# Patient Record
Sex: FEMALE | Race: WHITE | NOT HISPANIC OR LATINO | Employment: OTHER | URBAN - NONMETROPOLITAN AREA
[De-identification: names, ages, dates, MRNs, and addresses within clinical notes are randomized per-mention and may not be internally consistent; named-entity substitution may affect disease eponyms.]

---

## 2017-01-09 ENCOUNTER — TRANSCRIBE ORDERS (OUTPATIENT)
Dept: ADMINISTRATIVE | Facility: HOSPITAL | Age: 82
End: 2017-01-09

## 2017-01-09 DIAGNOSIS — Z12.31 ENCOUNTER FOR SCREENING MAMMOGRAM FOR MALIGNANT NEOPLASM OF BREAST: Primary | ICD-10-CM

## 2017-01-20 ENCOUNTER — OFFICE VISIT (OUTPATIENT)
Dept: CARDIOLOGY | Facility: CLINIC | Age: 82
End: 2017-01-20

## 2017-01-20 VITALS
HEIGHT: 64 IN | DIASTOLIC BLOOD PRESSURE: 72 MMHG | WEIGHT: 170 LBS | BODY MASS INDEX: 29.02 KG/M2 | HEART RATE: 63 BPM | SYSTOLIC BLOOD PRESSURE: 190 MMHG

## 2017-01-20 DIAGNOSIS — I48.0 PAROXYSMAL ATRIAL FIBRILLATION (HCC): Primary | ICD-10-CM

## 2017-01-20 DIAGNOSIS — I10 ESSENTIAL HYPERTENSION: ICD-10-CM

## 2017-01-20 DIAGNOSIS — E78.2 MIXED HYPERLIPIDEMIA: ICD-10-CM

## 2017-01-20 PROCEDURE — 99214 OFFICE O/P EST MOD 30 MIN: CPT | Performed by: INTERNAL MEDICINE

## 2017-01-20 PROCEDURE — 93000 ELECTROCARDIOGRAM COMPLETE: CPT | Performed by: INTERNAL MEDICINE

## 2017-01-20 RX ORDER — ASPIRIN 81 MG/1
81 TABLET ORAL NIGHTLY
COMMUNITY

## 2017-01-20 NOTE — PROGRESS NOTES
Referring Provider: Jung Collins MD    Reason for Follow-up Visit: paroxysmal afib    Subjective .   Chief Complaint:   Chief Complaint   Patient presents with   • Fatigue     3 MO DC FU    • Slow Heart Rate     august - got sent to hospital from [facility]    • Edema     ankle swelling       History of present illness:  Renay Lazo is a 87 y.o. yo female with history of paroxysmal afib, s/p pacemaker. Says she has fallen twice recently resulting in a broken left hip and a broken neck        History  Past Medical History   Diagnosis Date   • Adrenal adenoma    • Anemia    • Atrial fibrillation    • Benign neoplasm of cerebral meninges    • Brain tumor    • CHF (congestive heart failure)    • Dementia    • Disease of thyroid gland    • Hip fracture requiring operative repair      7/2016   • Hyperlipidemia    • Hypertension    • Obstructive sleep apnea    • Osteoarthritis    • Peripheral vascular disease    • Pneumonia    • Seizures    • Sick sinus syndrome      s/p pacemaker per Dr. Puri   • TIA (transient ischemic attack)    ,   Past Surgical History   Procedure Laterality Date   • Brain surgery     • Cholecystectomy     • Hemorrhoidectomy     • Hysterectomy     • Joint replacement     • Cataract extraction     • Tonsillectomy     • Appendectomy     • Total hip arthroplasty     • Pacemaker implantation     • Carotid endarterectomy  02/2011   • Insert / replace / remove pacemaker     ,   Family History   Problem Relation Age of Onset   • Arthritis Mother    • Cancer Mother    • Heart disease Mother    • Arthritis Father    • Arthritis Brother    • Cancer Brother    • Heart disease Brother    • Arthritis Sister    • Heart disease Sister    ,   Social History   Substance Use Topics   • Smoking status: Never Smoker   • Smokeless tobacco: None   • Alcohol use No   ,     Medications  Current Outpatient Prescriptions   Medication Sig Dispense Refill   • amLODIPine (NORVASC) 10 MG tablet Take 10 mg by mouth daily.      • aspirin 81 MG EC tablet Take 81 mg by mouth Daily.     • bumetanide (BUMEX) 1 MG tablet Take 1 mg by mouth daily.     • carvedilol (COREG) 3.125 MG tablet Take 6.25 mg by mouth 2 (Two) Times a Day With Meals.     • DILANTIN 100 MG ER capsule TAKE 4 CAPSULES AT BEDTIME (MUST BE SEEN IN OFFICE) 360 capsule 1   • Ergocalciferol (VITAMIN D2 PO) Take 50,000 Units by mouth 2 (Two) Times a Week.     • ferrous sulfate 325 (65 FE) MG tablet Take 325 mg by mouth daily.     • folic acid (FOLVITE) 1 MG tablet Take 1 mg by mouth daily.     • hydrALAZINE (APRESOLINE) 50 MG tablet Take 50 mg by mouth 4 (four) times a day.     • lisinopril (PRINIVIL,ZESTRIL) 20 MG tablet Take 20 mg by mouth daily.     • potassium chloride (K-DUR,KLOR-CON) 20 MEQ CR tablet Take 20 mEq by mouth 2 (Two) Times a Day.     • pravastatin (PRAVACHOL) 40 MG tablet Take 40 mg by mouth daily.     • acetaminophen (TYLENOL) 325 MG tablet Take 650 mg by mouth.  Tablets every 4 hours     • aluminum hydroxide-magnesium carbonate (GAVISCON)  MG/15ML suspension oral suspension Take 15 mL by mouth 3 (three) times a day as needed.     • citalopram (CeleXA) 10 MG tablet Take 10 mg by mouth Daily.     • fluticasone (FLONASE) 50 MCG/ACT nasal spray 1 spray into each nostril daily. Administer 2 sprays in each nostril for each dose.     • ipratropium (ATROVENT HFA) 17 MCG/ACT inhaler Inhale 1 puff 4 (four) times a day.     • NON FORMULARY Take 50,000 Units by mouth. Drisdol 1 capsule Monday and Thursday.     • ondansetron (ZOFRAN) 4 MG tablet Take 4 mg by mouth 2 (two) times a day as needed for nausea or vomiting.     • polyethylene glycol (MIRALAX) packet Take 17 g by mouth daily.     • Psyllium (METAMUCIL) wafer wafer Take  by mouth daily.     • sennosides-docusate sodium (SENOKOT-S) 8.6-50 MG tablet Take 2 tablets by mouth 2 (Two) Times a Day.     • vitamin E 400 UNIT capsule Take 400 Units by mouth. 2 capsules in morning, 2 capsules at noon, and one capsule  "at night       No current facility-administered medications for this visit.        Allergies:  Chocolate; Ampicillin; Benadryl [diphenhydramine]; Biaxin [clarithromycin]; Capoten [captopril]; Ciprofloxacin; Ciprofloxacin hcl; Codeine; Contrast dye; Darvon [propoxyphene]; Diphenhydramine hcl; Dyazide [triamterene-hctz]; Enablex [darifenacin hydrobromide er]; Gadolinium derivatives; Homatropine; Hydrochlorothiazide w-triamterene; Hydrocodone; Inderal la [propranolol hcl]; Iodine; Levaquin [levofloxacin]; Lortab [hydrocodone-acetaminophen]; Lotrel [amlodipine besy-benazepril hcl]; Macrodantin [nitrofurantoin macrocrystal]; Motrin [ibuprofen]; Macrodantin  [nitrofurantoin]; Other; Percocet [oxycodone-acetaminophen]; Phosphate; Propranolol; Qvar [beclomethasone]; Sulfa antibiotics; Terramycin [oxytetracycline]; and Vesicare [solifenacin]    Review of Systems  Review of Systems   HENT: Negative for nosebleeds.    Cardiovascular: Positive for leg swelling. Negative for chest pain, claudication, dyspnea on exertion, irregular heartbeat, near-syncope, orthopnea, palpitations, paroxysmal nocturnal dyspnea and syncope.   Respiratory: Negative for cough, hemoptysis and shortness of breath.    Musculoskeletal: Positive for joint pain.   Gastrointestinal: Negative for dysphagia, hematemesis and melena.   Genitourinary: Negative for hematuria.       Objective     Physical Exam:  Visit Vitals   • BP (!) 190/72 (BP Location: Left arm, Patient Position: Sitting)   • Pulse 63   • Ht 64\" (162.6 cm)   • Wt 170 lb (77.1 kg)   • BMI 29.18 kg/m2     Physical Exam   Constitutional: She is oriented to person, place, and time. She appears well-developed and well-nourished. No distress.   HENT:   Head: Normocephalic and atraumatic.   Eyes: No scleral icterus.   Neck: Normal range of motion.   Cardiovascular: Normal rate and regular rhythm.    Murmur heard.   Midsystolic murmur is present with a grade of 1/6  at the upper right sternal " border  Pulmonary/Chest: No respiratory distress. She has no wheezes. She has no rales. She exhibits no tenderness.   Abdominal: Soft. Bowel sounds are normal. There is no tenderness. There is no rebound and no guarding.   Musculoskeletal: She exhibits edema.   Neurological: She is alert and oriented to person, place, and time. No cranial nerve deficit.   Skin: Skin is warm. She is diaphoretic. No erythema.   Psychiatric: She has a normal mood and affect.       Results Review:    ECG 12 Lead  Date/Time: 1/20/2017 8:37 AM  Performed by: ANNEMARIE RUTLEDGE  Authorized by: ANNEMARIE RUTLEDGE   Comparison: compared with previous ECG   Similar to previous ECG  Rhythm: sinus rhythm and paced  Rate: normal  ST Segments: ST segments normal  T Waves: T waves normal  QRS axis: normal  Clinical impression: normal ECG  Comments: Atrial paced rhythm            Lab Requisition on 10/15/2016   Component Date Value Ref Range Status   • C. Difficile Toxins by PCR 10/15/2016 Negative  Negative Final   • Stool Culture 10/15/2016 Heavy growth (4+) Normal Fecal Marci   Final    No Salmonella,Shigella,or Vibrio Isolated  No Campylobacter Isolated  No Escherichia Coli 0157 Isolated  No Yersinia Isolated     • SHIGA TOXIN 1 10/15/2016 Negative   Final   • SHIGA TOXIN 2 10/15/2016 Negative   Final       Assessment/Plan   Renay was seen today for fatigue, slow heart rate and edema.    Diagnoses and all orders for this visit:    Paroxysmal atrial fibrillation, maintaining SR    Mixed hyperlipidemia, Patient's statin therapy is followed by PCP.      Essential hypertension, running high. She says she didn't take her morning meds. Needs to have meds adjusted but these have all been changed by Dr. Collins. The meds we have listed are incorrect. She needs to get back in to see him soon

## 2017-01-20 NOTE — MR AVS SNAPSHOT
Renay Lazo   1/20/2017 8:00 AM   Office Visit    Dept Phone:  [phone]   Encounter #:  [id number]    Provider:  Mik Puri MD   Department:  [facility]                Your Full Care Plan              Today's Medication Changes          These changes are accurate as of: 1/20/17  8:53 AM.  If you have any questions, ask your nurse or doctor.               Stop taking medication(s)listed here:     atenolol 25 MG tablet   Commonly known as:  TENORMIN   Stopped by:  Mik Puri MD           CloNIDine 0.1 MG tablet   Commonly known as:  CATAPRES   Stopped by:  Mik Puri MD           diltiaZEM  MG 24 hr capsule   Commonly known as:  CARDIZEM CD   Stopped by:  Mik Puri MD           enoxaparin 40 MG/0.4ML solution syringe   Commonly known as:  LOVENOX   Stopped by:  Mik Puri MD                      Your Updated Medication List          This list is accurate as of: 1/20/17  8:53 AM.  Always use your most recent med list.                acetaminophen 325 MG tablet   Commonly known as:  TYLENOL       aluminum hydroxide-magnesium carbonate  MG/15ML suspension oral suspension   Commonly known as:  GAVISCON       amLODIPine 10 MG tablet   Commonly known as:  NORVASC       aspirin 81 MG EC tablet       bumetanide 1 MG tablet   Commonly known as:  BUMEX       carvedilol 3.125 MG tablet   Commonly known as:  COREG       citalopram 10 MG tablet   Commonly known as:  CeleXA       DILANTIN 100 MG ER capsule   Generic drug:  phenytoin   TAKE 4 CAPSULES AT BEDTIME (MUST BE SEEN IN OFFICE)       ferrous sulfate 325 (65 FE) MG tablet       fluticasone 50 MCG/ACT nasal spray   Commonly known as:  FLONASE       folic acid 1 MG tablet   Commonly known as:  FOLVITE       hydrALAZINE 50 MG tablet   Commonly known as:  APRESOLINE       ipratropium 17 MCG/ACT inhaler   Commonly known as:  ATROVENT HFA       lisinopril 20 MG tablet   Commonly known as:   PRINIVIL,ZESTRIL       METAMUCIL wafer wafer       NON FORMULARY       ondansetron 4 MG tablet   Commonly known as:  ZOFRAN       polyethylene glycol packet   Commonly known as:  MIRALAX       potassium chloride 20 MEQ CR tablet   Commonly known as:  K-DUR,KLOR-CON       pravastatin 40 MG tablet   Commonly known as:  PRAVACHOL       sennosides-docusate sodium 8.6-50 MG tablet   Commonly known as:  SENOKOT-S   Take 2 tablets by mouth 2 (Two) Times a Day.       VITAMIN D2 PO       vitamin E 400 UNIT capsule               We Performed the Following     ECG 12 Lead       You Were Diagnosed With        Codes Comments    Paroxysmal atrial fibrillation    -  Primary ICD-10-CM: I48.0  ICD-9-CM: 427.31     Mixed hyperlipidemia     ICD-10-CM: E78.2  ICD-9-CM: 272.2     Essential hypertension     ICD-10-CM: I10  ICD-9-CM: 401.9       Instructions     None    Patient Instructions History      Upcoming Appointments     Visit Type Date Time Department    FOLLOW UP 2017  8:00 AM [facility]    MAMMO PAD SCREEN EWA BILATERAL 3/20/2017  2:30 PM  PAD MAMMO BIC    FOLLOW UP 1/15/2018 10:15 AM [facility]      MyChart Signup     [facility] allows you to send messages to your doctor, view your test results, renew your prescriptions, schedule appointments, and more. To sign up, go to [employer] and click on the Sign Up Now link in the New User? box. Enter your [employer] Activation Code exactly as it appears below along with the last four digits of your Social Security Number and your Date of Birth () to complete the sign-up process. If you do not sign up before the expiration date, you must request a new code.    R[employer] [facility] Activation Code: [email or web address]  Expires: 2017  4:40 AM    If you have questions, you can email [email or web address] or call [phone] to talk to our [employer] [facility] staff. Remember, [employer] is NOT to be used for urgent needs. For medical emergencies, dial  911.               Other Info from Your Visit           Your Appointments     Mar 20, 2017  2:30 PM CDT   Mammo pad  screen bilat with PAD BIC MAMM 2   [facility] MAMMO [facility] ([place])    [address] [place] [id number] [phone]           Arrive 30 minutes prior to exam time. Do not apply deodorant or talcum/body powders on the day of the exam prior to the appointment. Must bring previous Mammography images/reports. Can take all medications as directed. No breastfeeding for 3 months prior to exam date.            Thong 15, 2018 10:15 AM CST   Follow Up with Mik Puri MD   [facility] (--)    [address] [place] [id number] [phone]           Arrive 15 minutes prior to appointment.              Allergies     Chocolate Allergy Hives    Ampicillin      Benadryl [Diphenhydramine]      Biaxin [Clarithromycin]      Capoten [Captopril]      Patient takes Lisinopril at home    Ciprofloxacin      Ciprofloxacin Hcl      Codeine      Contrast Dye      Darvon [Propoxyphene]      Diphenhydramine Hcl      Dyazide [Triamterene-hctz]      Enablex [Darifenacin Hydrobromide Er]      Gadolinium Derivatives      Homatropine      Hydrochlorothiazide W-triamterene      Hydrocodone      Inderal La [Propranolol Hcl]      Patient takes Atenolol at Home    Iodine      Levaquin [Levofloxacin]      Lortab [Hydrocodone-acetaminophen]      Lotrel [Amlodipine Besy-benazepril Hcl]      Patient takes Amlodipine at home.    Macrodantin [Nitrofurantoin Macrocrystal]      Motrin [Ibuprofen]      Macrodantin  [Nitrofurantoin]      Other      Can now have only unenhanced CAT scan    Percocet [Oxycodone-acetaminophen]      Phosphate      Propranolol      Qvar [Beclomethasone]      Sulfa Antibiotics      Terramycin [Oxytetracycline]      Vesicare [Solifenacin]        Reason for Visit     Fatigue 3 MO DC FU     Slow Heart Rate august - got sent to hospital from [facility]      "Edema ankle swelling      Vital Signs     Blood Pressure Pulse Height Weight Body Mass Index Smoking Status    190/72 (BP Location: Left arm, Patient Position: Sitting) 63 64\" (162.6 cm) 170 lb (77.1 kg) 29.18 kg/m2 Never Smoker      Problems and Diagnoses Noted     Atrial fibrillation (irregular heartbeat)    Mixed hyperlipidemia        High blood pressure          Results     ECG 12 Lead               "

## 2017-01-20 NOTE — LETTER
January 20, 2017     Jung Collins MD  [address] [place] [address]    Patient: Renay Laoz   YOB: 1929   Date of Visit: 1/20/2017       Dear Dr. Karina MD:    Renay Lazo was in my office today. Below is a copy of my note.    Her BP is running very high and her meds need to be adjusted. If you have questions, please do not hesitate to call me. I look forward to following Renay along with you.         Sincerely,        Mik Puri MD        CC: No Recipients    No notes on file

## 2017-03-20 ENCOUNTER — LAB (OUTPATIENT)
Dept: LAB | Facility: HOSPITAL | Age: 82
End: 2017-03-20

## 2017-03-20 ENCOUNTER — HOSPITAL ENCOUNTER (OUTPATIENT)
Dept: MAMMOGRAPHY | Facility: HOSPITAL | Age: 82
Discharge: HOME | End: 2017-03-20
Admitting: INTERNAL MEDICINE

## 2017-03-20 DIAGNOSIS — Z12.31 ENCOUNTER FOR SCREENING MAMMOGRAM FOR MALIGNANT NEOPLASM OF BREAST: ICD-10-CM

## 2017-03-20 DIAGNOSIS — Z79.899 MEDICATION MANAGEMENT: ICD-10-CM

## 2017-03-20 LAB
ALBUMIN SERPL-MCNC: 3.9 G/DL (ref 3.5–5)
ALBUMIN/GLOB SERPL: 1.3 G/DL (ref 1.1–2.5)
ALP SERPL-CCNC: 122 U/L (ref 24–120)
ALT SERPL W P-5'-P-CCNC: 19 U/L (ref 0–54)
ANION GAP SERPL CALCULATED.3IONS-SCNC: 10 MMOL/L (ref 4–13)
AST SERPL-CCNC: 32 U/L (ref 7–45)
BILIRUB SERPL-MCNC: 0.3 MG/DL (ref 0.1–1)
BUN BLD-MCNC: 22 MG/DL (ref 5–21)
BUN/CREAT SERPL: 24.4 (ref 7–25)
CALCIUM SPEC-SCNC: 8.6 MG/DL (ref 8.4–10.4)
CHLORIDE SERPL-SCNC: 106 MMOL/L (ref 98–110)
CO2 SERPL-SCNC: 25 MMOL/L (ref 24–31)
CREAT BLD-MCNC: 0.9 MG/DL (ref 0.5–1.4)
DEPRECATED RDW RBC AUTO: 48.2 FL (ref 40–54)
ERYTHROCYTE [DISTWIDTH] IN BLOOD BY AUTOMATED COUNT: 14.2 % (ref 12–15)
GFR SERPL CREATININE-BSD FRML MDRD: 59 ML/MIN/1.73
GLOBULIN UR ELPH-MCNC: 3 GM/DL
GLUCOSE BLD-MCNC: 95 MG/DL (ref 70–100)
HCT VFR BLD AUTO: 34.1 % (ref 37–47)
HGB BLD-MCNC: 11.4 G/DL (ref 12–16)
MCH RBC QN AUTO: 30.9 PG (ref 28–32)
MCHC RBC AUTO-ENTMCNC: 33.4 G/DL (ref 33–36)
MCV RBC AUTO: 92.4 FL (ref 82–98)
PHENYTOIN SERPL-MCNC: 12.5 MCG/ML (ref 10–20)
PLATELET # BLD AUTO: 251 10*3/MM3 (ref 130–400)
PMV BLD AUTO: 9.5 FL (ref 6–12)
POTASSIUM BLD-SCNC: 4.2 MMOL/L (ref 3.5–5.3)
PROT SERPL-MCNC: 6.9 G/DL (ref 6.3–8.7)
RBC # BLD AUTO: 3.69 10*6/MM3 (ref 4.2–5.4)
SODIUM BLD-SCNC: 141 MMOL/L (ref 135–145)
WBC NRBC COR # BLD: 4.84 10*3/MM3 (ref 4.8–10.8)

## 2017-03-20 PROCEDURE — G0202 SCR MAMMO BI INCL CAD: HCPCS

## 2017-03-20 PROCEDURE — 80053 COMPREHEN METABOLIC PANEL: CPT | Performed by: CLINICAL NURSE SPECIALIST

## 2017-03-20 PROCEDURE — 80185 ASSAY OF PHENYTOIN TOTAL: CPT | Performed by: CLINICAL NURSE SPECIALIST

## 2017-03-20 PROCEDURE — 85027 COMPLETE CBC AUTOMATED: CPT | Performed by: CLINICAL NURSE SPECIALIST

## 2017-03-20 PROCEDURE — 77063 BREAST TOMOSYNTHESIS BI: CPT

## 2017-05-30 RX ORDER — PHENYTOIN SODIUM 100 MG/1
400 CAPSULE, EXTENDED RELEASE ORAL NIGHTLY
Qty: 120 CAPSULE | Refills: 0 | Status: ON HOLD | OUTPATIENT
Start: 2017-05-30 | End: 2017-06-21

## 2017-06-19 ENCOUNTER — APPOINTMENT (OUTPATIENT)
Dept: GENERAL RADIOLOGY | Facility: HOSPITAL | Age: 82
End: 2017-06-19

## 2017-06-19 ENCOUNTER — HOSPITAL ENCOUNTER (INPATIENT)
Facility: HOSPITAL | Age: 82
LOS: 9 days | Discharge: NURSING, REHAB OR LONG-TERM CARE | End: 2017-06-28
Attending: EMERGENCY MEDICINE | Admitting: FAMILY MEDICINE

## 2017-06-19 DIAGNOSIS — S72.141A CLOSED DISPLACED INTERTROCHANTERIC FRACTURE OF RIGHT FEMUR, INITIAL ENCOUNTER (HCC): Primary | ICD-10-CM

## 2017-06-19 DIAGNOSIS — Z78.9 DECREASED ACTIVITIES OF DAILY LIVING (ADL): ICD-10-CM

## 2017-06-19 DIAGNOSIS — Z74.09 IMPAIRED MOBILITY: ICD-10-CM

## 2017-06-19 DIAGNOSIS — R13.12 OROPHARYNGEAL DYSPHAGIA: ICD-10-CM

## 2017-06-19 DIAGNOSIS — S72.011A CLOSED SUBCAPITAL FRACTURE OF NECK OF RIGHT FEMUR, INITIAL ENCOUNTER (HCC): ICD-10-CM

## 2017-06-19 PROBLEM — I10 HYPERTENSION: Status: ACTIVE | Noted: 2017-06-19

## 2017-06-19 PROBLEM — I51.89 DIASTOLIC DYSFUNCTION: Status: ACTIVE | Noted: 2017-06-19

## 2017-06-19 PROBLEM — G47.33 OBSTRUCTIVE SLEEP APNEA: Status: ACTIVE | Noted: 2017-06-19

## 2017-06-19 PROBLEM — J81.1 PULMONARY EDEMA: Status: ACTIVE | Noted: 2017-06-19

## 2017-06-19 PROBLEM — I27.20 PULMONARY HTN (HCC): Status: ACTIVE | Noted: 2017-06-19

## 2017-06-19 PROBLEM — F03.90 DEMENTIA (HCC): Status: ACTIVE | Noted: 2017-06-19

## 2017-06-19 LAB
ABO GROUP BLD: NORMAL
ALBUMIN SERPL-MCNC: 4.1 G/DL (ref 3.5–5)
ALBUMIN/GLOB SERPL: 1.2 G/DL (ref 1.1–2.5)
ALP SERPL-CCNC: 189 U/L (ref 24–120)
ALT SERPL W P-5'-P-CCNC: 34 U/L (ref 0–54)
ANION GAP SERPL CALCULATED.3IONS-SCNC: 14 MMOL/L (ref 4–13)
ANTI-E: NORMAL
ANTI-LITTLE C: NORMAL
APTT PPP: 29.4 SECONDS (ref 24.1–34.8)
AST SERPL-CCNC: 49 U/L (ref 7–45)
BACTERIA UR QL AUTO: ABNORMAL /HPF
BASOPHILS # BLD AUTO: 0.01 10*3/MM3 (ref 0–0.2)
BASOPHILS NFR BLD AUTO: 0.1 % (ref 0–2)
BILIRUB SERPL-MCNC: 0.6 MG/DL (ref 0.1–1)
BILIRUB UR QL STRIP: NEGATIVE
BLD GP AB SCN SERPL QL: POSITIVE
BUN BLD-MCNC: 21 MG/DL (ref 5–21)
BUN/CREAT SERPL: 27.6 (ref 7–25)
CALCIUM SPEC-SCNC: 8.7 MG/DL (ref 8.4–10.4)
CHLORIDE SERPL-SCNC: 107 MMOL/L (ref 98–110)
CK SERPL-CCNC: 49 U/L (ref 0–203)
CLARITY UR: ABNORMAL
CO2 SERPL-SCNC: 23 MMOL/L (ref 24–31)
COLOR UR: YELLOW
CREAT BLD-MCNC: 0.76 MG/DL (ref 0.5–1.4)
D-LACTATE SERPL-SCNC: 1 MMOL/L (ref 0.5–2)
DEPRECATED RDW RBC AUTO: 47.5 FL (ref 40–54)
E AG RBC QL: NEGATIVE
EOSINOPHIL # BLD AUTO: 0.05 10*3/MM3 (ref 0–0.7)
EOSINOPHIL NFR BLD AUTO: 0.4 % (ref 0–4)
ERYTHROCYTE [DISTWIDTH] IN BLOOD BY AUTOMATED COUNT: 13.6 % (ref 12–15)
GFR SERPL CREATININE-BSD FRML MDRD: 72 ML/MIN/1.73
GLOBULIN UR ELPH-MCNC: 3.5 GM/DL
GLUCOSE BLD-MCNC: 182 MG/DL (ref 70–100)
GLUCOSE UR STRIP-MCNC: NEGATIVE MG/DL
HCT VFR BLD AUTO: 38.6 % (ref 37–47)
HGB BLD-MCNC: 12.9 G/DL (ref 12–16)
HGB UR QL STRIP.AUTO: ABNORMAL
HOLD SPECIMEN: NORMAL
HOLD SPECIMEN: NORMAL
HYALINE CASTS UR QL AUTO: ABNORMAL /LPF
IMM GRANULOCYTES # BLD: 0.03 10*3/MM3 (ref 0–0.03)
IMM GRANULOCYTES NFR BLD: 0.2 % (ref 0–5)
INR PPP: 1.05 (ref 0.91–1.09)
KETONES UR QL STRIP: NEGATIVE
LEUKOCYTE ESTERASE UR QL STRIP.AUTO: ABNORMAL
LITTLE C: NEGATIVE
LYMPHOCYTES # BLD AUTO: 0.86 10*3/MM3 (ref 0.72–4.86)
LYMPHOCYTES NFR BLD AUTO: 7.1 % (ref 15–45)
MCH RBC QN AUTO: 32 PG (ref 28–32)
MCHC RBC AUTO-ENTMCNC: 33.4 G/DL (ref 33–36)
MCV RBC AUTO: 95.8 FL (ref 82–98)
MONOCYTES # BLD AUTO: 1.01 10*3/MM3 (ref 0.19–1.3)
MONOCYTES NFR BLD AUTO: 8.3 % (ref 4–12)
NEUTROPHILS # BLD AUTO: 10.2 10*3/MM3 (ref 1.87–8.4)
NEUTROPHILS NFR BLD AUTO: 83.9 % (ref 39–78)
NITRITE UR QL STRIP: NEGATIVE
PH UR STRIP.AUTO: 7 [PH] (ref 5–8)
PHENYTOIN SERPL-MCNC: 16.9 MCG/ML (ref 10–20)
PLATELET # BLD AUTO: 286 10*3/MM3 (ref 130–400)
PMV BLD AUTO: 9.8 FL (ref 6–12)
POTASSIUM BLD-SCNC: 3.7 MMOL/L (ref 3.5–5.3)
PROT SERPL-MCNC: 7.6 G/DL (ref 6.3–8.7)
PROT UR QL STRIP: ABNORMAL
PROTHROMBIN TIME: 14 SECONDS (ref 11.9–14.6)
RBC # BLD AUTO: 4.03 10*6/MM3 (ref 4.2–5.4)
RBC # UR: ABNORMAL /HPF
REF LAB TEST METHOD: ABNORMAL
RH BLD: POSITIVE
SODIUM BLD-SCNC: 144 MMOL/L (ref 135–145)
SP GR UR STRIP: 1.02 (ref 1–1.03)
SQUAMOUS #/AREA URNS HPF: ABNORMAL /HPF
TROPONIN I SERPL-MCNC: 0.23 NG/ML (ref 0–0.03)
UROBILINOGEN UR QL STRIP: ABNORMAL
WBC NRBC COR # BLD: 12.16 10*3/MM3 (ref 4.8–10.8)
WBC UR QL AUTO: ABNORMAL /HPF
WHOLE BLOOD HOLD SPECIMEN: NORMAL
WHOLE BLOOD HOLD SPECIMEN: NORMAL

## 2017-06-19 PROCEDURE — 87086 URINE CULTURE/COLONY COUNT: CPT | Performed by: INTERNAL MEDICINE

## 2017-06-19 PROCEDURE — 86902 BLOOD TYPE ANTIGEN DONOR EA: CPT

## 2017-06-19 PROCEDURE — 82550 ASSAY OF CK (CPK): CPT | Performed by: INTERNAL MEDICINE

## 2017-06-19 PROCEDURE — 86900 BLOOD TYPING SEROLOGIC ABO: CPT | Performed by: EMERGENCY MEDICINE

## 2017-06-19 PROCEDURE — 81001 URINALYSIS AUTO W/SCOPE: CPT | Performed by: INTERNAL MEDICINE

## 2017-06-19 PROCEDURE — 80185 ASSAY OF PHENYTOIN TOTAL: CPT | Performed by: INTERNAL MEDICINE

## 2017-06-19 PROCEDURE — 83605 ASSAY OF LACTIC ACID: CPT | Performed by: EMERGENCY MEDICINE

## 2017-06-19 PROCEDURE — 86870 RBC ANTIBODY IDENTIFICATION: CPT | Performed by: EMERGENCY MEDICINE

## 2017-06-19 PROCEDURE — 25010000002 HYDRALAZINE PER 20 MG: Performed by: INTERNAL MEDICINE

## 2017-06-19 PROCEDURE — 94799 UNLISTED PULMONARY SVC/PX: CPT

## 2017-06-19 PROCEDURE — 86920 COMPATIBILITY TEST SPIN: CPT

## 2017-06-19 PROCEDURE — 87186 SC STD MICRODIL/AGAR DIL: CPT | Performed by: INTERNAL MEDICINE

## 2017-06-19 PROCEDURE — 86850 RBC ANTIBODY SCREEN: CPT | Performed by: EMERGENCY MEDICINE

## 2017-06-19 PROCEDURE — 87077 CULTURE AEROBIC IDENTIFY: CPT | Performed by: INTERNAL MEDICINE

## 2017-06-19 PROCEDURE — 86922 COMPATIBILITY TEST ANTIGLOB: CPT

## 2017-06-19 PROCEDURE — 85610 PROTHROMBIN TIME: CPT | Performed by: EMERGENCY MEDICINE

## 2017-06-19 PROCEDURE — 84484 ASSAY OF TROPONIN QUANT: CPT | Performed by: INTERNAL MEDICINE

## 2017-06-19 PROCEDURE — 25010000002 HYDROMORPHONE PER 4 MG: Performed by: INTERNAL MEDICINE

## 2017-06-19 PROCEDURE — 85730 THROMBOPLASTIN TIME PARTIAL: CPT | Performed by: EMERGENCY MEDICINE

## 2017-06-19 PROCEDURE — 85025 COMPLETE CBC W/AUTO DIFF WBC: CPT | Performed by: EMERGENCY MEDICINE

## 2017-06-19 PROCEDURE — 25010000002 FUROSEMIDE PER 20 MG: Performed by: FAMILY MEDICINE

## 2017-06-19 PROCEDURE — 71010 HC CHEST PA OR AP: CPT

## 2017-06-19 PROCEDURE — 93010 ELECTROCARDIOGRAM REPORT: CPT | Performed by: INTERNAL MEDICINE

## 2017-06-19 PROCEDURE — 86905 BLOOD TYPING RBC ANTIGENS: CPT | Performed by: EMERGENCY MEDICINE

## 2017-06-19 PROCEDURE — 93005 ELECTROCARDIOGRAM TRACING: CPT | Performed by: EMERGENCY MEDICINE

## 2017-06-19 PROCEDURE — 25010000002 FENTANYL CITRATE (PF) 100 MCG/2ML SOLUTION: Performed by: EMERGENCY MEDICINE

## 2017-06-19 PROCEDURE — 86901 BLOOD TYPING SEROLOGIC RH(D): CPT | Performed by: EMERGENCY MEDICINE

## 2017-06-19 PROCEDURE — 73502 X-RAY EXAM HIP UNI 2-3 VIEWS: CPT

## 2017-06-19 PROCEDURE — 99285 EMERGENCY DEPT VISIT HI MDM: CPT

## 2017-06-19 PROCEDURE — 80053 COMPREHEN METABOLIC PANEL: CPT | Performed by: EMERGENCY MEDICINE

## 2017-06-19 RX ORDER — POTASSIUM CHLORIDE 750 MG/1
20 CAPSULE, EXTENDED RELEASE ORAL 2 TIMES DAILY
Status: DISCONTINUED | OUTPATIENT
Start: 2017-06-19 | End: 2017-06-22

## 2017-06-19 RX ORDER — ONDANSETRON 2 MG/ML
4 INJECTION INTRAMUSCULAR; INTRAVENOUS EVERY 6 HOURS PRN
Status: DISCONTINUED | OUTPATIENT
Start: 2017-06-19 | End: 2017-06-25 | Stop reason: SDUPTHER

## 2017-06-19 RX ORDER — CARVEDILOL 6.25 MG/1
6.25 TABLET ORAL 2 TIMES DAILY WITH MEALS
Status: DISCONTINUED | OUTPATIENT
Start: 2017-06-19 | End: 2017-06-21

## 2017-06-19 RX ORDER — FENTANYL CITRATE 50 UG/ML
50 INJECTION, SOLUTION INTRAMUSCULAR; INTRAVENOUS ONCE
Status: COMPLETED | OUTPATIENT
Start: 2017-06-19 | End: 2017-06-19

## 2017-06-19 RX ORDER — HYDRALAZINE HYDROCHLORIDE 20 MG/ML
20 INJECTION INTRAMUSCULAR; INTRAVENOUS EVERY 4 HOURS PRN
Status: DISCONTINUED | OUTPATIENT
Start: 2017-06-19 | End: 2017-06-28 | Stop reason: HOSPADM

## 2017-06-19 RX ORDER — HYDRALAZINE HYDROCHLORIDE 50 MG/1
50 TABLET, FILM COATED ORAL 4 TIMES DAILY
Status: DISCONTINUED | OUTPATIENT
Start: 2017-06-19 | End: 2017-06-21

## 2017-06-19 RX ORDER — FUROSEMIDE 10 MG/ML
40 INJECTION INTRAMUSCULAR; INTRAVENOUS ONCE
Status: COMPLETED | OUTPATIENT
Start: 2017-06-19 | End: 2017-06-19

## 2017-06-19 RX ORDER — HYDRALAZINE HYDROCHLORIDE 20 MG/ML
10 INJECTION INTRAMUSCULAR; INTRAVENOUS EVERY 4 HOURS PRN
Status: DISCONTINUED | OUTPATIENT
Start: 2017-06-19 | End: 2017-06-19

## 2017-06-19 RX ORDER — PHENYTOIN SODIUM 100 MG/1
400 CAPSULE, EXTENDED RELEASE ORAL NIGHTLY
Status: DISCONTINUED | OUTPATIENT
Start: 2017-06-19 | End: 2017-06-28 | Stop reason: HOSPADM

## 2017-06-19 RX ORDER — PANTOPRAZOLE SODIUM 40 MG/10ML
40 INJECTION, POWDER, LYOPHILIZED, FOR SOLUTION INTRAVENOUS
Status: DISCONTINUED | OUTPATIENT
Start: 2017-06-19 | End: 2017-06-25 | Stop reason: CLARIF

## 2017-06-19 RX ORDER — POLYETHYLENE GLYCOL 3350 17 G/17G
17 POWDER, FOR SOLUTION ORAL DAILY
Status: DISCONTINUED | OUTPATIENT
Start: 2017-06-19 | End: 2017-06-24

## 2017-06-19 RX ORDER — LISINOPRIL 20 MG/1
20 TABLET ORAL DAILY
Status: DISCONTINUED | OUTPATIENT
Start: 2017-06-19 | End: 2017-06-24

## 2017-06-19 RX ORDER — LABETALOL HYDROCHLORIDE 5 MG/ML
20 INJECTION, SOLUTION INTRAVENOUS
Status: DISCONTINUED | OUTPATIENT
Start: 2017-06-19 | End: 2017-06-28 | Stop reason: HOSPADM

## 2017-06-19 RX ORDER — AMLODIPINE BESYLATE 10 MG/1
10 TABLET ORAL DAILY
Status: DISCONTINUED | OUTPATIENT
Start: 2017-06-19 | End: 2017-06-28 | Stop reason: HOSPADM

## 2017-06-19 RX ORDER — SENNA AND DOCUSATE SODIUM 50; 8.6 MG/1; MG/1
2 TABLET, FILM COATED ORAL 2 TIMES DAILY
Status: DISCONTINUED | OUTPATIENT
Start: 2017-06-19 | End: 2017-06-26

## 2017-06-19 RX ADMIN — HYDRALAZINE HYDROCHLORIDE 50 MG: 50 TABLET ORAL at 17:26

## 2017-06-19 RX ADMIN — HYDROMORPHONE HYDROCHLORIDE 1 MG: 1 INJECTION, SOLUTION INTRAMUSCULAR; INTRAVENOUS; SUBCUTANEOUS at 06:18

## 2017-06-19 RX ADMIN — SODIUM CHLORIDE 10 MG/HR: 9 INJECTION, SOLUTION INTRAVENOUS at 16:52

## 2017-06-19 RX ADMIN — CARVEDILOL 6.25 MG: 6.25 TABLET, FILM COATED ORAL at 17:26

## 2017-06-19 RX ADMIN — FUROSEMIDE 40 MG: 10 INJECTION, SOLUTION INTRAMUSCULAR; INTRAVENOUS at 08:35

## 2017-06-19 RX ADMIN — HYDRALAZINE HYDROCHLORIDE 50 MG: 50 TABLET ORAL at 20:30

## 2017-06-19 RX ADMIN — DOCUSATE SODIUM -SENNOSIDES 2 TABLET: 50; 8.6 TABLET, COATED ORAL at 17:26

## 2017-06-19 RX ADMIN — POTASSIUM CHLORIDE 20 MEQ: 750 CAPSULE, EXTENDED RELEASE ORAL at 17:26

## 2017-06-19 RX ADMIN — POLYETHYLENE GLYCOL 3350 17 G: 17 POWDER, FOR SOLUTION ORAL at 17:26

## 2017-06-19 RX ADMIN — HYDROMORPHONE HYDROCHLORIDE 0.5 MG: 1 INJECTION, SOLUTION INTRAMUSCULAR; INTRAVENOUS; SUBCUTANEOUS at 10:13

## 2017-06-19 RX ADMIN — LABETALOL HYDROCHLORIDE 20 MG: 5 INJECTION, SOLUTION INTRAVENOUS at 08:35

## 2017-06-19 RX ADMIN — FUROSEMIDE 40 MG: 10 INJECTION, SOLUTION INTRAMUSCULAR; INTRAVENOUS at 20:30

## 2017-06-19 RX ADMIN — LISINOPRIL 20 MG: 20 TABLET ORAL at 17:26

## 2017-06-19 RX ADMIN — HYDROMORPHONE HYDROCHLORIDE 1 MG: 1 INJECTION, SOLUTION INTRAMUSCULAR; INTRAVENOUS; SUBCUTANEOUS at 23:30

## 2017-06-19 RX ADMIN — SODIUM CHLORIDE 5 MG/HR: 9 INJECTION, SOLUTION INTRAVENOUS at 10:36

## 2017-06-19 RX ADMIN — AMLODIPINE BESYLATE 10 MG: 10 TABLET ORAL at 17:26

## 2017-06-19 RX ADMIN — HYDRALAZINE HYDROCHLORIDE 10 MG: 20 INJECTION INTRAMUSCULAR; INTRAVENOUS at 06:18

## 2017-06-19 RX ADMIN — HYDROMORPHONE HYDROCHLORIDE 0.5 MG: 1 INJECTION, SOLUTION INTRAMUSCULAR; INTRAVENOUS; SUBCUTANEOUS at 16:52

## 2017-06-19 RX ADMIN — FENTANYL CITRATE 50 MCG: 50 INJECTION, SOLUTION INTRAMUSCULAR; INTRAVENOUS at 03:26

## 2017-06-19 RX ADMIN — FENTANYL CITRATE 50 MCG: 50 INJECTION INTRAMUSCULAR; INTRAVENOUS at 05:25

## 2017-06-19 RX ADMIN — PANTOPRAZOLE SODIUM 40 MG: 40 INJECTION, POWDER, FOR SOLUTION INTRAVENOUS at 06:18

## 2017-06-19 RX ADMIN — PHENYTOIN SODIUM 400 MG: 100 CAPSULE, EXTENDED RELEASE ORAL at 20:30

## 2017-06-19 NOTE — ED NOTES
Bed change completed; pericare completed; +2 pedal pulses present after bed change and pericare; pt being transferred to ICU9     Kathy John RN  06/19/17 [id number]

## 2017-06-19 NOTE — CONSULTS
Inpatient Consult to Orthopedic Surgery  Consult performed by: FIFI MARTIN  Consult ordered by: АЛЕКСАНДР ZAVALA  Assessment/Recommendations: Orthopaedic Inpatient Consultation    NAME:  Renay Lazo   : 1929  MRN: [id number]    2017  1:26 AM    Requesting Physician: Александр Zavala MD    CHIEF COMPLAINT:  right hip pain      HISTORY OF PRESENT ILLNESS:   The patient is a 88 y.o. Female status post fall at home this morning transported to ED with hip pain, unable to ambulate.  She is a poor historian.  Pain is located in the right hip, rated a 3/5, dull and constant, worse with movement, better with rest and medication.  There are no associated symptoms.  She has a prior left hip fracture in the past.    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 Puri  No date: TIA (transient ischemic attack)    Past Surgical History:    Past Surgical History:  No date: APPENDECTOMY  No date: BRAIN SURGERY  2011: CAROTID ENDARTERECTOMY  No date: CATARACT EXTRACTION  No date: CHOLECYSTECTOMY  No date: HEMORRHOIDECTOMY  No date: INSERT / REPLACE / REMOVE PACEMAKER  No date: JOINT REPLACEMENT  No date: PACEMAKER IMPLANTATION  No date: TONSILLECTOMY  No date: TOTAL HIP ARTHROPLASTY    Current Medications:   Prior to Admission medications :  Medication acetaminophen (TYLENOL) 325 MG tablet, Sig Take 650 mg by mouth.  Tablets every 4 hours, Start Date , End Date ,  Taking? , Authorizing Provider Historical MD Raheel    Medication aluminum hydroxide-magnesium carbonate (GAVISCON)  MG/15ML suspension oral suspension, Sig Take 15 mL by mouth 3 (three) times a day as needed., Start Date , End Date , Taking? , Authorizing Provider Brinda Pickering MD    Medication amLODIPine (NORVASC) 10 MG tablet, Sig Take 10 mg by mouth daily., Start Date , End Date , Taking? , Authorizing Provider Brinda Pickering MD    Medication aspirin 81 MG EC tablet, Sig Take 81 mg by mouth Daily., Start Date , End Date , Taking? , Authorizing Provider Historical MD Raheel    Medication bumetanide (BUMEX) 1 MG tablet, Sig Take 1 mg by mouth daily., Start Date , End Date , Taking? , Authorizing Provider Historical MD Raheel    Medication carvedilol (COREG) 3.125 MG tablet, Sig Take 6.25 mg by mouth 2 (Two) Times a Day With Meals., Start Date , End Date , Taking? , Authorizing Provider Historical MD Raheel    Medication citalopram (CeleXA) 10 MG tablet, Sig Take 10 mg by mouth Daily., Start Date , End Date , Taking? , Authorizing Provider Brinda Pickering MD    Medication DILANTIN 100 MG ER capsule, Sig Take 4 capsules by mouth Every Night. NO REFILLS UNTIL SEEN IN THE OFFICE, Start Date 5/30/17, End Date , Taking? , Authorizing Provider BETO De La Garza    Medication Ergocalciferol (VITAMIN D2 PO), Sig Take 50,000 Units by mouth 2 (Two) Times a Week., Start Date , End Date , Taking? , Authorizing Provider Brinda Pickering MD    Medication ferrous sulfate 325 (65 FE) MG tablet, Sig Take 325 mg by mouth daily., Start Date , End Date , Taking? , Authorizing Provider Brinda Pickering MD    Medication fluticasone (FLONASE) 50 MCG/ACT nasal spray, Sig 1 spray into each nostril daily. Administer 2 sprays in each nostril for each dose., Start Date , End Date , Taking? , Authorizing Provider Brinda Pickering MD    Medication folic acid (FOLVITE) 1 MG tablet, Sig Take 1  mg by mouth daily., Start Date , End Date , Taking? , Authorizing Provider Historical MD Raheel    Medication hydrALAZINE (APRESOLINE) 50 MG tablet, Sig Take 50 mg by mouth 4 (four) times a day., Start Date , End Date , Taking? , Authorizing Provider Historical MD Raheel    Medication ipratropium (ATROVENT HFA) 17 MCG/ACT inhaler, Sig Inhale 1 puff 4 (four) times a day., Start Date , End Date , Taking? , Authorizing Provider Historical MD Raheel    Medication lisinopril (PRINIVIL,ZESTRIL) 20 MG tablet, Sig Take 20 mg by mouth daily., Start Date , End Date , Taking? , Authorizing Provider Historical MD Raheel    Medication NON FORMULARY, Sig Take 50,000 Units by mouth. Drisdol 1 capsule Monday and Thursday., Start Date , End Date , Taking? , Authorizing Provider Historical MD Raheel    Medication ondansetron (ZOFRAN) 4 MG tablet, Sig Take 4 mg by mouth 2 (two) times a day as needed for nausea or vomiting., Start Date , End Date , Taking? , Authorizing Provider Historical MD Raheel    Medication polyethylene glycol (MIRALAX) packet, Sig Take 17 g by mouth daily., Start Date , End Date , Taking? , Authorizing Provider Brinda Pickering MD    Medication potassium chloride (K-DUR,KLOR-CON) 20 MEQ CR tablet, Sig Take 20 mEq by mouth 2 (Two) Times a Day., Start Date , End Date , Taking? , Authorizing Provider Brinda Pickering MD    Medication pravastatin (PRAVACHOL) 40 MG tablet, Sig Take 40 mg by mouth daily., Start Date , End Date , Taking? , Authorizing Provider Brinda Pickering MD    Medication Psyllium (METAMUCIL) wafer wafer, Sig Take  by mouth daily., Start Date , End Date , Taking? , Authorizing Provider Brinda Pickering MD    Medication sennosides-docusate sodium (SENOKOT-S) 8.6-50 MG tablet, Sig Take 2 tablets by mouth 2 (Two) Times a Day., Start Date 9/29/16, End Date , Taking? , Authorizing Provider BETO Lee    Medication vitamin E 400 UNIT capsule, Sig Take 400  Units by mouth. 2 capsules in morning, 2 capsules at noon, and one capsule at night, Start Date , End Date , Taking? , Authorizing Provider Historical Provider, MD        Allergies:  Chocolate; Ampicillin; Benadryl (diphenhydramine); Biaxin (clarithromycin); Capoten (captopril); Ciprofloxacin; Ciprofloxacin hcl; Codeine; Contrast dye; Darvon (propoxyphene); Diphenhydramine hcl; Dyazide (triamterene-hctz); Enablex (darifenacin hydrobromide er); Gadolinium derivatives; Homatropine; Hydrochlorothiazide w-triamterene; Hydrocodone; Inderal la (propranolol hcl); Iodine; Levaquin (levofloxacin); Lortab (hydrocodone-acetaminophen); Lotrel (amlodipine besy-benazepril hcl); Macrodantin (nitrofurantoin macrocrystal); Motrin (ibuprofen); Macrodantin  (nitrofurantoin); Other; Percocet (oxycodone-acetaminophen); Phosphate; Propranolol; Qvar (beclomethasone); Sulfa antibiotics; Terramycin (oxytetracycline); and Vesicare (solifenacin)    Social History:   Social History    Marital status:              Spouse name:                       Years of education:                 Number of children:               Occupational History  Occupation          Employer            Comment               Retired                                     Social History Main Topics    Smoking status: Never Smoker                                                                   Smokeless status: Not on file                       Alcohol use: No              Drug use: No              Sexual activity: Yes                  Other Topics            Concern    None on file    Social History Narrative    None on file        Family History:   Review of patient's family history indicates:    Arthritis                      Mother                    Cancer                         Mother                    Heart disease                  Mother                    Arthritis                      Father                    Arthritis                      Brother                    Cancer                         Brother                   Heart disease                  Brother                   Arthritis                      Sister                    Heart disease                  Sister                      REVIEW OF SYSTEMS:  14 point review of systems has been reviewed from the patient's emergency room visit, reviewed with the patient on today's date with no new changes.    PHYSICAL EXAM:      Physical Examination:  Vitals: -----------------------------------------------------            06/19/17 06/19/17 06/19/17 06/19/17               0625      0635      0650      0720     -----------------------------------------------------   BP:     (!) 201/75  160/60    168/62    170/56     BP Location:                                           Patient Position:                                           Pulse:      72        66        68        70       Resp:       17        16        16        16       Temp:                                              TempSrc:                                           SpO2:      91%       95%       95%       95%       Weight:                                           -----------------------------------------------------  General:  Appears stated age, no distress.  Orientation:  Alert and oriented to time, place, and person.  Mood and Affect:  Cooperative and pleasant.  Gait:  Resting comfortably in bed.  Cardiovascular:  Symmetric 1-2 plus pulses in upper and lower extremities.  Lymph:  No cervical or inguinal lymphadenopathy noted.  Sensation:  Grossly intact to light touch.  DTR:  Normal, no pathologic reflexes.  Coordination/balance:  Normal    Musculoskeletal:  Right upper extremity exam:  There is no tenderness to palpation about the shoulder, elbow, wrist or hand.  Unrestricted full function motion is present.  Stability is normal with provocative tests, 5/5 strength, and  skin is normal.      Left upper extremity exam:  There is no tenderness to palpation about the shoulder, elbow, wrist or hand.  Unrestricted full function motion is present.  Stability is normal with provocative tests, 5/5 strength, and skin is normal.     Right lower extremity exam:  Tenderness right hip, externally rotated/shortened, refuses motion/stability/strength, skin intact    Left lower extremity exam:  There is no tenderness to palpation about the hip, knee, ankle or foot.  Unrestricted full function motion is present.  Stability is normal with provocative tests, 5/5 strength, and skin is normal.      DATA:    CBC with Differential:  Lab Results       Component                Value               Date                       WBC                      12.16 (H)           06/19/2017                 RBC                      4.03 (L)            06/19/2017                 HGB                      12.9                06/19/2017                 HCT                      38.6                06/19/2017                 PLT                      286                 06/19/2017                 MCV                      95.8                06/19/2017                 MCH                      32.0                06/19/2017                 MCHC                     33.4                06/19/2017                 RDW                      13.6                06/19/2017                 MONOSABS                 1.01                06/19/2017                 LYMPHSABS                0.86                06/19/2017                 EOSABS                   0.05                06/19/2017                 BASOSABS                 0.01                06/19/2017            CMP:  Lab Results       Component                Value               Date                       NA                       144                 06/19/2017                 K                        3.7                 06/19/2017                 CL                       107                  06/19/2017                 CO2                      23.0 (L)            06/19/2017                 BUN                      21                  06/19/2017                 CREATININE               0.76                06/19/2017                 GLUCOSE                  182 (H)             06/19/2017                 CALCIUM                  8.7                 06/19/2017                 BILITOT                  0.6                 06/19/2017                 ALKPHOS                  189 (H)             06/19/2017                 AST                      49 (H)              06/19/2017                 ALT                      34                  06/19/2017            BMP:  Lab Results       Component                Value               Date                       NA                       144                 06/19/2017                 K                        3.7                 06/19/2017                 CL                       107                 06/19/2017                 CO2                      23.0 (L)            06/19/2017                 BUN                      21                  06/19/2017                 CREATININE               0.76                06/19/2017                 CALCIUM                  8.7                 06/19/2017                 GLUCOSE                  182 (H)             06/19/2017              ----------------------------------------------------------------------------------------------------------------------  I have reviewed the radiology images above and agree with the findings dictated below    Radiology: Imaging Results (last 24 hours)     Procedure Component Value Units Date/Time    XR Hip With or Without Pelvis 2 - 3 View Right ([id number]) Collected:    06/19/17 0719     Updated:  06/19/17 0719    Narrative:       HISTORY: Fall     Three views.     AP and lateral projection radiographs of the right hip were obtained as  well as pelvis radiograph.     Comparison was made to the  09/20/2016 pelvis exam.     There is deformity and foreshortening of the right femoral neck  compatible with femoral neck fracture, basocervical type.     Changes from left femoral neck pinning observed.     No pelvic bone fracture notified.     There is diffuse osteoporosis.     There are vascular calcifications.       Impression:       1. Right femoral neck fracture.  This report was finalized on  by Dr. Mick Kimbrough MD.    XR Chest 1 View ([id number]) Collected:  06/19/17 0720     Updated:  06/19/17 0720    Narrative:       EXAMINATION: XR CHEST 1 VW-. 6/19/2017 7:04 AM CDT     CHEST, ONE VIEW:     HISTORY: Fall. Hip fracture.     COMPARISON: 10/04/2016     A single frontal chest radiograph was obtained.     FINDINGS:  There are diffuse bilateral perihilar interstitial infiltrates with  pulmonary vascular congestion with Kerley B lines. There is  cardiomegaly. These findings are compatible with interstitial pulmonary  edema and congestive heart failure.     Permanent cardiac pacemaker noted.     Calcified granuloma identified in the left lung base.                                  Impression:       1. 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     This report was finalized on  by Dr. Mick Kimbrough MD.        ----------------------------------------------------------------------------------------------------------------------  Assessment: Acute traumatic displaced subcapital fracture of the neck of the right femur, initial encounter for closed fracture    Plan:  1) to OR for right hip hemiarthroplasty today if ok with medicine service - we discussed the risks, benefits, and alternatives and the patient wishes to proceed  2) Admit postop for pain control, PT/OT  3) DVT prophylaxis x 3 weeks postop    Electronically signed by Jeremiah Zaidi MD on 6/19/2017 at 7:46 AM

## 2017-06-19 NOTE — H&P
[facility] Medicine Services  HISTORY AND PHYSICAL    Date of Admission: 6/19/2017  Primary Care Physician: Jung Collins MD    Subjective     Chief Complaint: Hip pain-patient seen at 3:49 AM on 6/19/17  History is obtained from the patient, ER physician, old records.  There is no family at bedside    History of Present Illness  The patient is an 88-year-old white female with dementia and hypertension.  She was last seen here in October 2016 with UTI.  She has a history of C-spine fracture.    The patient is an extraordinarily poor historian.  She is vague, and hard of hearing, and perseverates with answering her questions, or answering them inappropriately altogether.    The best story I can get is this: She was at home and fell while trying to grab hold of a chair and a book shelf around 6:30 in the evening.  She laid there for a couple of hours, as her  and other concerned people eventually got her up.  Presumably, she landed on her backside.  She says she did not hit her head.  Right leg was externally rotated when she arrived to the ER, and she is complaining of 10 out of 10 pain despite IV fentanyl.    It is difficult if not impossible to ascertain what her other symptoms are.  I asked her if she had chest pain and she said yes but then she indicated she did not she said that she feels a little short of breath.  Thus, she is a vague, inconsistent historian who is extremely hard of hearing.  There is no family at bedside currently    Blood pressure has been very high at greater than 200 mmHg, and Cardene drip is forthcoming.  Chest x-ray shows pulmonary edema which is new compared to old CXR.    Review of Systems   Otherwise complete ROS reviewed and negative except as mentioned in the HPI.      Past Medical History:   Past Medical History:   Diagnosis Date   • Adrenal adenoma    • Anemia    • Atrial fibrillation    • Benign neoplasm of cerebral meninges    •  Brain tumor    • CHF (congestive heart failure)    • Dementia    • Disease of thyroid gland    • Hip fracture requiring operative repair     7/2016   • Hyperlipidemia    • Hypertension    • Obstructive sleep apnea    • Osteoarthritis    • Peripheral vascular disease    • Pneumonia    • Seizures    • Sick sinus syndrome     s/p pacemaker per Dr. Puri   • TIA (transient ischemic attack)    Klebsiella UTI, history of dehydration, history of C2-C3 fracture    Past Surgical History:  Past Surgical History:   Procedure Laterality Date   • APPENDECTOMY     • BRAIN SURGERY     • CAROTID ENDARTERECTOMY  02/2011   • CATARACT EXTRACTION     • CHOLECYSTECTOMY     • HEMORRHOIDECTOMY     • INSERT / REPLACE / REMOVE PACEMAKER     • JOINT REPLACEMENT     • PACEMAKER IMPLANTATION     • TONSILLECTOMY     • TOTAL HIP ARTHROPLASTY         Social History:  reports that she has never smoked. She does not have any smokeless tobacco history on file. She reports that she does not drink alcohol or use illicit drugs.    Family History: family history includes Arthritis in her brother, father, mother, and sister; Cancer in her brother and mother; Heart disease in her brother, mother, and sister.   Unable to obtain from the patient personally-when I asked about her family history, she proceeded to tell me about her nephew who is a nurse.    Allergies:  Allergies   Allergen Reactions   • Chocolate Hives   • Ampicillin    • Benadryl [Diphenhydramine]    • Biaxin [Clarithromycin]    • Capoten [Captopril]      Patient takes Lisinopril at home   • Ciprofloxacin    • Ciprofloxacin Hcl    • Codeine    • Contrast Dye    • Darvon [Propoxyphene]    • Diphenhydramine Hcl    • Dyazide [Triamterene-Hctz]    • Enablex [Darifenacin Hydrobromide Er]    • Gadolinium Derivatives    • Homatropine    • Hydrochlorothiazide W-Triamterene    • Hydrocodone    • Inderal La [Propranolol Hcl]      Patient takes Atenolol at Home   • Iodine    • Levaquin [Levofloxacin]     • Lortab [Hydrocodone-Acetaminophen]    • Lotrel [Amlodipine Besy-Benazepril Hcl]      Patient takes Amlodipine at home.   • Macrodantin [Nitrofurantoin Macrocrystal]    • Motrin [Ibuprofen]    • Macrodantin  [Nitrofurantoin]    • Other      Can now have only unenhanced CAT scan   • Percocet [Oxycodone-Acetaminophen]    • Phosphate    • Propranolol    • Qvar [Beclomethasone]    • Sulfa Antibiotics    • Terramycin [Oxytetracycline]    • Vesicare [Solifenacin]        Medications:  Prior to Admission medications    Medication Sig Start Date End Date Taking? Authorizing Provider   acetaminophen (TYLENOL) 325 MG tablet Take 650 mg by mouth.  Tablets every 4 hours    Historical Provider, MD   aluminum hydroxide-magnesium carbonate (GAVISCON)  MG/15ML suspension oral suspension Take 15 mL by mouth 3 (three) times a day as needed.    Historical Provider, MD   amLODIPine (NORVASC) 10 MG tablet Take 10 mg by mouth daily.    Historical Provider, MD   aspirin 81 MG EC tablet Take 81 mg by mouth Daily.    Historical Provider, MD   bumetanide (BUMEX) 1 MG tablet Take 1 mg by mouth daily.    Historical Provider, MD   carvedilol (COREG) 3.125 MG tablet Take 6.25 mg by mouth 2 (Two) Times a Day With Meals.    Historical Provider, MD   citalopram (CeleXA) 10 MG tablet Take 10 mg by mouth Daily.    Historical Provider, MD   DILANTIN 100 MG ER capsule Take 4 capsules by mouth Every Night. NO REFILLS UNTIL SEEN IN THE OFFICE 5/30/17   BETO De La Garza   Ergocalciferol (VITAMIN D2 PO) Take 50,000 Units by mouth 2 (Two) Times a Week.    Historical Provider, MD   ferrous sulfate 325 (65 FE) MG tablet Take 325 mg by mouth daily.    Historical Provider, MD   fluticasone (FLONASE) 50 MCG/ACT nasal spray 1 spray into each nostril daily. Administer 2 sprays in each nostril for each dose.    Historical Provider, MD   folic acid (FOLVITE) 1 MG tablet Take 1 mg by mouth daily.    Historical Provider, MD   hydrALAZINE (APRESOLINE) 50  MG tablet Take 50 mg by mouth 4 (four) times a day.    Historical Provider, MD   ipratropium (ATROVENT HFA) 17 MCG/ACT inhaler Inhale 1 puff 4 (four) times a day.    Historical Provider, MD   lisinopril (PRINIVIL,ZESTRIL) 20 MG tablet Take 20 mg by mouth daily.    Historical Provider, MD   NON FORMULARY Take 50,000 Units by mouth. Drisdol 1 capsule Monday and Thursday.    Historical Provider, MD   ondansetron (ZOFRAN) 4 MG tablet Take 4 mg by mouth 2 (two) times a day as needed for nausea or vomiting.    Historical Provider, MD   polyethylene glycol (MIRALAX) packet Take 17 g by mouth daily.    Historical Provider, MD   potassium chloride (K-DUR,KLOR-CON) 20 MEQ CR tablet Take 20 mEq by mouth 2 (Two) Times a Day.    Historical Provider, MD   pravastatin (PRAVACHOL) 40 MG tablet Take 40 mg by mouth daily.    Historical Provider, MD   Psyllium (METAMUCIL) wafer wafer Take  by mouth daily.    Historical Provider, MD   sennosides-docusate sodium (SENOKOT-S) 8.6-50 MG tablet Take 2 tablets by mouth 2 (Two) Times a Day. 9/29/16   BETO Lee   vitamin E 400 UNIT capsule Take 400 Units by mouth. 2 capsules in morning, 2 capsules at noon, and one capsule at night    Historical Provider, MD       Objective     Vital Signs: BP (!) 194/56  Pulse 68  Temp 98.2 °F (36.8 °C) (Oral)   Resp 10  SpO2 95%  Physical Exam  Gen.: Patient awake, alert, no distress  HEENT: No scleral icterus, decreased hearing, moist oral mucosa  Neck: No JVD or retractions-patient is laying flat, which limits JVD assessment  Heart: S1-S2, no murmur, gallop, or rub  Lungs: Clear to auscultation bilaterally, normal respiratory effort  Abdomen: Soft, nontender and nondistended, positive bowel sounds, no mass  Extremities: No edema  Derm: No rashes or jaundice  Musculoskeletal: Good hand  strength bilaterally.  Right leg is externally rotated  Psychiatric: Patient is oriented to self, place, month, year.  She thinks it still June 18, on  Sunday    Results Reviewed:  Lab Results (last 24 hours)     Procedure Component Value Units Date/Time    Light Blue Top [[id number]] Collected:  06/19/17 0140    Specimen:  Blood Updated:  06/19/17 0301     Extra Tube hold for add-on      Auto resulted       Green Top (Gel) [[id number]] Collected:  06/19/17 0140    Specimen:  Blood Updated:  06/19/17 0301     Extra Tube Hold for add-ons.      Auto resulted.       Lavender Top [[id number]] Collected:  06/19/17 0140    Specimen:  Blood Updated:  06/19/17 0301     Extra Tube hold for add-on      Auto resulted       Red Top [[id number]] Collected:  06/19/17 0140    Specimen:  Blood Updated:  06/19/17 0301     Extra Tube Hold for add-ons.      Auto resulted.       [place] Draw [[id number]] Collected:  06/19/17 0140    Specimen:  Blood Updated:  06/19/17 0301    Narrative:       The following orders were created for panel order [place] Draw.  Procedure                               Abnormality         Status                     ---------                               -----------         ------                     Light Blue Top[[id number]]                                    Final result               Green Top (Gel)[[id number]]                                  Final result               Lavender Top[[id number]]                                     Final result               Red Top[[id number]]                                          Final result               Green Top (No Gel)[[id number]]                                                            Please view results for these tests on the individual orders.    CBC & Differential [[id number]] Collected:  06/19/17 0140    Specimen:  Blood Updated:  06/19/17 0323    Narrative:       The following orders were created for panel order CBC & Differential.  Procedure                               Abnormality         Status                     ---------                               -----------         ------                     CBC Auto  Differential[[id number]]        Abnormal            Final result                 Please view results for these tests on the individual orders.    CBC Auto Differential [[id number]]  (Abnormal) Collected:  06/19/17 0140    Specimen:  Blood Updated:  06/19/17 0323     WBC 12.16 (H) 10*3/mm3      RBC 4.03 (L) 10*6/mm3      Hemoglobin 12.9 g/dL      Hematocrit 38.6 %      MCV 95.8 fL      MCH 32.0 pg      MCHC 33.4 g/dL      RDW 13.6 %      RDW-SD 47.5 fl      MPV 9.8 fL      Platelets 286 10*3/mm3      Neutrophil % 83.9 (H) %      Lymphocyte % 7.1 (L) %      Monocyte % 8.3 %      Eosinophil % 0.4 %      Basophil % 0.1 %      Immature Grans % 0.2 %      Neutrophils, Absolute 10.20 (H) 10*3/mm3      Lymphocytes, Absolute 0.86 10*3/mm3      Monocytes, Absolute 1.01 10*3/mm3      Eosinophils, Absolute 0.05 10*3/mm3      Basophils, Absolute 0.01 10*3/mm3      Immature Grans, Absolute 0.03 10*3/mm3     aPTT [[id number]]  (Normal) Collected:  06/19/17 0140    Specimen:  Blood Updated:  06/19/17 0326     PTT 29.4 seconds     Protime-INR [[id number]]  (Normal) Collected:  06/19/17 0140    Specimen:  Blood Updated:  06/19/17 0326     Protime 14.0 Seconds      INR 1.05    Comprehensive Metabolic Panel [[id number]]  (Abnormal) Collected:  06/19/17 0140    Specimen:  Blood Updated:  06/19/17 0331     Glucose 182 (H) mg/dL      BUN 21 mg/dL      Creatinine 0.76 mg/dL      Sodium 144 mmol/L      Potassium 3.7 mmol/L      Chloride 107 mmol/L      CO2 23.0 (L) mmol/L      Calcium 8.7 mg/dL      Total Protein 7.6 g/dL      Albumin 4.10 g/dL      ALT (SGPT) 34 U/L      AST (SGOT) 49 (H) U/L      Alkaline Phosphatase 189 (H) U/L      Total Bilirubin 0.6 mg/dL      eGFR Non African Amer 72 mL/min/1.73      Globulin 3.5 gm/dL      A/G Ratio 1.2 g/dL      BUN/Creatinine Ratio 27.6 (H)     Anion Gap 14.0 (H) mmol/L     Narrative:       The MDRD GFR formula is only valid for adults with stable renal function between ages 18 and 70.        Imaging  Results (last 24 hours)     Procedure Component Value Units Date/Time    XR Chest 1 View [[id number]] Updated:  06/19/17 0155    XR Hip With or Without Pelvis 2 - 3 View Right [[id number]] Updated:  06/19/17 0156      Chest x-ray-per my informal read, pulmonary edema, not seen on previous chest x-ray  Hip x-ray-per my informal read, right hip fracture  EKG-sinus rhythm at 66 bpm, with pacemaker spikes, no acute changes       Assessment / Plan     Assessment & Plan     1.right hip fracture, status post fall-the details are not clear to us.  She states she did not hit her head, but she is an unreliable historian.  We will thus check CT of her head and cervical spine.  Keep her NPO, with orthopedic surgery consult.  IV pain medicines as needed.    Will also expand workup to include troponin, CK (because of her fall and prolonged stay on the floor), and urinalysis (given her history of UTI)    2.hypertension-uncontrolled.  Blood pressure sufficiently high to start her on a Cardene drip at this time.  However, she has a reported allergy to Norvasc-try hydralazine for now, and monitor closely in the unit     3.history of pacemaker-monitor on telemetry    4.seizure disorder-check Dilantin level, clarify home medicines.  Start home medications as soon as possible, seizure precautions.    5.questionable pulmonary edema-the patient indicates she feels more short of breath but clinically does not appear to be this way as she is not struggling to breathe and is satting appropriately.  She does not look fluid overloaded on exam.  We will only use judicious fluids if/when needed.  A CK is forthcoming-if she has rhabdomyolysis, she will need fluid hydration.    Kera Zavala MD   06/19/17   3:56 AM

## 2017-06-19 NOTE — ED NOTES
DR CARL AWARE OF PATIENTS BLOOD PRESSURE, NO NEW ORDERS NOTED AT THIS TIME.      Madina Bernal RN  06/19/17 0438

## 2017-06-19 NOTE — PROGRESS NOTES
[facility] Medicine Services  INPATIENT PROGRESS NOTE    Length of Stay: 0  Date of Admission: 6/19/2017  Primary Care Physician: Jung Collins MD    Subjective     Chief Complaint:   Right hip pain    HPI     The patient's blood pressure has been difficult to control and remains elevated.  Chest x-ray suggests pulmonary edema and physical exam certainly suggests the same.  The patient's antihypertensive regimen has been intensified and if her blood pressure comes under better control and she diuresis adequately she should be clear for surgery this evening.  Previous echocardiograms were reviewed.  The patient has a history of diastolic dysfunction and pulmonary hypertension and her hypertensive and tachycardic response to her hip fracture is responsible for her elevated blood pressure and resultant pulmonary edema.      Review of Systems     Unable to obtain secondary to dementia  All pertinent negatives and positives are as above. All other systems have been reviewed and are negative unless otherwise stated.     Objective    Temp:  [97.1 °F (36.2 °C)-98.2 °F (36.8 °C)] 97.1 °F (36.2 °C)  Heart Rate:  [60-99] 71  Resp:  [10-17] 16  BP: (160-236)/(52-92) 190/69     Specimen:  Blood Updated:  06/19/17 0323     WBC 12.16 (H) 10*3/mm3      RBC 4.03 (L) 10*6/mm3      Hemoglobin 12.9 g/dL      Hematocrit 38.6 %      MCV 95.8 fL      MCH 32.0 pg      MCHC 33.4 g/dL      RDW 13.6 %      RDW-SD 47.5 fl      MPV 9.8 fL      Platelets 286 10*3/mm3      Neutrophil % 83.9 (H) %      Lymphocyte % 7.1 (L) %      Monocyte % 8.3 %      Eosinophil % 0.4 %      Basophil % 0.1 %      Immature Grans % 0.2 %      Neutrophils, Absolute 10.20 (H) 10*3/mm3      Lymphocytes, Absolute 0.86 10*3/mm3      Monocytes, Absolute 1.01 10*3/mm3      Eosinophils, Absolute 0.05 10*3/mm3      Basophils, Absolute 0.01 10*3/mm3      Immature Grans, Absolute 0.03 10*3/mm3     aPTT [[id number]]  (Normal) Collected:   06/19/17 0140    Specimen:  Blood Updated:  06/19/17 0326     PTT 29.4 seconds     Protime-INR [[id number]]  (Normal) Collected:  06/19/17 0140    Specimen:  Blood Updated:  06/19/17 0326     Protime 14.0 Seconds      INR 1.05    Comprehensive Metabolic Panel [[id number]]  (Abnormal) Collected:  06/19/17 0140    Specimen:  Blood Updated:  06/19/17 0331     Glucose 182 (H) mg/dL      BUN 21 mg/dL      Creatinine 0.76 mg/dL      Sodium 144 mmol/L      Potassium 3.7 mmol/L      Chloride 107 mmol/L      CO2 23.0 (L) mmol/L      Calcium 8.7 mg/dL      Total Protein 7.6 g/dL      Albumin 4.10 g/dL      ALT (SGPT) 34 U/L      AST (SGOT) 49 (H) U/L      Alkaline Phosphatase 189 (H) U/L      Total Bilirubin 0.6 mg/dL      eGFR Non African Amer 72 mL/min/1.73      Globulin 3.5 gm/dL      A/G Ratio 1.2 g/dL      BUN/Creatinine Ratio 27.6 (H)     Anion Gap 14.0 (H) mmol/L     Narrative:       The MDRD GFR formula is only valid for adults with stable renal function between ages 18 and 70.    Lactic Acid, Plasma [[id number]]  (Normal) Collected:  06/19/17 0355    Specimen:  Blood Updated:  06/19/17 0416     Lactate 1.0 mmol/L     Urine Culture [[id number]] Collected:  06/19/17 0625    Specimen:  Urine from Urine, Catheter Updated:  06/19/17 0628    CK [[id number]]  (Normal) Collected:  06/19/17 0602    Specimen:  Blood Updated:  06/19/17 0632     Creatine Kinase 49 U/L     Phenytoin Level, Total [[id number]]  (Normal) Collected:  06/19/17 0602    Specimen:  Blood Updated:  06/19/17 0635     Phenytoin Level 16.9 mcg/mL     Troponin [[id number]]  (Abnormal) Collected:  06/19/17 0602    Specimen:  Blood Updated:  06/19/17 0644     Troponin I 0.233 (H) ng/mL     Urinalysis With / Culture If Indicated [[id number]]  (Abnormal) Collected:  06/19/17 0625    Specimen:  Urine from Urine, Catheter Updated:  06/19/17 7865     Color, UA Yellow     Appearance, UA Cloudy (A)     pH, UA 7.0     Specific Gravity, UA 1.017     Glucose, UA Negative      Ketones, UA Negative     Bilirubin, UA Negative     Blood, UA Small (1+) (A)     Protein, UA >=300 mg/dL (3+) (A)     Leuk Esterase, UA Small (1+) (A)     Nitrite, UA Negative     Urobilinogen, UA 1.0 E.U./dL    Urinalysis, Microscopic Only [[id number]]  (Abnormal) Collected:  06/19/17 0625    Specimen:  Urine from Urine, Catheter Updated:  06/19/17 0736     RBC, UA 0-2 (A) /HPF      WBC, UA 13-20 (A) /HPF      Bacteria, UA 4+ (A) /HPF      Squamous Epithelial Cells, UA 3-6 (A) /HPF      Hyaline Casts, UA None Seen /LPF      Methodology Manual Light Microscopy          Imaging Results (last 24 hours)     Procedure Component Value Units Date/Time    XR Hip With or Without Pelvis 2 - 3 View Right [[id number]] Collected:  06/19/17 0719     Updated:  06/19/17 0814    Narrative:       HISTORY: Fall     Three views.     AP and lateral projection radiographs of the right hip were obtained as  well as pelvis radiograph.     Comparison was made to the 09/20/2016 pelvis exam.     There is deformity and foreshortening of the right femoral neck  compatible with femoral neck fracture, basocervical type.     Changes from left femoral neck pinning observed.     No pelvic bone fracture notified.     There is diffuse osteoporosis.     There are vascular calcifications.       Impression:       1. Right femoral neck fracture.  This report was finalized on 06/19/2017 08:11 by Dr. Mick Kimbrough MD.    XR Chest 1 View [[id number]] Collected:  06/19/17 0720     Updated:  06/19/17 0814    Narrative:       EXAMINATION: XR CHEST 1 VW-. 6/19/2017 7:04 AM CDT     CHEST, ONE VIEW:     HISTORY: Fall. Hip fracture.     COMPARISON: 10/04/2016     A single frontal chest radiograph was obtained.     FINDINGS:  There are diffuse bilateral perihilar interstitial infiltrates with  pulmonary vascular congestion with Kerley B lines. There is  cardiomegaly. These findings are compatible with interstitial pulmonary  edema and congestive heart failure.      Permanent cardiac pacemaker noted.     Calcified granuloma identified in the left lung base.                                  Impression:       1. 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     This report was finalized on 06/19/2017 08:11 by Dr. Mick Kimbrough MD.             Intake/Output Summary (Last 24 hours) at 06/19/17 0854  Last data filed at 06/19/17 0800   Gross per 24 hour   Intake                0 ml   Output              225 ml   Net             -225 ml       Physical Exam   Constitutional: She appears well-developed. She is cooperative. She has a sickly appearance. Nasal cannula in place.   HENT:   Head: Normocephalic and atraumatic.   Right Ear: Decreased hearing is noted.   Left Ear: Decreased hearing is noted.   Mouth/Throat: Oropharynx is clear and moist.   Eyes: Conjunctivae are normal. No scleral icterus.   Neck: Neck supple. No JVD present. No tracheal deviation present. No thyromegaly present.   Cardiovascular: Normal rate, regular rhythm and normal heart sounds.    Pulmonary/Chest: She has rales (in dependent areas bilaterally).   Abdominal: Soft. Bowel sounds are normal. She exhibits no mass. There is no tenderness.   Musculoskeletal: Normal range of motion. She exhibits edema (1/4 BLE).        Right hip: She exhibits tenderness and bony tenderness.   Right hip externally rotated and shortened   Neurological: She is alert.   Skin: Skin is warm and dry. No rash noted.   Psychiatric: Her affect is blunt. Her speech is delayed. She is slowed. Cognition and memory are impaired.         Results Review:  I have reviewed the labs, radiology results, and diagnostic studies since my last progress note and made treatment changes reflective of the results.   I have reviewed the current medications.    Assessment/Plan     Hospital Problem List     * (Principal)Closed subcapital fracture of neck of right femur    Pulmonary edema    Diastolic  dysfunction    Pulmonary HTN    Paroxysmal atrial fibrillation    Obstructive sleep apnea    Dementia    Hypertension          PLAN:  Lasix 40 mg IV ×1 dose  Improve control of blood pressure  Labetalol 20 mg IV every 2 hours when necessary  Hydralazine 20 mg IV every 2 hours when necessary  Blood pressures do not come under control with above regimen, the patient will require Cardizem drip.  Hold surgery until blood pressure improved and patient is diuresed.  Hopefully she can be cleared for surgery tomorrow if her pressure is controlled and pulmonary edema is resolved.    Kwaku Frey,    06/19/17   8:54 AM     Approximately 60 minutes of critical care time were spent managing the patient exclusive of billable procedures.

## 2017-06-19 NOTE — PLAN OF CARE
Problem: Patient Care Overview (Adult)  Goal: Plan of Care Review  Outcome: Ongoing (interventions implemented as appropriate)    06/19/17 0640   Coping/Psychosocial Response Interventions   Plan Of Care Reviewed With patient   Patient Care Overview   Progress no change   Outcome Evaluation   Outcome Summary/Follow up Plan Patient blood pressure increased to 200s after being moved to the unit. PRN BP and pain medication given. Blood pressure lowered and patient reported pain relief. Taveras placed for strict bedrest and possible surgery.       Goal: Adult Individualization and Mutuality  Outcome: Ongoing (interventions implemented as appropriate)  Goal: Discharge Needs Assessment  Outcome: Ongoing (interventions implemented as appropriate)

## 2017-06-19 NOTE — PLAN OF CARE
Problem: Skin Integrity Impairment, Risk/Actual (Adult)  Goal: Identify Related Risk Factors and Signs and Symptoms  Outcome: Ongoing (interventions implemented as appropriate)  Goal: Skin Integrity/Wound Healing  Outcome: Ongoing (interventions implemented as appropriate)    Problem: Pain, Acute (Adult)  Goal: Identify Related Risk Factors and Signs and Symptoms  Outcome: Ongoing (interventions implemented as appropriate)  Goal: Acceptable Pain Control/Comfort Level  Outcome: Ongoing (interventions implemented as appropriate)    Problem: Fall Risk (Adult)  Goal: Identify Related Risk Factors and Signs and Symptoms  Outcome: Ongoing (interventions implemented as appropriate)  Goal: Absence of Falls  Outcome: Ongoing (interventions implemented as appropriate)

## 2017-06-19 NOTE — ED PROVIDER NOTES
Subjective   HPI Comments: Pt fell around 1830 evening.  She was unable to get up off the floor and figured she would just lay there until she could.  When patient was unable to get up on her own and her  called EMS.  Patient was noted to have a foreign body of the right leg, with shortening and external rotation.  She was given sentinel in route.      History provided by:  Patient and EMS personnel      Review of Systems   Constitutional: Negative for activity change, fatigue and fever.   HENT: Negative for congestion, ear pain, facial swelling, postnasal drip, rhinorrhea, sinus pressure, sore throat and trouble swallowing.    Eyes: Negative for photophobia and redness.   Respiratory: Negative for chest tightness, shortness of breath and wheezing.    Cardiovascular: Negative for chest pain and palpitations.   Gastrointestinal: Negative for abdominal distention, abdominal pain, diarrhea, nausea and vomiting.   Genitourinary: Negative for difficulty urinating, dysuria and flank pain.   Musculoskeletal: Positive for gait problem. Negative for back pain and neck pain.        Right leg and hip pain   Skin: Negative for color change and wound.   Neurological: Negative for dizziness, speech difficulty, weakness, light-headedness and headaches.   Psychiatric/Behavioral: Negative for agitation, confusion, self-injury and suicidal ideas.       Past Medical History:   Diagnosis Date   • Adrenal adenoma    • Anemia    • Atrial fibrillation    • Benign neoplasm of cerebral meninges    • Brain tumor    • CHF (congestive heart failure)    • Dementia    • Disease of thyroid gland    • Hip fracture requiring operative repair     7/2016   • Hyperlipidemia    • Hypertension    • Obstructive sleep apnea    • Osteoarthritis    • Peripheral vascular disease    • Pneumonia    • Seizures    • Sick sinus syndrome     s/p pacemaker per Dr. Puri   • TIA (transient ischemic attack)        Allergies   Allergen Reactions   • Chocolate  Hives   • Ampicillin    • Benadryl [Diphenhydramine]    • Biaxin [Clarithromycin]    • Capoten [Captopril]      Patient takes Lisinopril at home   • Ciprofloxacin    • Ciprofloxacin Hcl    • Codeine    • Contrast Dye    • Darvon [Propoxyphene]    • Diphenhydramine Hcl    • Dyazide [Triamterene-Hctz]    • Enablex [Darifenacin Hydrobromide Er]    • Gadolinium Derivatives    • Homatropine    • Hydrochlorothiazide W-Triamterene    • Hydrocodone    • Inderal La [Propranolol Hcl]      Patient takes Atenolol at Home   • Iodine    • Levaquin [Levofloxacin]    • Lortab [Hydrocodone-Acetaminophen]    • Lotrel [Amlodipine Besy-Benazepril Hcl]      Patient takes Amlodipine at home.   • Macrodantin [Nitrofurantoin Macrocrystal]    • Motrin [Ibuprofen]    • Macrodantin  [Nitrofurantoin]    • Other      Can now have only unenhanced CAT scan   • Percocet [Oxycodone-Acetaminophen]    • Phosphate    • Propranolol    • Qvar [Beclomethasone]    • Sulfa Antibiotics    • Terramycin [Oxytetracycline]    • Vesicare [Solifenacin]        Past Surgical History:   Procedure Laterality Date   • APPENDECTOMY     • BRAIN SURGERY     • CAROTID ENDARTERECTOMY  02/2011   • CATARACT EXTRACTION     • CHOLECYSTECTOMY     • HEMORRHOIDECTOMY     • INSERT / REPLACE / REMOVE PACEMAKER     • JOINT REPLACEMENT     • PACEMAKER IMPLANTATION     • TONSILLECTOMY     • TOTAL HIP ARTHROPLASTY         Family History   Problem Relation Age of Onset   • Arthritis Mother    • Cancer Mother    • Heart disease Mother    • Arthritis Father    • Arthritis Brother    • Cancer Brother    • Heart disease Brother    • Arthritis Sister    • Heart disease Sister        Social History     Social History   • Marital status:      Spouse name: N/A   • Number of children: N/A   • Years of education: N/A     Occupational History   • Retired      Social History Main Topics   • Smoking status: Never Smoker   • Smokeless tobacco: None   • Alcohol use No   • Drug use: No   •  Sexual activity: Yes     Other Topics Concern   • None     Social History Narrative   • None           Objective   Physical Exam   Constitutional: She is oriented to person, place, and time. She appears well-developed and well-nourished. No distress.   HENT:   Head: Normocephalic and atraumatic.   Mouth/Throat: No oropharyngeal exudate.   Eyes: EOM are normal. Pupils are equal, round, and reactive to light.   Neck: Normal range of motion. Neck supple. No JVD present.   Cardiovascular: Normal rate, regular rhythm, normal heart sounds and intact distal pulses.  Exam reveals no gallop and no friction rub.    No murmur heard.  Pulmonary/Chest: Effort normal and breath sounds normal. No respiratory distress. She has no wheezes. She has no rales.   Abdominal: Soft. Bowel sounds are normal. She exhibits no distension and no mass. There is no tenderness. There is no rebound and no guarding.   Musculoskeletal: She exhibits tenderness and deformity.        Right hip: She exhibits tenderness, bony tenderness and deformity. She exhibits normal range of motion and no swelling.        Right ankle: She exhibits normal pulse.   Neurological: She is alert and oriented to person, place, and time. No cranial nerve deficit.   Skin: Skin is warm and dry. No rash noted.   Psychiatric: She has a normal mood and affect. Her behavior is normal. Judgment and thought content normal.   Nursing note and vitals reviewed.      Procedures         ED Course  ED Course   Value Comment By Time   ECG 12 Lead Pacer rhythm with a rate of 66, no ST elevations or depressions, normal axis Jeremiah Villa MD 06/19 0304                  MDM  Number of Diagnoses or Management Options  Closed displaced intertrochanteric fracture of right femur, initial encounter: new and requires workup  Diagnosis management comments: Spoak with Dr. Zaidi 0258 and he would like hospitalist to admit.    0310:  Dr. Zavala will accept for further eval.         Amount and/or  Complexity of Data Reviewed  Clinical lab tests: ordered and reviewed  Tests in the radiology section of CPT®: ordered and reviewed  Tests in the medicine section of CPT®: ordered and reviewed  Discuss the patient with other providers: yes  Independent visualization of images, tracings, or specimens: yes    Risk of Complications, Morbidity, and/or Mortality  Presenting problems: high  Diagnostic procedures: moderate  Management options: high    Patient Progress  Patient progress: stable      Final diagnoses:   Closed displaced intertrochanteric fracture of right femur, initial encounter            Jeremiah Villa MD  06/19/17 3315

## 2017-06-19 NOTE — PLAN OF CARE
Problem: Skin Integrity Impairment, Risk/Actual (Adult)  Goal: Identify Related Risk Factors and Signs and Symptoms  Outcome: Ongoing (interventions implemented as appropriate)    Problem: Pain, Acute (Adult)  Goal: Identify Related Risk Factors and Signs and Symptoms  Outcome: Ongoing (interventions implemented as appropriate)  Goal: Acceptable Pain Control/Comfort Level  Outcome: Ongoing (interventions implemented as appropriate)    Problem: Fall Risk (Adult)  Goal: Identify Related Risk Factors and Signs and Symptoms  Outcome: Ongoing (interventions implemented as appropriate)  Goal: Absence of Falls  Outcome: Ongoing (interventions implemented as appropriate)    Problem: Patient Care Overview (Adult)  Goal: Plan of Care Review  Outcome: Ongoing (interventions implemented as appropriate)    06/19/17 1527   Coping/Psychosocial Response Interventions   Plan Of Care Reviewed With patient   Patient Care Overview   Progress no change   Outcome Evaluation   Outcome Summary/Follow up Plan patient controlled with dilaudid. Diuresis with lasix. surgery planned for tomorrow.

## 2017-06-19 NOTE — PROGRESS NOTES
Discharge Planning Assessment  [facility]     Patient Name: Renay Lazo  MRN: [id number]  Today's Date: 6/19/2017    Admit Date: 6/19/2017          Discharge Needs Assessment       06/19/17 6731    Living Environment    Lives With spouse    Living Arrangements house    Transportation Available ambulance;car;family or friend will provide    Living Environment    Provides Primary Care For no one, unable/limited ability to care for self    Primary Care Provided By spouse/significant other    Quality Of Family Relationships supportive    Discharge Needs Assessment    Concerns To Be Addressed adjustment to diagnosis/illness concerns;care coordination/care conferences;discharge planning concerns    Readmission Within The Last 30 Days no previous admission in last 30 days    Anticipated Changes Related to Illness inability to care for self    Equipment Currently Used at Home hospital bed;walker, rolling    Discharge Facility/Level Of Care Needs nursing facility, skilled;home with home health    Current Discharge Risk chronically ill;dependent with mobility/activities of daily living    Discharge Planning Comments Spoke with patient's spouse at bedside this morning.  Patient was residing at home upon admission.  Patient had been to [facility] in the past.  Spouse states he promised patient she would never go back to a nursing facility.  Spouse was brainstorming how he could take patient home and states he will hire caregivers if needed.  Patient will be having surgery.  Explained to spouse patient may possibly be nonweighbearing on her injured leg for a specificied period of time.  Spouse was tearful at one point but did state he had a good support system that would assist him with caring for patient at home.  Spouse requested follow up in a day or two to assess plan at that time.  Spouse stated he planned to start calling extended family.            Discharge Plan     None        Discharge Placement     No information  found                Demographic Summary     None            Functional Status     None            Psychosocial     None            Abuse/Neglect     None            Legal     None            Substance Abuse     None            Patient Forms     None          QUETA AvitiaW

## 2017-06-20 ENCOUNTER — ANESTHESIA (OUTPATIENT)
Dept: PERIOP | Facility: HOSPITAL | Age: 82
End: 2017-06-20

## 2017-06-20 ENCOUNTER — APPOINTMENT (OUTPATIENT)
Dept: CARDIOLOGY | Facility: HOSPITAL | Age: 82
End: 2017-06-20
Attending: FAMILY MEDICINE

## 2017-06-20 ENCOUNTER — APPOINTMENT (OUTPATIENT)
Dept: GENERAL RADIOLOGY | Facility: HOSPITAL | Age: 82
End: 2017-06-20

## 2017-06-20 ENCOUNTER — ANESTHESIA EVENT (OUTPATIENT)
Dept: PERIOP | Facility: HOSPITAL | Age: 82
End: 2017-06-20

## 2017-06-20 LAB
ALBUMIN SERPL-MCNC: 3.8 G/DL (ref 3.5–5)
ALBUMIN/GLOB SERPL: 1.1 G/DL (ref 1.1–2.5)
ALP SERPL-CCNC: 110 U/L (ref 24–120)
ALT SERPL W P-5'-P-CCNC: 28 U/L (ref 0–54)
ANION GAP SERPL CALCULATED.3IONS-SCNC: 12 MMOL/L (ref 4–13)
AST SERPL-CCNC: 33 U/L (ref 7–45)
BILIRUB SERPL-MCNC: 0.8 MG/DL (ref 0.1–1)
BUN BLD-MCNC: 17 MG/DL (ref 5–21)
BUN/CREAT SERPL: 20.2 (ref 7–25)
CALCIUM SPEC-SCNC: 8.7 MG/DL (ref 8.4–10.4)
CHLORIDE SERPL-SCNC: 100 MMOL/L (ref 98–110)
CO2 SERPL-SCNC: 28 MMOL/L (ref 24–31)
CREAT BLD-MCNC: 0.84 MG/DL (ref 0.5–1.4)
DEPRECATED RDW RBC AUTO: 47.4 FL (ref 40–54)
ERYTHROCYTE [DISTWIDTH] IN BLOOD BY AUTOMATED COUNT: 13.7 % (ref 12–15)
GFR SERPL CREATININE-BSD FRML MDRD: 64 ML/MIN/1.73
GLOBULIN UR ELPH-MCNC: 3.4 GM/DL
GLUCOSE BLD-MCNC: 131 MG/DL (ref 70–100)
HCT VFR BLD AUTO: 36.8 % (ref 37–47)
HGB BLD-MCNC: 12.3 G/DL (ref 12–16)
MCH RBC QN AUTO: 31.7 PG (ref 28–32)
MCHC RBC AUTO-ENTMCNC: 33.4 G/DL (ref 33–36)
MCV RBC AUTO: 94.8 FL (ref 82–98)
PLATELET # BLD AUTO: 227 10*3/MM3 (ref 130–400)
PMV BLD AUTO: 9.7 FL (ref 6–12)
POTASSIUM BLD-SCNC: 3.3 MMOL/L (ref 3.5–5.3)
PROT SERPL-MCNC: 7.2 G/DL (ref 6.3–8.7)
RBC # BLD AUTO: 3.88 10*6/MM3 (ref 4.2–5.4)
SODIUM BLD-SCNC: 140 MMOL/L (ref 135–145)
WBC NRBC COR # BLD: 9.4 10*3/MM3 (ref 4.8–10.8)

## 2017-06-20 PROCEDURE — 25010000002 CEFTRIAXONE: Performed by: FAMILY MEDICINE

## 2017-06-20 PROCEDURE — C1776 JOINT DEVICE (IMPLANTABLE): HCPCS | Performed by: ORTHOPAEDIC SURGERY

## 2017-06-20 PROCEDURE — C1713 ANCHOR/SCREW BN/BN,TIS/BN: HCPCS | Performed by: ORTHOPAEDIC SURGERY

## 2017-06-20 PROCEDURE — C8929 TTE W OR WO FOL WCON,DOPPLER: HCPCS

## 2017-06-20 PROCEDURE — 25010000002 NEOSTIGMINE PER 0.5 MG: Performed by: NURSE ANESTHETIST, CERTIFIED REGISTERED

## 2017-06-20 PROCEDURE — C1776 JOINT DEVICE (IMPLANTABLE): HCPCS

## 2017-06-20 PROCEDURE — 85027 COMPLETE CBC AUTOMATED: CPT | Performed by: FAMILY MEDICINE

## 2017-06-20 PROCEDURE — 25010000002 SUCCINYLCHOLINE PER 20 MG: Performed by: NURSE ANESTHETIST, CERTIFIED REGISTERED

## 2017-06-20 PROCEDURE — 0SRR0JZ REPLACEMENT OF RIGHT HIP JOINT, FEMORAL SURFACE WITH SYNTHETIC SUBSTITUTE, OPEN APPROACH: ICD-10-PCS | Performed by: ORTHOPAEDIC SURGERY

## 2017-06-20 PROCEDURE — 25010000002 PERFLUTREN (DEFINITY) 8.476 MG IN SODIUM CHLORIDE 10 ML INJECTION: Performed by: FAMILY MEDICINE

## 2017-06-20 PROCEDURE — 80053 COMPREHEN METABOLIC PANEL: CPT | Performed by: FAMILY MEDICINE

## 2017-06-20 PROCEDURE — 93306 TTE W/DOPPLER COMPLETE: CPT | Performed by: INTERNAL MEDICINE

## 2017-06-20 PROCEDURE — 25010000002 HYDROMORPHONE PER 4 MG: Performed by: INTERNAL MEDICINE

## 2017-06-20 PROCEDURE — 71010 HC CHEST PA OR AP: CPT

## 2017-06-20 PROCEDURE — L1830 KO IMMOB CANVAS LONG PRE OTS: HCPCS | Performed by: ORTHOPAEDIC SURGERY

## 2017-06-20 PROCEDURE — 25010000002 FUROSEMIDE PER 20 MG: Performed by: FAMILY MEDICINE

## 2017-06-20 DEVICE — MODULAR CATHCART FRACTURE HEAD HIP BALL 46MM OD +5MM: Type: IMPLANTABLE DEVICE | Site: HIP | Status: FUNCTIONAL

## 2017-06-20 DEVICE — CORAIL HIP SYSTEM CEMENTLESS FEMORAL STEM 12/14 AMT 135 DEGREES KA SIZE 15 HA COATED STANDARD COLLAR
Type: IMPLANTABLE DEVICE | Site: HIP | Status: FUNCTIONAL
Brand: CORAIL

## 2017-06-20 DEVICE — MODULAR CATHCART TAPERED SPACER 12/14 TAPER +0MM: Type: IMPLANTABLE DEVICE | Site: HIP | Status: FUNCTIONAL

## 2017-06-20 RX ORDER — METOCLOPRAMIDE HYDROCHLORIDE 5 MG/ML
5 INJECTION INTRAMUSCULAR; INTRAVENOUS
Status: DISCONTINUED | OUTPATIENT
Start: 2017-06-20 | End: 2017-06-20 | Stop reason: HOSPADM

## 2017-06-20 RX ORDER — ONDANSETRON 2 MG/ML
4 INJECTION INTRAMUSCULAR; INTRAVENOUS AS NEEDED
Status: DISCONTINUED | OUTPATIENT
Start: 2017-06-20 | End: 2017-06-20 | Stop reason: HOSPADM

## 2017-06-20 RX ORDER — SODIUM CHLORIDE 0.9 % (FLUSH) 0.9 %
1-10 SYRINGE (ML) INJECTION AS NEEDED
Status: DISCONTINUED | OUTPATIENT
Start: 2017-06-20 | End: 2017-06-28 | Stop reason: HOSPADM

## 2017-06-20 RX ORDER — CLINDAMYCIN PHOSPHATE 900 MG/50ML
900 INJECTION INTRAVENOUS ONCE
Status: COMPLETED | OUTPATIENT
Start: 2017-06-20 | End: 2017-06-20

## 2017-06-20 RX ORDER — MEPERIDINE HYDROCHLORIDE 25 MG/ML
12.5 INJECTION INTRAMUSCULAR; INTRAVENOUS; SUBCUTANEOUS
Status: DISCONTINUED | OUTPATIENT
Start: 2017-06-20 | End: 2017-06-20 | Stop reason: HOSPADM

## 2017-06-20 RX ORDER — CLINDAMYCIN PHOSPHATE 900 MG/50ML
900 INJECTION INTRAVENOUS EVERY 8 HOURS
Status: COMPLETED | OUTPATIENT
Start: 2017-06-21 | End: 2017-06-22

## 2017-06-20 RX ORDER — NALOXONE HCL 0.4 MG/ML
0.04 VIAL (ML) INJECTION AS NEEDED
Status: DISCONTINUED | OUTPATIENT
Start: 2017-06-20 | End: 2017-06-20 | Stop reason: HOSPADM

## 2017-06-20 RX ORDER — DOCUSATE SODIUM 100 MG/1
100 CAPSULE, LIQUID FILLED ORAL 2 TIMES DAILY PRN
Status: DISCONTINUED | OUTPATIENT
Start: 2017-06-20 | End: 2017-06-28 | Stop reason: HOSPADM

## 2017-06-20 RX ORDER — ESMOLOL HYDROCHLORIDE 10 MG/ML
INJECTION INTRAVENOUS AS NEEDED
Status: DISCONTINUED | OUTPATIENT
Start: 2017-06-20 | End: 2017-06-20 | Stop reason: SURG

## 2017-06-20 RX ORDER — CLINDAMYCIN PHOSPHATE 600 MG/50ML
INJECTION INTRAVENOUS
Status: DISCONTINUED
Start: 2017-06-20 | End: 2017-06-20 | Stop reason: WASHOUT

## 2017-06-20 RX ORDER — ONDANSETRON 2 MG/ML
4 INJECTION INTRAMUSCULAR; INTRAVENOUS EVERY 6 HOURS PRN
Status: DISCONTINUED | OUTPATIENT
Start: 2017-06-20 | End: 2017-06-28 | Stop reason: HOSPADM

## 2017-06-20 RX ORDER — SUCCINYLCHOLINE CHLORIDE 20 MG/ML
INJECTION INTRAMUSCULAR; INTRAVENOUS AS NEEDED
Status: DISCONTINUED | OUTPATIENT
Start: 2017-06-20 | End: 2017-06-20 | Stop reason: SURG

## 2017-06-20 RX ORDER — ROCURONIUM BROMIDE 10 MG/ML
INJECTION, SOLUTION INTRAVENOUS AS NEEDED
Status: DISCONTINUED | OUTPATIENT
Start: 2017-06-20 | End: 2017-06-20 | Stop reason: SURG

## 2017-06-20 RX ORDER — SODIUM CHLORIDE, SODIUM LACTATE, POTASSIUM CHLORIDE, CALCIUM CHLORIDE 600; 310; 30; 20 MG/100ML; MG/100ML; MG/100ML; MG/100ML
9 INJECTION, SOLUTION INTRAVENOUS CONTINUOUS PRN
Status: DISCONTINUED | OUTPATIENT
Start: 2017-06-20 | End: 2017-06-20 | Stop reason: HOSPADM

## 2017-06-20 RX ORDER — FUROSEMIDE 10 MG/ML
40 INJECTION INTRAMUSCULAR; INTRAVENOUS ONCE
Status: COMPLETED | OUTPATIENT
Start: 2017-06-20 | End: 2017-06-20

## 2017-06-20 RX ORDER — ONDANSETRON 4 MG/1
4 TABLET, ORALLY DISINTEGRATING ORAL EVERY 6 HOURS PRN
Status: DISCONTINUED | OUTPATIENT
Start: 2017-06-20 | End: 2017-06-28 | Stop reason: HOSPADM

## 2017-06-20 RX ORDER — GLYCOPYRROLATE 0.2 MG/ML
INJECTION INTRAMUSCULAR; INTRAVENOUS AS NEEDED
Status: DISCONTINUED | OUTPATIENT
Start: 2017-06-20 | End: 2017-06-20 | Stop reason: SURG

## 2017-06-20 RX ORDER — SODIUM CHLORIDE, SODIUM LACTATE, POTASSIUM CHLORIDE, CALCIUM CHLORIDE 600; 310; 30; 20 MG/100ML; MG/100ML; MG/100ML; MG/100ML
20 INJECTION, SOLUTION INTRAVENOUS CONTINUOUS
Status: DISCONTINUED | OUTPATIENT
Start: 2017-06-20 | End: 2017-06-20

## 2017-06-20 RX ORDER — LABETALOL HYDROCHLORIDE 5 MG/ML
5 INJECTION, SOLUTION INTRAVENOUS
Status: DISCONTINUED | OUTPATIENT
Start: 2017-06-20 | End: 2017-06-20 | Stop reason: HOSPADM

## 2017-06-20 RX ORDER — LIDOCAINE HYDROCHLORIDE 20 MG/ML
INJECTION, SOLUTION INFILTRATION; PERINEURAL AS NEEDED
Status: DISCONTINUED | OUTPATIENT
Start: 2017-06-20 | End: 2017-06-20 | Stop reason: SURG

## 2017-06-20 RX ORDER — ONDANSETRON 4 MG/1
4 TABLET, FILM COATED ORAL EVERY 6 HOURS PRN
Status: DISCONTINUED | OUTPATIENT
Start: 2017-06-20 | End: 2017-06-28 | Stop reason: HOSPADM

## 2017-06-20 RX ORDER — SODIUM CHLORIDE, SODIUM LACTATE, POTASSIUM CHLORIDE, CALCIUM CHLORIDE 600; 310; 30; 20 MG/100ML; MG/100ML; MG/100ML; MG/100ML
20 INJECTION, SOLUTION INTRAVENOUS CONTINUOUS
Status: DISCONTINUED | OUTPATIENT
Start: 2017-06-20 | End: 2017-06-22

## 2017-06-20 RX ORDER — IPRATROPIUM BROMIDE AND ALBUTEROL SULFATE 2.5; .5 MG/3ML; MG/3ML
3 SOLUTION RESPIRATORY (INHALATION) ONCE AS NEEDED
Status: DISCONTINUED | OUTPATIENT
Start: 2017-06-20 | End: 2017-06-20 | Stop reason: HOSPADM

## 2017-06-20 RX ORDER — MAGNESIUM HYDROXIDE 1200 MG/15ML
LIQUID ORAL AS NEEDED
Status: DISCONTINUED | OUTPATIENT
Start: 2017-06-20 | End: 2017-06-20 | Stop reason: HOSPADM

## 2017-06-20 RX ORDER — SUFENTANIL CITRATE 50 UG/ML
INJECTION EPIDURAL; INTRAVENOUS AS NEEDED
Status: DISCONTINUED | OUTPATIENT
Start: 2017-06-20 | End: 2017-06-20 | Stop reason: SURG

## 2017-06-20 RX ORDER — SODIUM CHLORIDE 0.9 % (FLUSH) 0.9 %
1-10 SYRINGE (ML) INJECTION AS NEEDED
Status: DISCONTINUED | OUTPATIENT
Start: 2017-06-20 | End: 2017-06-20 | Stop reason: HOSPADM

## 2017-06-20 RX ORDER — HYDRALAZINE HYDROCHLORIDE 20 MG/ML
5 INJECTION INTRAMUSCULAR; INTRAVENOUS
Status: DISCONTINUED | OUTPATIENT
Start: 2017-06-20 | End: 2017-06-20 | Stop reason: HOSPADM

## 2017-06-20 RX ORDER — FLUMAZENIL 0.1 MG/ML
0.2 INJECTION INTRAVENOUS AS NEEDED
Status: DISCONTINUED | OUTPATIENT
Start: 2017-06-20 | End: 2017-06-20 | Stop reason: HOSPADM

## 2017-06-20 RX ADMIN — CLONIDINE 1 PATCH: 0.2 PATCH TRANSDERMAL at 09:25

## 2017-06-20 RX ADMIN — Medication 4 MG: at 18:46

## 2017-06-20 RX ADMIN — PANTOPRAZOLE SODIUM 40 MG: 40 INJECTION, POWDER, FOR SOLUTION INTRAVENOUS at 06:07

## 2017-06-20 RX ADMIN — SUFENTANIL CITRATE 5 MCG: 50 INJECTION, SOLUTION EPIDURAL; INTRAVENOUS at 18:05

## 2017-06-20 RX ADMIN — HYDROMORPHONE HYDROCHLORIDE 1 MG: 1 INJECTION, SOLUTION INTRAMUSCULAR; INTRAVENOUS; SUBCUTANEOUS at 14:26

## 2017-06-20 RX ADMIN — SODIUM CHLORIDE, POTASSIUM CHLORIDE, SODIUM LACTATE AND CALCIUM CHLORIDE 20 ML/HR: 600; 310; 30; 20 INJECTION, SOLUTION INTRAVENOUS at 17:15

## 2017-06-20 RX ADMIN — LIDOCAINE HYDROCHLORIDE 100 MG: 20 INJECTION, SOLUTION INFILTRATION; PERINEURAL at 17:58

## 2017-06-20 RX ADMIN — LABETALOL HYDROCHLORIDE 20 MG: 5 INJECTION, SOLUTION INTRAVENOUS at 09:25

## 2017-06-20 RX ADMIN — GLYCOPYRROLATE 0.6 MG: 0.2 INJECTION, SOLUTION INTRAMUSCULAR; INTRAVENOUS at 18:46

## 2017-06-20 RX ADMIN — SUFENTANIL CITRATE 5 MCG: 50 INJECTION, SOLUTION EPIDURAL; INTRAVENOUS at 18:14

## 2017-06-20 RX ADMIN — SUFENTANIL CITRATE 5 MCG: 50 INJECTION, SOLUTION EPIDURAL; INTRAVENOUS at 18:10

## 2017-06-20 RX ADMIN — EPHEDRINE SULFATE 15 MG: 50 INJECTION INTRAMUSCULAR; INTRAVENOUS; SUBCUTANEOUS at 18:42

## 2017-06-20 RX ADMIN — SUCCINYLCHOLINE CHLORIDE 100 MG: 20 INJECTION, SOLUTION INTRAMUSCULAR; INTRAVENOUS at 17:58

## 2017-06-20 RX ADMIN — SODIUM CHLORIDE, POTASSIUM CHLORIDE, SODIUM LACTATE AND CALCIUM CHLORIDE: 600; 310; 30; 20 INJECTION, SOLUTION INTRAVENOUS at 17:50

## 2017-06-20 RX ADMIN — HYDROMORPHONE HYDROCHLORIDE 1 MG: 1 INJECTION, SOLUTION INTRAMUSCULAR; INTRAVENOUS; SUBCUTANEOUS at 17:07

## 2017-06-20 RX ADMIN — CEFTRIAXONE 1 G: 1 INJECTION, POWDER, FOR SOLUTION INTRAMUSCULAR; INTRAVENOUS at 09:59

## 2017-06-20 RX ADMIN — FUROSEMIDE 40 MG: 10 INJECTION, SOLUTION INTRAMUSCULAR; INTRAVENOUS at 10:07

## 2017-06-20 RX ADMIN — SODIUM CHLORIDE 5 ML: 9 INJECTION INTRAMUSCULAR; INTRAVENOUS; SUBCUTANEOUS at 15:04

## 2017-06-20 RX ADMIN — LABETALOL HYDROCHLORIDE 20 MG: 5 INJECTION, SOLUTION INTRAVENOUS at 20:18

## 2017-06-20 RX ADMIN — SODIUM CHLORIDE, POTASSIUM CHLORIDE, SODIUM LACTATE AND CALCIUM CHLORIDE 20 ML/HR: 600; 310; 30; 20 INJECTION, SOLUTION INTRAVENOUS at 20:20

## 2017-06-20 RX ADMIN — SUFENTANIL CITRATE 5 MCG: 50 INJECTION, SOLUTION EPIDURAL; INTRAVENOUS at 17:58

## 2017-06-20 RX ADMIN — SODIUM CHLORIDE 5 MG/HR: 9 INJECTION, SOLUTION INTRAVENOUS at 06:39

## 2017-06-20 RX ADMIN — SODIUM CHLORIDE 5 MG/HR: 9 INJECTION, SOLUTION INTRAVENOUS at 16:20

## 2017-06-20 RX ADMIN — ROCURONIUM BROMIDE 5 MG: 10 INJECTION INTRAVENOUS at 17:58

## 2017-06-20 RX ADMIN — EPHEDRINE SULFATE 10 MG: 50 INJECTION INTRAMUSCULAR; INTRAVENOUS; SUBCUTANEOUS at 18:34

## 2017-06-20 RX ADMIN — HYDROMORPHONE HYDROCHLORIDE 1 MG: 1 INJECTION, SOLUTION INTRAMUSCULAR; INTRAVENOUS; SUBCUTANEOUS at 16:43

## 2017-06-20 RX ADMIN — ROCURONIUM BROMIDE 20 MG: 10 INJECTION INTRAVENOUS at 18:05

## 2017-06-20 RX ADMIN — SODIUM CHLORIDE 5 MG/HR: 9 INJECTION, SOLUTION INTRAVENOUS at 19:17

## 2017-06-20 RX ADMIN — CLINDAMYCIN PHOSPHATE 900 MG: 18 INJECTION, SOLUTION INTRAVENOUS at 17:53

## 2017-06-20 RX ADMIN — HYDROMORPHONE HYDROCHLORIDE 1 MG: 1 INJECTION, SOLUTION INTRAMUSCULAR; INTRAVENOUS; SUBCUTANEOUS at 06:07

## 2017-06-20 RX ADMIN — ESMOLOL HYDROCHLORIDE 10 MG: 10 INJECTION, SOLUTION INTRAVENOUS at 19:29

## 2017-06-20 NOTE — PLAN OF CARE
Problem: Skin Integrity Impairment, Risk/Actual (Adult)  Goal: Skin Integrity/Wound Healing  Outcome: Ongoing (interventions implemented as appropriate)    Problem: Pain, Acute (Adult)  Goal: Acceptable Pain Control/Comfort Level  Outcome: Ongoing (interventions implemented as appropriate)    Problem: Fall Risk (Adult)  Goal: Absence of Falls  Outcome: Outcome(s) achieved Date Met:  06/20/17    Problem: Patient Care Overview (Adult)  Goal: Plan of Care Review  Outcome: Ongoing (interventions implemented as appropriate)    06/20/17 3702   Coping/Psychosocial Response Interventions   Plan Of Care Reviewed With patient;spouse   Outcome Evaluation   Outcome Summary/Follow up Plan pt currently in OR       Goal: Adult Individualization and Mutuality  Outcome: Ongoing (interventions implemented as appropriate)  Goal: Discharge Needs Assessment  Outcome: Ongoing (interventions implemented as appropriate)

## 2017-06-20 NOTE — PROGRESS NOTES
Surgery cancelled today due to blood pressure issue and pulmonary edema    Plan surgery 6/20/17 for hemiarthroplasty when ok with Dr. Frey    Electronically signed by Jeremiah Zaidi MD on 6/19/2017 at 10:13 PM

## 2017-06-20 NOTE — PROGRESS NOTES
Orthopedic Surgery Progress Note    Renay Lazo  6/20/2017      Subjective:     Systemic or Specific Complaints: R Hip pain, resting comfortably, was agitated earlier today according to .     Objective:     Patient Vitals for the past 24 hrs:   BP Temp Temp src Pulse Resp SpO2 Weight   06/20/17 0820 159/64 - - 101 - 95 % -   06/20/17 0805 139/68 - - 101 - 94 % -   06/20/17 0750 148/63 - - 93 - 94 % -   06/20/17 0735 141/73 - - 105 16 95 % -   06/20/17 0720 149/67 - - 111 - 95 % -   06/20/17 0705 141/72 - - 110 - 94 % -   06/20/17 0700 - 97.8 °F (36.6 °C) Temporal Art - - - -   06/20/17 0605 161/76 - - (!) 121 16 93 % -   06/20/17 0600 - - - - - - 165 lb 11.2 oz (75.2 kg)   06/20/17 0550 150/84 - - 102 16 96 % -   06/20/17 0535 142/67 - - 100 16 96 % -   06/20/17 0520 139/65 - - 108 15 95 % -   06/20/17 0505 160/85 - - 112 16 95 % -   06/20/17 0450 (!) 151/121 - - 100 15 95 % -   06/20/17 0435 161/92 - - 116 16 94 % -   06/20/17 0420 154/90 - - 115 15 95 % -   06/20/17 0405 157/71 97.6 °F (36.4 °C) Temporal Art 113 16 95 % -   06/20/17 0350 167/84 - - 103 14 95 % -   06/20/17 0335 166/63 - - 112 15 95 % -   06/20/17 0320 164/79 - - 100 14 96 % -   06/20/17 0305 163/62 - - 102 15 95 % -   06/20/17 0250 158/69 - - 101 14 96 % -   06/20/17 0235 135/66 - - 104 15 96 % -   06/20/17 0220 151/70 - - 107 14 96 % -   06/20/17 0205 140/73 - - 95 14 95 % -   06/20/17 0150 141/66 - - 97 14 95 % -   06/20/17 0135 145/64 - - 98 15 95 % -   06/20/17 0120 143/73 - - 113 15 95 % -   06/20/17 0105 141/63 - - 98 15 95 % -   06/20/17 0050 156/71 - - 101 15 96 % -   06/20/17 0035 160/64 - - 100 14 95 % -   06/20/17 0020 154/56 - - 69 16 95 % -   06/20/17 0005 157/56 - - 60 15 95 % -   06/20/17 0000 - 97.6 °F (36.4 °C) Temporal Art - - - -   06/19/17 2350 148/61 - - 75 15 94 % -   06/19/17 2335 155/61 - - 62 16 93 % -   06/19/17 2320 148/49 - - 66 15 92 % -   06/19/17 2305 149/47 - - 64 14 95 % -   06/19/17 2250 151/53 - - 62  14 95 % -   06/19/17 2235 140/46 - - 60 15 95 % -   06/19/17 2220 139/49 - - 60 15 95 % -   06/19/17 2205 129/46 - - 71 16 94 % -   06/19/17 2150 135/56 - - 61 15 94 % -   06/19/17 2135 132/47 - - 60 16 94 % -   06/19/17 2120 139/51 - - 64 15 94 % -   06/19/17 2105 147/56 - - 60 15 95 % -   06/19/17 2050 159/58 - - 65 15 93 % -   06/19/17 2035 143/62 - - 61 15 94 % -   06/19/17 2030 144/62 - - - - - -   06/19/17 2020 147/54 - - 61 15 95 % -   06/19/17 2005 143/54 - - 60 15 93 % -   06/19/17 2000 - 98 °F (36.7 °C) Temporal Art - - - -   06/19/17 1950 140/54 - - 63 16 95 % -   06/19/17 1935 135/53 - - 60 15 95 % -   06/19/17 1922 - - - 60 - 94 % -   06/19/17 1920 138/60 - - 67 15 94 % -   06/19/17 1905 118/55 - - 62 15 95 % -   06/19/17 1805 139/55 - - 95 14 95 % -   06/19/17 1750 138/51 - - 60 16 94 % -   06/19/17 1735 148/55 - - 70 16 93 % -   06/19/17 1705 141/62 - - 97 16 95 % -   06/19/17 1650 162/65 - - 90 16 95 % -   06/19/17 1635 162/61 - - 114 16 94 % -   06/19/17 1605 150/51 - - 76 14 94 % -   06/19/17 1550 146/49 - - 63 16 95 % -   06/19/17 1535 146/49 - - 77 14 94 % -   06/19/17 1505 139/48 - - 68 16 94 % -   06/19/17 1450 148/47 - - 71 16 94 % -   06/19/17 1435 152/60 - - 99 16 94 % -   06/19/17 1420 146/48 - - 77 14 94 % -   06/19/17 1405 145/49 - - 76 14 93 % -   06/19/17 1350 147/83 - - 62 16 93 % -   06/19/17 1335 154/56 - - 60 16 94 % -   06/19/17 1320 148/63 - - 72 16 93 % -   06/19/17 1305 146/48 - - 62 16 92 % -   06/19/17 1250 146/55 - - 83 16 90 % -   06/19/17 1235 133/54 - - 60 14 93 % -   06/19/17 1220 138/49 - - 70 16 94 % -   06/19/17 1205 137/49 - - 68 16 93 % -   06/19/17 1150 144/55 - - 65 14 94 % -   06/19/17 1135 146/51 - - 71 16 94 % -   06/19/17 1120 150/58 - - 67 16 95 % -   06/19/17 1105 161/65 - - 69 16 95 % -   06/19/17 1035 166/61 - - 60 16 95 % -   06/19/17 1005 176/60 - - 60 16 95 % -       right lower  General: alert, appears stated age and cooperative   Wound: clean, dry,  intact             Dressing: clean, dry, intact   Extremity: Distal NVI           DVT Exam: No evidence of DVT seen on physical exam.                   Data Review:  Lab Results (last 24 hours)     Procedure Component Value Units Date/Time    Urine Culture [[id number]]  (Abnormal) Collected:  06/19/17 0625    Specimen:  Urine from Urine, Catheter Updated:  06/20/17 0659     Urine Culture >100,000 CFU/mL Gram Negative Bacilli (A)        Imaging Results (last 24 hours)     Procedure Component Value Units Date/Time    XR Chest 1 View [[id number]] Updated:  06/20/17 0955          Assessment:     Acute traumatic displaced subcapital fracture of the neck of the right femur, initial encounter for closed fracture       Plan:      1:  DVT prophylaxis, ICE, elevate  2:  Pain control  3: NWB LLE  4: To OR 6/20/17 for R hemiarthroplasty-Medicine has given the OK. \    Supa Cancino PA-C

## 2017-06-20 NOTE — CONSULTS
#20 ga ultrasound guided peripheral to left brachial vein.  Line flushes and draws well.  Sterile dressing applied.  Pt ermelinda well.

## 2017-06-20 NOTE — ANESTHESIA PROCEDURE NOTES
Airway  Urgency: elective    Airway not difficult    General Information and Staff    Patient location during procedure: OR  CRNA: MONSTER ESPINOZA    Indications and Patient Condition  Indications for airway management: airway protection    Preoxygenated: yes  Mask difficulty assessment: 1 - vent by mask    Final Airway Details  Final airway type: endotracheal airway      Successful airway: ETT  Cuffed: yes   Successful intubation technique: direct laryngoscopy  Facilitating devices/methods: intubating stylet  Endotracheal tube insertion site: oral  Blade: Dennis  Blade size: #2  ETT size: 7.5 mm  Cormack-Lehane Classification: grade IIa - partial view of glottis  Placement verified by: chest auscultation and capnometry   Cuff volume (mL): 7  Measured from: lips  ETT to lips (cm): 22  Number of attempts at approach: 1    Additional Comments  Atraumatic intubation

## 2017-06-20 NOTE — PLAN OF CARE
Problem: Skin Integrity Impairment, Risk/Actual (Adult)  Goal: Identify Related Risk Factors and Signs and Symptoms  Outcome: Ongoing (interventions implemented as appropriate)  Goal: Skin Integrity/Wound Healing  Outcome: Ongoing (interventions implemented as appropriate)    Problem: Pain, Acute (Adult)  Goal: Identify Related Risk Factors and Signs and Symptoms  Outcome: Ongoing (interventions implemented as appropriate)  Goal: Acceptable Pain Control/Comfort Level  Outcome: Ongoing (interventions implemented as appropriate)    Problem: Fall Risk (Adult)  Goal: Identify Related Risk Factors and Signs and Symptoms  Outcome: Ongoing (interventions implemented as appropriate)  Goal: Absence of Falls  Outcome: Ongoing (interventions implemented as appropriate)    Problem: Patient Care Overview (Adult)  Goal: Plan of Care Review  Outcome: Ongoing (interventions implemented as appropriate)    06/19/17 1714 06/20/17 0400 06/20/17 0553   Coping/Psychosocial Response Interventions   Plan Of Care Reviewed With --  patient;spouse --    Patient Care Overview   Progress no change --  --    Outcome Evaluation   Outcome Summary/Follow up Plan --  --  dilaudid given x1 dose, Cardene back up to 5 mcg, prn order parameters too high to wean off cardene and need to be readdressed, 4+ bacteria in urine, start possible antibiotics, femur reduction surgery scheduled for today at 1625, continue to monitor       Goal: Adult Individualization and Mutuality  Outcome: Ongoing (interventions implemented as appropriate)  Goal: Discharge Needs Assessment  Outcome: Ongoing (interventions implemented as appropriate)

## 2017-06-20 NOTE — PROGRESS NOTES
[facility] Medicine Services  INPATIENT PROGRESS NOTE    Length of Stay: 1  Date of Admission: 6/19/2017  Primary Care Physician: Jung Collins MD    Subjective     Chief Complaint:     Right hip pain    HPI     Blood pressure is significantly improved.  She remains on Cardene drip at low dose.  Hopefully with the addition of Catapres patch we can discontinue the Cardene prior to surgery.  Her heart rate is elevated this morning into the 100 teens.  Diuresis has produced significant urine output.  Urine culture positive for gram-negative bacteria.  Echocardiogram ordered for further evaluation of cardiac function.  The patient is smiling and more interactive today.  Her  is present at bedside.       Review of Systems     All pertinent negatives and positives are as above. All other systems have been reviewed and are negative unless otherwise stated.     Objective    Temp:  [97.6 °F (36.4 °C)-98 °F (36.7 °C)] 97.8 °F (36.6 °C)  Heart Rate:  [] 101  Resp:  [14-16] 16  BP: (118-180)/() 159/64    Lab Results (last 24 hours)     Procedure Component Value Units Date/Time    Urine Culture [[id number]]  (Abnormal) Collected:  06/19/17 0625    Specimen:  Urine from Urine, Catheter Updated:  06/20/17 0659     Urine Culture >100,000 CFU/mL Gram Negative Bacilli (A)          Imaging Results (last 24 hours)     ** No results found for the last 24 hours. **             Intake/Output Summary (Last 24 hours) at 06/20/17 0851  Last data filed at 06/20/17 0820   Gross per 24 hour   Intake           549.62 ml   Output             2850 ml   Net         -2300.38 ml       Physical Exam    Constitutional: She appears well-developed. She is cooperative. She has a sickly appearance. Nasal cannula in place.  Discussed with her nurse Netta.  HENT:   Head: Normocephalic and atraumatic.   Right Ear: Decreased hearing is noted.   Left Ear: Decreased hearing is noted.   Mouth/Throat:  Oropharynx is clear and moist.   Eyes: Conjunctivae are normal. No scleral icterus.   Neck: Neck supple. No JVD present. No tracheal deviation present. No thyromegaly present.   Cardiovascular: Irregularly irregular rhythm and normal heart sounds.   Pulmonary/Chest: She has a few rales (in dependent areas bilaterally).   Abdominal: Soft. Bowel sounds are normal. She exhibits no mass. There is no tenderness.   Musculoskeletal: Normal range of motion. She exhibits edema (1/4 BLE).   Right hip: She exhibits tenderness and bony tenderness.   Right hip externally rotated and shortened   Neurological: She is alert.   Skin: Skin is warm and dry. No rash noted.   Psychiatric: Her affect is normal. Her speech is delayed. She is slowed. Cognition and memory are impaired.      Results Review:  I have reviewed the labs, radiology results, and diagnostic studies since my last progress note and made treatment changes reflective of the results.   I have reviewed the current medications.    Assessment/Plan     Hospital Problem List     * (Principal)Closed subcapital fracture of neck of right femur    Pulmonary edema    UTI (urinary tract infection)    Diastolic dysfunction    Pulmonary HTN    Paroxysmal atrial fibrillation    Obstructive sleep apnea    Dementia    Hypertension          PLAN:  Add Clonidine TTS 2 patch to regimen (oral medications on hold preoperatively)  Rocephin 1 gm IV q 24 hrs  Patient cleared for orthopedic surgery today. Their office has been notified.  Repeat chest x-ray today to reassess pulmonary edema.  Labetalol 20 mg IV every 2 hours when necessary for heart rate greater than 110    Kwaku Frey DO   06/20/17   8:51 AM     Approximately 40 minutes of critical care time were spent managing the patient exclusive of billable procedures.

## 2017-06-20 NOTE — ANESTHESIA PREPROCEDURE EVALUATION
Anesthesia Evaluation     Patient summary reviewed   NPO Solid Status: > 8 hours  NPO Liquid Status: > 2 hours     Airway   Mallampati: II  TM distance: >3 FB  Neck ROM: full  no difficulty expected  Dental - normal exam     Pulmonary - normal exam   (+) pneumonia , sleep apnea,   Cardiovascular     Rhythm: irregular    (+) hypertension, dysrhythmias Atrial Fib, CHF, PVD, hyperlipidemia      Neuro/Psych  (+) seizures, TIA, psychiatric history Anxiety, dementia,    GI/Hepatic/Renal/Endo      Musculoskeletal     (+) back pain,   Abdominal  - normal exam   Substance History      OB/GYN          Other   (+) arthritis   history of cancer                                  Anesthesia Plan    ASA 3 - emergent     general     intravenous induction   Anesthetic plan and risks discussed with patient.    Plan discussed with CRNA.

## 2017-06-21 LAB
ANION GAP SERPL CALCULATED.3IONS-SCNC: 11 MMOL/L (ref 4–13)
BACTERIA SPEC AEROBE CULT: ABNORMAL
BH CV ECHO MEAS - AO MAX PG (FULL): 9.9 MMHG
BH CV ECHO MEAS - AO MAX PG: 14.4 MMHG
BH CV ECHO MEAS - AO MEAN PG (FULL): 7 MMHG
BH CV ECHO MEAS - AO MEAN PG: 10 MMHG
BH CV ECHO MEAS - AO ROOT AREA (BSA CORRECTED): 1.6
BH CV ECHO MEAS - AO ROOT AREA: 6.6 CM^2
BH CV ECHO MEAS - AO ROOT DIAM: 2.9 CM
BH CV ECHO MEAS - AO V2 MAX: 190 CM/SEC
BH CV ECHO MEAS - AO V2 MEAN: 148 CM/SEC
BH CV ECHO MEAS - AO V2 VTI: 52.6 CM
BH CV ECHO MEAS - AVA(I,A): 1.4 CM^2
BH CV ECHO MEAS - AVA(I,D): 1.4 CM^2
BH CV ECHO MEAS - AVA(V,A): 1.4 CM^2
BH CV ECHO MEAS - AVA(V,D): 1.4 CM^2
BH CV ECHO MEAS - BSA(HAYCOCK): 1.9 M^2
BH CV ECHO MEAS - BSA: 1.8 M^2
BH CV ECHO MEAS - BZI_BMI: 28.3 KILOGRAMS/M^2
BH CV ECHO MEAS - BZI_METRIC_HEIGHT: 162.6 CM
BH CV ECHO MEAS - BZI_METRIC_WEIGHT: 74.8 KG
BH CV ECHO MEAS - CONTRAST EF 4CH: 62.4 ML/M^2
BH CV ECHO MEAS - EDV(CUBED): 46.7 ML
BH CV ECHO MEAS - EDV(MOD-SP4): 80.1 ML
BH CV ECHO MEAS - EDV(TEICH): 54.4 ML
BH CV ECHO MEAS - EF(CUBED): 80.2 %
BH CV ECHO MEAS - EF(MOD-SP4): 62.4 %
BH CV ECHO MEAS - EF(TEICH): 73.5 %
BH CV ECHO MEAS - ESV(CUBED): 9.3 ML
BH CV ECHO MEAS - ESV(MOD-SP4): 30.1 ML
BH CV ECHO MEAS - ESV(TEICH): 14.4 ML
BH CV ECHO MEAS - FS: 41.7 %
BH CV ECHO MEAS - IVS/LVPW: 1.1
BH CV ECHO MEAS - IVSD: 2.1 CM
BH CV ECHO MEAS - LA DIMENSION: 4.2 CM
BH CV ECHO MEAS - LA/AO: 1.4
BH CV ECHO MEAS - LV DIASTOLIC VOL/BSA (35-75): 44.4 ML/M^2
BH CV ECHO MEAS - LV MASS(C)D: 327 GRAMS
BH CV ECHO MEAS - LV MASS(C)DI: 181.4 GRAMS/M^2
BH CV ECHO MEAS - LV MAX PG: 4.6 MMHG
BH CV ECHO MEAS - LV MEAN PG: 3 MMHG
BH CV ECHO MEAS - LV SYSTOLIC VOL/BSA (12-30): 16.7 ML/M^2
BH CV ECHO MEAS - LV V1 MAX: 107 CM/SEC
BH CV ECHO MEAS - LV V1 MEAN: 73.4 CM/SEC
BH CV ECHO MEAS - LV V1 VTI: 28.1 CM
BH CV ECHO MEAS - LVIDD: 3.6 CM
BH CV ECHO MEAS - LVIDS: 2.1 CM
BH CV ECHO MEAS - LVLD AP4: 6.3 CM
BH CV ECHO MEAS - LVLS AP4: 4.6 CM
BH CV ECHO MEAS - LVOT AREA (M): 2.5 CM^2
BH CV ECHO MEAS - LVOT AREA: 2.5 CM^2
BH CV ECHO MEAS - LVOT DIAM: 1.8 CM
BH CV ECHO MEAS - LVPWD: 1.9 CM
BH CV ECHO MEAS - MV A MAX VEL: 104 CM/SEC
BH CV ECHO MEAS - MV DEC TIME: 0.25 SEC
BH CV ECHO MEAS - MV E MAX VEL: 101 CM/SEC
BH CV ECHO MEAS - MV E/A: 0.97
BH CV ECHO MEAS - RAP SYSTOLE: 10 MMHG
BH CV ECHO MEAS - RVSP: 85 MMHG
BH CV ECHO MEAS - SI(AO): 192.8 ML/M^2
BH CV ECHO MEAS - SI(CUBED): 20.7 ML/M^2
BH CV ECHO MEAS - SI(LVOT): 39.7 ML/M^2
BH CV ECHO MEAS - SI(MOD-SP4): 27.7 ML/M^2
BH CV ECHO MEAS - SI(TEICH): 22.2 ML/M^2
BH CV ECHO MEAS - SV(AO): 347.4 ML
BH CV ECHO MEAS - SV(CUBED): 37.4 ML
BH CV ECHO MEAS - SV(LVOT): 71.5 ML
BH CV ECHO MEAS - SV(MOD-SP4): 50 ML
BH CV ECHO MEAS - SV(TEICH): 40 ML
BH CV ECHO MEAS - TR MAX VEL: 433 CM/SEC
BUN BLD-MCNC: 25 MG/DL (ref 5–21)
BUN/CREAT SERPL: 24.8 (ref 7–25)
CALCIUM SPEC-SCNC: 8.5 MG/DL (ref 8.4–10.4)
CHLORIDE SERPL-SCNC: 99 MMOL/L (ref 98–110)
CO2 SERPL-SCNC: 28 MMOL/L (ref 24–31)
CREAT BLD-MCNC: 1.01 MG/DL (ref 0.5–1.4)
DEPRECATED RDW RBC AUTO: 46 FL (ref 40–54)
E/E' RATIO: 22.6
ERYTHROCYTE [DISTWIDTH] IN BLOOD BY AUTOMATED COUNT: 13.3 % (ref 12–15)
GFR SERPL CREATININE-BSD FRML MDRD: 52 ML/MIN/1.73
GLUCOSE BLD-MCNC: 142 MG/DL (ref 70–100)
HCT VFR BLD AUTO: 31.4 % (ref 37–47)
HGB BLD-MCNC: 10.7 G/DL (ref 12–16)
LEFT ATRIUM VOLUME: 22.9 CM3
LV EF 2D ECHO EST: 65 %
MCH RBC QN AUTO: 32.1 PG (ref 28–32)
MCHC RBC AUTO-ENTMCNC: 34.1 G/DL (ref 33–36)
MCV RBC AUTO: 94.3 FL (ref 82–98)
PLATELET # BLD AUTO: 212 10*3/MM3 (ref 130–400)
PMV BLD AUTO: 9.9 FL (ref 6–12)
POTASSIUM BLD-SCNC: 3.5 MMOL/L (ref 3.5–5.3)
RBC # BLD AUTO: 3.33 10*6/MM3 (ref 4.2–5.4)
SODIUM BLD-SCNC: 138 MMOL/L (ref 135–145)
WBC NRBC COR # BLD: 8.01 10*3/MM3 (ref 4.8–10.8)

## 2017-06-21 PROCEDURE — 25010000002 ONDANSETRON PER 1 MG: Performed by: ORTHOPAEDIC SURGERY

## 2017-06-21 PROCEDURE — 97162 PT EVAL MOD COMPLEX 30 MIN: CPT

## 2017-06-21 PROCEDURE — 25010000002 HYDROMORPHONE PER 4 MG: Performed by: FAMILY MEDICINE

## 2017-06-21 PROCEDURE — 85027 COMPLETE CBC AUTOMATED: CPT | Performed by: FAMILY MEDICINE

## 2017-06-21 PROCEDURE — G8987 SELF CARE CURRENT STATUS: HCPCS

## 2017-06-21 PROCEDURE — 25010000002 CEFTRIAXONE: Performed by: FAMILY MEDICINE

## 2017-06-21 PROCEDURE — G8988 SELF CARE GOAL STATUS: HCPCS

## 2017-06-21 PROCEDURE — 25010000002 ENOXAPARIN PER 10 MG: Performed by: ORTHOPAEDIC SURGERY

## 2017-06-21 PROCEDURE — G8979 MOBILITY GOAL STATUS: HCPCS

## 2017-06-21 PROCEDURE — 97166 OT EVAL MOD COMPLEX 45 MIN: CPT

## 2017-06-21 PROCEDURE — 25010000002 HYDROMORPHONE PER 4 MG: Performed by: INTERNAL MEDICINE

## 2017-06-21 PROCEDURE — 80048 BASIC METABOLIC PNL TOTAL CA: CPT | Performed by: FAMILY MEDICINE

## 2017-06-21 PROCEDURE — 97110 THERAPEUTIC EXERCISES: CPT

## 2017-06-21 PROCEDURE — G8978 MOBILITY CURRENT STATUS: HCPCS

## 2017-06-21 RX ORDER — AMLODIPINE BESYLATE 5 MG/1
5 TABLET ORAL DAILY
COMMUNITY
End: 2017-06-28 | Stop reason: HOSPADM

## 2017-06-21 RX ORDER — LISINOPRIL 5 MG/1
5 TABLET ORAL DAILY
COMMUNITY
End: 2017-06-28 | Stop reason: HOSPADM

## 2017-06-21 RX ORDER — CLONIDINE HYDROCHLORIDE 0.1 MG/1
0.1 TABLET ORAL 2 TIMES DAILY
COMMUNITY
End: 2017-06-28 | Stop reason: HOSPADM

## 2017-06-21 RX ORDER — CARVEDILOL 6.25 MG/1
6.25 TABLET ORAL 2 TIMES DAILY WITH MEALS
COMMUNITY
End: 2017-06-28 | Stop reason: HOSPADM

## 2017-06-21 RX ORDER — CARVEDILOL 6.25 MG/1
12.5 TABLET ORAL 2 TIMES DAILY WITH MEALS
Status: DISCONTINUED | OUTPATIENT
Start: 2017-06-21 | End: 2017-06-24

## 2017-06-21 RX ORDER — PHENYTOIN SODIUM 100 MG/1
300 CAPSULE, EXTENDED RELEASE ORAL
COMMUNITY
End: 2018-07-12

## 2017-06-21 RX ORDER — PHENYTOIN SODIUM 100 MG/1
100 CAPSULE, EXTENDED RELEASE ORAL EVERY MORNING
COMMUNITY

## 2017-06-21 RX ORDER — FUROSEMIDE 20 MG/1
20 TABLET ORAL DAILY
Status: DISCONTINUED | OUTPATIENT
Start: 2017-06-21 | End: 2017-06-22

## 2017-06-21 RX ADMIN — CEFTRIAXONE 1 G: 1 INJECTION, POWDER, FOR SOLUTION INTRAMUSCULAR; INTRAVENOUS at 08:51

## 2017-06-21 RX ADMIN — ENOXAPARIN SODIUM 40 MG: 40 INJECTION SUBCUTANEOUS at 08:50

## 2017-06-21 RX ADMIN — HYDROMORPHONE HYDROCHLORIDE 1 MG: 1 INJECTION, SOLUTION INTRAMUSCULAR; INTRAVENOUS; SUBCUTANEOUS at 05:30

## 2017-06-21 RX ADMIN — POTASSIUM CHLORIDE 20 MEQ: 750 CAPSULE, EXTENDED RELEASE ORAL at 08:51

## 2017-06-21 RX ADMIN — LABETALOL HYDROCHLORIDE 20 MG: 5 INJECTION, SOLUTION INTRAVENOUS at 16:50

## 2017-06-21 RX ADMIN — HYDROMORPHONE HYDROCHLORIDE 1 MG: 1 INJECTION, SOLUTION INTRAMUSCULAR; INTRAVENOUS; SUBCUTANEOUS at 01:09

## 2017-06-21 RX ADMIN — PHENYTOIN SODIUM 400 MG: 100 CAPSULE, EXTENDED RELEASE ORAL at 20:05

## 2017-06-21 RX ADMIN — FUROSEMIDE 20 MG: 20 TABLET ORAL at 10:35

## 2017-06-21 RX ADMIN — MAGNESIUM HYDROXIDE 10 ML: 2400 SUSPENSION ORAL at 20:05

## 2017-06-21 RX ADMIN — HYDROMORPHONE HYDROCHLORIDE 1 MG: 1 INJECTION, SOLUTION INTRAMUSCULAR; INTRAVENOUS; SUBCUTANEOUS at 23:37

## 2017-06-21 RX ADMIN — PHENYTOIN SODIUM 400 MG: 100 CAPSULE, EXTENDED RELEASE ORAL at 00:30

## 2017-06-21 RX ADMIN — DOCUSATE SODIUM -SENNOSIDES 2 TABLET: 50; 8.6 TABLET, COATED ORAL at 18:50

## 2017-06-21 RX ADMIN — CLINDAMYCIN PHOSPHATE 900 MG: 18 INJECTION, SOLUTION INTRAVENOUS at 20:12

## 2017-06-21 RX ADMIN — PANTOPRAZOLE SODIUM 40 MG: 40 INJECTION, POWDER, FOR SOLUTION INTRAVENOUS at 07:36

## 2017-06-21 RX ADMIN — HYDROMORPHONE HYDROCHLORIDE 1 MG: 1 INJECTION, SOLUTION INTRAMUSCULAR; INTRAVENOUS; SUBCUTANEOUS at 19:59

## 2017-06-21 RX ADMIN — CARVEDILOL 6.25 MG: 6.25 TABLET, FILM COATED ORAL at 07:34

## 2017-06-21 RX ADMIN — DOCUSATE SODIUM -SENNOSIDES 2 TABLET: 50; 8.6 TABLET, COATED ORAL at 08:51

## 2017-06-21 RX ADMIN — POLYETHYLENE GLYCOL 3350 17 G: 17 POWDER, FOR SOLUTION ORAL at 08:51

## 2017-06-21 RX ADMIN — HYDROMORPHONE HYDROCHLORIDE 1 MG: 1 INJECTION, SOLUTION INTRAMUSCULAR; INTRAVENOUS; SUBCUTANEOUS at 09:16

## 2017-06-21 RX ADMIN — HYDRALAZINE HYDROCHLORIDE 50 MG: 50 TABLET ORAL at 07:34

## 2017-06-21 RX ADMIN — AMLODIPINE BESYLATE 10 MG: 10 TABLET ORAL at 08:51

## 2017-06-21 RX ADMIN — CARVEDILOL 12.5 MG: 6.25 TABLET, FILM COATED ORAL at 18:50

## 2017-06-21 RX ADMIN — LISINOPRIL 20 MG: 20 TABLET ORAL at 08:51

## 2017-06-21 RX ADMIN — POTASSIUM CHLORIDE 20 MEQ: 750 CAPSULE, EXTENDED RELEASE ORAL at 18:50

## 2017-06-21 RX ADMIN — ONDANSETRON 4 MG: 2 INJECTION INTRAMUSCULAR; INTRAVENOUS at 19:59

## 2017-06-21 NOTE — BRIEF OP NOTE
HIP HEMIARTHROPLASTY  Procedure Note    Renay Lazo  6/19/2017 - 6/20/2017    Pre-op Diagnosis:   Closed subcapital fracture of neck of right femur, initial encounter [S72.011A]    Post-op Diagnosis:     Post-Op Diagnosis Codes:     * Closed subcapital fracture of neck of right femur, initial encounter [S72.011A]    Procedure/CPT® Codes:  DE FEMORAL FX, OPEN TX [08985]    Procedure(s):  HIP HEMIARTHROPLASTY    Surgeon(s):  Jeremiah Zaidi MD    Anesthesia: General    Staff:   Circulator: Joe Hurd RN  Scrub Person: José Antonio Jordan; Mary Ocampo; Mandie Rodas  Assistant: Alyssa Lynn    Estimated Blood Loss: *No blood loss documented*  Urine Voided: * No values recorded between 6/20/2017  5:45 PM and 6/20/2017  7:05 PM *    Specimens:                * No specimens in log *      Drains:   Urethral Catheter 06/19/17 0615 16 (Active)   Daily Indications Required activity restriction from trauma, surgery, (e.g. unstable spine, fracture, hemodynamics) 6/20/2017  5:00 PM   Securement secured to upper leg with tape 6/20/2017  5:00 PM   Catheter care done Yes 6/20/2017  8:00 AM   Tolerance no signs/symptoms of discomfort 6/20/2017  8:00 AM   Urine Output (mL) 800 6/20/2017  3:00 PM           Findings: see op note    Complications: none      Jeremiah Zaidi MD     Date: 6/20/2017  Time: 7:08 PM

## 2017-06-21 NOTE — OP NOTE
Patient Name: Noe  : 1929  MRN: [id number]      DATE of SURGERY: 2017    SURGEON: Jeremiah Zaidi MD    ASSISTANT: NONE    Preoperative Diagnosis:  Traumatic displaced subcapital fracture of the neck of the right femur, initial encounter for closed fracture     Postoperative Diagnosis:  Traumatic displaced subcapital fracture of the neck of the right femur, initial encounter for closed fracture     Procedure Performed: Right Hip Hemiarthroplasty    Implants: DePuy Corail System with the following components:            46 mm monopolar head          +0 neck           14 mm press fit stem    Anesthesia Used: GETA    Operative Indications: 88 y.o. year old female status post fall at home with a displaced femoral neck fracture. She has multiple medical problems and surgery was delayed due to hypertension and pulmonary edema. Surgical indications include fracture displacement, pain control, ability to mobilize the patient, and prevent sequelae of prolonged bedrest (DVT, pneumonia, skin ulceration).  Risk include, but are not limited to, bleeding, infection, pain, damage to neurovascular structures, leg length inequality, hip dislocation, blood clots, intraoperative death. Risks, benefits, and alternatives were discussed and the patient wished to proceed.    Estimated Blood Loss: 150 mL    Drains: None    Specimens: None    Complications: None    Procedure In Detail:  The patient was seen in the preoperative holding room, the informed consent was reviewed and signed, and the correct operative extremity marked with the patient’s agreement.  The patient was transported to the operating room, where a timeout was performed identifying the correct patient and operative site.  Perioperative antibiotics were administered prior to incision.  Once placed under general anesthesia, the patient was positioned in the lateral decubitus position and all bony prominences were padded.  An axillary roll was placed.   The extremity was prepped and draped in the usual sterile fashion.    A posterior approach to the hip was utilized as a slightly curved incision was made centered from the posterior aspect of the greater trochanter.  Soft tissue was dissected in-line with the incision and the tensor fascia and gluteus kelsie muscle were split.  The trochanteric bursa was excised revealing the short external rotators of the hip, which were tagged and incised from the posterolateral aspect of the greater trochanter.  A T-shaped capsulotomy was performed and with progressive internal rotation of the hip the fracture was noted.  While protecting soft tissue structures, a standard femoral neck cut was made with an oscillating saw approximately one finger-breadth proximal to the lesser trochanter.  The native femoral head was removed with a corkscrew device and taken the back table and measured.    Preparation of the femoral canal was then performed, first with a box-cut chisel, followed by a canal finder, lateralizing device, and sequential broaches up to a stable fit.  Trial components were inserted, the hip was reduced showing excellent stability and approximately equal leg lengths.  The trial components were removed, final implants impacted without complication with findings as described.    The incision was thoroughly irrigated followed by closure of the capsule with 0-vicryl, re-approximation of the short external rotators with #2 fiberwire suture, and closure of the wound in layers.  The skin was closed with adhesive glue.  A soft tissue dressing was placed.  The patient was placed supine on a hospital bed, legs lengths again checked showing them to be equal and a knee immobilizer was placed.    The patient was awakened by anesthesia transported to the PACU in stable condition.      POSTOPERATIVE PLAN: Admit inpatient for monitoring, PT/OT, 3 weeks of DVT prophylaxis, and IV antibiotics for 24 hrs.  Weight bear as tolerated with  posterior hip precautions.    Electronically signed by Jeremiah Zaidi MD on 6/20/2017 at 7:08 PM

## 2017-06-21 NOTE — PROGRESS NOTES
Continued Stay Note  [facility]     Patient Name: Renay Lazo  MRN: [id number]  Today's Date: 6/21/2017    Admit Date: 6/19/2017          Discharge Plan       06/21/17 1502    Case Management/Social Work Plan    Plan Home (Per spouse)    Additional Comments SW spoke to patient's  in room re discharge planning and recommended skilled nursing placement. Patient's  adamantly refused. He states that he wants for patient to discharge home and he plans to hire a nurse to assist full time in the home. Patient's  says patient is set up already at home with DME: hospital bed, wheelchair, walker, cane, bedside commode, cpap. SW will continue to follow to assist in discharge planning. SW did provide contact information to patient's .       06/21/17 1433    Case Management/Social Work Plan    Plan Skilled nursing placement    Additional Comments SW went to patient's room to discuss discharge planning needs (SNF). Patient's  told SW he was headed into bathroom and would need SW to return later. SW will go back to patient's room shortly to speak to patient's .               Discharge Codes     None            ADAN Harris

## 2017-06-21 NOTE — PLAN OF CARE
Problem: Skin Integrity Impairment, Risk/Actual (Adult)  Goal: Identify Related Risk Factors and Signs and Symptoms  Outcome: Outcome(s) achieved Date Met:  06/21/17  Goal: Skin Integrity/Wound Healing  Outcome: Ongoing (interventions implemented as appropriate)    Problem: Pain, Acute (Adult)  Goal: Identify Related Risk Factors and Signs and Symptoms  Outcome: Outcome(s) achieved Date Met:  06/21/17  Goal: Acceptable Pain Control/Comfort Level  Outcome: Ongoing (interventions implemented as appropriate)    Problem: Fall Risk (Adult)  Goal: Identify Related Risk Factors and Signs and Symptoms  Outcome: Outcome(s) achieved Date Met:  06/21/17  Goal: Absence of Falls  Outcome: Ongoing (interventions implemented as appropriate)    Problem: Patient Care Overview (Adult)  Goal: Plan of Care Review    06/21/17 0604   Coping/Psychosocial Response Interventions   Plan Of Care Reviewed With patient   Patient Care Overview   Progress no change   Outcome Evaluation   Outcome Summary/Follow up Plan Patient continues to be drowsy after surgery. Complaints of pain - dilaudid given. Cardene remains on.         Problem: Fractured Hip (Adult)  Goal: Signs and Symptoms of Listed Potential Problems Will be Absent or Manageable (Fractured Hip)  Outcome: Ongoing (interventions implemented as appropriate)

## 2017-06-21 NOTE — PROGRESS NOTES
[facility] Medicine Services  INPATIENT PROGRESS NOTE    Length of Stay: 2  Date of Admission: 6/19/2017  Primary Care Physician: Jung Collins MD    Subjective     Chief Complaint:     Right hip pain    HPI     The patient is moaning with discomfort this morning.  Communication remains very difficult because of her hearing loss and dementia.  Dilaudid seems not to be lasting the full 4 hour interval.  The patient has been able to take by mouth's and oral medications.  Final urine culture shows pansensitive Escherichia coli.  The patient's blood pressure is under good control with oral medications at present.  Echocardiogram report is pending.      Review of Systems     All pertinent negatives and positives are as above. All other systems have been reviewed and are negative unless otherwise stated.     Objective    Temp:  [96.9 °F (36.1 °C)-97.8 °F (36.6 °C)] 97.8 °F (36.6 °C)  Heart Rate:  [] 99  Resp:  [9-18] 17  BP: (112-187)/() 151/62    Lab Results (last 24 hours)     Procedure Component Value Units Date/Time    CBC (No Diff) [[id number]]  (Abnormal) Collected:  06/20/17 0947    Specimen:  Blood Updated:  06/20/17 1021     WBC 9.40 10*3/mm3      RBC 3.88 (L) 10*6/mm3      Hemoglobin 12.3 g/dL      Hematocrit 36.8 (L) %      MCV 94.8 fL      MCH 31.7 pg      MCHC 33.4 g/dL      RDW 13.7 %      RDW-SD 47.4 fl      MPV 9.7 fL      Platelets 227 10*3/mm3     Comprehensive Metabolic Panel [[id number]]  (Abnormal) Collected:  06/20/17 0947    Specimen:  Blood Updated:  06/20/17 1034     Glucose 131 (H) mg/dL      BUN 17 mg/dL      Creatinine 0.84 mg/dL      Sodium 140 mmol/L      Potassium 3.3 (L) mmol/L      Chloride 100 mmol/L      CO2 28.0 mmol/L      Calcium 8.7 mg/dL      Total Protein 7.2 g/dL      Albumin 3.80 g/dL      ALT (SGPT) 28 U/L      AST (SGOT) 33 U/L      Alkaline Phosphatase 110 U/L      Total Bilirubin 0.8 mg/dL      eGFR Non African Amer 64  mL/min/1.73      Globulin 3.4 gm/dL      A/G Ratio 1.1 g/dL      BUN/Creatinine Ratio 20.2     Anion Gap 12.0 mmol/L     Narrative:       The MDRD GFR formula is only valid for adults with stable renal function between ages 18 and 70.    CBC (No Diff) [[id number]]  (Abnormal) Collected:  06/21/17 0300    Specimen:  Blood Updated:  06/21/17 0356     WBC 8.01 10*3/mm3      RBC 3.33 (L) 10*6/mm3      Hemoglobin 10.7 (L) g/dL      Hematocrit 31.4 (L) %      MCV 94.3 fL      MCH 32.1 (H) pg      MCHC 34.1 g/dL      RDW 13.3 %      RDW-SD 46.0 fl      MPV 9.9 fL      Platelets 212 10*3/mm3     Urine Culture [[id number]]  (Abnormal)  (Susceptibility) Collected:  06/19/17 0625    Specimen:  Urine from Urine, Catheter Updated:  06/21/17 0748     Urine Culture >100,000 CFU/mL Escherichia coli (A)    Susceptibility      Escherichia coli     BRAVO     Ampicillin 4 ug/ml Susceptible     Ampicillin + Sulbactam <=2 ug/ml Susceptible     Cefazolin <=4 ug/ml Susceptible  [1]      Cefepime <=1 ug/ml Susceptible     Ceftriaxone <=1 ug/ml Susceptible     Ertapenem <=0.5 ug/ml Susceptible     ESBL Confirmation Test NEG  Negative     Gentamicin <=1 ug/ml Susceptible     Levofloxacin <=0.12 ug/ml Susceptible     Meropenem <=0.25 ug/ml Susceptible     Nitrofurantoin <=16 ug/ml Susceptible     Piperacillin + Tazobactam <=4 ug/ml Susceptible     Trimethoprim + Sulfamethoxazole <=20 ug/ml Susceptible            [1]   Cefazolin results may be used to predict the potential effectiveness of oral cephalosporins for treating uncomplicated urinary tract infections.                       Imaging Results (last 24 hours)     Procedure Component Value Units Date/Time    XR Chest 1 View [[id number]] Collected:  06/20/17 1003     Updated:  06/20/17 1007    Narrative:       EXAMINATION:  XR CHEST 1 VW-  6/20/2017 8:41 CST     HISTORY: Reevaluate pulmonary edema.     COMPARISON: 06/19/2017.     FINDINGS:  Bilateral interstitial edema is decreasing when compared  to  the prior study. There is mild cardiomegaly. There is minimal blunting  of the left costophrenic angle. There is a pacemaker on the left. There  is atheromatous disease of the thoracic aorta.       Impression:       1. Decreasing pulmonary edema.  2. Minimal blunting of the left costophrenic angle which may be due to  minimal left pleural effusion.  3. Mild cardiomegaly.        This report was finalized on 06/20/2017 10:04 by Dr. Jamar Choe MD.             Intake/Output Summary (Last 24 hours) at 06/21/17 0851  Last data filed at 06/21/17 0733   Gross per 24 hour   Intake           1030.9 ml   Output             1125 ml   Net            -94.1 ml       Physical Exam  Constitutional: She appears well-developed. She is cooperative. She is moaning with pain. Nasal cannula in place. Discussed with her nurse Netta.  HENT:   Head: Normocephalic and atraumatic.   Right Ear: Decreased hearing is noted.   Left Ear: Decreased hearing is noted.   Mouth/Throat: Oropharynx is clear and moist.   Eyes: Conjunctivae are normal. No scleral icterus.   Neck: Neck supple. No JVD present. No tracheal deviation present. No thyromegaly present.   Cardiovascular: Irregularly irregular rhythm and normal heart sounds.   Pulmonary/Chest: She has no rales.   Abdominal: Soft. Bowel sounds are normal. She exhibits no mass. There is no tenderness.   Musculoskeletal: Normal range of motion. She exhibits edema (1/4 BLE).   Right hip: She exhibits tenderness R hip.   Neurological: She is alert.   Skin: Skin is warm and dry. No rash noted.   Psychiatric: Her affect is normal. Her speech is delayed. She is slowed. Cognition and memory are impaired.        Results Review:  I have reviewed the labs, radiology results, and diagnostic studies since my last progress note and made treatment changes reflective of the results.   I have reviewed the current medications.    Assessment/Plan     Hospital Problem List     * (Principal)Closed subcapital  fracture of neck of right femur    Pulmonary edema    UTI (urinary tract infection)    Diastolic dysfunction    Pulmonary HTN    Paroxysmal atrial fibrillation    Obstructive sleep apnea    Dementia    Hypertension          PLAN:  Transfer to the medical surgical floor  Continue oral antihypertensives  Continue IV Rocephin  Recheck BMP  Lasix 20 mg by mouth daily  Discontinue hydralazine  Increase Coreg to 12.5 mg by mouth twice a day    Kwaku Frey,    06/21/17   8:51 AM     Approximately  minutes of critical care time were spent managing the patient exclusive of billable procedures.

## 2017-06-21 NOTE — THERAPY EVALUATION
Acute Care - Physical Therapy Initial Evaluation  [facility]     Patient Name: Renay Lazo  : 1929  MRN: [id number]  Today's Date: 2017   Onset of Illness/Injury or Date of Surgery Date: 17  Date of Referral to PT: 17  Referring Physician: (S) Dr. Zaidi (Post hip precautions, WBAT)      Admit Date: 2017     Visit Dx:    ICD-10-CM ICD-9-CM   1. Closed displaced intertrochanteric fracture of right femur, initial encounter S72.141A 820.21   2. Closed subcapital fracture of neck of right femur, initial encounter S72.011A 820.09   3. Decreased activities of daily living (ADL) Z78.9 V49.89   4. Impaired mobility Z74.09 799.89     Patient Active Problem List   Diagnosis   • Closed C2 fracture   • C2 cervical fracture   • Transient alteration of awareness   • Paroxysmal atrial fibrillation   • History of meningioma of the brain   • UTI (urinary tract infection)   • Over weight   • Closed displaced fracture of second cervical vertebra with routine healing   • Closed subcapital fracture of neck of right femur   • Obstructive sleep apnea   • Dementia   • Hypertension   • Pulmonary edema   • Diastolic dysfunction   • Pulmonary HTN     Past Medical History:   Diagnosis Date   • Adrenal adenoma    • Anemia    • Atrial fibrillation    • Benign neoplasm of cerebral meninges    • Brain tumor    • CHF (congestive heart failure)    • Dementia    • Disease of thyroid gland    • Hip fracture requiring operative repair     2016   • Hyperlipidemia    • Hypertension    • Obstructive sleep apnea    • Osteoarthritis    • Peripheral vascular disease    • Pneumonia    • Seizures    • Sick sinus syndrome     s/p pacemaker per Dr. Puri   • TIA (transient ischemic attack)      Past Surgical History:   Procedure Laterality Date   • APPENDECTOMY     • BRAIN SURGERY     • CAROTID ENDARTERECTOMY  2011   • CATARACT EXTRACTION     • CHOLECYSTECTOMY     • HEMORRHOIDECTOMY     • HIP HEMIARTHROPLASTY Right 2017     Procedure: HIP HEMIARTHROPLASTY;  Surgeon: Jeremiah Zaidi MD;  Location:  [facility] OR;  Service:    • INSERT / REPLACE / REMOVE PACEMAKER     • JOINT REPLACEMENT     • PACEMAKER IMPLANTATION     • TONSILLECTOMY     • TOTAL HIP ARTHROPLASTY            PT ASSESSMENT (last 72 hours)      PT Evaluation       06/21/17 0810 06/21/17 0756    Rehab Evaluation    Document Type evaluation   See MAR  -EDMUND evaluation   See MAR  -ND    Subjective Information agree to therapy;complains of;pain   moaning throughout with discomfort  -EDMUND agree to therapy;complains of;pain   difficult to obtain pt. just moans out in pain  -ND    General Information    Patient Profile Review yes  -EDMUND yes  -ND    Onset of Illness/Injury or Date of Surgery Date 06/19/17  -EDMUND 06/19/17  -ND    Referring Physician Dr. Zaidi   Post hip precautions, WBAT  -EDMUND Dr. Zaidi  -ND    General Observations Fowlers in bed, moaning throughout eval,  present  -EDMUND Fowlers, disoriented,  present, telemetry, IV site, R knee immobilizer  -ND    Pertinent History Of Current Problem Pt. fell at home d/t being poor historian it is difficult to obtain scenario. Pt. was found with RLE externally rotated. In ICU pt. has been extremely hypertensive difficult to control BP and pulmonary edema. Hx of dementia. Dx: Displaced subcapital fracture of R femoral neck, pulmonary edema, diastolic fx, pulm HTN, AFIB, ANAYA, HTN. Chest XRAY- pulm edema, min blunting of L costophrenric angle possibly d/t Min L PE. XR R hip- R femoral neck fx. s/p 6/20 R hip hemiarthroplasty.  -EDMUND Pt. fell at home d/t being poor historian it is difficult to obtain scenario. Pt. was found with RLE externally rotated. In  ICU pt. has been extremely hypertensive difficult to control BP and pulmonary edema. Hx of dementia. Dx: Displaced subcapital fracture of R femoral neck, pulmonary edema, diastolic fx, pulm HTN, AFIB, ANAYA, HTN. Chest XRAY- pulm edema, min blunting of L costophrenric angle  possibly d/t Min L PE. XR R hip- R femoral neck fx. s/p 6/20 R hemiarthroplasty   -ND    Precautions/Limitations fall precautions;hip precautions- right;oxygen therapy device and L/min   R knee immobilizer  -EDMUND fall precautions;hip precautions- right   WBAT RLE with PHP, R knee immobilizer  -ND    Prior Level of Function independent:;dressing;bathing;all household mobility   furniture walks  -EDMUND independent:;all household mobility;gait;transfer;ADL's   funrniture surfs per pt.   -ND    Equipment Currently Used at Home cane, straight;walker, standard  -EDMUND cane, straight;walker, rolling  -ND    Plans/Goals Discussed With patient;spouse/S.O.;agreed upon  -EDMUND patient and family;agreed upon  -ND    Risks Reviewed patient:;spouse/S.O.:;LOB;nausea/vomiting;dizziness;increased discomfort;change in vital signs;lines disloged  -EDMUND patient and family:;LOB;nausea/vomiting;dizziness;increased discomfort;change in vital signs;increased drainage;lines disloged  -ND    Benefits Reviewed patient:;spouse/S.O.:;improve function;increase independence;increase strength;increase balance;decrease pain;increase knowledge  -EDMUND patient and family:;improve function;increase independence;increase strength;increase balance;decrease pain;decrease risk of DVT;improve skin integrity;increase knowledge  -ND    Barriers to Rehab medically complex;cognitive status;physical barrier  -EDMUND medically complex;previous functional deficit;cognitive status;physical barrier  -ND    Living Environment    Lives With spouse  -EDMUND spouse  -ND    Living Arrangements house  -EDMUND house  -ND    Home Accessibility bed and bath on same level;ramps present at home   walk in shower, has built in seat  -EDMUND bed and bath on same level;ramps present at home   walk in shower with built in seat  -ND    Clinical Impression    Date of Referral to PT 06/20/17  -EDMUND     PT Diagnosis impaired mobility, weakness  -EDMUND     Functional Level At Time Of Evaluation Max x 2 bed mobility, CGA  sitting balance EOB  -EDMUND     Patient/Family Goals Statement Return home  -EDMUND     Criteria for Skilled Therapeutic Interventions Met yes;treatment indicated  -EDMUND     Impairments Found (describe specific impairments) arousal, attention, and cognition;gait, locomotion, and balance  -EDMUND     Rehab Potential good, to achieve stated therapy goals  -EDMUND     Predicted Duration of Therapy Intervention (days/wks) until d/c  -EDMUND     Vital Signs    Pre Systolic BP Rehab 137  -EDMUND 137  -ND    Pre Treatment Diastolic BP 68  -EDMUND 68  -ND    Intra Systolic BP Rehab 111  -EDMUND 111  -ND    Intra Treatment Diastolic BP 60  -EDMUND 60  -ND    Post Systolic BP Rehab 132  -EDMUND 132  -ND    Post Treatment Diastolic BP 51  -EDMUND 51  -ND    Pretreatment Heart Rate (beats/min) 101  -EDMUND 101  -ND    Posttreatment Heart Rate (beats/min) 72  -EDMUND 70  -ND    Pre SpO2 (%) 96  -EDMUND 96  -ND    O2 Delivery Pre Treatment supplemental O2   3L/NC  -EDMUND supplemental O2   3L O2  -ND    Post SpO2 (%) 94  -EDMUND     O2 Delivery Post Treatment supplemental O2   3L/NC  -EDMUND     Pre Patient Position Supine  -EDMUND Supine  -ND    Intra Patient Position Sitting  -EDMUND Sitting  -ND    Post Patient Position Supine  -EDMUND Supine  -ND    Pain Assessment    Pain Assessment Antony-Briggs FACES  -EDMUND Antony-Briggs FACES  -ND    Antony-Briggs FACES Pain Rating 8  -EDMUND 8  -ND    Pain Type Acute pain;Surgical pain  -EDMUND Acute pain;Surgical pain  -ND    Pain Location Hip  -EDMUND Hip  -ND    Pain Orientation Right  -EDMUND Right  -ND    Pain Descriptors Patient unable to describe  -EDMUND     Pain Frequency  Constant/continuous  -ND    Pain Intervention(s) Repositioned;Ambulation/increased activity  -EDMUND Medication (See MAR);Repositioned  -ND    Response to Interventions Moaned throughout eval  -EDMUND Pt. moaned and cried out in pain with movement  -ND    Vision Assessment/Intervention    Visual Impairment unable/difficult to assess   Has reading glasses  -EDMUND WFL with corrective lenses   reading  -ND    Cognitive  Assessment/Intervention    Current Cognitive/Communication Assessment impaired  -EDMUND impaired  -ND    Orientation Status oriented to;person  -EDMUND oriented to;person  -ND    Follows Commands/Answers Questions able to follow single-step instructions;25% of the time;50% of the time;needs cueing;needs increased time;needs repetition  -EDMUND 50% of the time;able to follow single-step instructions;needs cueing;needs increased time;needs repetition;speech unintelligible;unable to answer questions  -ND    Personal Safety decreased awareness, need for assist;decreased awareness, need for safety  -EDMUND decreased awareness, need for assist;decreased awareness, need for safety;decreased insight to deficits;moderate impairment;severe impairment  -ND    Personal Safety Interventions fall prevention program maintained;gait belt;muscle strengthening facilitated;nonskid shoes/slippers when out of bed  -EDMUND fall prevention program maintained;nonskid shoes/slippers when out of bed;supervised activity  -ND    ROM (Range of Motion)    General ROM  upper extremity range of motion deficits identified  -ND    General ROM Detail Deferred R LE, L LE AROM impaired ~ 50%. See OT IE for B UE  -EDMUND Jimy shoulders impaired approx 25% AROM, PROM WFL, Distal BUE WFL  -ND    MMT (Manual Muscle Testing)    General MMT Assessment Detail Deferred R LE, L LE functionally 2-/5 - 2/5. See OT IE B UE  -EDMUND Functionally 3+/5  -ND    Mobility Assessment/Training    Extremity Weight-Bearing Status right lower extremity  -EDMUND right lower extremity  -ND    Right Lower Extremity Weight-Bearing weight-bearing as tolerated  -EDMUND weight-bearing as tolerated  -ND    Bed Mobility, Assessment/Treatment    Bed Mobility, Assistive Device head of bed elevated;draw sheet  -EDMUND bed rails;head of bed elevated;draw sheet  -ND    Bed Mobility, Scoot/Bridge, [place] maximum assist (25% patient effort);2 person assist required   draw sheet  -EDMUND verbal cues required;nonverbal cues  required (demo/gesture);maximum assist (25% patient effort);2 person assist required   draw sheet  -ND    Bed Mob, Supine to Sit, [place] verbal cues required;maximum assist (25% patient effort);2 person assist required  -EDMUND verbal cues required;nonverbal cues required (demo/gesture);maximum assist (25% patient effort);2 person assist required  -ND    Bed Mob, Sit to Supine, [place] verbal cues required;maximum assist (25% patient effort);2 person assist required  -EDMUND verbal cues required;nonverbal cues required (demo/gesture);maximum assist (25% patient effort);2 person assist required  -ND    Bed Mobility, Safety Issues cognitive deficits limit understanding;decreased use of arms for pushing/pulling;decreased use of legs for bridging/pushing  -EDMUND decreased use of arms for pushing/pulling;decreased use of legs for bridging/pushing;cognitive deficits limit understanding  -ND    Bed Mobility, Impairments ROM decreased;strength decreased;impaired balance;pain  -EDMUND impaired balance;strength decreased;postural control impaired;pain  -ND    Transfer Assessment/Treatment    Transfer, Comment Deffered due to cognitive status currently and weakness  -EDMUND deferred not appropriate at this time d/t decreased cognition and severe pain  -ND    Motor Skills/Interventions    Additional Documentation Balance Skills Training (Group)  -EDMUND Balance Skills Training (Group)  -ND    Balance Skills Training    Sitting-Level of Assistance Close supervision;Contact guard  -EDMUND Close supervision;Contact guard  -ND    Sitting-Balance Support Right upper extremity supported;Left upper extremity supported;Feet unsupported  -EDMUND Right upper extremity supported;Left upper extremity supported;Feet unsupported  -ND    Sitting # of Minutes 5  -EDMUND     Orthotics Prosthetics    Additional Documentation Orthosis Location (Group);Orthosis Management/Training (Group)  -EDMUND Orthosis Location (Group);Orthosis Management/Training (Group)  -ND     Orthosis Location    Orthosis Location/Type lower extremity  -EDMUND lower extremity  -ND    Orthosis, Lower Extremity Right:;knee immobilizer  -EDMUND Right:;knee immobilizer  -ND    Orthosis Management/Training    Orthosis Indications immobilize, protect/position healing structures;rest, reduce pain   post hip precations  -EDMUND immobilize, protect/position healing structures;rest, reduce pain  -ND    Orthosis Skills Training restrictions/precautions  -EDMUND restrictions/precautions  -ND    Sensory Assessment/Intervention    Sensory Impairment --   unable to accurately assess  -EDMUND --   unable to obtain  -ND    Positioning and Restraints    Pre-Treatment Position in bed  -EDMUND in bed  -ND    Post Treatment Position bed  -EDMUND bed  -ND    In Bed fowlers;call light within reach;encouraged to call for assist;patient within staff view;with family/caregiver;SCD pump applied   R knee immobilizer intact  -EDMUND fowlers;call light within reach;encouraged to call for assist;patient within staff view;with family/caregiver;side rails up x2;SCD pump applied;with brace  -ND      06/19/17 1350 06/19/17 0900    General Information    Equipment Currently Used at Home hospital bed;walker, rolling  -CE none  -RC    Living Environment    Lives With spouse  -CE facility resident  -RC    Living Arrangements house  -CE extended care facility  -    Home Accessibility  no concerns  -    Stair Railings at Home  inside, present at both sides  -    Type of Financial/Environmental Concern  none  -    Transportation Available ambulance;car;family or friend will provide  -CE family or friend will provide  -      User Key  (r) = Recorded By, (t) = Taken By, (c) = Cosigned By    Initials Name Provider Type    EDMUND Cesar, PT DPT Physical Therapist    RC Rajan Vieyra, RN Registered Nurse    KAYLEE Betancourt BSW     GEE Zamarripa, OTR/L Occupational Therapist          Physical Therapy Education     Title: PT OT SLP Therapies  (Active)     Topic: Physical Therapy (Active)     Point: Mobility training (Active)    Learning Progress Summary    Learner Readiness Method Response Comment Documented by Status   Patient Acceptance E NR Educated pt./spouse on progression of PT POC, benefits of activity, R hip precautions.  06/21/17 0810 Active   Significant Other Acceptance E NR Educated pt./spouse on progression of PT POC, benefits of activity, R hip precautions.  06/21/17 0810 Active               Point: Precautions (Active)    Learning Progress Summary    Learner Readiness Method Response Comment Documented by Status   Patient Acceptance E NR Educated pt./spouse on progression of PT POC, benefits of activity, R hip precautions.  06/21/17 0810 Active   Significant Other Acceptance E NR Educated pt./spouse on progression of PT POC, benefits of activity, R hip precautions.  06/21/17 0810 Active                      User Key     Initials Effective Dates Name Provider Type Discipline    EDMUND 08/02/16 -  Deonte Cesar, PT DPT Physical Therapist PT                PT Recommendation and Plan  Anticipated Equipment Needs At Discharge: two wheeled walker  Anticipated Discharge Disposition: transitional care, skilled nursing facility, inpatient rehabilitation facility  Planned Therapy Interventions: bed mobility training, transfer training, gait training, balance training, home exercise program, orthotic fitting/training, patient/family education, postural re-education, strengthening  PT Frequency: 2 times/day, per priority policy  Plan of Care Review  Plan Of Care Reviewed With: patient, spouse  Outcome Summary/Follow up Plan: PT eval completed. Pt. currently max assist x 2 to get EOB and to return to bed. Patient was able to sit EOB with CGA/SBA  ~ 5 minuets. Pt. currently moaning in pain throughout all of eval and with weakness. PT will continue to educate post hip precautions and will work to progress functional mobility, balance, and  strength. Anticiapte d/c to SNF.           IP PT Goals       06/21/17 0810          Bed Mobility PT LTG    Bed Mobility PT LTG, Date Established 06/21/17  -EDMUND      Bed Mobility PT LTG, Time to Achieve by discharge  -EDMUND      Bed Mobility PT LTG, Activity Type supine to sit/sit to supine  -EDMUND      Bed Mobility PT LTG, [place] Level minimum assist (75% patient effort)  -EDMUND      Bed Mobility PT Goal  LTG, Assist Device bed rails  -EDMUND      Transfer Training PT LTG    Transfer Training PT LTG, Date Established 06/21/17  -EDMUND      Transfer Training PT LTG, Time to Achieve by discharge  -EDMUND      Transfer Training PT LTG, Activity Type bed to chair /chair to bed;sit to stand/stand to sit  -EDMUND      Transfer Training PT LTG, [place] Level minimum assist (75% patient effort)  -EDMUND      Transfer Training PT LTG, Assist Device walker, rolling  -EDMUND      Gait Training PT LTG    Gait Training Goal PT LTG, Date Established 06/21/17  -EDMUND      Gait Training Goal PT LTG, Time to Achieve by discharge  -EDMUND      Gait Training Goal PT LTG, [place] Level minimum assist (75% patient effort)  -EDMUND      Gait Training Goal PT LTG, Assist Device walker, rolling  -EDMUND      Gait Training Goal PT LTG, Distance to Achieve 75  -EDMUND      Strength Goal PT LTG    Strength Goal PT LTG, Date Established 06/21/17  -EDMUND      Strength Goal PT LTG, Time to Achieve by discharge  -EDMUND      Strength Goal PT LTG, Measure to Achieve AROM/AAROM, 20 reps, B UE/LE, min assist  -EDMUND        User Key  (r) = Recorded By, (t) = Taken By, (c) = Cosigned By    Initials Name Provider Type    EDMUND Cesar, PT DPT Physical Therapist                Outcome Measures       06/21/17 0810 06/21/17 0800       How much help from another person do you currently need...    Turning from your back to your side while in flat bed without using bedrails? 2  -EDMUND      Moving from lying on back to sitting on the side of a flat bed without bedrails? 2  -EDMUND      Moving to and from a  bed to a chair (including a wheelchair)? 1  -EDMUND      Standing up from a chair using your arms (e.g., wheelchair, bedside chair)? 2  -EDMUND      Climbing 3-5 steps with a railing? 1  -EDMUND      To walk in hospital room? 1  -EDMUND      AM-PAC 6 Clicks Score 9  -EDMUND      How much help from another is currently needed...    Putting on and taking off regular lower body clothing?  1  -ND     Bathing (including washing, rinsing, and drying)  1  -ND     Toileting (which includes using toilet bed pan or urinal)  2  -ND     Putting on and taking off regular upper body clothing  2  -ND     Taking care of personal grooming (such as brushing teeth)  2  -ND     Eating meals  2  -ND     Score  10  -ND     Functional Assessment    Outcome Measure Options AM-PAC 6 Clicks Basic Mobility (PT)  -EDMUND AM-PAC 6 Clicks Daily Activity (OT)  -ND       User Key  (r) = Recorded By, (t) = Taken By, (c) = Cosigned By    Initials Name Provider Type    EDMUND Cesar, PT DPT Physical Therapist    ND Jess Zamarripa, OTR/L Occupational Therapist           Time Calculation:         PT Charges       06/21/17 0911          Time Calculation    Start Time 0810  -EDMUND      Stop Time 0849  -EDMUND      Time Calculation (min) 39 min  -EDMUND      PT Received On 06/21/17  -EDMUND      PT Goal Re-Cert Due Date 07/01/17  -EDMUND        User Key  (r) = Recorded By, (t) = Taken By, (c) = Cosigned By    Initials Name Provider Type    EDMUND Cesar, PT DPT Physical Therapist          Therapy Charges for Today     Code Description Service Date Service Provider Modifiers Qty    21023282407 HC PT MOBILITY CURRENT 6/21/2017 Deonte Cesar, PT DPT GP, CL 1    [id number] HC PT MOBILITY PROJECTED 6/21/2017 Deonte Cesar, PT DPT GP, CJ 1    [id number]  PT EVAL MOD COMPLEXITY 3 6/21/2017 Deonte Cesar, PT DPT GP, KX 1          PT G-Codes  Outcome Measure Options: AM-PAC 6 Clicks Basic Mobility (PT)  Score: 9  Functional Limitation: Mobility: Walking and moving  around  Mobility: Walking and Moving Around Current Status (): At least 60 percent but less than 80 percent impaired, limited or restricted  Mobility: Walking and Moving Around Goal Status (): At least 20 percent but less than 40 percent impaired, limited or restricted      Deonte Cesar, PT DPT  6/21/2017

## 2017-06-21 NOTE — PROGRESS NOTES
Continued Stay Note   [place]     Patient Name: Renay Lazo  MRN: [id number]  Today's Date: 6/21/2017    Admit Date: 6/19/2017          Discharge Plan       06/21/17 1433    Case Management/Social Work Plan    Plan Skilled nursing placement    Additional Comments SW went to patient's room to discuss discharge planning needs (SNF). Patient's  told SW he was headed into bathroom and would need SW to return later. SW will go back to patient's room shortly to speak to patient's .               Discharge Codes     None            QUETA HarrisW

## 2017-06-21 NOTE — THERAPY EVALUATION
Acute Care - Occupational Therapy Initial Evaluation  [facility]     Patient Name: Renay Lazo  : 1929  MRN: [id number]  Today's Date: 2017  Onset of Illness/Injury or Date of Surgery Date: 17  Date of Referral to OT: 17  Referring Physician: (S) Dr. Zaidi (Post hip precautions, WBAT)    Admit Date: 2017       ICD-10-CM ICD-9-CM   1. Closed displaced intertrochanteric fracture of right femur, initial encounter S72.141A 820.21   2. Closed subcapital fracture of neck of right femur, initial encounter S72.011A 820.09   3. Decreased activities of daily living (ADL) Z78.9 V49.89     Patient Active Problem List   Diagnosis   • Closed C2 fracture   • C2 cervical fracture   • Transient alteration of awareness   • Paroxysmal atrial fibrillation   • History of meningioma of the brain   • UTI (urinary tract infection)   • Over weight   • Closed displaced fracture of second cervical vertebra with routine healing   • Closed subcapital fracture of neck of right femur   • Obstructive sleep apnea   • Dementia   • Hypertension   • Pulmonary edema   • Diastolic dysfunction   • Pulmonary HTN     Past Medical History:   Diagnosis Date   • Adrenal adenoma    • Anemia    • Atrial fibrillation    • Benign neoplasm of cerebral meninges    • Brain tumor    • CHF (congestive heart failure)    • Dementia    • Disease of thyroid gland    • Hip fracture requiring operative repair     2016   • Hyperlipidemia    • Hypertension    • Obstructive sleep apnea    • Osteoarthritis    • Peripheral vascular disease    • Pneumonia    • Seizures    • Sick sinus syndrome     s/p pacemaker per Dr. Puri   • TIA (transient ischemic attack)      Past Surgical History:   Procedure Laterality Date   • APPENDECTOMY     • BRAIN SURGERY     • CAROTID ENDARTERECTOMY  2011   • CATARACT EXTRACTION     • CHOLECYSTECTOMY     • HEMORRHOIDECTOMY     • HIP HEMIARTHROPLASTY Right 2017    Procedure: HIP HEMIARTHROPLASTY;  Surgeon:  Jeremiah Zaidi MD;  Location:  [facility] OR;  Service:    • INSERT / REPLACE / REMOVE PACEMAKER     • JOINT REPLACEMENT     • PACEMAKER IMPLANTATION     • TONSILLECTOMY     • TOTAL HIP ARTHROPLASTY            OT ASSESSMENT FLOWSHEET (last 72 hours)      OT Evaluation       06/21/17 0810 06/21/17 0756 06/19/17 1350 06/19/17 0900       Rehab Evaluation    Document Type evaluation   See MAR  -EDMUND evaluation   See MAR  -ND       Subjective Information (P)  agree to therapy;complains of;pain   moaning throughout with discomfort  -EDMUND agree to therapy;complains of;pain   difficult to obtain pt. just moans out in pain  -ND       General Information    Patient Profile Review yes  -EDMUND yes  -ND       Onset of Illness/Injury or Date of Surgery Date 06/19/17  -EDMUND 06/19/17  -ND       Referring Physician Dr. Zaidi   Post hip precautions, WBAT  -EDMUND Dr. Zaidi  -ND       General Observations (P)  Fowlers in bed, moaning throughout eval,  present  -EDMUND Fowlers, disoriented,  present, telemetry, IV site, R knee immobilizer  -ND       Pertinent History Of Current Problem (P)  Pt. fell at home d/t being poor historian it is difficult to obtain scenario. Pt. was found with RLE externally rotated. In ICU pt. has been extremely hypertensive difficult to control BP and pulmonary edema. Hx of dementia. Dx: Displaced subcapital fracture of R femoral neck, pulmonary edema, diastolic fx, pulm HTN, AFIB, ANAYA, HTN. Chest XRAY- pulm edema, min blunting of L costophrenric angle possibly d/t Min L PE. XR R hip- R femoral neck fx. s/p 6/20 R hip hemiarthroplasty.  -EDMUND Pt. fell at home d/t being poor historian it is difficult to obtain scenario. Pt. was found with RLE externally rotated. In  ICU pt. has been extremely hypertensive difficult to control BP and pulmonary edema. Hx of dementia. Dx: Displaced subcapital fracture of R femoral neck, pulmonary edema, diastolic fx, pulm HTN, AFIB, ANAYA, HTN. Chest XRAY- pulm edema, min blunting  of L costophrenric angle possibly d/t Min L PE. XR R hip- R femoral neck fx. s/p 6/20 R hemiarthroplasty   -ND       Precautions/Limitations (P)  fall precautions;hip precautions- right;oxygen therapy device and L/min   R knee immobilizer  -EDMUND fall precautions;hip precautions- right   WBAT RLE with PHP, R knee immobilizer  -ND       Prior Level of Function independent:;dressing;bathing;all household mobility   furniture walks  -EDMUND independent:;all household mobility;gait;transfer;ADL's   funrniture surfs per pt.   -ND       Equipment Currently Used at Home cane, straight;walker, standard  -EDMUND cane, straight;walker, rolling  -ND hospital bed;walker, rolling  -CE none  -RC     Plans/Goals Discussed With (P)  patient;spouse/S.O.;agreed upon  -EDMUND patient and family;agreed upon  -ND       Risks Reviewed (P)  patient:;spouse/S.O.:;LOB;nausea/vomiting;dizziness;increased discomfort;change in vital signs;lines disloged  -EDMUND patient and family:;LOB;nausea/vomiting;dizziness;increased discomfort;change in vital signs;increased drainage;lines disloged  -ND       Benefits Reviewed (P)  patient:;spouse/S.O.:;improve function;increase independence;increase strength;increase balance;decrease pain;increase knowledge  -EDMUND patient and family:;improve function;increase independence;increase strength;increase balance;decrease pain;decrease risk of DVT;improve skin integrity;increase knowledge  -ND       Barriers to Rehab (P)  medically complex;cognitive status;physical barrier  -EDMUND medically complex;previous functional deficit;cognitive status;physical barrier  -ND       Living Environment    Lives With spouse  -EDMUND spouse  -ND spouse  -CE facility resident  -     Living Arrangements house  -EDMUND house  -ND house  -CE extended care [facility]  -     Home Accessibility bed and bath on same level;ramps present at home   walk in shower, has built in seat  -EDMUND bed and bath on same level;ramps present at home   walk in shower with built in  seat  -ND  no concerns  -RC     Stair Railings at Home    inside, present at both sides  -RC     Type of Financial/Environmental Concern    none  -RC     Transportation Available   ambulance;car;family or friend will provide  -CE family or friend will provide  -RC     Clinical Impression    Date of Referral to OT  06/20/17  -ND       OT Diagnosis  decreased adl  -ND       Prognosis  fair/good  -ND       Impairments Found (describe specific impairments)  aerobic capacity/endurance;arousal, attention, and cognition;ergonomics and body mechanics;gait, locomotion, and balance;joint integrity and mobility;motor function;muscle performance;posture;ROM  -ND       Patient/Family Goals Statement  to go home with   -ND       Criteria for Skilled Therapeutic Interventions Met  yes;treatment indicated  -ND       Rehab Potential  fair, will monitor progress closely  -ND       Therapy Frequency  3-5 times/wk  -ND       Predicted Duration of Therapy Intervention (days/wks)  10 days  -ND       Anticipated Equipment Needs At Discharge  bathing equipment;dressing equipment  -ND       Anticipated Discharge Disposition  skilled nursing facility  -ND       Functional Level Prior    Ambulation    1-->assistive equipment  -RC     Transferring    1-->assistive equipment  -RC     Toileting    1-->assistive equipment  -RC     Bathing    1-->assistive equipment  -RC     Dressing    1-->assistive equipment  -RC     Eating    0-->independent  -RC     Communication    0-->understands/communicates without difficulty  -RC     Swallowing    0-->swallows foods/liquids without difficulty  -RC     Prior Functional Level Comment    none  -RC     Vital Signs    Pre Systolic BP Rehab 137  -EDMUND 137  -ND       Pre Treatment Diastolic BP 68  -EDMUND 68  -ND       Intra Systolic BP Rehab (P)  111  -EDMUND 111  -ND       Intra Treatment Diastolic BP (P)  60  -EDMUND 60  -ND       Post Systolic BP Rehab (P)  132  -EDMUND 132  -ND       Post Treatment Diastolic BP (P)   51  -EDMUND 51  -ND       Pretreatment Heart Rate (beats/min) 101  -EDMUND 101  -ND       Posttreatment Heart Rate (beats/min) (P)  72  -EDMUND 70  -ND       Pre SpO2 (%) 96  -EDMUND 96  -ND       O2 Delivery Pre Treatment (P)  supplemental O2   3L/NC  -EDMUND supplemental O2   3L O2  -ND       Post SpO2 (%) (P)  94  -EDMUND        O2 Delivery Post Treatment (P)  supplemental O2   3L/NC  -EDMUND        Pre Patient Position (P)  Supine  -EDMUND Supine  -ND       Intra Patient Position (P)  Sitting  -EDMUND Sitting  -ND       Post Patient Position (P)  Supine  -EDMUND Supine  -ND       Pain Assessment    Pain Assessment (P)  Antony-Briggs FACES  -EDMUND Antony-Briggs FACES  -ND       Antony-Briggs FACES Pain Rating  8  -ND       Pain Type  Acute pain;Surgical pain  -ND       Pain Location  Hip  -ND       Pain Orientation  Right  -ND       Pain Frequency  Constant/continuous  -ND       Pain Intervention(s)  Medication (See MAR);Repositioned  -ND       Response to Interventions  Pt. moaned and cried out in pain with movement  -ND       Vision Assessment/Intervention    Visual Impairment --   Has reading glasses  -EDMUND WFL with corrective lenses   reading  -ND       Cognitive Assessment/Intervention    Current Cognitive/Communication Assessment  impaired  -ND       Orientation Status  oriented to;person  -ND       Follows Commands/Answers Questions  50% of the time;able to follow single-step instructions;needs cueing;needs increased time;needs repetition;speech unintelligible;unable to answer questions  -ND       Personal Safety  decreased awareness, need for assist;decreased awareness, need for safety;decreased insight to deficits;moderate impairment;severe impairment  -ND       Personal Safety Interventions  fall prevention program maintained;nonskid shoes/slippers when out of bed;supervised activity  -ND       ROM (Range of Motion)    General ROM  upper extremity range of motion deficits identified  -ND       General ROM Detail  Jimy shoulders impaired approx 25% AROM,  PROM WFL, Distal BUE WFL  -ND       MMT (Manual Muscle Testing)    General MMT Assessment Detail  Functionally 3+/5  -ND       Mobility Assessment/Training    Extremity Weight-Bearing Status  right lower extremity  -ND       Right Lower Extremity Weight-Bearing  weight-bearing as tolerated  -ND       Bed Mobility, Assessment/Treatment    Bed Mobility, Assistive Device  bed rails;head of bed elevated;draw sheet  -ND       Bed Mobility, Scoot/Bridge, [place]  verbal cues required;nonverbal cues required (demo/gesture);maximum assist (25% patient effort);2 person assist required   draw sheet  -ND       Bed Mob, Supine to Sit, [place]  verbal cues required;nonverbal cues required (demo/gesture);maximum assist (25% patient effort);2 person assist required  -ND       Bed Mob, Sit to Supine, [place]  verbal cues required;nonverbal cues required (demo/gesture);maximum assist (25% patient effort);2 person assist required  -ND       Bed Mobility, Safety Issues  decreased use of arms for pushing/pulling;decreased use of legs for bridging/pushing;cognitive deficits limit understanding  -ND       Bed Mobility, Impairments  impaired balance;strength decreased;postural control impaired;pain  -ND       Transfer Assessment/Treatment    Transfer, Comment  deferred not appropriate at this time d/t decreased cognition and severe pain  -ND       Lower Body Dressing Assessment/Training    LB Dressing Assess/Train, Clothing Type  doffing:;donning:;socks  -ND       LB Dressing Assess/Train, Position  edge of bed  -ND       LB Dressing Assess/Train, [place]  dependent (less than 25% patient effort)  -ND       LB Dressing Assess/Train, Impairments  decreased flexibility;ROM decreased;strength decreased;impaired balance;pain;postural control impaired  -ND       Motor Skills/Interventions    Additional Documentation  Balance Skills Training (Group)  -ND       Balance Skills Training    Sitting-Level of Assistance  Close  supervision;Contact guard  -ND       Sitting-Balance Support  Right upper extremity supported;Left upper extremity supported;Feet unsupported  -ND       Orthotics Prosthetics    Additional Documentation  Orthosis Location (Group);Orthosis Management/Training (Group)  -ND       Orthosis Location    Orthosis Location/Type  lower extremity  -ND       Orthosis, Lower Extremity  Right:;knee immobilizer  -ND       Orthosis Management/Training    Orthosis Indications  immobilize, protect/position healing structures;rest, reduce pain  -ND       Orthosis Skills Training  restrictions/precautions  -ND       Sensory Assessment/Intervention    Sensory Impairment  --   unable to obtain  -ND       General Therapy Interventions    Planned Therapy Interventions  activity intolerance;adaptive equipment training;ADL retraining;balance training;bed mobility training;energy conservation;home exercise program;orthotic fitting/training;strengthening;transfer training  -ND       Positioning and Restraints    Pre-Treatment Position  in bed  -ND       Post Treatment Position  bed  -ND       In Bed  fowlers;call light within reach;encouraged to call for assist;patient within staff view;with family/caregiver;side rails up x2;SCD pump applied;with brace  -ND         User Key  (r) = Recorded By, (t) = Taken By, (c) = Cosigned By    Initials Name Effective Dates    EDMUND Cesar, PT DPT 08/02/16 -     KADY Vieyra RN 08/02/16 -     KAYLEE Betancourt, BSW 09/15/16 -     GEE Zamarripa, OTR/L 10/21/16 -            Occupational Therapy Education     Title: PT OT SLP Therapies (Done)     Topic: Occupational Therapy (Done)     Point: ADL training (Done)    Description: Instruct learner(s) on proper safety adaptation and remediation techniques during self care or transfers.   Instruct in proper use of assistive devices.    Learning Progress Summary    Learner Readiness Method Response Comment Documented by Status   Patient Acceptance  E VU,NR Pt. educated on role of OT, Safe bed mob, PHP, benefit of activity, progression with poc ND 06/21/17 0851 Done   Significant Other Acceptance E VU,NR Pt. educated on role of OT, Safe bed mob, PHP, benefit of activity, progression with poc ND 06/21/17 0851 Done               Point: Home exercise program (Done)    Description: Instruct learner(s) on appropriate technique for monitoring, assisting and/or progressing therapeutic exercises/activities.    Learning Progress Summary    Learner Readiness Method Response Comment Documented by Status   Patient Acceptance E VU,NR Pt. educated on role of OT, Safe bed mob, PHP, benefit of activity, progression with poc ND 06/21/17 0851 Done   Significant Other Acceptance E VU,NR Pt. educated on role of OT, Safe bed mob, PHP, benefit of activity, progression with poc ND 06/21/17 0851 Done               Point: Body mechanics (Done)    Description: Instruct learner(s) on proper positioning and spine alignment during self-care, functional mobility activities and/or exercises.    Learning Progress Summary    Learner Readiness Method Response Comment Documented by Status   Patient Acceptance E VU,NR Pt. educated on role of OT, Safe bed mob, PHP, benefit of activity, progression with poc ND 06/21/17 0851 Done   Significant Other Acceptance E VU,NR Pt. educated on role of OT, Safe bed mob, PHP, benefit of activity, progression with poc ND 06/21/17 0851 Done                      User Key     Initials Effective Dates Name Provider Type Discipline    ND 10/21/16 -  Jess Zamarripa, OTR/L Occupational Therapist OT                  OT Recommendation and Plan  Anticipated Equipment Needs At Discharge: bathing equipment, dressing equipment  Anticipated Discharge Disposition: skilled nursing facility  Planned Therapy Interventions: activity intolerance, adaptive equipment training, ADL retraining, balance training, bed mobility training, energy conservation, home exercise program,  orthotic fitting/training, strengthening, transfer training  Therapy Frequency: 3-5 times/wk  Plan of Care Review  Plan Of Care Reviewed With: patient  Progress: progress toward functional goals as expected  Outcome Summary/Follow up Plan: OT eval completed. Pt. moaning and crying out in immense pain during eval and decreased cognition limiting understanding of benefits of activity. Pt. Max A with use of draw sheet to complete bed mob. Pt. Max A for LB dressing. Pt. sat EOB with close sup/CGA for approx 8 min. Pt. cont to benefit from skilled OT d/t decreased cognition, decreased activity tolerance, decreased strength, decreased IND in ADLs. Anticipated dc is SNF. 6/21/17 0838          OT Goals       06/21/17 0852          Bed Mobility OT LTG    Bed Mobility OT LTG, Date Established 06/21/17  -ND      Bed Mobility OT LTG, Time to Achieve by discharge  -ND      Bed Mobility OT LTG, Activity Type all bed mobility  -ND      Bed Mobility OT LTG, [place] Level minimum assist (75% patient effort)  -ND      Transfer Training OT LTG    Transfer Training OT LTG, Date Established 06/21/17  -ND      Transfer Training OT LTG, Time to Achieve by discharge  -ND      Transfer Training OT LTG, Activity Type bed to chair /chair to bed;sit to stand/stand to sit;toilet  -ND      Transfer Training OT LTG, [place] Level moderate assist (50% patient effort)  -ND      Transfer Training OT LTG, Assist Device commode, bedside;walker, rolling  -ND      Strength OT LTG    Strength Goal OT LTG, Date Established 06/21/17  -ND      Strength Goal OT LTG, Time to Achieve by discharge  -ND      Strength Goal OT LTG, Measure to Achieve Pt. will complete BUE strengthening exercises to increase BUE strength to 4/5 for ADLs.   -ND      ADL OT LTG    ADL OT LTG, Date Established 06/21/17  -ND      ADL OT LTG, Time to Achieve by discharge  -ND      ADL OT LTG, Activity Type ADL skills  -ND      ADL OT LTG, [place] Level mod assist  -ND       ADL OT LTG, Additional Goal AE PRN  -ND        User Key  (r) = Recorded By, (t) = Taken By, (c) = Cosigned By    Initials Name Provider Type    GEE Zamarripa OTR/L Occupational Therapist                Outcome Measures       06/21/17 0800          How much help from another is currently needed...    Putting on and taking off regular lower body clothing? 1  -ND      Bathing (including washing, rinsing, and drying) 1  -ND      Toileting (which includes using toilet bed pan or urinal) 2  -ND      Putting on and taking off regular upper body clothing 2  -ND      Taking care of personal grooming (such as brushing teeth) 2  -ND      Eating meals 2  -ND      Score 10  -ND      Functional Assessment    Outcome Measure Options AM-PAC 6 Clicks Daily Activity (OT)  -ND        User Key  (r) = Recorded By, (t) = Taken By, (c) = Cosigned By    Initials Name Provider Type    GEE Zamarripa OTR/L Occupational Therapist          Time Calculation:   OT Start Time: 0740  OT Stop Time: 0838  OT Time Calculation (min): 58 min    Therapy Charges for Today     Code Description Service Date Service Provider Modifiers Qty    [id number]  OT SELFCARE CURRENT 6/21/2017 Jess Zamarripa OTR/L GO, CL 1    [id number] HC OT SELFCARE PROJECTED 6/21/2017 Jess Zamarripa OTR/L GO, CK 1    [id number]  OT EVAL MOD COMPLEXITY 4 6/21/2017 Jess Zamarripa OTR/L GO, KX 1          OT G-codes  OT Functional Scales Options: AM-PAC 6 Clicks Daily Activity (OT)  Functional Limitation: Self care  Self Care Current Status (): At least 60 percent but less than 80 percent impaired, limited or restricted  Self Care Goal Status (): At least 40 percent but less than 60 percent impaired, limited or restricted    SHAY Magdaleno/YENNIFER  6/21/2017

## 2017-06-21 NOTE — PLAN OF CARE
Problem: Patient Care Overview (Adult)  Goal: Plan of Care Review  Outcome: Ongoing (interventions implemented as appropriate)    06/21/17 1316   Coping/Psychosocial Response Interventions   Plan Of Care Reviewed With patient   Patient Care Overview   Progress no change   Outcome Evaluation   Outcome Summary/Follow up Plan Pt. very lethargic this afternoon, sleeping during most of tx except she would wake with pain when R leg was moved. Assisted pt. with exercises to bilateral LEs. Pt. was prom-AAROM on Left LE and PROM on R ight LE. Will continue to work with pt. on strengthening and mobility. Pt. will most likely need inpatient therapy prior to returning home.

## 2017-06-21 NOTE — PROGRESS NOTES
Orthopedic Surgery Progress Note    Renay Lazo  6/21/2017      Subjective:     Systemic or Specific Complaints: resting comfortably.  said she slept well through most of the night.    Objective:     Patient Vitals for the past 24 hrs:   BP Temp Temp src Pulse Resp SpO2   06/21/17 0602 133/49 - - 92 17 96 %   06/21/17 0517 128/50 - - 62 - 98 %   06/21/17 0502 131/64 - - 83 15 98 %   06/21/17 0436 - 97.7 °F (36.5 °C) Temporal Art - - -   06/21/17 0433 128/60 - - 81 - 94 %   06/21/17 0402 143/66 - - 88 11 97 %   06/21/17 0347 145/66 - - 88 - 97 %   06/21/17 0332 142/58 - - 80 - 96 %   06/21/17 0302 141/67 - - 99 10 97 %   06/21/17 0247 124/62 - - 94 - 96 %   06/21/17 0231 134/59 - - 90 - 96 %   06/21/17 0217 131/62 - - 87 - 97 %   06/21/17 0201 122/72 - - 86 11 94 %   06/21/17 0147 123/56 - - 87 - 97 %   06/21/17 0132 119/59 - - 96 - 97 %   06/21/17 0117 135/67 - - 102 - 96 %   06/21/17 0102 119/59 - - 102 10 98 %   06/21/17 0047 144/72 - - 90 - 97 %   06/21/17 0032 149/64 - - 90 - 97 %   06/21/17 0017 151/73 - - 80 - 97 %   06/21/17 0002 164/66 - - 80 10 96 %   06/20/17 2302 159/71 - - 77 11 97 %   06/20/17 2220 148/84 - - 85 9 97 %   06/20/17 2205 157/71 - - 65 10 97 %   06/20/17 2150 158/64 - - 71 14 98 %   06/20/17 2135 144/70 - - 65 12 96 %   06/20/17 2120 147/95 - - 65 10 97 %   06/20/17 2035 114/61 - - 74 14 95 %   06/20/17 2020 161/78 - - (!) 150 18 95 %   06/20/17 1955 170/60 97.8 °F (36.6 °C) Temporal Art 100 16 93 %   06/20/17 1950 158/69 - - 108 16 93 %   06/20/17 1945 169/67 - - 88 16 95 %   06/20/17 1940 168/68 - - 94 16 96 %   06/20/17 1935 167/86 - - (!) 164 16 96 %   06/20/17 1930 169/94 - - (!) 167 16 97 %   06/20/17 1925 (!) 187/74 - - 98 16 96 %   06/20/17 1920 165/68 - - 97 16 95 %   06/20/17 1915 - - - 97 16 97 %   06/20/17 1910 161/60 97.6 °F (36.4 °C) Temporal Art 100 16 97 %   06/20/17 1713 - - - 87 16 95 %   06/20/17 1620 (!) 147/101 - - 85 - 92 %   06/20/17 1605 160/65 - - 78 - 95  %   06/20/17 1550 152/70 - - 93 - 94 %   06/20/17 1535 151/65 - - 61 - 95 %   06/20/17 1515 151/68 - - 78 - 96 %   06/20/17 1505 155/60 - - 99 - 94 %   06/20/17 1450 141/66 - - 84 - 91 %   06/20/17 1435 157/54 - - 75 - 92 %   06/20/17 1420 149/63 - - 62 - 96 %   06/20/17 1400 151/72 - - 80 - 97 %   06/20/17 1355 150/60 - - 64 - 96 %   06/20/17 1320 148/64 - - 86 - 96 %   06/20/17 1300 148/70 - - 75 - 98 %   06/20/17 1245 153/73 - - 86 - 97 %   06/20/17 1230 162/73 - - 106 - 97 %   06/20/17 1215 149/69 - - 107 - 96 %   06/20/17 1205 151/79 - - 104 - 96 %   06/20/17 1200 - 96.9 °F (36.1 °C) Temporal Art - - -   06/20/17 1145 161/66 - - 79 - 96 %   06/20/17 1130 147/68 - - 76 - 94 %   06/20/17 1120 155/64 - - 70 - 93 %   06/20/17 1100 152/68 - - 72 - 95 %   06/20/17 1050 160/67 - - 101 - 91 %   06/20/17 1020 143/61 - - 107 - 90 %   06/20/17 1005 131/63 - - 105 - 95 %   06/20/17 0935 112/56 - - 87 - 93 %   06/20/17 0920 137/70 - - 113 - 96 %   06/20/17 0905 (!) 126/103 - - 96 - 95 %   06/20/17 0850 150/69 - - 107 - 93 %   06/20/17 0835 104/78 - - 103 - 94 %   06/20/17 0820 159/64 - - 101 - 95 %   06/20/17 0805 139/68 - - 101 - 94 %   06/20/17 0750 148/63 - - 93 - 94 %   06/20/17 0735 141/73 - - 105 16 95 %   06/20/17 0720 149/67 - - 111 - 95 %       right lower  General: alert, appears stated age and cooperative   Wound: clean, dry, intact             Dressing: clean, dry, intact   Extremity: Distal NVI           DVT Exam: No evidence of DVT seen on physical exam.                   Data Review:  Lab Results (last 24 hours)     Procedure Component Value Units Date/Time    CBC (No Diff) [[id number]]  (Abnormal) Collected:  06/20/17 0947    Specimen:  Blood Updated:  06/20/17 1021     WBC 9.40 10*3/mm3      RBC 3.88 (L) 10*6/mm3      Hemoglobin 12.3 g/dL      Hematocrit 36.8 (L) %      MCV 94.8 fL      MCH 31.7 pg      MCHC 33.4 g/dL      RDW 13.7 %      RDW-SD 47.4 fl      MPV 9.7 fL      Platelets 227 10*3/mm3      Comprehensive Metabolic Panel [[id number]]  (Abnormal) Collected:  06/20/17 0947    Specimen:  Blood Updated:  06/20/17 1034     Glucose 131 (H) mg/dL      BUN 17 mg/dL      Creatinine 0.84 mg/dL      Sodium 140 mmol/L      Potassium 3.3 (L) mmol/L      Chloride 100 mmol/L      CO2 28.0 mmol/L      Calcium 8.7 mg/dL      Total Protein 7.2 g/dL      Albumin 3.80 g/dL      ALT (SGPT) 28 U/L      AST (SGOT) 33 U/L      Alkaline Phosphatase 110 U/L      Total Bilirubin 0.8 mg/dL      eGFR Non African Amer 64 mL/min/1.73      Globulin 3.4 gm/dL      A/G Ratio 1.1 g/dL      BUN/Creatinine Ratio 20.2     Anion Gap 12.0 mmol/L     Narrative:       The MDRD GFR formula is only valid for adults with stable renal function between ages 18 and 70.    CBC (No Diff) [[id number]]  (Abnormal) Collected:  06/21/17 0300    Specimen:  Blood Updated:  06/21/17 0356     WBC 8.01 10*3/mm3      RBC 3.33 (L) 10*6/mm3      Hemoglobin 10.7 (L) g/dL      Hematocrit 31.4 (L) %      MCV 94.3 fL      MCH 32.1 (H) pg      MCHC 34.1 g/dL      RDW 13.3 %      RDW-SD 46.0 fl      MPV 9.9 fL      Platelets 212 10*3/mm3         Imaging Results (last 24 hours)     Procedure Component Value Units Date/Time    XR Chest 1 View [[id number]] Collected:  06/20/17 1003     Updated:  06/20/17 1007    Narrative:       EXAMINATION:  XR CHEST 1 VW-  6/20/2017 8:41 CST     HISTORY: Reevaluate pulmonary edema.     COMPARISON: 06/19/2017.     FINDINGS:  Bilateral interstitial edema is decreasing when compared to  the prior study. There is mild cardiomegaly. There is minimal blunting  of the left costophrenic angle. There is a pacemaker on the left. There  is atheromatous disease of the thoracic aorta.       Impression:       1. Decreasing pulmonary edema.  2. Minimal blunting of the left costophrenic angle which may be due to  minimal left pleural effusion.  3. Mild cardiomegaly.        This report was finalized on 06/20/2017 10:04 by Dr. Jamar Choe MD.           Assessment:     POD# 1 R hip hemiarthroplasty, Acute Post op Blood Loss Anemia (expected)     Plan:      1:  DVT prophylaxis 3 weeks, ICE, elevate  2:  Pain control  3:  Physical therapy/Occupational therapy  4:  IV antibiotics for 24 hours  5:  Anticipate discharge next week if pain well controlled  6:  WBAT RLE with posterior hip precautions       Supa Cancino PA-C

## 2017-06-21 NOTE — THERAPY TREATMENT NOTE
Acute Care - Physical Therapy Treatment Note  [facility]     Patient Name: Renay Lazo  : 1929  MRN: [id number]  Today's Date: 2017  Onset of Illness/Injury or Date of Surgery Date: 17  Date of Referral to PT: 17  Referring Physician: (S) Dr. Zaidi (Post hip precautions, WBAT)    Admit Date: 2017    Visit Dx:    ICD-10-CM ICD-9-CM   1. Closed displaced intertrochanteric fracture of right femur, initial encounter S72.141A 820.21   2. Closed subcapital fracture of neck of right femur, initial encounter S72.011A 820.09   3. Decreased activities of daily living (ADL) Z78.9 V49.89   4. Impaired mobility Z74.09 799.89     Patient Active Problem List   Diagnosis   • Closed C2 fracture   • C2 cervical fracture   • Transient alteration of awareness   • Paroxysmal atrial fibrillation   • History of meningioma of the brain   • UTI (urinary tract infection)   • Over weight   • Closed displaced fracture of second cervical vertebra with routine healing   • Closed subcapital fracture of neck of right femur   • Obstructive sleep apnea   • Dementia   • Hypertension   • Pulmonary edema   • Diastolic dysfunction   • Pulmonary HTN               Adult Rehabilitation Note       17 1316          Rehab Assessment/Intervention    Discipline physical therapy assistant  -      Document Type therapy note (daily note)  -      Subjective Information agree to therapy;complains of;pain  -      Symptoms Noted Comment Pt. very lethargic, sleeping through most of tx. Pt. did awake with pain when R Leg was moved.  -      Precautions/Limitations cardiac precautions;hip precautions- right   Rknee immobilizer  -      Recorded by [] Clara Saldana PTA      Pain Assessment    Pain Assessment Antony-Baker FACES  -      Antony-Baker FACES Pain Rating 8  -      Pain Type Acute pain;Surgical pain  -      Pain Location Hip  -      Pain Orientation Right  -      Pain Frequency Intermittent  -       Pain Intervention(s) Medication (See MAR);Repositioned  -MF      Response to Interventions tolerated, pt. would wake with pain.  -MF      Recorded by [] Clara Saldana PTA      Therapy Exercises    Left Lower Extremity PROM:;AAROM:;20 reps;supine;ankle pumps/circles;heel slides;hip abduction/adduction;SAQ  -MF      Exercise Protocols total hip  -MF      Total Hip Exercises right:;20 reps;completed protocol;with assist   no quad sets due to lethargy. All PROM.  -MF      Recorded by [] Clara Saldana PTA      Positioning and Restraints    Pre-Treatment Position in bed  -MF      Post Treatment Position bed  -MF      In Bed fowlers;call light within reach;exit alarm on;with family/caregiver;side rails up x3;SCD pump applied;R knee immobilizer  -MF      Recorded by [] Clara Saldana PTA        User Key  (r) = Recorded By, (t) = Taken By, (c) = Cosigned By    Initials Name Effective Dates     Clara Saldaan PTA 08/02/16 -                 IP PT Goals       06/21/17 0810          Bed Mobility PT LTG    Bed Mobility PT LTG, Date Established 06/21/17  -EDMUND      Bed Mobility PT LTG, Time to Achieve by discharge  -EDMUND      Bed Mobility PT LTG, Activity Type supine to sit/sit to supine  -EDMUND      Bed Mobility PT LTG, [place] Level minimum assist (75% patient effort)  -EDMUND      Bed Mobility PT Goal  LTG, Assist Device bed rails  -EDMUND      Transfer Training PT LTG    Transfer Training PT LTG, Date Established 06/21/17  -EDMUND      Transfer Training PT LTG, Time to Achieve by discharge  -EDMUND      Transfer Training PT LTG, Activity Type bed to chair /chair to bed;sit to stand/stand to sit  -EDMUND      Transfer Training PT LTG, [place] Level minimum assist (75% patient effort)  -EDMUND      Transfer Training PT LTG, Assist Device walker, rolling  -EDMUND      Gait Training PT LTG    Gait Training Goal PT LTG, Date Established 06/21/17  -EDMUND      Gait Training Goal PT LTG, Time to Achieve by discharge  -EDMUND      Gait  Training Goal PT LTG, [place] Level minimum assist (75% patient effort)  -EDMUND      Gait Training Goal PT LTG, Assist Device walker, rolling  -EDMUND      Gait Training Goal PT LTG, Distance to Achieve 75  -EDMUND      Strength Goal PT LTG    Strength Goal PT LTG, Date Established 06/21/17  -EDMUND      Strength Goal PT LTG, Time to Achieve by discharge  -EDMUND      Strength Goal PT LTG, Measure to Achieve AROM/AAROM, 20 reps, B UE/LE, min assist  -EDMUND        User Key  (r) = Recorded By, (t) = Taken By, (c) = Cosigned By    Initials Name Provider Type    EDMUND Cesar, PT DPT Physical Therapist          Physical Therapy Education     Title: PT OT SLP Therapies (Active)     Topic: Physical Therapy (Active)     Point: Mobility training (Active)    Learning Progress Summary    Learner Readiness Method Response Comment Documented by Status   Patient Acceptance E NR Educated pt./spouse on progression of PT POC, benefits of activity, R hip precautions.  06/21/17 0810 Active   Significant Other Acceptance E NR Educated pt./spouse on progression of PT POC, benefits of activity, R hip precautions.  06/21/17 0810 Active               Point: Precautions (Active)    Learning Progress Summary    Learner Readiness Method Response Comment Documented by Status   Patient Acceptance E NR Educated pt./spouse on progression of PT POC, benefits of activity, R hip precautions.  06/21/17 0810 Active   Significant Other Acceptance E NR Educated pt./spouse on progression of PT POC, benefits of activity, R hip precautions.  06/21/17 0810 Active                      User Key     Initials Effective Dates Name Provider Type [place] SHAFFER 08/02/16 -  Deonte Cesar, PT DPT Physical Therapist PT                    PT Recommendation and Plan  Anticipated Equipment Needs At Discharge: two wheeled walker  Anticipated Discharge Disposition: transitional care, skilled nursing facility, inpatient rehabilitation facility  Planned Therapy  Interventions: bed mobility training, transfer training, gait training, balance training, home exercise program, orthotic fitting/training, patient/family education, postural re-education, strengthening  PT Frequency: 2 times/day, per priority policy  Plan of Care Review  Plan Of Care Reviewed With: patient  Progress: no change  Outcome Summary/Follow up Plan: Pt. very lethargic this afternoon, sleeping during most of tx except she would wake with pain when R leg was moved. Assisted pt. with exercises to bilateral LEs. Pt. was prom-AAROM on Left LE and PROM on R ight LE. Will continue to work with pt. on strengthening and mobility. Pt. will most likely need inpatient therapy prior to returning home.          Outcome Measures       06/21/17 1316 06/21/17 0810 06/21/17 0800    How much help from another person do you currently need...    Turning from your back to your side while in flat bed without using bedrails? 2  -MF 2  -EDMUND     Moving from lying on back to sitting on the side of a flat bed without bedrails? 2  -MF 2  -EDMUND     Moving to and from a bed to a chair (including a wheelchair)? 1  -MF 1  -EDMUND     Standing up from a chair using your arms (e.g., wheelchair, bedside chair)? 2  -MF 2  -EDMUND     Climbing 3-5 steps with a railing? 1  -MF 1  -EDMUND     To walk in hospital room? 1  -MF 1  -EDMUND     AM-PAC 6 Clicks Score 9  -MF 9  -EDMUND     How much help from another is currently needed...    Putting on and taking off regular lower body clothing?   1  -ND    Bathing (including washing, rinsing, and drying)   1  -ND    Toileting (which includes using toilet bed pan or urinal)   2  -ND    Putting on and taking off regular upper body clothing   2  -ND    Taking care of personal grooming (such as brushing teeth)   2  -ND    Eating meals   2  -ND    Score   10  -ND    Functional Assessment    Outcome Measure Options AM-PAC 6 Clicks Basic Mobility (PT)  -MF AM-PAC 6 Clicks Basic Mobility (PT)  -EDMUND AM-PAC 6 Clicks Daily Activity  (OT)  -ND      User Key  (r) = Recorded By, (t) = Taken By, (c) = Cosigned By    Initials Name Provider Type    EDMUND Cesar, PT DPT Physical Therapist    MARC Saldana PTA Physical Therapy Assistant    GEE Zamarripa, OTR/L Occupational Therapist           Time Calculation:         PT Charges       06/21/17 1351 06/21/17 0911       Time Calculation    Start Time 1316  -MF 0810  -EDMUND     Stop Time 1344  -MF 0849  -EDMUND     Time Calculation (min) 28 min  -MF 39 min  -EDMUND     PT Non-Billable Time (min) 0 min  -MF      PT Received On 06/21/17  - 06/21/17  -EDMUND     PT Goal Re-Cert Due Date 07/01/17  - 07/01/17  -EDMUND     Time Calculation- PT    Total Timed Code Minutes- PT 28 minute(s)  -        User Key  (r) = Recorded By, (t) = Taken By, (c) = Cosigned By    Initials Name Provider Type    EDMUND Cesar, PT DPT Physical Therapist    MARC Saldana PTA Physical Therapy Assistant          Therapy Charges for Today     Code Description Service Date Service Provider Modifiers Qty    32666680792 HC PT THER PROC EA 15 MIN 6/21/2017 Clara Saldana PTA GP, KX 2          PT G-Codes  Outcome Measure Options: AM-PAC 6 Clicks Basic Mobility (PT)  Score: 9  Functional Limitation: Mobility: Walking and moving around  Mobility: Walking and Moving Around Current Status (): At least 60 percent but less than 80 percent impaired, limited or restricted  Mobility: Walking and Moving Around Goal Status (): At least 20 percent but less than 40 percent impaired, limited or restricted    Clara Saldana PTA  6/21/2017

## 2017-06-21 NOTE — PLAN OF CARE
Problem: Patient Care Overview (Adult)  Goal: Plan of Care Review  Outcome: Ongoing (interventions implemented as appropriate)    06/20/17 1955   Coping/Psychosocial Response Interventions   Plan Of Care Reviewed With patient   Patient Care Overview   Progress improving   Outcome Evaluation   Outcome Summary/Follow up Plan no s/s pain noted. VSS. Meets criteria for discharge from PACU and transfer to ICU.

## 2017-06-21 NOTE — PLAN OF CARE
Problem: Patient Care Overview (Adult)  Goal: Plan of Care Review  Outcome: Ongoing (interventions implemented as appropriate)    06/21/17 0810   Coping/Psychosocial Response Interventions   Plan Of Care Reviewed With patient;spouse   Outcome Evaluation   Outcome Summary/Follow up Plan PT eval completed. Pt. currently max assist x 2 to get EOB and to return to bed. Patient was able to sit EOB with CGA/SBA ~ 5 minuets. Pt. currently moaning in pain throughout all of eval and with weakness. PT will continue to educate post hip precautions and will work to progress functional mobility, balance, and strength. Anticiapte d/c to SNF.          Problem: Inpatient Physical Therapy  Goal: Bed Mobility Goal LTG- PT  Outcome: Ongoing (interventions implemented as appropriate)    06/21/17 0810   Bed Mobility PT LTG   Bed Mobility PT LTG, Date Established 06/21/17   Bed Mobility PT LTG, Time to Achieve by discharge   Bed Mobility PT LTG, Activity Type supine to sit/sit to supine   Bed Mobility PT LTG, [place] Level minimum assist (75% patient effort)   Bed Mobility PT Goal LTG, Assist Device bed rails       Goal: Transfer Training Goal 1 LTG- PT  Outcome: Ongoing (interventions implemented as appropriate)    06/21/17 0810   Transfer Training PT LTG   Transfer Training PT LTG, Date Established 06/21/17   Transfer Training PT LTG, Time to Achieve by discharge   Transfer Training PT LTG, Activity Type bed to chair /chair to bed;sit to stand/stand to sit   Transfer Training PT LTG, [place] Level minimum assist (75% patient effort)   Transfer Training PT LTG, Assist Device walker, rolling       Goal: Gait Training Goal LTG- PT  Outcome: Ongoing (interventions implemented as appropriate)    06/21/17 0810   Gait Training PT LTG   Gait Training Goal PT LTG, Date Established 06/21/17   Gait Training Goal PT LTG, Time to Achieve by discharge   Gait Training Goal PT LTG, [place] Level minimum assist (75% patient effort)    Gait Training Goal PT LTG, Assist Device walker, rolling   Gait Training Goal PT LTG, Distance to Achieve 75       Goal: Strength Goal LTG- PT  Outcome: Ongoing (interventions implemented as appropriate)    06/21/17 0810   Strength Goal PT LTG   Strength Goal PT LTG, Date Established 06/21/17   Strength Goal PT LTG, Time to Achieve by discharge   Strength Goal PT LTG, Measure to Achieve AROM/AAROM, 20 reps, B UE/LE, min assist

## 2017-06-21 NOTE — PLAN OF CARE
Problem: Patient Care Overview (Adult)  Goal: Plan of Care Review  Outcome: Ongoing (interventions implemented as appropriate)    06/21/17 0852   Coping/Psychosocial Response Interventions   Plan Of Care Reviewed With patient   Patient Care Overview   Progress progress toward functional goals as expected   Outcome Evaluation   Outcome Summary/Follow up Plan OT eval completed. Pt. moaning and crying out in immense pain during eval and decreased cognition limiting understanding of benefits of activity. Pt. Max A with use of draw sheet to complete bed mob. Pt. Max A for LB dressing. Pt. sat EOB with close sup/CGA for approx 8 min. Pt. cont to benefit from skilled OT d/t decreased cognition, decreased activity tolerance, decreased strength, decreased IND in ADLs. Anticipated dc is SNF. 6/21/17 0838         Problem: Inpatient Occupational Therapy  Goal: Bed Mobility Goal LTG- OT  Outcome: Ongoing (interventions implemented as appropriate)    06/21/17 0852   Bed Mobility OT LTG   Bed Mobility OT LTG, Date Established 06/21/17   Bed Mobility OT LTG, Time to Achieve by discharge   Bed Mobility OT LTG, Activity Type all bed mobility   Bed Mobility OT LTG, [place] Level minimum assist (75% patient effort)       Goal: Transfer Training Goal 1 LTG- OT  Outcome: Ongoing (interventions implemented as appropriate)    06/21/17 0852   Transfer Training OT LTG   Transfer Training OT LTG, Date Established 06/21/17   Transfer Training OT LTG, Time to Achieve by discharge   Transfer Training OT LTG, Activity Type bed to chair /chair to bed;sit to stand/stand to sit;toilet   Transfer Training OT LTG, [place] Level moderate assist (50% patient effort)   Transfer Training OT LTG, Assist Device commode, bedside;walker, rolling       Goal: Strength Goal LTG- OT  Outcome: Ongoing (interventions implemented as appropriate)    06/21/17 0852   Strength OT LTG   Strength Goal OT LTG, Date Established 06/21/17   Strength Goal OT LTG,  Time to Achieve by discharge   Strength Goal OT LTG, Measure to Achieve Pt. will complete BUE strengthening exercises to increase BUE strength to 4/5 for ADLs.        Goal: ADL Goal LTG- OT  Outcome: Ongoing (interventions implemented as appropriate)    06/21/17 0852   ADL OT LTG   ADL OT LTG, Date Established 06/21/17   ADL OT LTG, Time to Achieve by discharge   ADL OT LTG, Activity Type ADL skills   ADL OT LTG, [place] Level mod assist   ADL OT LTG, Additional Goal AE PRN

## 2017-06-22 ENCOUNTER — APPOINTMENT (OUTPATIENT)
Dept: CT IMAGING | Facility: HOSPITAL | Age: 82
End: 2017-06-22

## 2017-06-22 LAB
DEPRECATED RDW RBC AUTO: 47.6 FL (ref 40–54)
ERYTHROCYTE [DISTWIDTH] IN BLOOD BY AUTOMATED COUNT: 13.6 % (ref 12–15)
HCT VFR BLD AUTO: 23.9 % (ref 37–47)
HGB BLD-MCNC: 8.1 G/DL (ref 12–16)
MCH RBC QN AUTO: 32 PG (ref 28–32)
MCHC RBC AUTO-ENTMCNC: 33.9 G/DL (ref 33–36)
MCV RBC AUTO: 94.5 FL (ref 82–98)
PLATELET # BLD AUTO: 166 10*3/MM3 (ref 130–400)
PMV BLD AUTO: 10 FL (ref 6–12)
RBC # BLD AUTO: 2.53 10*6/MM3 (ref 4.2–5.4)
WBC NRBC COR # BLD: 7.83 10*3/MM3 (ref 4.8–10.8)

## 2017-06-22 PROCEDURE — 97110 THERAPEUTIC EXERCISES: CPT

## 2017-06-22 PROCEDURE — 92610 EVALUATE SWALLOWING FUNCTION: CPT

## 2017-06-22 PROCEDURE — G8997 SWALLOW GOAL STATUS: HCPCS

## 2017-06-22 PROCEDURE — 97530 THERAPEUTIC ACTIVITIES: CPT

## 2017-06-22 PROCEDURE — 25010000002 CEFTRIAXONE: Performed by: ORTHOPAEDIC SURGERY

## 2017-06-22 PROCEDURE — P9612 CATHETERIZE FOR URINE SPEC: HCPCS

## 2017-06-22 PROCEDURE — G8996 SWALLOW CURRENT STATUS: HCPCS

## 2017-06-22 PROCEDURE — 70450 CT HEAD/BRAIN W/O DYE: CPT

## 2017-06-22 PROCEDURE — 87040 BLOOD CULTURE FOR BACTERIA: CPT | Performed by: FAMILY MEDICINE

## 2017-06-22 PROCEDURE — 97535 SELF CARE MNGMENT TRAINING: CPT

## 2017-06-22 PROCEDURE — 25010000002 ENOXAPARIN PER 10 MG: Performed by: ORTHOPAEDIC SURGERY

## 2017-06-22 PROCEDURE — 25010000002 HYDROMORPHONE PER 4 MG: Performed by: FAMILY MEDICINE

## 2017-06-22 PROCEDURE — 85027 COMPLETE CBC AUTOMATED: CPT | Performed by: FAMILY MEDICINE

## 2017-06-22 RX ORDER — ACETAMINOPHEN 325 MG/1
650 TABLET ORAL EVERY 6 HOURS PRN
Status: DISCONTINUED | OUTPATIENT
Start: 2017-06-22 | End: 2017-06-28 | Stop reason: HOSPADM

## 2017-06-22 RX ORDER — SODIUM CHLORIDE 9 MG/ML
75 INJECTION, SOLUTION INTRAVENOUS CONTINUOUS
Status: DISCONTINUED | OUTPATIENT
Start: 2017-06-22 | End: 2017-06-24

## 2017-06-22 RX ADMIN — PANTOPRAZOLE SODIUM 40 MG: 40 INJECTION, POWDER, FOR SOLUTION INTRAVENOUS at 05:17

## 2017-06-22 RX ADMIN — SODIUM CHLORIDE 100 ML/HR: 9 INJECTION, SOLUTION INTRAVENOUS at 14:54

## 2017-06-22 RX ADMIN — DOCUSATE SODIUM -SENNOSIDES 2 TABLET: 50; 8.6 TABLET, COATED ORAL at 09:24

## 2017-06-22 RX ADMIN — ENOXAPARIN SODIUM 40 MG: 40 INJECTION SUBCUTANEOUS at 09:23

## 2017-06-22 RX ADMIN — POLYETHYLENE GLYCOL 3350 17 G: 17 POWDER, FOR SOLUTION ORAL at 09:25

## 2017-06-22 RX ADMIN — FUROSEMIDE 20 MG: 20 TABLET ORAL at 09:25

## 2017-06-22 RX ADMIN — AMLODIPINE BESYLATE 10 MG: 10 TABLET ORAL at 09:25

## 2017-06-22 RX ADMIN — CEFTRIAXONE 1 G: 1 INJECTION, POWDER, FOR SOLUTION INTRAMUSCULAR; INTRAVENOUS at 09:23

## 2017-06-22 RX ADMIN — ACETAMINOPHEN 650 MG: 325 TABLET, FILM COATED ORAL at 14:53

## 2017-06-22 RX ADMIN — CARVEDILOL 12.5 MG: 6.25 TABLET, FILM COATED ORAL at 09:27

## 2017-06-22 RX ADMIN — LISINOPRIL 20 MG: 20 TABLET ORAL at 09:25

## 2017-06-22 RX ADMIN — CLINDAMYCIN PHOSPHATE 900 MG: 18 INJECTION, SOLUTION INTRAVENOUS at 05:16

## 2017-06-22 RX ADMIN — HYDROMORPHONE HYDROCHLORIDE 1 MG: 1 INJECTION, SOLUTION INTRAMUSCULAR; INTRAVENOUS; SUBCUTANEOUS at 09:26

## 2017-06-22 RX ADMIN — POTASSIUM CHLORIDE 20 MEQ: 750 CAPSULE, EXTENDED RELEASE ORAL at 09:24

## 2017-06-22 NOTE — PROGRESS NOTES
"    [facility]  INPATIENT PROGRESS NOTE    Length of Stay: 3  Date of Admission: 6/19/2017  Primary Care Physician: Jung Collins MD    Subjective   Chief Complaint: f/u right hip fracture    HPI   Patient awakens with gentle verbal stimuli.  She is confused, thinks she is in her home.  She does not follow commands very well but she does try.  She moans with every expiratory breath.  When asked how she was feeling she returned, \"I am all right I guess\".  She appears to be in no acute distress.  Following closely.    Review of Systems   Unable to determine    All pertinent negatives and positives are as above. All other systems have been reviewed and are negative unless otherwise stated.     Objective    Temp:  [97.7 °F (36.5 °C)-101.3 °F (38.5 °C)] 100.7 °F (38.2 °C)  Heart Rate:  [] 73  Resp:  [12-18] 16  BP: (111-175)/(41-82) 119/46    Physical Exam   Constitutional: She appears well-developed and well-nourished. No distress.   HENT:   Head: Normocephalic and atraumatic.   Oral mucosa dry   Eyes: Conjunctivae and EOM are normal. Pupils are equal, round, and reactive to light. No scleral icterus.   Neck: Normal range of motion. Neck supple. No JVD present. No tracheal deviation present.   Cardiovascular: Normal rate, regular rhythm, normal heart sounds and intact distal pulses.  Exam reveals no gallop.    No murmur heard.  Pulmonary/Chest: Effort normal and breath sounds normal. No respiratory distress. She has no wheezes. She has no rales.   Abdominal: Soft. Bowel sounds are normal. She exhibits no distension. There is no tenderness. There is no guarding.   Musculoskeletal: She exhibits no edema.   Neurological:   Awakens and tries to follow commands   Skin: Skin is warm and dry. No rash noted. She is not diaphoretic. No erythema. No pallor.   Dressing right lower extremity   Psychiatric: She has a normal mood and affect. Her behavior is normal.   Vitals " reviewed.      Results Review:  Recent Results (from the past 12 hour(s))   CBC (No Diff)    Collection Time: 06/22/17  4:20 AM   Result Value Ref Range    WBC 7.83 4.80 - 10.80 10*3/mm3    RBC 2.53 (L) 4.20 - 5.40 10*6/mm3    Hemoglobin 8.1 (L) 12.0 - 16.0 g/dL    Hematocrit 23.9 (L) 37.0 - 47.0 %    MCV 94.5 82.0 - 98.0 fL    MCH 32.0 28.0 - 32.0 pg    MCHC 33.9 33.0 - 36.0 g/dL    RDW 13.6 12.0 - 15.0 %    RDW-SD 47.6 40.0 - 54.0 fl    MPV 10.0 6.0 - 12.0 fL    Platelets 166 130 - 400 10*3/mm3       Cultures:  Urine Culture   Date Value Ref Range Status   06/19/2017 >100,000 CFU/mL Escherichia coli (A)  Final       Radiology Data:    Imaging Results (last 24 hours)     ** No results found for the last 24 hours. **            Intake/Output Summary (Last 24 hours) at 06/22/17 0755  Last data filed at 06/22/17 0336   Gross per 24 hour   Intake              240 ml   Output              230 ml   Net               10 ml       Allergies   Allergen Reactions   • Chocolate Hives   • Ampicillin    • Benadryl [Diphenhydramine]    • Biaxin [Clarithromycin]    • Capoten [Captopril]      Patient takes Lisinopril at home   • Ciprofloxacin    • Ciprofloxacin Hcl    • Codeine    • Contrast Dye    • Darvon [Propoxyphene]    • Diphenhydramine Hcl    • Dyazide [Triamterene-Hctz]    • Enablex [Darifenacin Hydrobromide Er]    • Gadolinium Derivatives    • Homatropine    • Hydrochlorothiazide W-Triamterene    • Hydrocodone    • Inderal La [Propranolol Hcl]      Patient takes Atenolol at Home   • Iodine    • Levaquin [Levofloxacin]    • Lortab [Hydrocodone-Acetaminophen]    • Lotrel [Amlodipine Besy-Benazepril Hcl]      Patient takes Amlodipine at home.   • Macrodantin [Nitrofurantoin Macrocrystal]    • Motrin [Ibuprofen]    • Macrodantin  [Nitrofurantoin]    • Other      Can now have only unenhanced CAT scan   • Percocet [Oxycodone-Acetaminophen]    • Phosphate    • Propranolol    • Qvar [Beclomethasone]    • Sulfa Antibiotics    •  Terramycin [Oxytetracycline]    • Vesicare [Solifenacin]        Scheduled meds:     amLODIPine 10 mg Oral Daily   carvedilol 12.5 mg Oral BID With Meals   ceftriaxone 1 g Intravenous Q24H   CloNIDine 1 patch Transdermal Weekly   enoxaparin 40 mg Subcutaneous Daily   furosemide 20 mg Oral Daily   HYDROmorphone 1 mg Intravenous Once   lisinopril 20 mg Oral Daily   pantoprazole 40 mg Intravenous Q AM   phenytoin 400 mg Oral Nightly   polyethylene glycol 17 g Oral Daily   potassium chloride 20 mEq Oral BID   sennosides-docusate sodium 2 tablet Oral BID       PRN meds:  docusate sodium  •  hydrALAZINE  •  HYDROmorphone  •  labetalol  •  magnesium hydroxide  •  ondansetron  •  ondansetron **OR** ondansetron ODT **OR** ondansetron  •  sodium chloride    Assessment/Plan     Principal Problem:    Closed subcapital fracture of neck of right femur  Active Problems:    Paroxysmal atrial fibrillation    UTI (urinary tract infection)    Obstructive sleep apnea    Dementia    Hypertension    Pulmonary edema    Diastolic dysfunction    Pulmonary HTN  Febrile illness  Ecoli UTI    Plan:  1. POD#1 right hip hemiarthroplasty   2. Day 3 Rocephin 1 gm IV q 24 hrs  3. Repeat portable chest xray in am  4. Check anemia substrates, CBC, CMP  5. Obtain blood cultures x 2 for temp equal to or greater than 100.5  6. Start NS at 100ml/hour, monitor for fluid overload  7. Consult Speech Therapy - evaluate for aspiration       Discharge Planning:  In 2-3 days to home with private care nursing as  has declined rehabilitation placement, or home health follow-up.    Ramona Allen, APRN   06/22/17   7:56 AM

## 2017-06-22 NOTE — PROGRESS NOTES
Orthopedic Surgery Progress Note    Renay Lazo  6/22/2017      Subjective:     Systemic or Specific Complaints: resting comfortably. Doing well.   Objective:     Patient Vitals for the past 24 hrs:   BP Temp Temp src Pulse Resp SpO2   06/22/17 0301 119/46 (!) 100.7 °F (38.2 °C) Oral 73 16 98 %   06/21/17 2300 126/41 99.2 °F (37.3 °C) Oral 71 16 94 %   06/21/17 2140 118/54 98 °F (36.7 °C) Oral 69 16 96 %   06/21/17 1928 134/48 (!) 101.3 °F (38.5 °C) Oral 87 18 93 %   06/21/17 1600 142/55 97.7 °F (36.5 °C) Oral 71 18 98 %   06/21/17 1030 135/50 98.5 °F (36.9 °C) Oral 65 14 98 %   06/21/17 0950 126/51 - - 69 12 96 %   06/21/17 0935 130/58 - - 80 - 97 %   06/21/17 0920 141/66 - - 82 - 96 %   06/21/17 0905 175/73 - - 100 - 98 %   06/21/17 0850 119/61 - - 87 - 99 %   06/21/17 0840 132/51 - - 95 - 96 %   06/21/17 0835 111/60 - - 76 - 97 %   06/21/17 0825 154/62 - - 88 - 97 %   06/21/17 0820 137/68 - - 101 - 98 %   06/21/17 0805 160/82 - - 99 16 98 %   06/21/17 0750 165/56 - - 75 - 98 %   06/21/17 0735 151/62 - - 97 - 99 %   06/21/17 0734 151/62 - - 99 - -   06/21/17 0720 141/54 - - 93 - 98 %       right lower  General: alert, appears stated age and cooperative   Wound: clean, dry, intact             Dressing: clean, dry, intact   Extremity: Distal NVI           DVT Exam: No evidence of DVT seen on physical exam.                   Data Review:  Lab Results (last 24 hours)     Procedure Component Value Units Date/Time    Urine Culture [[id number]]  (Abnormal)  (Susceptibility) Collected:  06/19/17 0625    Specimen:  Urine from Urine, Catheter Updated:  06/21/17 0748     Urine Culture >100,000 CFU/mL Escherichia coli (A)    Susceptibility      Escherichia coli     BRAVO     Ampicillin 4 ug/ml Susceptible     Ampicillin + Sulbactam <=2 ug/ml Susceptible     Cefazolin <=4 ug/ml Susceptible  [1]      Cefepime <=1 ug/ml Susceptible     Ceftriaxone <=1 ug/ml Susceptible     Ertapenem <=0.5 ug/ml Susceptible     ESBL Confirmation  Test NEG  Negative     Gentamicin <=1 ug/ml Susceptible     Levofloxacin <=0.12 ug/ml Susceptible     Meropenem <=0.25 ug/ml Susceptible     Nitrofurantoin <=16 ug/ml Susceptible     Piperacillin + Tazobactam <=4 ug/ml Susceptible     Trimethoprim + Sulfamethoxazole <=20 ug/ml Susceptible            [1]   Cefazolin results may be used to predict the potential effectiveness of oral cephalosporins for treating uncomplicated urinary tract infections.                 Basic Metabolic Panel [[id number]]  (Abnormal) Collected:  06/21/17 0908    Specimen:  Blood Updated:  06/21/17 0944     Glucose 142 (H) mg/dL      BUN 25 (H) mg/dL      Creatinine 1.01 mg/dL      Sodium 138 mmol/L      Potassium 3.5 mmol/L      Chloride 99 mmol/L      CO2 28.0 mmol/L      Calcium 8.5 mg/dL      eGFR Non African Amer 52 (L) mL/min/1.73      BUN/Creatinine Ratio 24.8     Anion Gap 11.0 mmol/L     Narrative:       The MDRD GFR formula is only valid for adults with stable renal function between ages 18 and 70.    CBC (No Diff) [[id number]]  (Abnormal) Collected:  06/22/17 0420    Specimen:  Blood Updated:  06/22/17 0603     WBC 7.83 10*3/mm3      RBC 2.53 (L) 10*6/mm3      Hemoglobin 8.1 (L) g/dL      Hematocrit 23.9 (L) %      MCV 94.5 fL      MCH 32.0 pg      MCHC 33.9 g/dL      RDW 13.6 %      RDW-SD 47.6 fl      MPV 10.0 fL      Platelets 166 10*3/mm3         Imaging Results (last 24 hours)     ** No results found for the last 24 hours. **          Assessment:     POD# 2 R hip hemiarthroplasty, Acute Post op Blood Loss Anemia (expected)     Plan:      1:  DVT prophylaxis 3 weeks, ICE, elevate  2:  Pain control  3:  Physical therapy/Occupational therapy  4:  IV antibiotics for 24 hours  5:  Anticipate discharge tomorrow/Saturday if pain well controlled-per Medicine.   6:  WBAT RLE with posterior hip precautions       Supa Cancino PA-C

## 2017-06-22 NOTE — PLAN OF CARE
Problem: Patient Care Overview (Adult)  Goal: Plan of Care Review  Outcome: Ongoing (interventions implemented as appropriate)    06/22/17 1533   Coping/Psychosocial Response Interventions   Plan Of Care Reviewed With caregiver;other (see comments)  (Javon SAENZ)   Outcome Evaluation   Outcome Summary/Follow up Plan CBSE completed. Javon SAENZ reports pt has been inconsistently alert and that she had a wet vocal quality earlier today with sips of thin water. He stated that she has appeared to tolerate meds in applesauce once she initiates a swallow with it. Pt had eyes closed and was moaning in pain upon SLP arrival. Pt could not follow commands or respond. SLP presented empty spoon to pt's oral cavity. She did not close her mouth or attempt to obtain the spoon. SLP provided stimulation to pt's lips with ice. She did purse her lips to clear off water residue from the ice. No PO trials were given due to the pt's current cognitive status. Pt has been seen by SLP in previous admission in October of 2016 and was on a mechanical soft diet with honey thick liquids at that time. Recommend change to mechanical soft solids and honey thick liquids. Feed pt only if alert and discontinue if any s/s of aspiration observed. Meds crushed in applesauce if pt alert.         Problem: Inpatient SLP  Goal: Dysphagia- Patient will safely consume diet as per recommendation with no signs/symptoms of aspiration  Outcome: Ongoing (interventions implemented as appropriate)    06/22/17 1533   Safely Consume Diet   Safely Consume Diet- SLP, Date Established 06/22/17   Safely Consume Diet- SLP, Time to Achieve by discharge   Safely Consume Diet- SLP, Additional Goal Pt will tolerate trials of current diet and upgraded consistencies with no overt s/s of aspiration.   Safely Consume Diet- SLP, Date Goal Reviewed 06/22/17   Safely Consume Diet- SLP, Outcome goal ongoing

## 2017-06-22 NOTE — PLAN OF CARE
Problem: Skin Integrity Impairment, Risk/Actual (Adult)  Goal: Skin Integrity/Wound Healing  Outcome: Ongoing (interventions implemented as appropriate)    Problem: Pain, Acute (Adult)  Goal: Acceptable Pain Control/Comfort Level  Outcome: Ongoing (interventions implemented as appropriate)    Problem: Fall Risk (Adult)  Goal: Absence of Falls  Outcome: Ongoing (interventions implemented as appropriate)    Problem: Patient Care Overview (Adult)  Goal: Plan of Care Review  Outcome: Ongoing (interventions implemented as appropriate)    06/21/17 4369   Coping/Psychosocial Response Interventions   Plan Of Care Reviewed With patient;spouse   Patient Care Overview   Progress no change   Outcome Evaluation   Outcome Summary/Follow up Plan pt lethargic, slept most of the day, pain controlled with pain meds       Goal: Adult Individualization and Mutuality  Outcome: Ongoing (interventions implemented as appropriate)  Goal: Discharge Needs Assessment  Outcome: Ongoing (interventions implemented as appropriate)    Problem: Fractured Hip (Adult)  Goal: Signs and Symptoms of Listed Potential Problems Will be Absent or Manageable (Fractured Hip)  Outcome: Ongoing (interventions implemented as appropriate)    Problem: Pressure Ulcer Risk (Wing Scale) (Adult,Obstetrics,Pediatric)  Goal: Identify Related Risk Factors and Signs and Symptoms  Outcome: Ongoing (interventions implemented as appropriate)  Goal: Skin Integrity  Outcome: Ongoing (interventions implemented as appropriate)

## 2017-06-22 NOTE — THERAPY EVALUATION
Acute Care - Speech Language Pathology   Swallow Initial Evaluation [facility]     Patient Name: Renay Lazo  : 1929  MRN: [id number]  Today's Date: 2017  Onset of Illness/Injury or Date of Surgery Date: 17            Admit Date: 2017    SPEECH-LANGUAGE PATHOLOGY EVALUATION - SWALLOW  Subjective: The patient was seen on this date for a Clinical Swallow evaluation.  Patient was poorly arousable.    The patient's history is significant for acute fall with right hip fracture s/p surgery. History of c-spine fracture, pacemaker, a-fib, brain tumor, brain surgery, CHF, dementia, pneumonia, seizures, TIA, carotid endarterectomy.   Objective: Textures given included empty spoon and ice to pt's lips.  Assessment: Difficulties were noted with all presentations. Javon SAENZ reports pt has been inconsistently alert and that she had a wet vocal quality earlier today with sips of thin water. He stated that she has appeared to tolerate meds in applesauce once she initiates a swallow with it. Pt had eyes closed and was moaning in pain upon SLP arrival. Pt could not follow commands or respond. SLP presented empty spoon to pt's oral cavity. She did not close her mouth or attempt to obtain the spoon. SLP provided stimulation to pt's lips with ice. She did purse her lips to clear off water residue from the ice. No PO trials were given due to the pt's current cognitive status. Pt has been seen by SLP in previous admission in 2016 and was on a mechanical soft diet with honey thick liquids at that time. Recommend change to mechanical soft solids and honey thick liquids. Feed pt only if alert and discontinue if any s/s of aspiration observed. Meds crushed in applesauce if pt alert.  SLP Findings:  Patient presents with moderate to severe oropharyngeal dysphagia.  Recommendations: Diet Textures: honey thick liquid, mechanical soft consistency food.  Medications should be taken crushed with puree.    Recommended Strategies: Feed only if alert and discontinue PO if s/s of aspiration observed, Upright for PO and small bites and sips. Oral care before breakfast, after all meals and PRN.  Other Recommended Evaluations: Re-evaluation at bedside    Dysphagia therapy is recommended.   Liss Breaux, CCC-SLP 6/22/2017 3:40 PM    Visit Dx:     ICD-10-CM ICD-9-CM   1. Closed displaced intertrochanteric fracture of right femur, initial encounter S72.141A 820.21   2. Closed subcapital fracture of neck of right femur, initial encounter S72.011A 820.09   3. Decreased activities of daily living (ADL) Z78.9 V49.89   4. Impaired mobility Z74.09 799.89   5. Oropharyngeal dysphagia R13.12 787.22     Patient Active Problem List   Diagnosis   • Closed C2 fracture   • C2 cervical fracture   • Transient alteration of awareness   • Paroxysmal atrial fibrillation   • History of meningioma of the brain   • UTI (urinary tract infection)   • Over weight   • Closed displaced fracture of second cervical vertebra with routine healing   • Closed subcapital fracture of neck of right femur   • Obstructive sleep apnea   • Dementia   • Hypertension   • Pulmonary edema   • Diastolic dysfunction   • Pulmonary HTN     Past Medical History:   Diagnosis Date   • Adrenal adenoma    • Anemia    • Atrial fibrillation    • Benign neoplasm of cerebral meninges    • Brain tumor    • CHF (congestive heart failure)    • Dementia    • Disease of thyroid gland    • Hip fracture requiring operative repair     7/2016   • Hyperlipidemia    • Hypertension    • Obstructive sleep apnea    • Osteoarthritis    • Peripheral vascular disease    • Pneumonia    • Seizures    • Sick sinus syndrome     s/p pacemaker per Dr. Puri   • TIA (transient ischemic attack)      Past Surgical History:   Procedure Laterality Date   • APPENDECTOMY     • BRAIN SURGERY     • CAROTID ENDARTERECTOMY  02/2011   • CATARACT EXTRACTION     • CHOLECYSTECTOMY     • HEMORRHOIDECTOMY     •  HIP HEMIARTHROPLASTY Right 6/20/2017    Procedure: HIP HEMIARTHROPLASTY;  Surgeon: Jeremiah Zaidi MD;  Location: [facility] OR;  Service:    • INSERT / REPLACE / REMOVE PACEMAKER     • JOINT REPLACEMENT     • PACEMAKER IMPLANTATION     • TONSILLECTOMY     • TOTAL HIP ARTHROPLASTY            SWALLOW EVALUATION (last 72 hours)      Swallow Evaluation       06/22/17 1455                Rehab Evaluation    Document Type evaluation  -MB        Subjective Information unable to respond   pt moaning in pain  -MB        General Information    Patient Profile Review yes  -MB        Onset of Illness/Injury 06/19/17  -MB        Pertinent History Of Current Problem acute fall with right hip fracture s/p surgery. Hx of c-spine fracture, a-fib, pacemaker, brain tumor, brain surgery, CHF, dementia, pneumonia, seizures, TIA, carotid endarterectomy  -MB        Current Diet Limitations regular solid;thin liquids  -MB        Prior Level of Function- Communication functional for ADL's with assistance  -MB        Plans/Goals Discussed With patient  -MB        Barriers to Rehab cognitive status  -MB        Clinical Impression    Patient's Goals For Discharge patient could not state  -MB        SLP Swallowing Diagnosis severe dysphagia  -MB        Rehab Potential/Prognosis, Swallowing fair, will monitor progress closely  -MB        Criteria for Skilled Therapeutic Interventions Met demonstrates skilled criteria for intervention  -MB        Therapy Frequency 3-5 times/wk  -MB        Predicted Duration Therapy Interv (days) until discharge  -MB        Expected Duration Therapy Session (min) 15-30 minutes  -MB        SLP Diet Recommendation soft textures;ground;honey-thick liquids  -MB        Recommended Diagnostics reassess via clinical swallow (non-instrumental exam)  -MB        Recommended Feeding/Eating Techniques maintain upright posture during/after eating for 30 mins;small sips/bites;other (see comments)   feed only if alert, d/c PO  if s/s of aspiration observed  -MB        SLP Rec. for Method of Medication Administration meds crushed in pudding/applesauce  -MB        Monitor For Signs Of Aspiration cough;gurgly voice;throat clearing  -MB        Anticipated Discharge Disposition skilled nursing facility  -MB        General Feeding/Swallowing Observations    Current Feeding Method oral feeding methods  -MB        Clinical Swallow Exam    Mode of Presentation spoon  -MB        Oral Preparation Concerns incomplete mouth closure around utensil  -MB        Summary of Clinical Exam Javon SAENZ reports pt has been inconsistently alert and that she had a wet vocal quality earlier today with sips of thin water. He stated that she has appeared to tolerate meds in applesauce once she initiates a swallow with it. Pt had eyes closed and was moaning in pain upon SLP arrival. Pt could not follow commands or respond. SLP presented empty spoon to pt's oral cavity. She did not close her mouth or attempt to obtain the spoon. SLP provided stimulation to pt's lips with ice. She did purse her lips to clear off water residue from the ice. No PO trials were given due to the pt's current cognitive status. Pt has been seen by SLP in previous admission in October of 2016 and was on a mechanical soft diet with honey thick liquids at that time.  -MB        Swallow Recommendations    Eating Assistance requires caregiver assistance for eating  -MB        Oral Care oral care with toothbrush and dentifrice BID and PRN  -MB        Modified Eating Strategies upright positioning 90 degrees  -MB        Recommended Diet soft textures;ground;honey-thick liquids;other (see comments)   feed pt only if alert  -MB          User Key  (r) = Recorded By, (t) = Taken By, (c) = Cosigned By    Initials Name Effective Dates    RED Breaux [facility]-SLP 08/02/16 -         EDUCATION  The patient has been educated in the following areas:   Dysphagia (Swallowing Impairment).    SLP  Recommendation and Plan  SLP Swallowing Diagnosis: severe dysphagia  SLP Diet Recommendation: soft textures, ground, honey-thick liquids  Recommended Feeding/Eating Techniques: maintain upright posture during/after eating for 30 mins, small sips/bites, other (see comments) (feed only if alert, d/c PO if s/s of aspiration observed)  SLP Rec. for Method of Medication Administration: meds crushed in pudding/applesauce  Monitor For Signs Of Aspiration: cough, gurgly voice, throat clearing  Recommended Diagnostics: reassess via clinical swallow (non-instrumental exam)  Criteria for Skilled Therapeutic Interventions Met: demonstrates skilled criteria for intervention  Anticipated Discharge Disposition: skilled nursing facility  Rehab Potential/Prognosis, Swallowing: fair, will monitor progress closely  Therapy Frequency: 3-5 times/wk             Plan of Care Review  Plan Of Care Reviewed With: caregiver, other (see comments) (Javon SAENZ)  Outcome Summary/Follow up Plan: CBSE completed. Javon SAENZ reports pt has been inconsistently alert and that she had a wet vocal quality earlier today with sips of thin water. He stated that she has appeared to tolerate meds in applesauce once she initiates a swallow with it. Pt had eyes closed and was moaning in pain upon SLP arrival. Pt could not follow commands or respond. SLP presented empty spoon to pt's oral cavity. She did not close her mouth or attempt to obtain the spoon. SLP provided stimulation to pt's lips with ice. She did purse her lips to clear off water residue from the ice. No PO trials were given due to the pt's current cognitive status. Pt has been seen by SLP in previous admission in October of 2016 and was on a mechanical soft diet with honey thick liquids at that time. Recommend change to mechanical soft solids and honey thick liquids. Feed pt only if alert and discontinue if any s/s of aspiration observed. Meds crushed in applesauce if pt alert.          IP SLP  Goals       06/22/17 1533          Safely Consume Diet    Safely Consume Diet- SLP, Date Established 06/22/17  -MB      Safely Consume Diet- SLP, Time to Achieve by discharge  -MB      Safely Consume Diet- SLP, Additional Goal Pt will tolerate trials of current diet and upgraded consistencies with no overt s/s of aspiration.  -MB      Safely Consume Diet- SLP, Date Goal Reviewed 06/22/17  -MB      Safely Consume Diet- SLP, Outcome goal ongoing  -MB        User Key  (r) = Recorded By, (t) = Taken By, (c) = Cosigned By    Initials Name Provider Type    RED Breaux CCC-SLP Speech and Language Pathologist             SLP Outcome Measures (last 72 hours)      SLP Outcome Measures       06/22/17 1500          SLP Outcome Measures    Outcome Measure Used? Adult NOMS  -MB      FCM Scores    FCM Chosen Swallowing  -MB      Swallowing FCM Score 2  -MB        User Key  (r) = Recorded By, (t) = Taken By, (c) = Cosigned By    Initials Name Effective Dates    BASILIO Alex 08/02/16 -            Time Calculation:         Time Calculation- SLP       06/22/17 1539          Time Calculation- SLP    SLP Start Time 1455  -MB      SLP Stop Time 1539  -MB      SLP Time Calculation (min) 44 min  -MB      SLP Received On 06/22/17  -MB      SLP Goal Re-Cert Due Date 07/02/17  -MB        User Key  (r) = Recorded By, (t) = Taken By, (c) = Cosigned By    Initials Name Provider Type    BASILIO Alex Speech and Language Pathologist          Therapy Charges for Today     Code Description Service Date Service Provider Modifiers Qty    11074326619 HC ST SWALLOWING CURRENT STATUS 6/22/2017 BASILIO Arteaga GN, CM 1    35606471423 HC ST SWALLOWING PROJECTED 6/22/2017 BASILIO Arteaga GN, CK 1    65815692378 HC ST EVAL ORAL PHARYNG SWALLOW 3 6/22/2017 BASILIO Arteaga, KX 1          SLP G-Codes  SLP NOMS Used?: Yes  Functional Limitations: Swallowing  Swallow Current Status  (): At least 80 percent but less than 100 percent impaired, limited or restricted  Swallow Goal Status (): At least 40 percent but less than 60 percent impaired, limited or restricted    Liss Breaux CCC-SLP  6/22/2017

## 2017-06-22 NOTE — PLAN OF CARE
Problem: Patient Care Overview (Adult)  Goal: Plan of Care Review  Outcome: Ongoing (interventions implemented as appropriate)    06/22/17 1041   Coping/Psychosocial Response Interventions   Plan Of Care Reviewed With patient   Patient Care Overview   Progress progress toward functional goals is gradual   Outcome Evaluation   Outcome Summary/Follow up Plan Pt very lethargic and demonstrates lack of task initation.Pt would open eyes but was unable to keep them open. Tamayo discussed acute rehab with pt spouse, educating pt spouse on safety concerns in taking pt home in current condition. Continue OT POC

## 2017-06-22 NOTE — PROGRESS NOTES
Continued Stay Note  DYLAN Charles     Patient Name: Renay Lazo  MRN: [id number]  Today's Date: 6/22/2017    Admit Date: 6/19/2017          Discharge Plan       06/22/17 1250    Case Management/Social Work Plan    Plan Home vs SNF    Additional Comments Long discussion with patient's  in room re discharge planning and recommendations. PT and OT worked with patient and advised  that patient will need SNF. RC advised SW that patient's  seemed more receptive to placement. Patient's  reported to SW that he would still like to take patient home with hired assistance. Patient's  states he is willing to discuss plans with his brother and will get back with SW this afternoon. SW will continue to follow.               Discharge Codes     None            QUETA HarrisW

## 2017-06-22 NOTE — PLAN OF CARE
Problem: Skin Integrity Impairment, Risk/Actual (Adult)  Goal: Skin Integrity/Wound Healing  Outcome: Ongoing (interventions implemented as appropriate)    Problem: Pain, Acute (Adult)  Goal: Acceptable Pain Control/Comfort Level  Outcome: Ongoing (interventions implemented as appropriate)    Problem: Fall Risk (Adult)  Goal: Absence of Falls  Outcome: Ongoing (interventions implemented as appropriate)    Problem: Patient Care Overview (Adult)  Goal: Plan of Care Review  Outcome: Ongoing (interventions implemented as appropriate)    06/22/17 0333   Coping/Psychosocial Response Interventions   Plan Of Care Reviewed With patient;spouse   Patient Care Overview   Progress no change   Outcome Evaluation   Outcome Summary/Follow up Plan pt has complained of sharp pain only with movements,  has been at bedside all night, pt turned q2hr, VSS besides temperature has been flucuating, drsg, c/d/i, n/v checks WNL, ppp, will cont to monitor         Problem: Fractured Hip (Adult)  Goal: Signs and Symptoms of Listed Potential Problems Will be Absent or Manageable (Fractured Hip)  Outcome: Ongoing (interventions implemented as appropriate)    Problem: Pressure Ulcer Risk (Wing Scale) (Adult,Obstetrics,Pediatric)  Goal: Identify Related Risk Factors and Signs and Symptoms  Outcome: Outcome(s) achieved Date Met:  06/22/17  Goal: Skin Integrity  Outcome: Ongoing (interventions implemented as appropriate)

## 2017-06-22 NOTE — PLAN OF CARE
Problem: Patient Care Overview (Adult)  Goal: Plan of Care Review  Outcome: Ongoing (interventions implemented as appropriate)    06/22/17 1130   Coping/Psychosocial Response Interventions   Plan Of Care Reviewed With patient   Patient Care Overview   Progress progress toward functional goals is gradual   Outcome Evaluation   Outcome Summary/Follow up Plan Pt very lethargic,constant vc's to attempt to keep pt awake. Pt trans sup-sit mod-max of 2, sat EOB 10 minutes mod -max assist for maintain sitting balance. Contact vc's to attempt to keep pt awake during sitting, trans sit-supine max of 2. Pt will need SNF

## 2017-06-23 ENCOUNTER — APPOINTMENT (OUTPATIENT)
Dept: CT IMAGING | Facility: HOSPITAL | Age: 82
End: 2017-06-23

## 2017-06-23 ENCOUNTER — APPOINTMENT (OUTPATIENT)
Dept: GENERAL RADIOLOGY | Facility: HOSPITAL | Age: 82
End: 2017-06-23

## 2017-06-23 LAB
ABO + RH BLD: NORMAL
ABO + RH BLD: NORMAL
ALBUMIN SERPL-MCNC: 3.3 G/DL (ref 3.5–5)
ALBUMIN/GLOB SERPL: 1 G/DL (ref 1.1–2.5)
ALP SERPL-CCNC: 83 U/L (ref 24–120)
ALT SERPL W P-5'-P-CCNC: 45 U/L (ref 0–54)
ANION GAP SERPL CALCULATED.3IONS-SCNC: 13 MMOL/L (ref 4–13)
ARTERIAL PATENCY WRIST A: ABNORMAL
AST SERPL-CCNC: 63 U/L (ref 7–45)
ATMOSPHERIC PRESS: ABNORMAL MMHG
BASE EXCESS BLDA CALC-SCNC: 3.7 MMOL/L (ref -2–2)
BDY SITE: ABNORMAL
BH BB BLOOD EXPIRATION DATE: NORMAL
BH BB BLOOD EXPIRATION DATE: NORMAL
BH BB BLOOD TYPE BARCODE: 5100
BH BB BLOOD TYPE BARCODE: 5100
BH BB DISPENSE STATUS: NORMAL
BH BB DISPENSE STATUS: NORMAL
BH BB PRODUCT CODE: NORMAL
BH BB PRODUCT CODE: NORMAL
BH BB UNIT NUMBER: NORMAL
BH BB UNIT NUMBER: NORMAL
BILIRUB SERPL-MCNC: 0.7 MG/DL (ref 0.1–1)
BUN BLD-MCNC: 34 MG/DL (ref 5–21)
BUN/CREAT SERPL: 28.8 (ref 7–25)
CALCIUM SPEC-SCNC: 8.5 MG/DL (ref 8.4–10.4)
CHLORIDE SERPL-SCNC: 102 MMOL/L (ref 98–110)
CO2 SERPL-SCNC: 23 MMOL/L (ref 24–31)
CREAT BLD-MCNC: 1.18 MG/DL (ref 0.5–1.4)
CROSSMATCH INTERPRETATION: NORMAL
CROSSMATCH INTERPRETATION: NORMAL
DEPRECATED RDW RBC AUTO: 46.3 FL (ref 40–54)
ERYTHROCYTE [DISTWIDTH] IN BLOOD BY AUTOMATED COUNT: 13.3 % (ref 12–15)
FERRITIN SERPL-MCNC: 202 NG/ML (ref 11.1–264)
FOLATE SERPL-MCNC: >20 NG/ML
GAS FLOW AIRWAY: 2 LPM
GFR SERPL CREATININE-BSD FRML MDRD: 43 ML/MIN/1.73
GLOBULIN UR ELPH-MCNC: 3.3 GM/DL
GLUCOSE BLD-MCNC: 120 MG/DL (ref 70–100)
HCO3 BLDA-SCNC: 26.5 MMOL/L (ref 22–26)
HCT VFR BLD AUTO: 24.3 % (ref 37–47)
HGB BLD-MCNC: 8.1 G/DL (ref 12–16)
IRON 24H UR-MRATE: 23 MCG/DL (ref 42–180)
IRON SATN MFR SERPL: 12 % (ref 20–45)
MCH RBC QN AUTO: 31.5 PG (ref 28–32)
MCHC RBC AUTO-ENTMCNC: 33.3 G/DL (ref 33–36)
MCV RBC AUTO: 94.6 FL (ref 82–98)
MODALITY: ABNORMAL
PCO2 BLDA: 34.7 MM HG (ref 35–45)
PH BLDA: 7.5 PH UNITS (ref 7.35–7.45)
PLATELET # BLD AUTO: 173 10*3/MM3 (ref 130–400)
PMV BLD AUTO: 10.1 FL (ref 6–12)
PO2 BLDA: 91.1 MM HG (ref 80–100)
POTASSIUM BLD-SCNC: 4 MMOL/L (ref 3.5–5.3)
PROT SERPL-MCNC: 6.6 G/DL (ref 6.3–8.7)
RBC # BLD AUTO: 2.57 10*6/MM3 (ref 4.2–5.4)
SAO2 % BLDCOA: 97.6 % (ref 94–100)
SAO2 % BLDCOA: 97.6 % (ref 94–100)
SODIUM BLD-SCNC: 138 MMOL/L (ref 135–145)
TIBC SERPL-MCNC: 200 MCG/DL (ref 225–420)
UNIT  ABO: NORMAL
UNIT  ABO: NORMAL
UNIT  RH: NORMAL
UNIT  RH: NORMAL
VIT B12 BLD-MCNC: 245 PG/ML (ref 239–931)
WBC NRBC COR # BLD: 8.46 10*3/MM3 (ref 4.8–10.8)

## 2017-06-23 PROCEDURE — 25010000002 CEFTRIAXONE: Performed by: ORTHOPAEDIC SURGERY

## 2017-06-23 PROCEDURE — 71250 CT THORAX DX C-: CPT

## 2017-06-23 PROCEDURE — 82728 ASSAY OF FERRITIN: CPT | Performed by: NURSE PRACTITIONER

## 2017-06-23 PROCEDURE — 36600 WITHDRAWAL OF ARTERIAL BLOOD: CPT

## 2017-06-23 PROCEDURE — 83550 IRON BINDING TEST: CPT | Performed by: NURSE PRACTITIONER

## 2017-06-23 PROCEDURE — 71010 HC CHEST PA OR AP: CPT

## 2017-06-23 PROCEDURE — 97535 SELF CARE MNGMENT TRAINING: CPT

## 2017-06-23 PROCEDURE — 82607 VITAMIN B-12: CPT | Performed by: NURSE PRACTITIONER

## 2017-06-23 PROCEDURE — 97530 THERAPEUTIC ACTIVITIES: CPT

## 2017-06-23 PROCEDURE — 92526 ORAL FUNCTION THERAPY: CPT

## 2017-06-23 PROCEDURE — 80053 COMPREHEN METABOLIC PANEL: CPT | Performed by: NURSE PRACTITIONER

## 2017-06-23 PROCEDURE — 25010000002 IRON SUCROSE PER 1 MG: Performed by: NURSE PRACTITIONER

## 2017-06-23 PROCEDURE — 97110 THERAPEUTIC EXERCISES: CPT

## 2017-06-23 PROCEDURE — 85027 COMPLETE CBC AUTOMATED: CPT | Performed by: FAMILY MEDICINE

## 2017-06-23 PROCEDURE — 25010000002 CYANOCOBALAMIN PER 1000 MCG: Performed by: NURSE PRACTITIONER

## 2017-06-23 PROCEDURE — 83540 ASSAY OF IRON: CPT | Performed by: NURSE PRACTITIONER

## 2017-06-23 PROCEDURE — 82746 ASSAY OF FOLIC ACID SERUM: CPT | Performed by: NURSE PRACTITIONER

## 2017-06-23 PROCEDURE — 82803 BLOOD GASES ANY COMBINATION: CPT

## 2017-06-23 PROCEDURE — 25010000002 ENOXAPARIN PER 10 MG: Performed by: ORTHOPAEDIC SURGERY

## 2017-06-23 RX ORDER — CYANOCOBALAMIN 1000 UG/ML
1000 INJECTION, SOLUTION INTRAMUSCULAR; SUBCUTANEOUS
Status: DISCONTINUED | OUTPATIENT
Start: 2017-07-06 | End: 2017-06-28 | Stop reason: HOSPADM

## 2017-06-23 RX ORDER — BISACODYL 10 MG
10 SUPPOSITORY, RECTAL RECTAL DAILY PRN
Status: DISCONTINUED | OUTPATIENT
Start: 2017-06-23 | End: 2017-06-28 | Stop reason: HOSPADM

## 2017-06-23 RX ORDER — CYANOCOBALAMIN 1000 UG/ML
1000 INJECTION, SOLUTION INTRAMUSCULAR; SUBCUTANEOUS DAILY
Status: DISCONTINUED | OUTPATIENT
Start: 2017-06-23 | End: 2017-06-28 | Stop reason: HOSPADM

## 2017-06-23 RX ADMIN — PANTOPRAZOLE SODIUM 40 MG: 40 INJECTION, POWDER, FOR SOLUTION INTRAVENOUS at 05:14

## 2017-06-23 RX ADMIN — LISINOPRIL 20 MG: 20 TABLET ORAL at 09:59

## 2017-06-23 RX ADMIN — CYANOCOBALAMIN 1000 MCG: 1000 INJECTION, SOLUTION INTRAMUSCULAR; SUBCUTANEOUS at 10:09

## 2017-06-23 RX ADMIN — SODIUM CHLORIDE 100 ML/HR: 9 INJECTION, SOLUTION INTRAVENOUS at 05:26

## 2017-06-23 RX ADMIN — PHENYTOIN SODIUM 400 MG: 100 CAPSULE, EXTENDED RELEASE ORAL at 20:41

## 2017-06-23 RX ADMIN — POLYETHYLENE GLYCOL 3350 17 G: 17 POWDER, FOR SOLUTION ORAL at 09:59

## 2017-06-23 RX ADMIN — CARVEDILOL 12.5 MG: 6.25 TABLET, FILM COATED ORAL at 20:41

## 2017-06-23 RX ADMIN — LABETALOL HYDROCHLORIDE 20 MG: 5 INJECTION, SOLUTION INTRAVENOUS at 04:05

## 2017-06-23 RX ADMIN — CARVEDILOL 12.5 MG: 6.25 TABLET, FILM COATED ORAL at 04:33

## 2017-06-23 RX ADMIN — CEFTRIAXONE 1 G: 1 INJECTION, POWDER, FOR SOLUTION INTRAMUSCULAR; INTRAVENOUS at 13:22

## 2017-06-23 RX ADMIN — DOCUSATE SODIUM -SENNOSIDES 2 TABLET: 50; 8.6 TABLET, COATED ORAL at 09:59

## 2017-06-23 RX ADMIN — IRON SUCROSE 300 MG: 20 INJECTION, SOLUTION INTRAVENOUS at 10:08

## 2017-06-23 RX ADMIN — SODIUM CHLORIDE 75 ML/HR: 9 INJECTION, SOLUTION INTRAVENOUS at 19:10

## 2017-06-23 RX ADMIN — ENOXAPARIN SODIUM 40 MG: 40 INJECTION SUBCUTANEOUS at 09:59

## 2017-06-23 RX ADMIN — AMLODIPINE BESYLATE 10 MG: 10 TABLET ORAL at 09:59

## 2017-06-23 NOTE — PLAN OF CARE
Problem: Patient Care Overview (Adult)  Goal: Plan of Care Review  Outcome: Ongoing (interventions implemented as appropriate)    06/23/17 1035   Coping/Psychosocial Response Interventions   Plan Of Care Reviewed With patient;spouse   Patient Care Overview   Progress progress toward functional goals is gradual   Outcome Evaluation   Outcome Summary/Follow up Plan Pt still lethargic, would open eyes briefly. Pt did not participate with ROM, PROM to RLE. Reveiwed THR precautions with . Pt will need SNF at d/c

## 2017-06-23 NOTE — THERAPY TREATMENT NOTE
Acute Care - Speech Language Pathology   Swallow Treatment Note [facility]     Patient Name: Renay Lazo  : 1929  MRN: [id number]  Today's Date: 2017  Onset of Illness/Injury or Date of Surgery Date: 17            Admit Date: 2017   Pt was quite fatigued at time of ST arrival, essentially lethargic; however, with able to arouse for brief periods with maximal, multi-modal cues including sternal rub, verbal cues, and tactile stim with labial ice chip stimulation. With labial and lingual ice chip stimulation, pt required max cues to awaken and to follow commands for lingual protrusion, though pt was able to follow. Pt was able to complete a swallow following stim and cues with each of the two attempted trials. Pt was then presented meds crushed in applesauce, presented to the pt by the RN, Kamla. Pt was noted to have what appeared to be quite immediate swallow initiations, with up to three multiple swallows across three trials. It must be noted that laryngeal elevation decreased when multiple swallows were observed. Educated RN and  on concerns with pt's level alertness placing her at an increased risk for aspiration with PO intake. Minutes following the final PO trial presentation, pt was noted to have two separate coughing instances. RN stated that concerns where noted this AM with  attempting to feed pt when pt appeared too fatigued. Therefore, RN stated she called in nurse aid, Tiff, to assist with PO at that time and the aid removed the tray from the bedside post meal secondary to this concern. RN stated lungs are diminished. Cannot fully r/o aspiration with PO intake. Educated  that ST will follow up at lunch time today to monitor for safety with PO intake and to provide further recommendations as felt warranted. Thanks! Marva Jamil, PASCALE-SLP 2017 10:39 AM      Visit Dx:      ICD-10-CM ICD-9-CM   1. Closed displaced intertrochanteric fracture of right  femur, initial encounter S72.141A 820.21   2. Closed subcapital fracture of neck of right femur, initial encounter S72.011A 820.09   3. Decreased activities of daily living (ADL) Z78.9 V49.89   4. Impaired mobility Z74.09 799.89   5. Oropharyngeal dysphagia R13.12 787.22     Patient Active Problem List   Diagnosis   • Closed C2 fracture   • C2 cervical fracture   • Transient alteration of awareness   • Paroxysmal atrial fibrillation   • History of meningioma of the brain   • UTI (urinary tract infection)   • Over weight   • Closed displaced fracture of second cervical vertebra with routine healing   • Closed subcapital fracture of neck of right femur   • Obstructive sleep apnea   • Dementia   • Hypertension   • Pulmonary edema   • Diastolic dysfunction   • Pulmonary HTN             Adult Rehabilitation Note       06/23/17 1020 06/23/17 1000 06/23/17 0808    Rehab Assessment/Intervention    Discipline physical therapy assistant  - speech language pathologist  -TM occupational therapy assistant  -TS    Document Type therapy note (daily note)  - therapy note (daily note)  -TM therapy note (daily note)  -TS    Subjective Information no complaints   pt still lethargic  - fatigue   Significant fatigue, sternal rub, max verbal, tactile cues.  - no complaints   pt stated yes/no to questions  -TS    Patient Effort, Rehab Treatment  fair  -     Symptoms Noted Comment   lethagic and would not open eyes through tx  -TS    Precautions/Limitations fall precautions;hip precautions- right   Rknee immobilizer in bed  -  fall precautions;swallowing precautions;hip precautions- right   knee immobilizer in bed RLE  -TS    Recorded by [] Yesenia Merritt, MIYA [TM] Marva Jamil, PASCALE-SLP [TS] KIM Naranjo/L    Pain Assessment    Pain Assessment Antony-Briggs FACES  -AH  Antony-Briggs FACES  -TS    Antony-Briggs FACES Pain Rating 2  -AH  0  -TS    Pain Type Surgical pain  -      Pain Location Hip  -AH      Pain  Orientation Right  -AH      Pain Frequency Intermittent   with ROM  -AH      Pain Intervention(s) Repositioned  -AH      Recorded by [AH] Yesenia Merritt, PTA  [TS] KIM Naranjo/L    Cognitive Assessment/Intervention    Personal Safety Interventions   fall prevention program maintained  -TS    Recorded by   [TS] KIM Naranjo/L    Dysphagia/Swallow Intervention    Dysphagia/Swallow Therapeutic Feed  Pt was quite fatigued at time of ST arrival, essentially lethargic; however, with able to arouse for brief periods with maximal, multi-modal cues including sternal rub, verbal cues, and tactile stim with labial ice chip stimulation. With labial and lingual ice chip stimulation, pt required max cues to awaken and to follow commands for lingual protrusion, though pt was able to follow. Pt was able to complete a swallow following stim and cues with each of the two attempted trials. Pt was then presented meds crushed in applesauce, presented to the pt by the RN, Kamla. Pt was noted to have what appeared to be quite immediate swallow initiations, with up to three multiple swallows across three trials. It must be noted that laryngeal elevation decreased when multiple swallows were observed. Educated RN and  on concerns with pt's level alertness placing her at an increased risk for aspiration with PO intake. Minutes following the final PO trial presentation, pt was noted to have two separate coughing instances. RN stated that concerns where noted this AM with  attempting to feed pt when pt appeared too fatigued. Therefore, RN stated she called in nurse aid, Tiff, to assist with PO at that time and the aid removed the tray from the bedside post meal secondary to this concern. RN stated lungs are diminished. Cannot fully r/o aspiration with PO intake. Educated  that ST will follow up at lunch time today to monitor for safety with PO intake and to provide further recommendations as  felt warranted.  -TM     Recorded by  [TM] Marva Jamil, CCC-SLP     ADL Assessment/Intervention    Additional Documentation   Self-Feeding Assessment/Training (Group)  -TS    Recorded by   [TS] LUTHER Naranjo    Self-Feeding Assessment/Training    Self-Feeding Assess/Train, Position   --   HOB elevated to 90  -TS    Self-Feeding Assess/Train, [place]   maximum assist (25% patient effort);dependent (less than 25% patient effort)  -TS    Self-Feeding Assess/Train, Impairments   --   decreased cognition  -TS    Self-Feeding Assess/Train, Comment   Pt responded yes/no x3 occurances during self feeding. Pt kept eyes closed through task with max encouragement to open eyes and hold utensils. Tamayo attempted [place] food to mouth, pt assisted 25% with bringing food to mouth. Pt provided verbal cues and increased time to chew and swallow with feeding task.   -TS    Recorded by   [TS] LUTHER Naranjo    Therapy Exercises    Exercise Protocols total hip  -      Total Hip Exercises right:;10 reps   PROM  -      Recorded by [AH] Yesenia Merritt PTA      Orthosis Location    Orthosis Location/Type lower extremity  -      Orthosis, Lower Extremity Right:;knee immobilizer  -      Recorded by [AH] Yesenia Merritt PTA      Orthosis Management/Training    Orthosis Wear Schedule wear at night or when in bed  -      Recorded by [AH] Yesenia Merritt PTA      Positioning and Restraints    Pre-Treatment Position in bed  -AH  in bed  -    Post Treatment Position bed  -  bed  -TS    In Bed fowlers;call light within reach;encouraged to call for assist;exit alarm on;with family/caregiver;R knee immobilizer  -  fowlers;call light within reach;encouraged to call for assist;with family/caregiver;side rails up x2;with brace;SCD pump applied  -TS    Recorded by [AH] Yesenia Merritt PTA  [TS] LUTHER Naranjo      06/22/17 1455 06/22/17 1022 06/22/17 1000    Rehab Assessment/Intervention     Discipline  physical therapy assistant  - occupational therapy assistant  -TS    Document Type  therapy note (daily note)  - therapy note (daily note)  -TS    Subjective Information  agree to therapy   pt very drowsy  - agree to therapy;complains of;fatigue  -TS    Symptoms Noted Comment  lethargic  - VERY Lethargic  -TS    Precautions/Limitations  fall precautions;hip precautions- right   R knee immobilizer when in bed  - fall precautions;hip precautions- right;oxygen therapy device and L/min   R knee immobilizer  -TS    Recorded by  [] Yesenia Merritt PTA [TS] Julisa Balbuena, ALVAREZ/L    Pain Assessment    Pain Assessment  Antony-Briggs FACES  - No/denies pain  -TS    Antony-Briggs FACES Pain Rating  2  -     Pain Location  Hip  -     Pain Orientation  Right  -     Pain Frequency  Intermittent   with bed mobility  -     Pain Intervention(s)  Medication (See MAR)  -     Response to Interventions  tolerated  -     Recorded by  [] Yesenia Merritt PTA [TS] Julisa Balbuena, ALVAREZ/L    Cognitive Assessment/Intervention    Personal Safety Interventions  fall prevention program maintained  - fall prevention program maintained  -TS    Recorded by  [] Yesenia Merritt PTA [TS] Julisa Balbuena, ALVAREZ/L    Swallow Assessment/Intervention    Additional Documentation Dysphagia/Swallow Intervention (Group)  -MB      Recorded by [MB] Liss Breaux CCC-SLP      Dysphagia/Swallow Intervention    Dysphagia/Swallow Therapeutic Feed Pt will tolerate trials of current diet and upgraded consistencies with no overt s/s of aspiration.  -MB      Recorded by [MB] Liss Breaux CCC-SLP      Bed Mobility, Assessment/Treatment    Bed Mob, Supine to Sit, [place]  moderate assist (50% patient effort);maximum assist (25% patient effort);verbal cues required;2 person assist required  -     Bed Mob, Sit to Supine, [place]  maximum assist (25% patient effort);2 person assist required;verbal  cues required  -     Bed Mobility, Safety Issues  cognitive deficits limit understanding   lethargic  -AH     Recorded by  [] Yesenia Merritt PTA     Grooming Assessment/Training    Grooming Assess/Train, Position   supine  -TS    Grooming Assess/Train, Indepen Level   maximum assist (25% patient effort)  -TS    Grooming Assess/Train, Comment   pt present wash cloth, and then put in pt hand, pt then provided [place] to initiate, pt would open eyes occasionally but would not initiate or complete task.   -TS    Recorded by   [TS] KIM Naranjo/L    Motor Skills/Interventions    Additional Documentation  Balance Skills Training (Group)  -AH     Recorded by  [] Yesenia Merritt PTA     Balance Skills Training    Sitting-Level of Assistance  Moderate assistance;Maximum assistance   constant vc's to attempt to keep pt awake  -     Sitting-Balance Support  Feet supported  -AH     Recorded by  [] Yesenia Merritt PTA     Therapy Exercises    Bilateral Upper Extremity   AAROM:;15 reps;supine;elbow flexion/extension;shoulder extension/flexion   wrist flexion/extension  -TS    Recorded by   [TS] KIM Naranjo/L    Orthotics Prosthetics    Additional Documentation  Orthosis Location (Group);Orthosis Management/Training (Group)  -     Recorded by  [] Yesenia Merritt PTA     Orthosis Location    Orthosis Location/Type  lower extremity  -     Orthosis, Lower Extremity  Right:;knee immobilizer  -     Recorded by  [] Yesenia Merritt PTA     Orthosis Management/Training    Orthosis Indications  --   hip precaution  -     Orthosis Wear Schedule  wear at night or when in bed  -     Recorded by  [] Yesenia Merritt PTA     Positioning and Restraints    Pre-Treatment Position  in bed  - in bed  -    Post Treatment Position  bed  - bed  -TS    In Bed  fowlers;call light within reach;encouraged to call for assist;with family/caregiver;R knee immobilizer  - fowlers;call light within  reach;encouraged to call for assist;with family/caregiver;with PT;side rails up x2  -TS    Recorded by  [] Yesenia Merritt PTA [TS] KIM Naranjo/YENNIFER      06/21/17 1316          Rehab Assessment/Intervention    Discipline physical therapy assistant  -      Document Type therapy note (daily note)  -      Subjective Information agree to therapy;complains of;pain  -      Symptoms Noted Comment Pt. very lethargic, sleeping through most of tx. Pt. did awake with pain when R Leg was moved.  -      Precautions/Limitations cardiac precautions;hip precautions- right   Rknee immobilizer  -      Recorded by [] Clara Saldana PTA      Pain Assessment    Pain Assessment Antony-Baker FACES  -      Antony-Baker FACES Pain Rating 8  -      Pain Type Acute pain;Surgical pain  -      Pain Location Hip  -      Pain Orientation Right  -      Pain Frequency Intermittent  -      Pain Intervention(s) Medication (See MAR);Repositioned  -      Response to Interventions tolerated, pt. would wake with pain.  -MF      Recorded by [] Clara Saldana PTA      Therapy Exercises    Left Lower Extremity PROM:;AAROM:;20 reps;supine;ankle pumps/circles;heel slides;hip abduction/adduction;SAQ  -      Exercise Protocols total hip  -      Total Hip Exercises right:;20 reps;completed protocol;with assist   no quad sets due to lethargy. All PROM.  -MF      Recorded by [] Clara Saldana PTA      Positioning and Restraints    Pre-Treatment Position in bed  -      Post Treatment Position bed  -      In Bed fowlers;call light within reach;exit alarm on;with family/caregiver;side rails up x3;SCD pump applied;R knee immobilizer  -      Recorded by [] Clara Saldana PTA        User Key  (r) = Recorded By, (t) = Taken By, (c) = Cosigned By    Initials Name Effective Dates    RED Breaux, [facility]SLP 08/02/16 -      Yesenia Merritt PTA 08/02/16 -      Marva Jamil CCC-SLP 08/02/16 -      CHERELLE Balbuena, ALVAREZ/L 08/02/16 -     MF Clara COHENJorge Les, PTA 08/02/16 -                   IP SLP Goals       06/23/17 1037 06/22/17 1533       Safely Consume Diet    Safely Consume Diet- SLP, Date Established  06/22/17  -MB     Safely Consume Diet- SLP, Time to Achieve  by discharge  -MB     Safely Consume Diet- SLP, Additional Goal  Pt will tolerate trials of current diet and upgraded consistencies with no overt s/s of aspiration.  -MB     Safely Consume Diet- SLP, Date Goal Reviewed 06/23/17  -TM 06/22/17  -MB     Safely Consume Diet- SLP, Outcome  goal ongoing  -MB       User Key  (r) = Recorded By, (t) = Taken By, (c) = Cosigned By    Initials Name Provider Type    RED Breaux, CCC-SLP Speech and Language Pathologist    KAIR Jamil, CCC-SLP Speech and Language Pathologist          EDUCATION  The patient has been educated in the following areas:   Dysphagia (Swallowing Impairment).    SLP Recommendation and Plan                                           Plan of Care Review  Plan Of Care Reviewed With: spouse, other (see comments) (Kamla SAENZ. )  Progress: progress towards functional goals is fair  Outcome Summary/Follow up Plan: Pt was quite fatigued at time of ST arrival, essentially lethargic; however, with able to arouse for brief periods with maximal, multi-modal cues including sternal rub, verbal cues, and tactile stim with labial ice chip stimulation. With labial and lingual ice chip stimulation, pt required max cues to awaken and to follow commands for lingual protrusion, though pt was able to follow. Pt was able to complete a swallow following stim and cues with each of the two attempted trials. Pt was then presented meds crushed in applesauce, presented to the pt by the RNKamla. Pt was noted to have what appeared to be quite immediate swallow initiations, with up to three multiple swallows across three trials. It must be noted that laryngeal elevation decreased when  multiple swallows were observed. Educated RN and  on concerns with pt's level alertness placing her at an increased risk for aspiration with PO intake. Minutes following the final PO trial presentation, pt was noted to have two separate coughing instances. RN stated that concerns where noted this AM with  attempting to feed pt when pt appeared too fatigued. Therefore, RN stated she called in nurse aid, Tiff, to assist with PO at that time and the aid removed the tray from the bedside post meal secondary to this concern. RN stated lungs are diminished. Cannot fully r/o aspiration with PO intake. Educated  that ST will follow up at lunch time today to monitor for safety with PO intake and to provide further recommendations as felt warranted. ST to follow and tx.       SLP Outcome Measures (last 72 hours)      SLP Outcome Measures       06/22/17 1500          SLP Outcome Measures    Outcome Measure Used? Adult NOMS  -MB      FCM Scores    FCM Chosen Swallowing  -MB      Swallowing FCM Score 2  -MB        User Key  (r) = Recorded By, (t) = Taken By, (c) = Cosigned By    Initials Name Effective Dates    MB Liss Breaux CCC-KAYLEE 08/02/16 -              Time Calculation:         Time Calculation- SLP       06/23/17 1038          Time Calculation- SLP    SLP Start Time 1000  -TM      SLP Stop Time 1024  -TM      SLP Time Calculation (min) 24 min  -TM      SLP Received On 06/23/17  -TM        User Key  (r) = Recorded By, (t) = Taken By, (c) = Cosigned By    Initials Name Provider Type    TM Marva Jamil CCC-SLP Speech and Language Pathologist          Therapy Charges for Today     Code Description Service Date Service Provider Modifiers Qty    [id number] [facility] TREATMENT SWALLOW 2 6/23/2017 Marva Jamil CCC-SLP GN, KX 1          SLP G-Codes  SLP NOMS Used?: Yes  Functional Limitations: Swallowing  Swallow Current Status (): At least 80 percent but less than 100 percent impaired,  limited or restricted  Swallow Goal Status (): At least 40 percent but less than 60 percent impaired, limited or restricted      Marva Jamil CCC-SLP  6/23/2017

## 2017-06-23 NOTE — THERAPY TREATMENT NOTE
Acute Care - Occupational Therapy Treatment Note  [facility]     Patient Name: Renay Lazo  : 1929  MRN: [id number]  Today's Date: 2017  Onset of Illness/Injury or Date of Surgery Date: 17  Date of Referral to OT: 17  Referring Physician: (S) Dr. Zaidi (Post hip precautions, WBAT)      Admit Date: 2017    Visit Dx:     ICD-10-CM ICD-9-CM   1. Closed displaced intertrochanteric fracture of right femur, initial encounter S72.141A 820.21   2. Closed subcapital fracture of neck of right femur, initial encounter S72.011A 820.09   3. Decreased activities of daily living (ADL) Z78.9 V49.89   4. Impaired mobility Z74.09 799.89   5. Oropharyngeal dysphagia R13.12 787.22     Patient Active Problem List   Diagnosis   • Closed C2 fracture   • C2 cervical fracture   • Transient alteration of awareness   • Paroxysmal atrial fibrillation   • History of meningioma of the brain   • UTI (urinary tract infection)   • Over weight   • Closed displaced fracture of second cervical vertebra with routine healing   • Closed subcapital fracture of neck of right femur   • Obstructive sleep apnea   • Dementia   • Hypertension   • Pulmonary edema   • Diastolic dysfunction   • Pulmonary HTN             Adult Rehabilitation Note       17 0808 17 1455 17 1022    Rehab Assessment/Intervention    Discipline occupational therapy assistant  -TS  physical therapy assistant  -    Document Type therapy note (daily note)  -TS  therapy note (daily note)  -    Subjective Information no complaints   pt stated yes/no to questions  -TS  agree to therapy   pt very drowsy  -    Symptoms Noted Comment lethagic and would not open eyes through tx  -TS  lethargic  -AH    Precautions/Limitations fall precautions;swallowing precautions;hip precautions- right   knee immobilizer in bed RLE  -TS  fall precautions;hip precautions- right   R knee immobilizer when in bed  -AH    Recorded by [TS] Julisa Balbuena,  ALVAREZ/L  [] Yesenia Merritt PTA    Pain Assessment    Pain Assessment Antony-Briggs FACES  -  Antony-Briggs FACES  -    Antony-Briggs FACES Pain Rating 0  -TS  2  -AH    Pain Location   Hip  -AH    Pain Orientation   Right  -AH    Pain Frequency   Intermittent   with bed mobility  -    Pain Intervention(s)   Medication (See MAR)  -    Response to Interventions   tolerated  -AH    Recorded by [TS] CITLALY NaranjoA/L  [] Yesenia Merritt PTA    Cognitive Assessment/Intervention    Personal Safety Interventions fall prevention program maintained  -  fall prevention program maintained  -AH    Recorded by [TS] CITLALY NaranjoA/L  [] Yesenia Merritt PTA    Swallow Assessment/Intervention    Additional Documentation  Dysphagia/Swallow Intervention (Group)  -MB     Recorded by  [MB] Liss Breaux CCC-SLP     Dysphagia/Swallow Intervention    Dysphagia/Swallow Therapeutic Feed  Pt will tolerate trials of current diet and upgraded consistencies with no overt s/s of aspiration.  -MB     Recorded by  [MB] Liss Breaux CCC-SLP     Bed Mobility, Assessment/Treatment    Bed Mob, Supine to Sit, [place]   moderate assist (50% patient effort);maximum assist (25% patient effort);verbal cues required;2 person assist required  -    Bed Mob, Sit to Supine, [place]   maximum assist (25% patient effort);2 person assist required;verbal cues required  -    Bed Mobility, Safety Issues   cognitive deficits limit understanding   lethargic  -AH    Recorded by   [] Yesenia Merritt PTA    ADL Assessment/Intervention    Additional Documentation Self-Feeding Assessment/Training (Group)  -TS      Recorded by [TS] CITLALY NaranjoA/L      Self-Feeding Assessment/Training    Self-Feeding Assess/Train, Position --   HOB elevated to 90  -TS      Self-Feeding Assess/Train, [place] maximum assist (25% patient effort);dependent (less than 25% patient effort)  -      Self-Feeding Assess/Train,  Impairments --   decreased cognition  -      Self-Feeding Assess/Train, Comment Pt responded yes/no x3 occurances during self feeding. Pt kept eyes closed through task with max encouragement to open eyes and hold utensils. Tamayo attempted [place] food to mouth, pt assisted 25% with bringing food to mouth. Pt provided verbal cues and increased time to chew and swallow with feeding task.   -TS      Recorded by [] KIM Naranjo/L      Motor Skills/Interventions    Additional Documentation   Balance Skills Training (Group)  -    Recorded by   [] Yesenia Merritt PTA    Balance Skills Training    Sitting-Level of Assistance   Moderate assistance;Maximum assistance   constant vc's to attempt to keep pt awake  -    Sitting-Balance Support   Feet supported  -    Recorded by   [] Yesenia Merritt PTA    Orthotics Prosthetics    Additional Documentation   Orthosis Location (Group);Orthosis Management/Training (Group)  -    Recorded by   [] Yesenia Merritt PTA    Orthosis Location    Orthosis Location/Type   lower extremity  -    Orthosis, Lower Extremity   Right:;knee immobilizer  -    Recorded by   [] Yesenia Merritt PTA    Orthosis Management/Training    Orthosis Indications   --   hip precaution  -    Orthosis Wear Schedule   wear at night or when in bed  -    Recorded by   [] Yesenia Merritt PTA    Positioning and Restraints    Pre-Treatment Position in bed  -  in bed  -    Post Treatment Position bed  -  bed  -    In Bed fowlers;call light within reach;encouraged to call for assist;with family/caregiver;side rails up x2;with brace;SCD pump applied  -  fowlers;call light within reach;encouraged to call for assist;with family/caregiver;R knee immobilizer  -    Recorded by [] KIM Naranjo/YENNIFER  [] Yesenia Merritt PTA      06/22/17 1000 06/21/17 1316       Rehab Assessment/Intervention    Discipline occupational therapy assistant  - physical therapy assistant   -MF     Document Type therapy note (daily note)  -TS therapy note (daily note)  -MF     Subjective Information agree to therapy;complains of;fatigue  -TS agree to therapy;complains of;pain  -MF     Symptoms Noted Comment VERY Lethargic  -TS Pt. very lethargic, sleeping through most of tx. Pt. did awake with pain when R Leg was moved.  -MF     Precautions/Limitations fall precautions;hip precautions- right;oxygen therapy device and L/min   R knee immobilizer  -TS cardiac precautions;hip precautions- right   Rknee immobilizer  -MF     Recorded by [TS] KIM Naranjo/YENNIFER [MF] Clara Saldana, MIYA     Pain Assessment    Pain Assessment No/denies pain  -TS Antony-Baker FACES  -MF     Antony-Baker FACES Pain Rating  8  -MF     Pain Type  Acute pain;Surgical pain  -MF     Pain Location  Hip  -MF     Pain Orientation  Right  -MF     Pain Frequency  Intermittent  -MF     Pain Intervention(s)  Medication (See MAR);Repositioned  -MF     Response to Interventions  tolerated, pt. would wake with pain.  -MF     Recorded by [TS] KIM Naranjo/YENNIFER [MF] Clara Saldana PTA     Cognitive Assessment/Intervention    Personal Safety Interventions fall prevention program maintained  -TS      Recorded by [TS] KIM Naranjo/L      Grooming Assessment/Training    Grooming Assess/Train, Position supine  -TS      Grooming Assess/Train, Indepen Level maximum assist (25% patient effort)  -TS      Grooming Assess/Train, Comment pt present wash cloth, and then put in pt hand, pt then provided [place] to initiate, pt would open eyes occasionally but would not initiate or complete task.   -TS      Recorded by [TS] KIM Naranjo/L      Therapy Exercises    Left Lower Extremity  PROM:;AAROM:;20 reps;supine;ankle pumps/circles;heel slides;hip abduction/adduction;SAQ  -MF     Bilateral Upper Extremity AAROM:;15 reps;supine;elbow flexion/extension;shoulder extension/flexion   wrist flexion/extension  -TS       Exercise Protocols  total hip  -MF     Total Hip Exercises  right:;20 reps;completed protocol;with assist   no quad sets due to lethargy. All PROM.  -MF     Recorded by [TS] KIM Naranjo/YENNIFER [MF] Clara Saldana PTA     Positioning and Restraints    Pre-Treatment Position in bed  -TS in bed  -MF     Post Treatment Position bed  -TS bed  -MF     In Bed fowlers;call light within reach;encouraged to call for assist;with family/caregiver;with PT;side rails up x2  -TS fowlers;call light within reach;exit alarm on;with family/caregiver;side rails up x3;SCD pump applied;R knee immobilizer  -     Recorded by [TS] KIM Naranjo/YENNIFER [MF] Clara Saldana PTA       User Key  (r) = Recorded By, (t) = Taken By, (c) = Cosigned By    Initials Name Effective Dates    MB Liss Breaux, [facility]-SLP 08/02/16 -      Yesenia Merritt, PTA 08/02/16 -      LUTHER Naranjo 08/02/16 -     MARC Saldana, MIYA 08/02/16 -                 OT Goals       06/21/17 0852          Bed Mobility OT LTG    Bed Mobility OT LTG, Date Established 06/21/17  -ND      Bed Mobility OT LTG, Time to Achieve by discharge  -ND      Bed Mobility OT LTG, Activity Type all bed mobility  -ND      Bed Mobility OT LTG, [place] Level minimum assist (75% patient effort)  -ND      Transfer Training OT LTG    Transfer Training OT LTG, Date Established 06/21/17  -ND      Transfer Training OT LTG, Time to Achieve by discharge  -ND      Transfer Training OT LTG, Activity Type bed to chair /chair to bed;sit to stand/stand to sit;toilet  -ND      Transfer Training OT LTG, [place] Level moderate assist (50% patient effort)  -ND      Transfer Training OT LTG, Assist Device commode, bedside;walker, rolling  -ND      Strength OT LTG    Strength Goal OT LTG, Date Established 06/21/17  -ND      Strength Goal OT LTG, Time to Achieve by discharge  -ND      Strength Goal OT LTG, Measure to Achieve Pt. will complete BUE  strengthening exercises to increase BUE strength to 4/5 for ADLs.   -ND      ADL OT LTG    ADL OT LTG, Date Established 06/21/17  -ND      ADL OT LTG, Time to Achieve by discharge  -ND      ADL OT LTG, Activity Type ADL skills  -ND      ADL OT LTG, [place] Level mod assist  -ND      ADL OT LTG, Additional Goal AE PRN  -ND        User Key  (r) = Recorded By, (t) = Taken By, (c) = Cosigned By    Initials Name Provider Type    GEE Zamarripa, OTR/L Occupational Therapist          Occupational Therapy Education     Title: PT OT SLP Therapies (Active)     Topic: Occupational Therapy (Done)     Point: ADL training (Done)    Description: Instruct learner(s) on proper safety adaptation and remediation techniques during self care or transfers.   Instruct in proper use of assistive devices.    Learning Progress Summary    Learner Readiness Method Response Comment Documented by Status   Patient Acceptance E,D VU,NR ADL's TS 06/23/17 0857 Done    Acceptance E,D VU progression of POC, strengthening TS 06/22/17 1043 Done    Acceptance E VU,NR Pt. educated on role of OT, Safe bed mob, PHP, benefit of activity, progression with poc ND 06/21/17 0851 Done   Significant Other Acceptance E VU,NR Pt. educated on role of OT, Safe bed mob, PHP, benefit of activity, progression with poc ND 06/21/17 0851 Done   Caregiver Acceptance E,D VU progression of POC, strengthening TS 06/22/17 1043 Done               Point: Home exercise program (Done)    Description: Instruct learner(s) on appropriate technique for monitoring, assisting and/or progressing therapeutic exercises/activities.    Learning Progress Summary    Learner Readiness Method Response Comment Documented by Status   Patient Acceptance E VU,NR Pt. educated on role of OT, Safe bed mob, PHP, benefit of activity, progression with poc ND 06/21/17 0851 Done   Significant Other Acceptance E VU,NR Pt. educated on role of OT, Safe bed mob, PHP, benefit of activity, progression  with poc ND 06/21/17 0851 Done               Point: Body mechanics (Done)    Description: Instruct learner(s) on proper positioning and spine alignment during self-care, functional mobility activities and/or exercises.    Learning Progress Summary    Learner Readiness Method Response Comment Documented by Status   Patient Acceptance E VU,NR Pt. educated on role of OT, Safe bed mob, PHP, benefit of activity, progression with poc ND 06/21/17 0851 Done   Significant Other Acceptance E VU,NR Pt. educated on role of OT, Safe bed mob, PHP, benefit of activity, progression with poc ND 06/21/17 0851 Done                      User Key     Initials Effective Dates Name Provider Type Discipline    TS 08/02/16 -  KIM Naranjo/L Occupational Therapy Assistant OT    ND 10/21/16 -  SHAY Caldwell/L Occupational Therapist OT                  OT Recommendation and Plan  Anticipated Equipment Needs At Discharge: bathing equipment, dressing equipment  Anticipated Discharge Disposition: skilled nursing facility  Planned Therapy Interventions: activity intolerance, adaptive equipment training, ADL retraining, balance training, bed mobility training, energy conservation, home exercise program, orthotic fitting/training, strengthening, transfer training  Therapy Frequency: 3-5 times/wk  Plan of Care Review  Plan Of Care Reviewed With: patient  Progress: no change  Outcome Summary/Follow up Plan: Pt lethargic and kept eyes closed through self feeding task. Tamayo attempted [place] assist to encourage pt participation with self feeding but pt required max A to bring food to mouth. Pt Max A/dependent with self feeding. Pt spouse plans for pt to discharge to SNF for rehab. Continue OT POC         Outcome Measures       06/23/17 0800 06/22/17 1000 06/21/17 1316    How much help from another person do you currently need...    Turning from your back to your side while in flat bed without using bedrails?  2  -AH 2  -MF    Moving  from lying on back to sitting on the side of a flat bed without bedrails?  1  -AH 2  -MF    Moving to and from a bed to a chair (including a wheelchair)?  1  -AH 1  -MF    Standing up from a chair using your arms (e.g., wheelchair, bedside chair)?  1  -AH 2  -MF    Climbing 3-5 steps with a railing?  1  -AH 1  -MF    To walk in hospital room?  1  -AH 1  -MF    AM-PAC 6 Clicks Score  7  -AH 9  -MF    How much help from another is currently needed...    Putting on and taking off regular lower body clothing? 1  -TS 1  -TS     Bathing (including washing, rinsing, and drying) 1  -TS 1  -TS     Toileting (which includes using toilet bed pan or urinal) 1  -TS 2  -TS     Putting on and taking off regular upper body clothing 1  -TS 2  -TS     Taking care of personal grooming (such as brushing teeth) 1  -TS 2  -TS     Eating meals 1  -TS 2  -TS     Score 6  -TS 10  -TS     Functional Assessment    Outcome Measure Options AM-PAC 6 Clicks Daily Activity (OT)  -TS AM-PAC 6 Clicks Daily Activity (OT)  -TS AM-PAC 6 Clicks Basic Mobility (PT)  -      06/21/17 0810 06/21/17 0800       How much help from another person do you currently need...    Turning from your back to your side while in flat bed without using bedrails? 2  -EDMUND      Moving from lying on back to sitting on the side of a flat bed without bedrails? 2  -EDMUND      Moving to and from a bed to a chair (including a wheelchair)? 1  -EDMUND      Standing up from a chair using your arms (e.g., wheelchair, bedside chair)? 2  -EDMUND      Climbing 3-5 steps with a railing? 1  -EDMUND      To walk in hospital room? 1  -EDMUND      AM-PAC 6 Clicks Score 9  -EDMUND      How much help from another is currently needed...    Putting on and taking off regular lower body clothing?  1  -ND     Bathing (including washing, rinsing, and drying)  1  -ND     Toileting (which includes using toilet bed pan or urinal)  2  -ND     Putting on and taking off regular upper body clothing  2  -ND     Taking care of  personal grooming (such as brushing teeth)  2  -ND     Eating meals  2  -ND     Score  10  -ND     Functional Assessment    Outcome Measure Options AM-PAC 6 Clicks Basic Mobility (PT)  -EDMUND AM-PAC 6 Clicks Daily Activity (OT)  -ND       User Key  (r) = Recorded By, (t) = Taken By, (c) = Cosigned By    Initials Name Provider Type     Yesenia Merritt, PTA Physical Therapy Assistant     Julisa Balbuena, ALVAREZ/L Occupational Therapy Assistant    EDMUND Cesar, PT DPT Physical Therapist    MF Clara Saldana, PTA Physical Therapy Assistant    ND Jess Zamarripa, OTR/L Occupational Therapist           Time Calculation:         Time Calculation- OT       06/23/17 0900          Time Calculation- OT    OT Start Time 0806  -TS      OT Stop Time 0845  -TS      OT Time Calculation (min) 39 min  -TS      Total Timed Code Minutes- OT 39 minute(s)  -TS      OT Received On 06/23/17  -TS        User Key  (r) = Recorded By, (t) = Taken By, (c) = Cosigned By    Initials Name Provider Type     Julisa Balbuena ALVAREZ/L Occupational Therapy Assistant           Therapy Charges for Today     Code Description Service Date Service Provider Modifiers Qty    20511475774 HC OT SELF CARE/MGMT/TRAIN EA 15 MIN 6/22/2017 Julisa Balbuena ALVAREZ/L GO, KX 1    70181481296 HC OT THER PROC EA 15 MIN 6/22/2017 Julisa Balbuena ALVAREZ/L GO, KX 1    47569483233 HC OT SELF CARE/MGMT/TRAIN EA 15 MIN 6/23/2017 Julisa Balbuena ALVAREZ/L GO, KX 3          OT G-codes  OT Functional Scales Options: AM-PAC 6 Clicks Daily Activity (OT)  Functional Limitation: Self care  Self Care Current Status (): At least 60 percent but less than 80 percent impaired, limited or restricted  Self Care Goal Status (): At least 40 percent but less than 60 percent impaired, limited or restricted    KIM Daly/YENNIFER  6/23/2017

## 2017-06-23 NOTE — PROGRESS NOTES
[facility] Medicine Services  INPATIENT PROGRESS NOTE    Length of Stay: 4  Date of Admission: 6/19/2017  Primary Care Physician: Jung Collins MD    Subjective   Chief Complaint: Follow-up febrile illness    HPI   Patient much more alert today.  She follows commands and does respond to questioning.   at bedside states she ate good today.  Patient has absolutely no complaints.  She is in no acute distress.    Review of Systems   All pertinent negatives and positives are as above. All other systems have been reviewed and are negative unless otherwise stated.     Objective    Temp:  [98.5 °F (36.9 °C)-100.6 °F (38.1 °C)] 98.5 °F (36.9 °C)  Heart Rate:  [] 71  Resp:  [16-20] 18  BP: ()/(49-55) 155/50    Physical Exam   Constitutional: She is oriented to person, place, and time. She appears well-developed and well-nourished. No distress.   HENT:   Head: Normocephalic and atraumatic.   Eyes: Conjunctivae and EOM are normal. Pupils are equal, round, and reactive to light. No scleral icterus.   Neck: Normal range of motion. Neck supple. No JVD present. No tracheal deviation present.   Cardiovascular: Normal rate, regular rhythm, normal heart sounds and intact distal pulses.  Exam reveals no gallop.    No murmur heard.  Pulmonary/Chest: Effort normal and breath sounds normal. No respiratory distress. She has no wheezes. She has no rales.   Abdominal: Soft. Bowel sounds are normal. She exhibits no distension. There is no tenderness. There is no guarding.   Musculoskeletal: Normal range of motion. She exhibits no edema.   Neurological: She is alert and oriented to person, place, and time.   No obvious deficits noted.   Skin: Skin is warm and dry. No rash noted. She is not diaphoretic. No erythema. No pallor.   Flushed   Psychiatric: She has a normal mood and affect. Her behavior is normal.   Vitals reviewed.      Results Review:  Recent Results (from the past 12  hour(s))   CBC (No Diff)    Collection Time: 06/23/17  5:07 AM   Result Value Ref Range    WBC 8.46 4.80 - 10.80 10*3/mm3    RBC 2.57 (L) 4.20 - 5.40 10*6/mm3    Hemoglobin 8.1 (L) 12.0 - 16.0 g/dL    Hematocrit 24.3 (L) 37.0 - 47.0 %    MCV 94.6 82.0 - 98.0 fL    MCH 31.5 28.0 - 32.0 pg    MCHC 33.3 33.0 - 36.0 g/dL    RDW 13.3 12.0 - 15.0 %    RDW-SD 46.3 40.0 - 54.0 fl    MPV 10.1 6.0 - 12.0 fL    Platelets 173 130 - 400 10*3/mm3   Iron Profile    Collection Time: 06/23/17  5:07 AM   Result Value Ref Range    Iron 23 (L) 42 - 180 mcg/dL    TIBC 200 (L) 225 - 420 mcg/dL    Iron Saturation 12 (L) 20 - 45 %   Ferritin    Collection Time: 06/23/17  5:07 AM   Result Value Ref Range    Ferritin 202.00 11.10 - 264.00 ng/mL   Folate    Collection Time: 06/23/17  5:07 AM   Result Value Ref Range    Folate >20.00 >2.75 ng/mL   Vitamin B12    Collection Time: 06/23/17  5:07 AM   Result Value Ref Range    Vitamin B-12 245 239 - 931 pg/mL   Comprehensive Metabolic Panel    Collection Time: 06/23/17  5:07 AM   Result Value Ref Range    Glucose 120 (H) 70 - 100 mg/dL    BUN 34 (H) 5 - 21 mg/dL    Creatinine 1.18 0.50 - 1.40 mg/dL    Sodium 138 135 - 145 mmol/L    Potassium 4.0 3.5 - 5.3 mmol/L    Chloride 102 98 - 110 mmol/L    CO2 23.0 (L) 24.0 - 31.0 mmol/L    Calcium 8.5 8.4 - 10.4 mg/dL    Total Protein 6.6 6.3 - 8.7 g/dL    Albumin 3.30 (L) 3.50 - 5.00 g/dL    ALT (SGPT) 45 0 - 54 U/L    AST (SGOT) 63 (H) 7 - 45 U/L    Alkaline Phosphatase 83 24 - 120 U/L    Total Bilirubin 0.7 0.1 - 1.0 mg/dL    eGFR Non African Amer 43 (L) >60 mL/min/1.73    Globulin 3.3 gm/dL    A/G Ratio 1.0 (L) 1.1 - 2.5 g/dL    BUN/Creatinine Ratio 28.8 (H) 7.0 - 25.0    Anion Gap 13.0 4.0 - 13.0 mmol/L       Cultures:  Urine Culture   Date Value Ref Range Status   06/19/2017 >100,000 CFU/mL Escherichia coli (A)  Final       Radiology Data:    Imaging Results (last 24 hours)     Procedure Component Value Units Date/Time    CT Head Without Contrast  [[id number]] Collected:  06/22/17 1626     Updated:  06/22/17 1724    Narrative:       EXAMINATION: CT HEAD WO CONTRAST-  6/22/2017 4:14 PM CDT     CT SCAN OF THE HEAD, WITHOUT CONTRAST:      HISTORY: Altered mental status     COMPARISONS: 10/04/2016      TECHNIQUE:     Radiation dose equals DLP 1395 mGy-cm.  Automated exposure control dose  reduction technique was implemented.     CT evaluation of the head without intravenous contrast. 5 mm transaxial  images were obtained.   2-D sagittal and coronal reconstruction images  were generated.     FINDINGS:      A craniotomy defect is again identified in the left temporofrontal lobe  region, with a large amount of cystic encephalomalacia involving the  temporal lobe and frontal lobe, most likely stable.     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     There is decreased attenuation in the periventricular white matter;  suspect chronic ischemic changes.     There is mild diffuse central and cortical atrophy.     There is no intra or extra-axial hemorrhage.     There is a partially calcified mass identified along the left posterior  parietal region peripherally, and a meningioma is suspected. The mass  measures nearly 2 cm and was noted previously, likely unchanged.             Impression:       1. No CT evidence of an acute intracranial process.    2. Postsurgical and chronic changes.   3. Partially calcified left convexity meningioma suspected.     These findings were described on a digital voice clip recorded on the  PACS system at the time of dictation.                            This report was finalized on 06/22/2017 17:21 by Dr. Mick Kimbrough MD.         1. No CT evidence of an acute intracranial process.    2. Postsurgical and chronic changes.   3. Partially calcified left convexity meningioma suspected.     These findings were described on a digital voice clip recorded on the  PACS system  at the time of dictation.                            This report was finalized on 06/22/2017 17:21 by Dr. Mick Kimbrough MD.    XR Chest 1 View [[id number]] Collected:  06/23/17 1317     Updated:  06/23/17 1317    Narrative:       EXAMINATION: XR CHEST 1 VW-. 6/23/2017 12:54 PM CDT     CHEST, ONE VIEW:     HISTORY: Pulmonary edema.     COMPARISON: 06/20/2017, 06/19/2017 and 10/04/2016.     A single frontal chest radiograph was obtained.     FINDINGS:  The heart is generous with permanent cardiac pacemaker.     There is mild perihilar bronchovascular interstitial prominence  compatible with minimal residual pulmonary edema changes significantly  improved compared to the 6/19/2017 examination improving compared to the  06/20/2017 study.     The bony structures are intact.       Impression:       1. Significant improvement in pulmonary edema with minimal residual  perihilar bronchovascular interstitial prominence.     This report was finalized on  by Dr. Mick Kimbrough MD.          Intake/Output Summary (Last 24 hours) at 06/23/17 0813  Last data filed at 06/23/17 0322   Gross per 24 hour   Intake           760.67 ml   Output                0 ml   Net           760.67 ml       Allergies   Allergen Reactions   • Chocolate Hives   • Ampicillin    • Benadryl [Diphenhydramine]    • Biaxin [Clarithromycin]    • Capoten [Captopril]      Patient takes Lisinopril at home   • Ciprofloxacin    • Ciprofloxacin Hcl    • Codeine    • Contrast Dye    • Darvon [Propoxyphene]    • Diphenhydramine Hcl    • Dyazide [Triamterene-Hctz]    • Enablex [Darifenacin Hydrobromide Er]    • Gadolinium Derivatives    • Homatropine    • Hydrochlorothiazide W-Triamterene    • Hydrocodone    • Inderal La [Propranolol Hcl]      Patient takes Atenolol at Home   • Iodine    • Levaquin [Levofloxacin]    • Lortab [Hydrocodone-Acetaminophen]    • Lotrel [Amlodipine Besy-Benazepril Hcl]      Patient takes Amlodipine at home.   • Macrodantin [Nitrofurantoin  Macrocrystal]    • Motrin [Ibuprofen]    • Macrodantin  [Nitrofurantoin]    • Other      Can now have only unenhanced CAT scan   • Percocet [Oxycodone-Acetaminophen]    • Phosphate    • Propranolol    • Qvar [Beclomethasone]    • Sulfa Antibiotics    • Terramycin [Oxytetracycline]    • Vesicare [Solifenacin]        Scheduled meds:     amLODIPine 10 mg Oral Daily   carvedilol 12.5 mg Oral BID With Meals   ceftriaxone 1 g Intravenous Q24H   CloNIDine 1 patch Transdermal Weekly   cyanocobalamin 1,000 mcg Intramuscular Daily   enoxaparin 40 mg Subcutaneous Daily   HYDROmorphone 1 mg Intravenous Once   iron sucrose (VENOFER) IVPB 300 mg Intravenous Q24H   lisinopril 20 mg Oral Daily   pantoprazole 40 mg Intravenous Q AM   phenytoin 400 mg Oral Nightly   polyethylene glycol 17 g Oral Daily   sennosides-docusate sodium 2 tablet Oral BID       PRN meds:  •  acetaminophen  •  bisacodyl  •  docusate sodium  •  hydrALAZINE  •  HYDROmorphone  •  labetalol  •  magnesium hydroxide  •  ondansetron  •  ondansetron **OR** ondansetron ODT **OR** ondansetron  •  sodium chloride    Assessment/Plan     Principal Problem:    Closed subcapital fracture of neck of right femur  Active Problems:    Paroxysmal atrial fibrillation    UTI (urinary tract infection)    Obstructive sleep apnea    Dementia    Hypertension    Pulmonary edema    Diastolic dysfunction    Pulmonary HTN  Febrile illness  Ecoli UTI     Plan:  1. POD#2 right hip hemiarthroplasty   2. Day 4 Rocephin 1 gm IV q 24 hrs  3. Portable chest xray today  4. Venofer 300mg IV daily x 3  5. Obtain blood cultures x 2 for temp equal to or greater than 100.5  6. Decrease NS to 75ml/hour, monitor for fluid overload  7. Speech Therapy -following, puréed honey thick liquids started today       Discharge Planning: In 2-3 days to home with private care nursing as  has declined rehabilitation placement, or home health follow-up.    Luis:  Patient seen and examined. Cheeks flushed.  Patient breathing solomnent. Right lung congested.  appears from bathroom. Asks if things ok. Tells me that patient had a CXR this am. Results reviewed and appear improved, but still concern. Will get abg and CT chest for further review. Agree with NP plan.     Ramona Allen, BETO   06/23/17   8:13 AM

## 2017-06-23 NOTE — PROGRESS NOTES
Continued Stay Note   Melvin     Patient Name: Renay Lazo  MRN: [id number]  Today's Date: 6/23/2017    Admit Date: 6/19/2017          Discharge Plan       06/23/17 9881    Case Management/Social Work Plan    Plan [facility]    Additional Comments [facility] has offered patient a bed (will need to be off Venofer). Will follow for discharge to [facility].               Discharge Codes     None            ADAN Harris

## 2017-06-23 NOTE — THERAPY TREATMENT NOTE
Acute Care - Physical Therapy Treatment Note  [facility]     Patient Name: Renay Lazo  : 1929  MRN: [id number]  Today's Date: 2017  Onset of Illness/Injury or Date of Surgery Date: 17  Date of Referral to PT: 17  Referring Physician: (S) Dr. Zaidi (Post hip precautions, WBAT)    Admit Date: 2017    Visit Dx:    ICD-10-CM ICD-9-CM   1. Closed displaced intertrochanteric fracture of right femur, initial encounter S72.141A 820.21   2. Closed subcapital fracture of neck of right femur, initial encounter S72.011A 820.09   3. Decreased activities of daily living (ADL) Z78.9 V49.89   4. Impaired mobility Z74.09 799.89   5. Oropharyngeal dysphagia R13.12 787.22     Patient Active Problem List   Diagnosis   • Closed C2 fracture   • C2 cervical fracture   • Transient alteration of awareness   • Paroxysmal atrial fibrillation   • History of meningioma of the brain   • UTI (urinary tract infection)   • Over weight   • Closed displaced fracture of second cervical vertebra with routine healing   • Closed subcapital fracture of neck of right femur   • Obstructive sleep apnea   • Dementia   • Hypertension   • Pulmonary edema   • Diastolic dysfunction   • Pulmonary HTN               Adult Rehabilitation Note       17 1020 17 1000 17 0808    Rehab Assessment/Intervention    Discipline physical therapy assistant  - speech language pathologist  -TM occupational therapy assistant  -TS    Document Type therapy note (daily note)  - therapy note (daily note)  -TM therapy note (daily note)  -TS    Subjective Information no complaints   pt still lethargic  - fatigue   Significant fatigue, sternal rub, max verbal, tactile cues.  - no complaints   pt stated yes/no to questions  -TS    Patient Effort, Rehab Treatment  fair  -TM     Symptoms Noted Comment   lethagic and would not open eyes through tx  -TS    Precautions/Limitations fall precautions;hip precautions- right   Rknee  immobilizer in bed  -AH  fall precautions;swallowing precautions;hip precautions- right   knee immobilizer in bed RLE  -TS    Recorded by [AH] Yesenia Merritt PTA [TM] Marva Jamil, PASCALE-SLP [TS] Julisa Balbuena ALVAREZ/L    Pain Assessment    Pain Assessment Antony-Briggs FACES  -AH  Antony-Briggs FACES  -TS    Antony-Briggs FACES Pain Rating 2  -AH  0  -TS    Pain Type Surgical pain  -AH      Pain Location Hip  -AH      Pain Orientation Right  -AH      Pain Frequency Intermittent   with ROM  -AH      Pain Intervention(s) Repositioned  -AH      Recorded by [AH] Yesenia Merritt PTA  [TS] Julisa Balbuena, ALVAREZ/L    Cognitive Assessment/Intervention    Personal Safety Interventions   fall prevention program maintained  -TS    Recorded by   [TS] Julisa Balbuena ALVAREZ/L    Dysphagia/Swallow Intervention    Dysphagia/Swallow Therapeutic Feed  Pt was quite fatigued at time of ST arrival, essentially lethargic; however, with able to arouse for brief periods with maximal, multi-modal cues including sternal rub, verbal cues, and tactile stim with labial ice chip stimulation. With labial and lingual ice chip stimulation, pt required max cues to awaken and to follow commands for lingual protrusion, though pt was able to follow. Pt was able to complete a swallow following stim and cues with each of the two attempted trials. Pt was then presented meds crushed in applesauce, presented to the pt by the RN, Kamla. Pt was noted to have what appeared to be quite immediate swallow initiations, with up to three multiple swallows across three trials. It must be noted that laryngeal elevation decreased when multiple swallows were observed. Educated RN and  on concerns with pt's level alertness placing her at an increased risk for aspiration with PO intake. Minutes following the final PO trial presentation, pt was noted to have two separate coughing instances. RN stated that concerns where noted this AM with  attempting to  feed pt when pt appeared too fatigued. Therefore, RN stated she called in nurse aid, Tiff, to assist with PO at that time and the aid removed the tray from the bedside post meal secondary to this concern. RN stated lungs are diminished. Cannot fully r/o aspiration with PO intake. Educated  that ST will follow up at lunch time today to monitor for safety with PO intake and to provide further recommendations as felt warranted.  -TM     Recorded by  [TM] Marva Jamil, CCC-SLP     ADL Assessment/Intervention    Additional Documentation   Self-Feeding Assessment/Training (Group)  -TS    Recorded by   [TS] LUTHER Naranjo    Self-Feeding Assessment/Training    Self-Feeding Assess/Train, Position   --   HOB elevated to 90  -TS    Self-Feeding Assess/Train, [place]   maximum assist (25% patient effort);dependent (less than 25% patient effort)  -TS    Self-Feeding Assess/Train, Impairments   --   decreased cognition  -TS    Self-Feeding Assess/Train, Comment   Pt responded yes/no x3 occurances during self feeding. Pt kept eyes closed through task with max encouragement to open eyes and hold utensils. Tamayo attempted [place] food to mouth, pt assisted 25% with bringing food to mouth. Pt provided verbal cues and increased time to chew and swallow with feeding task.   -TS    Recorded by   [TS] LUTHER Naranjo    Therapy Exercises    Exercise Protocols total hip  -      Total Hip Exercises right:;10 reps   PROM  -      Recorded by [] Yesenia Merritt PTA      Orthosis Location    Orthosis Location/Type lower extremity  -      Orthosis, Lower Extremity Right:;knee immobilizer  -      Recorded by [] Yesenia Merritt PTA      Orthosis Management/Training    Orthosis Wear Schedule wear at night or when in bed  -      Recorded by [] Yesenia Merritt PTA      Positioning and Restraints    Pre-Treatment Position in bed  -  in bed  -    Post Treatment Position bed  -  bed  -     In Bed fowlers;call light within reach;encouraged to call for assist;exit alarm on;with family/caregiver;R knee immobilizer  -  fowlers;call light within reach;encouraged to call for assist;with family/caregiver;side rails up x2;with brace;SCD pump applied  -TS    Recorded by [] Yesenia Merritt PTA  [TS] CITLALY NaranjoA/L      06/22/17 1455 06/22/17 1022 06/22/17 1000    Rehab Assessment/Intervention    Discipline  physical therapy assistant  - occupational therapy assistant  -TS    Document Type  therapy note (daily note)  - therapy note (daily note)  -TS    Subjective Information  agree to therapy   pt very drowsy  - agree to therapy;complains of;fatigue  -    Symptoms Noted Comment  lethargic  - VERY Lethargic  -    Precautions/Limitations  fall precautions;hip precautions- right   R knee immobilizer when in bed  - fall precautions;hip precautions- right;oxygen therapy device and L/min   R knee immobilizer  -TS    Recorded by  [] Yesenia Merritt PTA [TS] Julisa Balbuena, ALVAREZ/L    Pain Assessment    Pain Assessment  Antony-Briggs FACES  - No/denies pain  -    Antony-Briggs FACES Pain Rating  2  -     Pain Location  Hip  -     Pain Orientation  Right  -     Pain Frequency  Intermittent   with bed mobility  -     Pain Intervention(s)  Medication (See MAR)  -     Response to Interventions  tolerated  -     Recorded by  [] Yesenia Merritt PTA [TS] Julisa Balbuena, ALVAREZ/L    Cognitive Assessment/Intervention    Personal Safety Interventions  fall prevention program maintained  - fall prevention program maintained  -TS    Recorded by  [] Yesenia Merritt PTA [TS] Julisa Balbuena, ALVAREZ/L    Swallow Assessment/Intervention    Additional Documentation Dysphagia/Swallow Intervention (Group)  -MB      Recorded by [MB] Liss Breaux CCC-SLP      Dysphagia/Swallow Intervention    Dysphagia/Swallow Therapeutic Feed Pt will tolerate trials of current diet and  upgraded consistencies with no overt s/s of aspiration.  -MB      Recorded by [MB] Liss Breaux CCC-SLP      Bed Mobility, Assessment/Treatment    Bed Mob, Supine to Sit, [place]  moderate assist (50% patient effort);maximum assist (25% patient effort);verbal cues required;2 person assist required  -     Bed Mob, Sit to Supine, [place]  maximum assist (25% patient effort);2 person assist required;verbal cues required  -     Bed Mobility, Safety Issues  cognitive deficits limit understanding   lethargic  -AH     Recorded by  [AH] Yesenia Merritt PTA     Grooming Assessment/Training    Grooming Assess/Train, Position   supine  -TS    Grooming Assess/Train, Indepen Level   maximum assist (25% patient effort)  -TS    Grooming Assess/Train, Comment   pt present wash cloth, and then put in pt hand, pt then provided [place] to initiate, pt would open eyes occasionally but would not initiate or complete task.   -TS    Recorded by   [TS] KIM Naranjo/L    Motor Skills/Interventions    Additional Documentation  Balance Skills Training (Group)  -     Recorded by  [] Yesenia Merritt PTA     Balance Skills Training    Sitting-Level of Assistance  Moderate assistance;Maximum assistance   constant vc's to attempt to keep pt awake  -     Sitting-Balance Support  Feet supported  -     Recorded by  [] Yesenia Merritt PTA     Therapy Exercises    Bilateral Upper Extremity   AAROM:;15 reps;supine;elbow flexion/extension;shoulder extension/flexion   wrist flexion/extension  -TS    Recorded by   [TS] CITLALY NaranjoA/L    Orthotics Prosthetics    Additional Documentation  Orthosis Location (Group);Orthosis Management/Training (Group)  -     Recorded by  [] Yesenia Merritt PTA     Orthosis Location    Orthosis Location/Type  lower extremity  -     Orthosis, Lower Extremity  Right:;knee immobilizer  -AH     Recorded by  [] Yesenia Merritt PTA     Orthosis Management/Training     Orthosis Indications  --   hip precaution  -     Orthosis Wear Schedule  wear at night or when in bed  -     Recorded by  [] Yesenia Merritt PTA     Positioning and Restraints    Pre-Treatment Position  in bed  - in bed  -    Post Treatment Position  bed  - bed  -TS    In Bed  fowlers;call light within reach;encouraged to call for assist;with family/caregiver;R knee immobilizer  - fowlers;call light within reach;encouraged to call for assist;with family/caregiver;with PT;side rails up x2  -TS    Recorded by  [] Yesenia Merritt PTA [TS] KIM Naranjo/YENNIFER      06/21/17 1316          Rehab Assessment/Intervention    Discipline physical therapy assistant  -      Document Type therapy note (daily note)  -      Subjective Information agree to therapy;complains of;pain  -      Symptoms Noted Comment Pt. very lethargic, sleeping through most of tx. Pt. did awake with pain when R Leg was moved.  -      Precautions/Limitations cardiac precautions;hip precautions- right   Rknee immobilizer  -      Recorded by [] Clara Saldana PTA      Pain Assessment    Pain Assessment Antony-Baker FACES  -      Antony-Baker FACES Pain Rating 8  -      Pain Type Acute pain;Surgical pain  -      Pain Location Hip  -      Pain Orientation Right  -      Pain Frequency Intermittent  -      Pain Intervention(s) Medication (See MAR);Repositioned  -      Response to Interventions tolerated, pt. would wake with pain.  -MF      Recorded by [] Clara Saldana PTA      Therapy Exercises    Left Lower Extremity PROM:;AAROM:;20 reps;supine;ankle pumps/circles;heel slides;hip abduction/adduction;SAQ  -      Exercise Protocols total hip  -      Total Hip Exercises right:;20 reps;completed protocol;with assist   no quad sets due to lethargy. All PROM.  -MF      Recorded by [] Clara Saldana PTA      Positioning and Restraints    Pre-Treatment Position in bed  -      Post Treatment  Position bed  -MF      In Bed fowlers;call light within reach;exit alarm on;with family/caregiver;side rails up x3;SCD pump applied;R knee immobilizer  -MF      Recorded by [MF] Clara Saldana, MIYA        User Key  (r) = Recorded By, (t) = Taken By, (c) = Cosigned By    Initials Name Effective Dates    MB Liss Breaux, CCC-SLP 08/02/16 -      Yesenia Merritt, PTA 08/02/16 -      Marva Jamil, CCC-SLP 08/02/16 -     TS Julisa Balbuena, ALVAREZ/L 08/02/16 -      Clara Saldana, PTA 08/02/16 -                 IP PT Goals       06/21/17 0810          Bed Mobility PT LTG    Bed Mobility PT LTG, Date Established 06/21/17  -EDMUND      Bed Mobility PT LTG, Time to Achieve by discharge  -EDMUND      Bed Mobility PT LTG, Activity Type supine to sit/sit to supine  -EDMUND      Bed Mobility PT LTG, [place] Level minimum assist (75% patient effort)  -EDMUND      Bed Mobility PT Goal  LTG, Assist Device bed rails  -EDMUND      Transfer Training PT LTG    Transfer Training PT LTG, Date Established 06/21/17  -EDMUND      Transfer Training PT LTG, Time to Achieve by discharge  -EDMUND      Transfer Training PT LTG, Activity Type bed to chair /chair to bed;sit to stand/stand to sit  -EDMUND      Transfer Training PT LTG, [place] Level minimum assist (75% patient effort)  -EDMUND      Transfer Training PT LTG, Assist Device walker, rolling  -EDMUND      Gait Training PT LTG    Gait Training Goal PT LTG, Date Established 06/21/17  -EDMUND      Gait Training Goal PT LTG, Time to Achieve by discharge  -EDMUND      Gait Training Goal PT LTG, [place] Level minimum assist (75% patient effort)  -EDMUND      Gait Training Goal PT LTG, Assist Device walker, rolling  -EDMUND      Gait Training Goal PT LTG, Distance to Achieve 75  -EDMUND      Strength Goal PT LTG    Strength Goal PT LTG, Date Established 06/21/17  -EDMUND      Strength Goal PT LTG, Time to Achieve by discharge  -EDMUND      Strength Goal PT LTG, Measure to Achieve AROM/AAROM, 20 reps, B UE/LE, min assist   -EDMUND        User Key  (r) = Recorded By, (t) = Taken By, (c) = Cosigned By    Initials Name Provider Type    EDMUND Cesar, PT DPT Physical Therapist          Physical Therapy Education     Title: PT OT SLP Therapies (Active)     Topic: Physical Therapy (Active)     Point: Mobility training (Active)    Learning Progress Summary    Learner Readiness Method Response Comment Documented by Status   Patient Acceptance E NL bed mobility  06/22/17 1129 Active    Acceptance E NR Educated pt./spouse on progression of PT POC, benefits of activity, R hip precautions.  06/21/17 0810 Active   Significant Other Acceptance E NR Educated pt./spouse on progression of PT POC, benefits of activity, R hip precautions.  06/21/17 0810 Active               Point: Precautions (Active)    Learning Progress Summary    Learner Readiness Method Response Comment Documented by Status   Patient Acceptance E NR Educated pt./spouse on progression of PT POC, benefits of activity, R hip precautions.  06/21/17 0810 Active   Significant Other Acceptance E,D,H VU,NR THR HEP and precautions  06/23/17 1035 Done    Acceptance E NR Educated pt./spouse on progression of PT POC, benefits of activity, R hip precautions.  06/21/17 0810 Active                      User Key     Initials Effective Dates Name Provider Type Discipline     08/02/16 -  Yesenia Merritt, PTA Physical Therapy Assistant PT     08/02/16 -  Deonte Cesar, PT DPT Physical Therapist PT                    PT Recommendation and Plan  Anticipated Equipment Needs At Discharge: two wheeled walker  Anticipated Discharge Disposition: transitional care, skilled nursing facility, inpatient rehabilitation facility  Planned Therapy Interventions: bed mobility training, transfer training, gait training, balance training, home exercise program, orthotic fitting/training, patient/family education, postural re-education, strengthening  PT Frequency: 2 times/day, per priority  policy  Plan of Care Review  Plan Of Care Reviewed With: patient, spouse  Progress: progress toward functional goals is gradual  Outcome Summary/Follow up Plan: Pt still lethargic, would open eyes briefly. Pt did not participate with ROM, PROM to RLE. Reveiwed THR precautions with . Pt will need SNF at d/c          Outcome Measures       06/23/17 1000 06/23/17 0800 06/22/17 1000    How much help from another person do you currently need...    Turning from your back to your side while in flat bed without using bedrails? 1  -AH  2  -AH    Moving from lying on back to sitting on the side of a flat bed without bedrails? 1  -AH  1  -AH    Moving to and from a bed to a chair (including a wheelchair)? 1  -AH  1  -AH    Standing up from a chair using your arms (e.g., wheelchair, bedside chair)? 1  -AH  1  -AH    Climbing 3-5 steps with a railing? 1  -AH  1  -AH    To walk in hospital room? 1  -AH  1  -AH    AM-PAC 6 Clicks Score 6  -AH  7  -AH    How much help from another is currently needed...    Putting on and taking off regular lower body clothing?  1  -TS 1  -TS    Bathing (including washing, rinsing, and drying)  1  -TS 1  -TS    Toileting (which includes using toilet bed pan or urinal)  1  -TS 2  -TS    Putting on and taking off regular upper body clothing  1  -TS 2  -TS    Taking care of personal grooming (such as brushing teeth)  1  -TS 2  -TS    Eating meals  1  -TS 2  -TS    Score  6  -TS 10  -TS    Functional Assessment    Outcome Measure Options AM-PAC 6 Clicks Basic Mobility (PT)  -AH AM-PAC 6 Clicks Daily Activity (OT)  -TS AM-PAC 6 Clicks Daily Activity (OT)  -TS      06/21/17 1316 06/21/17 0810 06/21/17 0800    How much help from another person do you currently need...    Turning from your back to your side while in flat bed without using bedrails? 2  -MF 2  -EDUMND     Moving from lying on back to sitting on the side of a flat bed without bedrails? 2  -MF 2  -EDMUND     Moving to and from a bed to a chair  (including a wheelchair)? 1  -MF 1  -EDMUND     Standing up from a chair using your arms (e.g., wheelchair, bedside chair)? 2  -MF 2  -EDMUND     Climbing 3-5 steps with a railing? 1  -MF 1  -EDMUND     To walk in hospital room? 1  -MF 1  -EDMUND     AM-PAC 6 Clicks Score 9  -MF 9  -EDMUND     How much help from another is currently needed...    Putting on and taking off regular lower body clothing?   1  -ND    Bathing (including washing, rinsing, and drying)   1  -ND    Toileting (which includes using toilet bed pan or urinal)   2  -ND    Putting on and taking off regular upper body clothing   2  -ND    Taking care of personal grooming (such as brushing teeth)   2  -ND    Eating meals   2  -ND    Score   10  -ND    Functional Assessment    Outcome Measure Options AM-PAC 6 Clicks Basic Mobility (PT)  -MF AM-PAC 6 Clicks Basic Mobility (PT)  -EDMUND AM-PAC 6 Clicks Daily Activity (OT)  -ND      User Key  (r) = Recorded By, (t) = Taken By, (c) = Cosigned By    Initials Name Provider Type     Yesenia Merritt PTA Physical Therapy Assistant     Julisa Balbuena, ALVAREZ/L Occupational Therapy Assistant    EDMUND Cesar, PT DPT Physical Therapist    MARC Saldana PTA Physical Therapy Assistant    GEE Zamarripa, OTR/L Occupational Therapist           Time Calculation:         PT Charges       06/23/17 1038          Time Calculation    Start Time 1020  -      Stop Time 1038  -      Time Calculation (min) 18 min  -      PT Received On 06/23/17  -      PT Goal Re-Cert Due Date 07/01/17  -      Time Calculation- PT    Total Timed Code Minutes- PT 18 minute(s)  -        User Key  (r) = Recorded By, (t) = Taken By, (c) = Cosigned By    Initials Name Provider Type     Yesenia Merritt PTA Physical Therapy Assistant          Therapy Charges for Today     Code Description Service Date Service Provider Modifiers Qty    40080608744  PT THERAPEUTIC ACT EA 15 MIN 6/22/2017 Yesenia Merritt PTA GP, KX 1    [id number]   PT THER PROC EA 15 MIN 6/23/2017 Yesenia Merritt PTA GP, KX 1          PT G-Codes  Outcome Measure Options: AM-PAC 6 Clicks Basic Mobility (PT)  Score: 9  Functional Limitation: Mobility: Walking and moving around  Mobility: Walking and Moving Around Current Status (): At least 60 percent but less than 80 percent impaired, limited or restricted  Mobility: Walking and Moving Around Goal Status (): At least 20 percent but less than 40 percent impaired, limited or restricted    Yesenia Merritt PTA  6/23/2017

## 2017-06-23 NOTE — THERAPY TREATMENT NOTE
Acute Care - Physical Therapy Treatment Note  [facility]     Patient Name: Renay Lazo  : 1929  MRN: [id number]  Today's Date: 2017  Onset of Illness/Injury or Date of Surgery Date: 17  Date of Referral to PT: 17  Referring Physician: (S) Dr. Zaidi (Post hip precautions, WBAT)    Admit Date: 2017    Visit Dx:    ICD-10-CM ICD-9-CM   1. Closed displaced intertrochanteric fracture of right femur, initial encounter S72.141A 820.21   2. Closed subcapital fracture of neck of right femur, initial encounter S72.011A 820.09   3. Decreased activities of daily living (ADL) Z78.9 V49.89   4. Impaired mobility Z74.09 799.89   5. Oropharyngeal dysphagia R13.12 787.22     Patient Active Problem List   Diagnosis   • Closed C2 fracture   • C2 cervical fracture   • Transient alteration of awareness   • Paroxysmal atrial fibrillation   • History of meningioma of the brain   • UTI (urinary tract infection)   • Over weight   • Closed displaced fracture of second cervical vertebra with routine healing   • Closed subcapital fracture of neck of right femur   • Obstructive sleep apnea   • Dementia   • Hypertension   • Pulmonary edema   • Diastolic dysfunction   • Pulmonary HTN               Adult Rehabilitation Note       17 1530 17 1200 17 1020    Rehab Assessment/Intervention    Discipline physical therapy assistant  - speech language pathologist  -CS physical therapy assistant  -    Document Type therapy note (daily note)  - therapy note (daily note)  - therapy note (daily note)  -    Subjective Information agree to therapy;complains of;weakness;fatigue;pain  - no complaints;agree to therapy  - no complaints   pt still lethargic  -    Patient Effort, Rehab Treatment  adequate  -CS     Precautions/Limitations fall precautions;hip precautions- right   R knee immobilizer when in bed  -  fall precautions;hip precautions- right   Rknee immobilizer in bed  -    Recorded by  [] Yesenia Merritt PTA [CS] Yordy Whiting, MS, CFY-SLP [] Yesenia Merritt PTA    Pain Assessment    Pain Assessment Antony-Briggs FACES  - No/denies pain  - Antony-Briggs FACES  -    Antony-Briggs FACES Pain Rating 6  -  2  -AH    Pain Type Surgical pain  -AH  Surgical pain  -AH    Pain Location Hip  -AH  Hip  -AH    Pain Orientation Right  -AH  Right  -AH    Pain Frequency Intermittent   with bed mobility  -AH  Intermittent   with ROM  -AH    Pain Intervention(s) Repositioned  -AH  Repositioned  -    Response to Interventions tolerated  -AH      Recorded by [AH] Yesenia Merritt PTA [CS] Yordy Whiting, MS, CFY-SLP [AH] Yesenia Merritt PTA    Dysphagia/Swallow Intervention    Dysphagia/Swallow Therapeutic Feed  ST presented pt with mechanical soft, pureed, and honey thick consistencies at lunch. ST notes pt to be fatigued throughout session requiring moderate verbal and tactile cues to stay aroused. Pt's  was insistent that he feed the pt and ST observe. ST notes pt's  provided pt with large bolus portions in which ST cued the  to decrease bolus size with PO trials presented. Pt verbalized understanding and made adjustment. ST also informed pt's  that the pt always needed to be sitting in and upright position while completing PO intake. No overt s/s were noted with pureed consistencies and honey thick liquids. However, the pt exhibited significant difficulties with mechanical soft consistencies as noted by a weak rotary chew and prolonged mastication. ST eventually cued the pt to swallow in which she was able to complete. Post swallow a moderate-severe amount of residue remained throughout the oral cavity. Pt eventually cleared residue with multiple swallows and a honey thick liquid wash. Pt downgraded to pureed consistencies at this time. ST will continue to follow and treat.  -CS     Recorded by  [CS] Yordy Whiting, MS, CFY-SLP     Bed Mobility, Assessment/Treatment    Bed Mob, Supine  to Sit, [place] moderate assist (50% patient effort);maximum assist (25% patient effort);2 person assist required;verbal cues required  -      Bed Mob, Sit to Supine, [place] maximum assist (25% patient effort);2 person assist required;verbal cues required  -      Recorded by [] Yesenia Merritt PTA      Transfer Assessment/Treatment    Transfers, Sit-Stand [place] moderate assist (50% patient effort);maximum assist (25% patient effort);2 person assist required;verbal cues required  -      Transfers, Stand-Sit [place] moderate assist (50% patient effort);verbal cues required;2 person assist required  -      Recorded by [] Yesenia Merritt PTA      Balance Skills Training    Sitting-Level of Assistance Contact guard;Minimum assistance  -      Sitting-Balance Support Left upper extremity supported;Feet supported  -      Sitting-Balance Activities Trunk control activities  -      Sitting # of Minutes 20  -      Standing-Level of Assistance Moderate assistance;x2  -      Static Standing Balance Support assistive device  -      Standing Balance # of Minutes 2  -AH      Recorded by [] Yesenia Merritt PTA      Therapy Exercises    Right Lower Extremity AAROM:;10 reps;sitting  -      Exercise Protocols   total hip  -    Total Hip Exercises   right:;10 reps   PROM  -    Recorded by [] Yesenia Merritt PTA  [] Yesenia Merritt PTA    Orthotics Prosthetics    Additional Documentation Orthosis Location (Group)  -      Recorded by [] Yesenia Merritt PTA      Orthosis Location    Orthosis Location/Type lower extremity  -  lower extremity  -    Orthosis, Lower Extremity Right:;knee immobilizer  -  Right:;knee immobilizer  -    Recorded by [] Yesenia Merritt PTA  [] Yesenia Merritt PTA    Orthosis Management/Training    Orthosis Wear Schedule wear at night or when in bed  -  wear at night or when in bed  -    Recorded by [] Yesenia Merritt PTA  [] Yesenia COX  MIYA Merritt    Positioning and Restraints    Pre-Treatment Position in bed  -AH  in bed  -    Post Treatment Position bed  -AH  bed  -AH    In Bed fowlers;call light within reach;encouraged to call for assist;with family/caregiver;SCD pump applied;R knee immobilizer  -  fowlers;call light within reach;encouraged to call for assist;exit alarm on;with family/caregiver;R knee immobilizer  -    Recorded by [] Yesenia Merritt PTA  [] Yesenia Merritt PTA      06/23/17 1000 06/23/17 0808 06/22/17 1455    Rehab Assessment/Intervention    Discipline speech language pathologist  -TM occupational therapy assistant  -TS     Document Type therapy note (daily note)  -TM therapy note (daily note)  -TS     Subjective Information fatigue   Significant fatigue, sternal rub, max verbal, tactile cues.  -TM no complaints   pt stated yes/no to questions  -TS     Patient Effort, Rehab Treatment fair  -TM      Symptoms Noted Comment  lethagic and would not open eyes through tx  -TS     Precautions/Limitations  fall precautions;swallowing precautions;hip precautions- right   knee immobilizer in bed RLE  -TS     Recorded by [TM] Marva Jamil CCC-SLP [TS] CITLALY NaranjoA/L     Pain Assessment    Pain Assessment  Antony-Briggs FACES  -TS     Antony-Briggs FACES Pain Rating  0  -TS     Recorded by  [TS] KIM Naranjo/L     Cognitive Assessment/Intervention    Personal Safety Interventions  fall prevention program maintained  -TS     Recorded by  [TS] CITLALY NaranjoA/L     Swallow Assessment/Intervention    Additional Documentation   Dysphagia/Swallow Intervention (Group)  -MB    Recorded by   [MB] Liss Breaux CCC-SLP    Dysphagia/Swallow Intervention    Dysphagia/Swallow Therapeutic Feed Pt was quite fatigued at time of ST arrival, essentially lethargic; however, with able to arouse for brief periods with maximal, multi-modal cues including sternal rub, verbal cues, and tactile stim with labial ice  chip stimulation. With labial and lingual ice chip stimulation, pt required max cues to awaken and to follow commands for lingual protrusion, though pt was able to follow. Pt was able to complete a swallow following stim and cues with each of the two attempted trials. Pt was then presented meds crushed in applesauce, presented to the pt by the RN, Kamla. Pt was noted to have what appeared to be quite immediate swallow initiations, with up to three multiple swallows across three trials. It must be noted that laryngeal elevation decreased when multiple swallows were observed. Educated RN and  on concerns with pt's level alertness placing her at an increased risk for aspiration with PO intake. Minutes following the final PO trial presentation, pt was noted to have two separate coughing instances. RN stated that concerns where noted this AM with  attempting to feed pt when pt appeared too fatigued. Therefore, RN stated she called in nurse aid, Tiff, to assist with PO at that time and the aid removed the tray from the bedside post meal secondary to this concern. RN stated lungs are diminished. Cannot fully r/o aspiration with PO intake. Educated  that ST will follow up at lunch time today to monitor for safety with PO intake and to provide further recommendations as felt warranted.  -TM  Pt will tolerate trials of current diet and upgraded consistencies with no overt s/s of aspiration.  -MB    Recorded by [TM] Marva Jamil, CCC-SLP  [MB] Liss Breaux, CCC-SLP    ADL Assessment/Intervention    Additional Documentation  Self-Feeding Assessment/Training (Group)  -TS     Recorded by  [TS] KIM Naranjo/L     Self-Feeding Assessment/Training    Self-Feeding Assess/Train, Position  --   HOB elevated to 90  -TS     Self-Feeding Assess/Train, [place]  maximum assist (25% patient effort);dependent (less than 25% patient effort)  -TS     Self-Feeding Assess/Train, Impairments  --    decreased cognition  -TS     Self-Feeding Assess/Train, Comment  Pt responded yes/no x3 occurances during self feeding. Pt kept eyes closed through task with max encouragement to open eyes and hold utensils. Tamayo attempted [place] food to mouth, pt assisted 25% with bringing food to mouth. Pt provided verbal cues and increased time to chew and swallow with feeding task.   -TS     Recorded by  [TS] LUTHER Naranjo     Positioning and Restraints    Pre-Treatment Position  in bed  -TS     Post Treatment Position  bed  -TS     In Bed  fowlers;call light within reach;encouraged to call for assist;with family/caregiver;side rails up x2;with brace;SCD pump applied  -TS     Recorded by  [TS] LUTHER Naranjo       06/22/17 1022 06/22/17 1000 06/21/17 1316    Rehab Assessment/Intervention    Discipline physical therapy assistant  - occupational therapy assistant  -TS physical therapy assistant  -    Document Type therapy note (daily note)  - therapy note (daily note)  - therapy note (daily note)  -    Subjective Information agree to therapy   pt very drowsy  - agree to therapy;complains of;fatigue  - agree to therapy;complains of;pain  -    Symptoms Noted Comment lethargic  - VERY Lethargic  - Pt. very lethargic, sleeping through most of tx. Pt. did awake with pain when R Leg was moved.  -    Precautions/Limitations fall precautions;hip precautions- right   R knee immobilizer when in bed  - fall precautions;hip precautions- right;oxygen therapy device and L/min   R knee immobilizer  - cardiac precautions;hip precautions- right   Rknee immobilizer  -    Recorded by [] Yesenia Merritt, PTA [TS] LUTHER Naranjo [] Clara Saldana, PTA    Pain Assessment    Pain Assessment Antony-Briggs FACES  - No/denies pain  -TS Antony-Baker FACES  -    Antony-Baker FACES Pain Rating 2  -  8  -MF    Pain Type   Acute pain;Surgical pain  -    Pain Location Hip  -  Hip  -     Pain Orientation Right  -  Right  -MF    Pain Frequency Intermittent   with bed mobility  -  Intermittent  -MF    Pain Intervention(s) Medication (See MAR)  -  Medication (See MAR);Repositioned  -MF    Response to Interventions tolerated  -  tolerated, pt. would wake with pain.  -MF    Recorded by [] Yesenia Merritt PTA [TS] Julisa Balbuena, ALVAREZ/L [MF] Clara Saldana PTA    Cognitive Assessment/Intervention    Personal Safety Interventions fall prevention program maintained  - fall prevention program maintained  -TS     Recorded by [] Yesenia Merritt PTA [TS] Julisa Balbuena, ALVAREZ/L     Bed Mobility, Assessment/Treatment    Bed Mob, Supine to Sit, [place] moderate assist (50% patient effort);maximum assist (25% patient effort);verbal cues required;2 person assist required  -      Bed Mob, Sit to Supine, [place] maximum assist (25% patient effort);2 person assist required;verbal cues required  -      Bed Mobility, Safety Issues cognitive deficits limit understanding   lethargic  -      Recorded by [] Yesenia Merritt PTA      Grooming Assessment/Training    Grooming Assess/Train, Position  supine  -     Grooming Assess/Train, Indepen Level  maximum assist (25% patient effort)  -     Grooming Assess/Train, Comment  pt present wash cloth, and then put in pt hand, pt then provided [place] to initiate, pt would open eyes occasionally but would not initiate or complete task.   -TS     Recorded by  [] Julisa Balbuena, ALVAREZ/L     Motor Skills/Interventions    Additional Documentation Balance Skills Training (Group)  -      Recorded by [] Yesenia Merritt PTA      Balance Skills Training    Sitting-Level of Assistance Moderate assistance;Maximum assistance   constant vc's to attempt to keep pt awake  -      Sitting-Balance Support Feet supported  -      Recorded by [] Yesenia Merritt PTA      Therapy Exercises    Left Lower Extremity   PROM:;AAROM:;20  reps;supine;ankle pumps/circles;heel slides;hip abduction/adduction;SAQ  -MF    Bilateral Upper Extremity  AAROM:;15 reps;supine;elbow flexion/extension;shoulder extension/flexion   wrist flexion/extension  -     Exercise Protocols   total hip  -MF    Total Hip Exercises   right:;20 reps;completed protocol;with assist   no quad sets due to lethargy. All PROM.  -MF    Recorded by  [] KIM Naranjo/YENNIFER [] Clara Saldana PTA    Orthotics Prosthetics    Additional Documentation Orthosis Location (Group);Orthosis Management/Training (Group)  -      Recorded by [] Yesenia Merritt PTA      Orthosis Location    Orthosis Location/Type lower extremity  -      Orthosis, Lower Extremity Right:;knee immobilizer  -      Recorded by [] Yesenia Merritt PTA      Orthosis Management/Training    Orthosis Indications --   hip precaution  -      Orthosis Wear Schedule wear at night or when in bed  -      Recorded by [] Yesenia Merritt PTA      Positioning and Restraints    Pre-Treatment Position in bed  - in bed  - in bed  -    Post Treatment Position bed  - bed  - bed  -    In Bed fowlers;call light within reach;encouraged to call for assist;with family/caregiver;R knee immobilizer  - fowlers;call light within reach;encouraged to call for assist;with family/caregiver;with PT;side rails up x2  - fowlers;call light within reach;exit alarm on;with family/caregiver;side rails up x3;SCD pump applied;R knee immobilizer  -    Recorded by [] Yesenia Merritt PTA [] KIM Naranjo/YENNIFER [] Clara Saldana PTA      User Key  (r) = Recorded By, (t) = Taken By, (c) = Cosigned By    Initials Name Effective Dates    RED Breaux CCC-SLP 08/02/16 -      Yesenia Merritt PTA 08/02/16 -      Marva Jamil CCC-SLP 08/02/16 -      KIM Naranjo/L 08/02/16 -      Clara Saldana, MIYA 08/02/16 -      Yordy Whiting MS, CFY-SLP 08/02/16 -                  IP PT Goals       06/21/17 0810          Bed Mobility PT LTG    Bed Mobility PT LTG, Date Established 06/21/17  -EDMUND      Bed Mobility PT LTG, Time to Achieve by discharge  -EDMUND      Bed Mobility PT LTG, Activity Type supine to sit/sit to supine  -EDMUND      Bed Mobility PT LTG, [place] Level minimum assist (75% patient effort)  -EDMUND      Bed Mobility PT Goal  LTG, Assist Device bed rails  -EDMUND      Transfer Training PT LTG    Transfer Training PT LTG, Date Established 06/21/17  -EDMUND      Transfer Training PT LTG, Time to Achieve by discharge  -EDMUND      Transfer Training PT LTG, Activity Type bed to chair /chair to bed;sit to stand/stand to sit  -EDMUND      Transfer Training PT LTG, [place] Level minimum assist (75% patient effort)  -EDMUND      Transfer Training PT LTG, Assist Device walker, rolling  -EDMUND      Gait Training PT LTG    Gait Training Goal PT LTG, Date Established 06/21/17  -EDMUND      Gait Training Goal PT LTG, Time to Achieve by discharge  -EDMUND      Gait Training Goal PT LTG, [place] Level minimum assist (75% patient effort)  -EDMUND      Gait Training Goal PT LTG, Assist Device walker, rolling  -EDMUND      Gait Training Goal PT LTG, Distance to Achieve 75  -EDMUND      Strength Goal PT LTG    Strength Goal PT LTG, Date Established 06/21/17  -EDMUND      Strength Goal PT LTG, Time to Achieve by discharge  -EDMUND      Strength Goal PT LTG, Measure to Achieve AROM/AAROM, 20 reps, B UE/LE, min assist  -EDMUND        User Key  (r) = Recorded By, (t) = Taken By, (c) = Cosigned By    Initials Name Provider Type    EDMUND Cesar, PT DPT Physical Therapist          Physical Therapy Education     Title: PT OT SLP Therapies (Active)     Topic: Physical Therapy (Active)     Point: Mobility training (Active)    Learning Progress Summary    Learner Readiness Method Response Comment Documented by Status   Patient Acceptance E NL bed mobility AH 06/22/17 1129 Active    Acceptance E NR Educated pt./spouse on progression of PT POC,  benefits of activity, R hip precautions.  06/21/17 0810 Active   Significant Other Acceptance E NR Educated pt./spouse on progression of PT POC, benefits of activity, R hip precautions.  06/21/17 0810 Active               Point: Precautions (Active)    Learning Progress Summary    Learner Readiness Method Response Comment Documented by Status   Patient Acceptance E NR Educated pt./spouse on progression of PT POC, benefits of activity, R hip precautions.  06/21/17 0810 Active   Significant Other Acceptance E,D,H VU,NR THR HEP and precautions  06/23/17 1035 Done    Acceptance E NR Educated pt./spouse on progression of PT POC, benefits of activity, R hip precautions.  06/21/17 0810 Active                      User Key     Initials Effective Dates Name Provider Type Discipline     08/02/16 -  Yesenia Merritt, PTA Physical Therapy Assistant PT     08/02/16 -  Deonte Cesar, PT DPT Physical Therapist PT                    PT Recommendation and Plan  Anticipated Equipment Needs At Discharge: two wheeled walker  Anticipated Discharge Disposition: transitional care, skilled nursing facility, inpatient rehabilitation facility  Planned Therapy Interventions: bed mobility training, transfer training, gait training, balance training, home exercise program, orthotic fitting/training, patient/family education, postural re-education, strengthening  PT Frequency: 2 times/day, per priority policy  Plan of Care Review  Plan Of Care Reviewed With: patient, spouse  Progress: progress toward functional goals is gradual  Outcome Summary/Follow up Plan: Pt still lethargic, would open eyes briefly. Pt did not participate with ROM, PROM to RLE. Reveiwed THR precautions with . Pt will need SNF at d/c          Outcome Measures       06/23/17 1000 06/23/17 0800 06/22/17 1000    How much help from another person do you currently need...    Turning from your back to your side while in flat bed without using bedrails? 1  -   2  -AH    Moving from lying on back to sitting on the side of a flat bed without bedrails? 1  -AH  1  -AH    Moving to and from a bed to a chair (including a wheelchair)? 1  -AH  1  -AH    Standing up from a chair using your arms (e.g., wheelchair, bedside chair)? 1  -AH  1  -AH    Climbing 3-5 steps with a railing? 1  -AH  1  -AH    To walk in hospital room? 1  -AH  1  -AH    AM-PAC 6 Clicks Score 6  -AH  7  -AH    How much help from another is currently needed...    Putting on and taking off regular lower body clothing?  1  -TS 1  -TS    Bathing (including washing, rinsing, and drying)  1  -TS 1  -TS    Toileting (which includes using toilet bed pan or urinal)  1  -TS 2  -TS    Putting on and taking off regular upper body clothing  1  -TS 2  -TS    Taking care of personal grooming (such as brushing teeth)  1  -TS 2  -TS    Eating meals  1  -TS 2  -TS    Score  6  -TS 10  -TS    Functional Assessment    Outcome Measure Options AM-PAC 6 Clicks Basic Mobility (PT)  -AH AM-PAC 6 Clicks Daily Activity (OT)  -TS AM-PAC 6 Clicks Daily Activity (OT)  -TS      06/21/17 1316 06/21/17 0810 06/21/17 0800    How much help from another person do you currently need...    Turning from your back to your side while in flat bed without using bedrails? 2  -MF 2  -EDMUND     Moving from lying on back to sitting on the side of a flat bed without bedrails? 2  -MF 2  -EDMUND     Moving to and from a bed to a chair (including a wheelchair)? 1  -MF 1  -EDMUND     Standing up from a chair using your arms (e.g., wheelchair, bedside chair)? 2  -MF 2  -EDMUND     Climbing 3-5 steps with a railing? 1  -MF 1  -EDMUND     To walk in hospital room? 1  -MF 1  -EDMUND     AM-PAC 6 Clicks Score 9  -MF 9  -EDMUND     How much help from another is currently needed...    Putting on and taking off regular lower body clothing?   1  -ND    Bathing (including washing, rinsing, and drying)   1  -ND    Toileting (which includes using toilet bed pan or urinal)   2  -ND    Putting on and  taking off regular upper body clothing   2  -ND    Taking care of personal grooming (such as brushing teeth)   2  -ND    Eating meals   2  -ND    Score   10  -ND    Functional Assessment    Outcome Measure Options AM-PAC 6 Clicks Basic Mobility (PT)  -MARC AM-PAC 6 Clicks Basic Mobility (PT)  -EDMUND AM-PAC 6 Clicks Daily Activity (OT)  -ND      User Key  (r) = Recorded By, (t) = Taken By, (c) = Cosigned By    Initials Name Provider Type     Yesenia Merritt PTA Physical Therapy Assistant    CHERELLE Balbuena, ALVAREZ/L Occupational Therapy Assistant    EDMUND Cesar, PT DPT Physical Therapist    MARC Saldana PTA Physical Therapy Assistant    GEE Zamarripa, OTR/L Occupational Therapist           Time Calculation:         PT Charges       06/23/17 1608 06/23/17 1038       Time Calculation    Start Time 1530  -AH 1020  -AH     Stop Time 1600  -AH 1038  -AH     Time Calculation (min) 30 min  -AH 18 min  -AH     PT Received On  06/23/17  -     PT Goal Re-Cert Due Date  07/01/17  -     Time Calculation- PT    Total Timed Code Minutes- PT 30 minute(s)  -AH 18 minute(s)  -AH       User Key  (r) = Recorded By, (t) = Taken By, (c) = Cosigned By    Initials Name Provider Type     Yesenia Merritt PTA Physical Therapy Assistant          Therapy Charges for Today     Code Description Service Date Service Provider Modifiers Qty    23361292450 HC PT THERAPEUTIC ACT EA 15 MIN 6/22/2017 Yesenia Merritt PTA GP, KX 1    02597075765 HC PT THER PROC EA 15 MIN 6/23/2017 Yesenia Merritt PTA GP, KX 1    97332283861 HC PT THERAPEUTIC ACT EA 15 MIN 6/23/2017 Yesenia Merritt PTA GP, KX 2          PT G-Codes  Outcome Measure Options: AM-PAC 6 Clicks Basic Mobility (PT)  Score: 9  Functional Limitation: Mobility: Walking and moving around  Mobility: Walking and Moving Around Current Status (): At least 60 percent but less than 80 percent impaired, limited or restricted  Mobility: Walking and Moving Around Goal  Status (): At least 20 percent but less than 40 percent impaired, limited or restricted    Yesenia Merritt, PTA  6/23/2017

## 2017-06-23 NOTE — PLAN OF CARE
Problem: Patient Care Overview (Adult)  Goal: Plan of Care Review  Outcome: Ongoing (interventions implemented as appropriate)    06/23/17 0858   Coping/Psychosocial Response Interventions   Plan Of Care Reviewed With patient   Patient Care Overview   Progress no change   Outcome Evaluation   Outcome Summary/Follow up Plan Pt lethargic and kept eyes closed through self feeding task. Tamayo attempted [place] assist to encourage pt participation with self feeding but pt required max A to bring food to mouth. Pt Max A/dependent with self feeding. Pt spouse plans for pt to discharge to SNF for rehab. Continue OT POC

## 2017-06-23 NOTE — PROGRESS NOTES
Continued Stay Note   [place]     Patient Name: Renay Lazo  MRN: [id number]  Today's Date: 6/23/2017    Admit Date: 6/19/2017          Discharge Plan       06/23/17 1217    Case Management/Social Work Plan    [facility]    Additional Comments Patient's  has called  to inform that he will agree to [facility] but will not agree to alternate referrals at this time. LYLE called Juan and notified Ivy of referral. Ivy confirmed she will evaluate and call  with update.               Discharge Codes     None            QUETA HarrisW

## 2017-06-23 NOTE — PLAN OF CARE
Problem: Skin Integrity Impairment, Risk/Actual (Adult)  Goal: Skin Integrity/Wound Healing  Outcome: Ongoing (interventions implemented as appropriate)    Problem: Pain, Acute (Adult)  Goal: Acceptable Pain Control/Comfort Level  Outcome: Ongoing (interventions implemented as appropriate)    Problem: Fall Risk (Adult)  Goal: Absence of Falls  Outcome: Ongoing (interventions implemented as appropriate)    Problem: Patient Care Overview (Adult)  Goal: Plan of Care Review  Outcome: Ongoing (interventions implemented as appropriate)    06/23/17 0311   Coping/Psychosocial Response Interventions   Plan Of Care Reviewed With patient   Patient Care Overview   Progress no change   Outcome Evaluation   Outcome Summary/Follow up Plan patient has been drowsy tonight.  at BS, DTV, bladder scan q4 hours. patient will wake up when stimulated ,          Problem: Fractured Hip (Adult)  Goal: Signs and Symptoms of Listed Potential Problems Will be Absent or Manageable (Fractured Hip)  Outcome: Ongoing (interventions implemented as appropriate)    Problem: Pressure Ulcer Risk (Wing Scale) (Adult,Obstetrics,Pediatric)  Goal: Skin Integrity  Outcome: Ongoing (interventions implemented as appropriate)

## 2017-06-23 NOTE — PLAN OF CARE
Problem: Patient Care Overview (Adult)  Goal: Plan of Care Review  Outcome: Ongoing (interventions implemented as appropriate)    06/23/17 1304   Coping/Psychosocial Response Interventions   Plan Of Care Reviewed With patient   Patient Care Overview   Progress progress towards functional goals is fair   Outcome Evaluation   Outcome Summary/Follow up Plan ST presented pt with mechanical soft, pureed, and honey thick consistencies at lunch. ST notes pt to be fatigued throughout session requiring moderate verbal and tactile cues to stay aroused. Pt's  was insistent that he feed the pt and ST observe. ST notes pt's  provided pt with large bolus portions in which ST cued the  to decrease bolus size with PO trials presented. Pt verbalized understanding and made adjustment. ST also informed pt's  that the pt always needed to be sitting in and upright position while completing PO intake. No overt s/s were noted with pureed consistencies and honey thick liquids. However, the pt exhibited significant difficulties with mechanical soft consistencies as noted by a weak rotary chew and prolonged mastication. ST eventually cued the pt to swallow in which she was able to complete. Post swallow a moderate-severe amount of residue remained throughout the oral cavity. Pt eventually cleared residue with multiple swallows and a honey thick liquid wash. Pt downgraded to pureed consistencies at this time. ST will continue to follow and treat.         Problem: Inpatient SLP  Goal: Dysphagia- Patient will safely consume diet as per recommendation with no signs/symptoms of aspiration  Outcome: Ongoing (interventions implemented as appropriate)    06/22/17 1533 06/23/17 1037   Safely Consume Diet   Safely Consume Diet- SLP, Date Established 06/22/17 --    Safely Consume Diet- SLP, Time to Achieve by discharge --    Safely Consume Diet- SLP, Additional Goal Pt will tolerate trials of current diet and upgraded  consistencies with no overt s/s of aspiration. --    Safely Consume Diet- SLP, Date Goal Reviewed --  06/23/17   Safely Consume Diet- SLP, Outcome goal ongoing --

## 2017-06-23 NOTE — PROGRESS NOTES
"  Orthopedic Surgery Progress Note    Renay Lazo  6/23/2017      Subjective:     Systemic or Specific Complaints: resting comfortably. Doing well.  agrees to go to [facility] for rehab.   Objective:     Patient Vitals for the past 24 hrs:   BP Temp Temp src Pulse Resp SpO2 Height Weight   06/23/17 0643 - - - 75 - - - -   06/23/17 0444 - - - (!) 137 16 - - -   06/23/17 0433 - - - (!) 150 - - - -   06/23/17 0322 163/51 100 °F (37.8 °C) Oral 65 20 99 % - -   06/22/17 2329 177/49 (!) 100.6 °F (38.1 °C) Oral 82 20 97 % - -   06/22/17 2044 - - - - - - - 172 lb 4 oz (78.1 kg)   06/22/17 1939 144/55 99.3 °F (37.4 °C) Axillary 66 18 99 % - -   06/22/17 1721 - - - - - - 64.02\" (162.6 cm) -   06/22/17 1545 143/54 98.5 °F (36.9 °C) Oral 74 18 98 % - -   06/22/17 1200 95/49 99 °F (37.2 °C) Oral 64 16 99 % - -   06/22/17 0806 138/46 98.3 °F (36.8 °C) Oral 65 16 97 % - -       right lower  General: alert, appears stated age and cooperative   Wound: clean, dry, intact             Dressing: clean, dry, intact   Extremity: Distal NVI           DVT Exam: No evidence of DVT seen on physical exam.                   Data Review:  Lab Results (last 24 hours)     Procedure Component Value Units Date/Time    Blood Culture With TEDDY [[id number]] Collected:  06/22/17 2347    Specimen:  Blood from Arm, Left Updated:  06/23/17 0035    Blood Culture With TEDDY [[id number]] Collected:  06/22/17 2347    Specimen:  Blood from Hand, Left Updated:  06/23/17 0144    Folate [[id number]] Collected:  06/23/17 0507    Specimen:  Blood Updated:  06/23/17 0546    Vitamin B12 [[id number]] Collected:  06/23/17 0507    Specimen:  Blood Updated:  06/23/17 0546    CBC (No Diff) [[id number]]  (Abnormal) Collected:  06/23/17 0507    Specimen:  Blood Updated:  06/23/17 0555     WBC 8.46 10*3/mm3      RBC 2.57 (L) 10*6/mm3      Hemoglobin 8.1 (L) g/dL      Hematocrit 24.3 (L) %      MCV 94.6 fL      MCH 31.5 pg      MCHC 33.3 g/dL      RDW 13.3 %      RDW-SD 46.3 fl "      MPV 10.1 fL      Platelets 173 10*3/mm3     Comprehensive Metabolic Panel [[id number]]  (Abnormal) Collected:  06/23/17 0507    Specimen:  Blood Updated:  06/23/17 0609     Glucose 120 (H) mg/dL      BUN 34 (H) mg/dL      Creatinine 1.18 mg/dL      Sodium 138 mmol/L      Potassium 4.0 mmol/L      Chloride 102 mmol/L      CO2 23.0 (L) mmol/L      Calcium 8.5 mg/dL      Total Protein 6.6 g/dL      Albumin 3.30 (L) g/dL      ALT (SGPT) 45 U/L      AST (SGOT) 63 (H) U/L      Alkaline Phosphatase 83 U/L      Total Bilirubin 0.7 mg/dL      eGFR Non African Amer 43 (L) mL/min/1.73      Globulin 3.3 gm/dL      A/G Ratio 1.0 (L) g/dL      BUN/Creatinine Ratio 28.8 (H)     Anion Gap 13.0 mmol/L     Narrative:       The MDRD GFR formula is only valid for adults with stable renal function between ages 18 and 70.    Iron Profile [[id number]]  (Abnormal) Collected:  06/23/17 0507    Specimen:  Blood Updated:  06/23/17 0619     Iron 23 (L) mcg/dL      TIBC 200 (L) mcg/dL      Iron Saturation 12 (L) %     Ferritin [[id number]]  (Normal) Collected:  06/23/17 0507    Specimen:  Blood Updated:  06/23/17 0644     Ferritin 202.00 ng/mL         Imaging Results (last 24 hours)     Procedure Component Value Units Date/Time    CT Head Without Contrast [[id number]] Collected:  06/22/17 1626     Updated:  06/22/17 1724    Narrative:       EXAMINATION: CT HEAD WO CONTRAST-  6/22/2017 4:14 PM CDT     CT SCAN OF THE HEAD, WITHOUT CONTRAST:      HISTORY: Altered mental status     COMPARISONS: 10/04/2016      TECHNIQUE:     Radiation dose equals DLP 1395 mGy-cm.  Automated exposure control dose  reduction technique was implemented.     CT evaluation of the head without intravenous contrast. 5 mm transaxial  images were obtained.   2-D sagittal and coronal reconstruction images  were generated.     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     There is decreased attenuation in the periventricular white matter;  suspect chronic ischemic changes.     There is mild diffuse central and cortical atrophy.     There is no intra or extra-axial hemorrhage.     There is a partially calcified mass identified along the left posterior  parietal region peripherally, and a meningioma is suspected. The mass  measures nearly 2 cm and was noted previously, likely unchanged.             Impression:       1. No CT evidence of an acute intracranial process.    2. Postsurgical and chronic changes.   3. Partially calcified left convexity meningioma suspected.     These findings were described on a digital voice clip recorded on the  PACS system at the time of dictation.                            This report was finalized on 06/22/2017 17:21 by Dr. Mick Kimbrough MD.          Assessment:     POD# 3 R hip hemiarthroplasty, Acute Post op Blood Loss Anemia (expected)     Plan:      1:  DVT prophylaxis 3 weeks, ICE, elevate  2:  Pain control  3:  Physical therapy/Occupational therapy  4:  IV antibiotics for 24 hours  5:  Anticipate discharge today/tomorrow if pain well controlled-per Medicine,  requests [facility].   6:  WBAT RLE with posterior hip precautions       Supa Cancino PA-C

## 2017-06-23 NOTE — PLAN OF CARE
Problem: Patient Care Overview (Adult)  Goal: Plan of Care Review  Outcome: Ongoing (interventions implemented as appropriate)    06/23/17 1037   Coping/Psychosocial Response Interventions   Plan Of Care Reviewed With spouse;other (see comments)  (RNKamla. )   Patient Care Overview   Progress progress towards functional goals is fair   Outcome Evaluation   Outcome Summary/Follow up Plan Pt was quite fatigued at time of ST arrival, essentially lethargic; however, with able to arouse for brief periods with maximal, multi-modal cues including sternal rub, verbal cues, and tactile stim with labial ice chip stimulation. With labial and lingual ice chip stimulation, pt required max cues to awaken and to follow commands for lingual protrusion, though pt was able to follow. Pt was able to complete a swallow following stim and cues with each of the two attempted trials. Pt was then presented meds crushed in applesauce, presented to the pt by the RNKamla. Pt was noted to have what appeared to be quite immediate swallow initiations, with up to three multiple swallows across three trials. It must be noted that laryngeal elevation decreased when multiple swallows were observed. Educated RN and  on concerns with pt's level alertness placing her at an increased risk for aspiration with PO intake. Minutes following the final PO trial presentation, pt was noted to have two separate coughing instances. RN stated that concerns where noted this AM with  attempting to feed pt when pt appeared too fatigued. Therefore, RN stated she called in nurse aid, Tiff, to assist with PO at that time and the aid removed the tray from the bedside post meal secondary to this concern. RN stated lungs are diminished. Cannot fully r/o aspiration with PO intake. Educated  that ST will follow up at lunch time today to monitor for safety with PO intake and to provide further recommendations as felt warranted. ST to follow and  tx.         Problem: Inpatient SLP  Goal: Dysphagia- Patient will safely consume diet as per recommendation with no signs/symptoms of aspiration  Outcome: Ongoing (interventions implemented as appropriate)    06/22/17 1533 06/23/17 1037   Safely Consume Diet   Safely Consume Diet- SLP, Date Established 06/22/17 --    Safely Consume Diet- SLP, Time to Achieve by discharge --    Safely Consume Diet- SLP, Additional Goal Pt will tolerate trials of current diet and upgraded consistencies with no overt s/s of aspiration. --    Safely Consume Diet- SLP, Date Goal Reviewed --  06/23/17   Safely Consume Diet- SLP, Outcome goal ongoing --

## 2017-06-23 NOTE — THERAPY TREATMENT NOTE
Acute Care - Speech Language Pathology   Swallow Treatment Note [facility]     Patient Name: Renay Lazo  : 1929  MRN: [id number]  Today's Date: 2017  Onset of Illness/Injury or Date of Surgery Date: 17            Admit Date: 2017  ST presented pt with mechanical soft, pureed, and honey thick consistencies at lunch. ST notes pt to be fatigued throughout session requiring moderate verbal and tactile cues to stay aroused. Pt's  was insistent that he feed the pt and ST observe. ST notes pt's  provided pt with large bolus portions in which ST cued the  to decrease bolus size with PO trials presented. Pt verbalized understanding and made adjustment. ST also informed pt's  that the pt always needed to be sitting in and upright position while completing PO intake. No overt s/s were noted with pureed consistencies and honey thick liquids. However, the pt exhibited significant difficulties with mechanical soft consistencies as noted by a weak rotary chew and prolonged mastication. ST eventually cued the pt to swallow in which she was able to complete. Post swallow a moderate-severe amount of residue remained throughout the oral cavity. Pt eventually cleared residue with multiple swallows and a honey thick liquid wash. Pt downgraded to pureed consistencies at this time. ST will continue to follow and treat.  Yordy Whiting MS, CFY-SLP 2017 1:07 PM    Visit Dx:      ICD-10-CM ICD-9-CM   1. Closed displaced intertrochanteric fracture of right femur, initial encounter S72.141A 820.21   2. Closed subcapital fracture of neck of right femur, initial encounter S72.011A 820.09   3. Decreased activities of daily living (ADL) Z78.9 V49.89   4. Impaired mobility Z74.09 799.89   5. Oropharyngeal dysphagia R13.12 787.22     Patient Active Problem List   Diagnosis   • Closed C2 fracture   • C2 cervical fracture   • Transient alteration of awareness   • Paroxysmal atrial fibrillation    • History of meningioma of the brain   • UTI (urinary tract infection)   • Over weight   • Closed displaced fracture of second cervical vertebra with routine healing   • Closed subcapital fracture of neck of right femur   • Obstructive sleep apnea   • Dementia   • Hypertension   • Pulmonary edema   • Diastolic dysfunction   • Pulmonary HTN             Adult Rehabilitation Note       06/23/17 1200 06/23/17 1020 06/23/17 1000    Rehab Assessment/Intervention    Discipline speech language pathologist  -CS physical therapy assistant  -AH speech language pathologist  -    Document Type therapy note (daily note)  -CS therapy note (daily note)  - therapy note (daily note)  -    Subjective Information no complaints;agree to therapy  -CS no complaints   pt still lethargic  - fatigue   Significant fatigue, sternal rub, max verbal, tactile cues.  -TM    Patient Effort, Rehab Treatment adequate  -CS  fair  -TM    Precautions/Limitations  fall precautions;hip precautions- right   Rknee immobilizer in bed  -     Recorded by [CS] Yordy Whiting, MS, CFY-SLP [] Yesenia Merritt PTA [TM] Marva Jamil, [facility]-SLP    Pain Assessment    Pain Assessment No/denies pain  - Antony-Briggs FACES  -     Antony-Briggs FACES Pain Rating  2  -     Pain Type  Surgical pain  -     Pain Location  Hip  -     Pain Orientation  Right  -AH     Pain Frequency  Intermittent   with ROM  -     Pain Intervention(s)  Repositioned  -     Recorded by [CS] Yordy Whiting MS, CFY-SLP [] Yesenia Merritt PTA     Dysphagia/Swallow Intervention    Dysphagia/Swallow Therapeutic Feed ST presented pt with mechanical soft, pureed, and honey thick consistencies at lunch. ST notes pt to be fatigued throughout session requiring moderate verbal and tactile cues to stay aroused. Pt's  was insistent that he feed the pt and ST observe. ST notes pt's  provided pt with large bolus portions in which ST cued the  to decrease bolus size with  PO trials presented. Pt verbalized understanding and made adjustment. ST also informed pt's  that the pt always needed to be sitting in and upright position while completing PO intake. No overt s/s were noted with pureed consistencies and honey thick liquids. However, the pt exhibited significant difficulties with mechanical soft consistencies as noted by a weak rotary chew and prolonged mastication. ST eventually cued the pt to swallow in which she was able to complete. Post swallow a moderate-severe amount of residue remained throughout the oral cavity. Pt eventually cleared residue with multiple swallows and a honey thick liquid wash. Pt downgraded to pureed consistencies at this time. ST will continue to follow and treat.  -CS  Pt was quite fatigued at time of ST arrival, essentially lethargic; however, with able to arouse for brief periods with maximal, multi-modal cues including sternal rub, verbal cues, and tactile stim with labial ice chip stimulation. With labial and lingual ice chip stimulation, pt required max cues to awaken and to follow commands for lingual protrusion, though pt was able to follow. Pt was able to complete a swallow following stim and cues with each of the two attempted trials. Pt was then presented meds crushed in applesauce, presented to the pt by the RN, Kamla. Pt was noted to have what appeared to be quite immediate swallow initiations, with up to three multiple swallows across three trials. It must be noted that laryngeal elevation decreased when multiple swallows were observed. Educated RN and  on concerns with pt's level alertness placing her at an increased risk for aspiration with PO intake. Minutes following the final PO trial presentation, pt was noted to have two separate coughing instances. RN stated that concerns where noted this AM with  attempting to feed pt when pt appeared too fatigued. Therefore, RN stated she called in nurse aid, Tiff, to  assist with PO at that time and the aid removed the tray from the bedside post meal secondary to this concern. RN stated lungs are diminished. Cannot fully r/o aspiration with PO intake. Educated  that ST will follow up at lunch time today to monitor for safety with PO intake and to provide further recommendations as felt warranted.  -TM    Recorded by [] Yordy Whiting MS, CFY-SLP  [TM] Marva Jamil, CCC-SLP    Therapy Exercises    Exercise Protocols  total hip  -     Total Hip Exercises  right:;10 reps   PROM  -     Recorded by  [] Yesenia Merritt PTA     Orthosis Location    Orthosis Location/Type  lower extremity  -     Orthosis, Lower Extremity  Right:;knee immobilizer  -     Recorded by  [] Yesenia Merritt PTA     Orthosis Management/Training    Orthosis Wear Schedule  wear at night or when in bed  -     Recorded by  [] Yesenia Merritt PTA     Positioning and Restraints    Pre-Treatment Position  in bed  -     Post Treatment Position  bed  -AH     In Bed  fowlers;call light within reach;encouraged to call for assist;exit alarm on;with family/caregiver;R knee immobilizer  -     Recorded by  [] Yesenia Merritt PTA       06/23/17 0808 06/22/17 1455 06/22/17 1022    Rehab Assessment/Intervention    Discipline occupational therapy assistant  -  physical therapy assistant  -    Document Type therapy note (daily note)  -TS  therapy note (daily note)  -    Subjective Information no complaints   pt stated yes/no to questions  -TS  agree to therapy   pt very drowsy  -    Symptoms Noted Comment lethagic and would not open eyes through tx  -TS  lethargic  -    Precautions/Limitations fall precautions;swallowing precautions;hip precautions- right   knee immobilizer in bed RLE  -TS  fall precautions;hip precautions- right   R knee immobilizer when in bed  -    Recorded by [TS] KIM Naranjo/YENNIFER  [] Yesenia Merritt PTA    Pain Assessment    Pain Assessment Antony-Baker  FACES  -TS  Antony-Briggs FACES  -AH    Antony-Briggs FACES Pain Rating 0  -TS  2  -AH    Pain Location   Hip  -AH    Pain Orientation   Right  -AH    Pain Frequency   Intermittent   with bed mobility  -    Pain Intervention(s)   Medication (See MAR)  -    Response to Interventions   tolerated  -AH    Recorded by [TS] CITLALY NaranjoA/L  [] Yesenia Merritt PTA    Cognitive Assessment/Intervention    Personal Safety Interventions fall prevention program maintained  -TS  fall prevention program maintained  -AH    Recorded by [TS] CITLALY NaranjoA/L  [] Yesenia Merritt PTA    Swallow Assessment/Intervention    Additional Documentation  Dysphagia/Swallow Intervention (Group)  -MB     Recorded by  [MB] Liss Breaux CCC-SLP     Dysphagia/Swallow Intervention    Dysphagia/Swallow Therapeutic Feed  Pt will tolerate trials of current diet and upgraded consistencies with no overt s/s of aspiration.  -MB     Recorded by  [MB] Liss Breaux CCC-SLP     Bed Mobility, Assessment/Treatment    Bed Mob, Supine to Sit, Meagher   moderate assist (50% patient effort);maximum assist (25% patient effort);verbal cues required;2 person assist required  -    Bed Mob, Sit to Supine, Meagher   maximum assist (25% patient effort);2 person assist required;verbal cues required  -    Bed Mobility, Safety Issues   cognitive deficits limit understanding   lethargic  -AH    Recorded by   [] Yesenia Merritt PTA    ADL Assessment/Intervention    Additional Documentation Self-Feeding Assessment/Training (Group)  -TS      Recorded by [TS] Julisa Balbuena ALVAREZ/L      Self-Feeding Assessment/Training    Self-Feeding Assess/Train, Position --   HOB elevated to 90  -TS      Self-Feeding Assess/Train, Meagher maximum assist (25% patient effort);dependent (less than 25% patient effort)  -TS      Self-Feeding Assess/Train, Impairments --   decreased cognition  -TS      Self-Feeding Assess/Train, Comment  Pt responded yes/no x3 occurances during self feeding. Pt kept eyes closed through task with max encouragement to open eyes and hold utensils. Tamayo attempted [place] food to mouth, pt assisted 25% with bringing food to mouth. Pt provided verbal cues and increased time to chew and swallow with feeding task.   -TS      Recorded by [] KIM Naranjo/L      Motor Skills/Interventions    Additional Documentation   Balance Skills Training (Group)  -    Recorded by   [] Yesenia Merritt PTA    Balance Skills Training    Sitting-Level of Assistance   Moderate assistance;Maximum assistance   constant vc's to attempt to keep pt awake  -    Sitting-Balance Support   Feet supported  -    Recorded by   [] Yesenia Merritt PTA    Orthotics Prosthetics    Additional Documentation   Orthosis Location (Group);Orthosis Management/Training (Group)  -    Recorded by   [] Yesenia Merritt PTA    Orthosis Location    Orthosis Location/Type   lower extremity  -    Orthosis, Lower Extremity   Right:;knee immobilizer  -    Recorded by   [] Yesenia Merritt PTA    Orthosis Management/Training    Orthosis Indications   --   hip precaution  -    Orthosis Wear Schedule   wear at night or when in bed  -    Recorded by   [] Yesenia Merritt PTA    Positioning and Restraints    Pre-Treatment Position in bed  -  in bed  -    Post Treatment Position bed  -  bed  -AH    In Bed fowlers;call light within reach;encouraged to call for assist;with family/caregiver;side rails up x2;with brace;SCD pump applied  -  fowlers;call light within reach;encouraged to call for assist;with family/caregiver;R knee immobilizer  -    Recorded by [] KIM Naranjo/YENNIFER  [] Yesenia Merritt PTA      06/22/17 1000 06/21/17 1316       Rehab Assessment/Intervention    Discipline occupational therapy assistant  - physical therapy assistant  -     Document Type therapy note (daily note)  -CHERELLE therapy note (daily note)  -MARC      Subjective Information agree to therapy;complains of;fatigue  -TS agree to therapy;complains of;pain  -MF     Symptoms Noted Comment VERY Lethargic  -TS Pt. very lethargic, sleeping through most of tx. Pt. did awake with pain when R Leg was moved.  -MF     Precautions/Limitations fall precautions;hip precautions- right;oxygen therapy device and L/min   R knee immobilizer  -TS cardiac precautions;hip precautions- right   Rknee immobilizer  -MF     Recorded by [TS] KIM Naranjo/YENNIFER [MF] Clara Saldana PTA     Pain Assessment    Pain Assessment No/denies pain  -TS Antony-Baker FACES  -MF     Antony-Baker FACES Pain Rating  8  -MF     Pain Type  Acute pain;Surgical pain  -MF     Pain Location  Hip  -MF     Pain Orientation  Right  -MF     Pain Frequency  Intermittent  -MF     Pain Intervention(s)  Medication (See MAR);Repositioned  -MF     Response to Interventions  tolerated, pt. would wake with pain.  -MF     Recorded by [TS] KIM Naranjo/YENNIFER [MF] Clara Saldana PTA     Cognitive Assessment/Intervention    Personal Safety Interventions fall prevention program maintained  -TS      Recorded by [TS] KIM Naranjo/L      Grooming Assessment/Training    Grooming Assess/Train, Position supine  -TS      Grooming Assess/Train, Indepen Level maximum assist (25% patient effort)  -TS      Grooming Assess/Train, Comment pt present wash cloth, and then put in pt hand, pt then provided [place] to initiate, pt would open eyes occasionally but would not initiate or complete task.   -TS      Recorded by [TS] KIM Naranjo/L      Therapy Exercises    Left Lower Extremity  PROM:;AAROM:;20 reps;supine;ankle pumps/circles;heel slides;hip abduction/adduction;SAQ  -MF     Bilateral Upper Extremity AAROM:;15 reps;supine;elbow flexion/extension;shoulder extension/flexion   wrist flexion/extension  -TS      Exercise Protocols  total hip  -MF     Total Hip Exercises  right:;20 reps;completed  protocol;with assist   no quad sets due to lethargy. All PROM.  -MF     Recorded by [TS] KIM Naranjo/YENNIFER [MF] Clara Saldana PTA     Positioning and Restraints    Pre-Treatment Position in bed  -TS in bed  -MF     Post Treatment Position bed  -TS bed  -MF     In Bed fowlers;call light within reach;encouraged to call for assist;with family/caregiver;with PT;side rails up x2  -TS fowlers;call light within reach;exit alarm on;with family/caregiver;side rails up x3;SCD pump applied;R knee immobilizer  -MF     Recorded by [TS] KIM Naranjo/YENNIFER [MF] Clara Saldana PTA       User Key  (r) = Recorded By, (t) = Taken By, (c) = Cosigned By    Initials Name Effective Dates    RED Breaux, [facility]SLP 08/02/16 -      Yesenia Merritt, [facility] 08/02/16 -      Marva Jamil CCC-SLP 08/02/16 -     TS KIM Naranjo/L 08/02/16 -      Clara Saldana, PTA 08/02/16 -      Yordy Whiting, MS, [facility]-SLP 08/02/16 -                    SLP Goals       06/23/17 1037 06/22/17 1533       Safely Consume Diet    Safely Consume Diet- SLP, Date Established  06/22/17  -MB     Safely Consume Diet- SLP, Time to Achieve  by discharge  -MB     Safely Consume Diet- SLP, Additional Goal  Pt will tolerate trials of current diet and upgraded consistencies with no overt s/s of aspiration.  -MB     Safely Consume Diet- SLP, Date Goal Reviewed 06/23/17  -TM 06/22/17  -MB     Safely Consume Diet- SLP, Outcome  goal ongoing  -MB       User Key  (r) = Recorded By, (t) = Taken By, (c) = Cosigned By    Initials Name Provider Type    RED Breaux, CCC-SLP Speech and Language Pathologist     Marva Jamil CCC-SLP Speech and Language Pathologist          EDUCATION  The patient has been educated in the following areas:   Dysphagia (Swallowing Impairment).    SLP Recommendation and Plan                                           Plan of Care Review  Plan Of Care Reviewed With: patient  Progress:  progress towards functional goals is fair  Outcome Summary/Follow up Plan: ST presented pt with mechanical soft, pureed, and honey thick consistencies at lunch. ST notes pt to be fatigued throughout session requiring moderate verbal and tactile cues to stay aroused. Pt's  was insistent that he feed the pt and ST observe. ST notes pt's  provided pt with large bolus portions in which ST cued the  to decrease bolus size with PO trials presented. Pt verbalized understanding and made adjustment. ST also informed pt's  that the pt always needed to be sitting in and upright position while completing PO intake. No overt s/s were noted with pureed consistencies and honey thick liquids. However, the pt exhibited significant difficulties with mechanical soft consistencies as noted by a weak rotary chew and prolonged mastication. ST eventually cued the pt to swallow in which she was able to complete. Post swallow a moderate-severe amount of residue remained throughout the oral cavity. Pt eventually cleared residue with multiple swallows and a honey thick liquid wash. Pt downgraded to pureed consistencies at this time. ST will continue to follow and treat.       SLP Outcome Measures (last 72 hours)      SLP Outcome Measures       06/22/17 1500          SLP Outcome Measures    Outcome Measure Used? Adult NOMS  -MB      FCM Scores    FCM Chosen Swallowing  -MB      Swallowing FCM Score 2  -MB        User Key  (r) = Recorded By, (t) = Taken By, (c) = Cosigned By    Initials Name Effective Dates    RED Breaux [facility]-SLP 08/02/16 -              Time Calculation:         Time Calculation- SLP       06/23/17 1306 06/23/17 1038       Time Calculation- SLP    SLP Start Time 1205  -CS 1000  -TM     SLP Stop Time 1245  -CS 1024  -TM     SLP Time Calculation (min) 40 min  -CS 24 min  -TM     SLP Received On 06/23/17  -CS 06/23/17  -TM       User Key  (r) = Recorded By, (t) = Taken By, (c) = Cosigned By     Initials Name Provider Type    TM Marva Jamil, CCC-SLP Speech and Language Pathologist     Yordy Whiting MS, CFY-SLP Speech and Language Pathologist          Therapy Charges for Today     Code Description Service Date Service Provider Modifiers Qty    [id number]  ST TREATMENT SWALLOW 3 6/23/2017 Yordy Whiting MS, CFY-SLP GN, KX 1          SLP G-Codes  SLP NOMS Used?: Yes  Functional Limitations: Swallowing  Swallow Current Status (): At least 80 percent but less than 100 percent impaired, limited or restricted  Swallow Goal Status (): At least 40 percent but less than 60 percent impaired, limited or restricted      Yordy Whiting MS, CFY-SLP  6/23/2017

## 2017-06-24 LAB
ANION GAP SERPL CALCULATED.3IONS-SCNC: 10 MMOL/L (ref 4–13)
BUN BLD-MCNC: 23 MG/DL (ref 5–21)
BUN/CREAT SERPL: 27.1 (ref 7–25)
CALCIUM SPEC-SCNC: 8.4 MG/DL (ref 8.4–10.4)
CHLORIDE SERPL-SCNC: 104 MMOL/L (ref 98–110)
CO2 SERPL-SCNC: 25 MMOL/L (ref 24–31)
CREAT BLD-MCNC: 0.85 MG/DL (ref 0.5–1.4)
DEPRECATED RDW RBC AUTO: 46.3 FL (ref 40–54)
ERYTHROCYTE [DISTWIDTH] IN BLOOD BY AUTOMATED COUNT: 13.3 % (ref 12–15)
GFR SERPL CREATININE-BSD FRML MDRD: 63 ML/MIN/1.73
GLUCOSE BLD-MCNC: 190 MG/DL (ref 70–100)
HCT VFR BLD AUTO: 24.2 % (ref 37–47)
HGB BLD-MCNC: 8.1 G/DL (ref 12–16)
MCH RBC QN AUTO: 31.5 PG (ref 28–32)
MCHC RBC AUTO-ENTMCNC: 33.5 G/DL (ref 33–36)
MCV RBC AUTO: 94.2 FL (ref 82–98)
NT-PROBNP SERPL-MCNC: 7140 PG/ML (ref 0–1800)
PLATELET # BLD AUTO: 227 10*3/MM3 (ref 130–400)
PMV BLD AUTO: 10.1 FL (ref 6–12)
POTASSIUM BLD-SCNC: 3.7 MMOL/L (ref 3.5–5.3)
RBC # BLD AUTO: 2.57 10*6/MM3 (ref 4.2–5.4)
SODIUM BLD-SCNC: 139 MMOL/L (ref 135–145)
WBC NRBC COR # BLD: 8.66 10*3/MM3 (ref 4.8–10.8)

## 2017-06-24 PROCEDURE — 85027 COMPLETE CBC AUTOMATED: CPT | Performed by: FAMILY MEDICINE

## 2017-06-24 PROCEDURE — 25010000002 IRON SUCROSE PER 1 MG: Performed by: NURSE PRACTITIONER

## 2017-06-24 PROCEDURE — 94799 UNLISTED PULMONARY SVC/PX: CPT

## 2017-06-24 PROCEDURE — 80048 BASIC METABOLIC PNL TOTAL CA: CPT | Performed by: NURSE PRACTITIONER

## 2017-06-24 PROCEDURE — 25010000002 ENOXAPARIN PER 10 MG: Performed by: ORTHOPAEDIC SURGERY

## 2017-06-24 PROCEDURE — 97530 THERAPEUTIC ACTIVITIES: CPT

## 2017-06-24 PROCEDURE — 25010000002 MEROPENEM: Performed by: NURSE PRACTITIONER

## 2017-06-24 PROCEDURE — 25010000002 CYANOCOBALAMIN PER 1000 MCG: Performed by: NURSE PRACTITIONER

## 2017-06-24 PROCEDURE — 83880 ASSAY OF NATRIURETIC PEPTIDE: CPT | Performed by: NURSE PRACTITIONER

## 2017-06-24 PROCEDURE — 25010000002 HYDRALAZINE PER 20 MG: Performed by: FAMILY MEDICINE

## 2017-06-24 PROCEDURE — 25010000002 CEFTRIAXONE: Performed by: ORTHOPAEDIC SURGERY

## 2017-06-24 PROCEDURE — 94640 AIRWAY INHALATION TREATMENT: CPT

## 2017-06-24 PROCEDURE — 25010000002 VANCOMYCIN 10 G RECONSTITUTED SOLUTION: Performed by: NURSE PRACTITIONER

## 2017-06-24 PROCEDURE — 94760 N-INVAS EAR/PLS OXIMETRY 1: CPT

## 2017-06-24 PROCEDURE — 94660 CPAP INITIATION&MGMT: CPT

## 2017-06-24 RX ORDER — VANCOMYCIN HYDROCHLORIDE 1 G/200ML
1000 INJECTION, SOLUTION INTRAVENOUS EVERY 24 HOURS
Status: DISCONTINUED | OUTPATIENT
Start: 2017-06-25 | End: 2017-06-26

## 2017-06-24 RX ORDER — CARVEDILOL 25 MG/1
25 TABLET ORAL 2 TIMES DAILY WITH MEALS
Status: DISCONTINUED | OUTPATIENT
Start: 2017-06-24 | End: 2017-06-25

## 2017-06-24 RX ORDER — LISINOPRIL 20 MG/1
40 TABLET ORAL DAILY
Status: DISCONTINUED | OUTPATIENT
Start: 2017-06-25 | End: 2017-06-24

## 2017-06-24 RX ORDER — LISINOPRIL 20 MG/1
20 TABLET ORAL DAILY
Status: DISCONTINUED | OUTPATIENT
Start: 2017-06-25 | End: 2017-06-28 | Stop reason: HOSPADM

## 2017-06-24 RX ORDER — BUMETANIDE 0.5 MG/1
0.5 TABLET ORAL DAILY
Status: DISCONTINUED | OUTPATIENT
Start: 2017-06-24 | End: 2017-06-28

## 2017-06-24 RX ADMIN — CEFTRIAXONE 1 G: 1 INJECTION, POWDER, FOR SOLUTION INTRAMUSCULAR; INTRAVENOUS at 10:11

## 2017-06-24 RX ADMIN — IPRATROPIUM BROMIDE 0.5 MG: 0.5 SOLUTION RESPIRATORY (INHALATION) at 20:47

## 2017-06-24 RX ADMIN — CYANOCOBALAMIN 1000 MCG: 1000 INJECTION, SOLUTION INTRAMUSCULAR; SUBCUTANEOUS at 09:54

## 2017-06-24 RX ADMIN — LISINOPRIL 20 MG: 20 TABLET ORAL at 09:55

## 2017-06-24 RX ADMIN — CARVEDILOL 25 MG: 25 TABLET, FILM COATED ORAL at 18:04

## 2017-06-24 RX ADMIN — PANTOPRAZOLE SODIUM 40 MG: 40 INJECTION, POWDER, FOR SOLUTION INTRAVENOUS at 05:21

## 2017-06-24 RX ADMIN — HYDRALAZINE HYDROCHLORIDE 20 MG: 20 INJECTION INTRAMUSCULAR; INTRAVENOUS at 05:21

## 2017-06-24 RX ADMIN — IRON SUCROSE 300 MG: 20 INJECTION, SOLUTION INTRAVENOUS at 08:05

## 2017-06-24 RX ADMIN — CARVEDILOL 12.5 MG: 6.25 TABLET, FILM COATED ORAL at 09:55

## 2017-06-24 RX ADMIN — BUMETANIDE 0.5 MG: 0.5 TABLET ORAL at 16:08

## 2017-06-24 RX ADMIN — SODIUM CHLORIDE 75 ML/HR: 9 INJECTION, SOLUTION INTRAVENOUS at 11:51

## 2017-06-24 RX ADMIN — AMLODIPINE BESYLATE 10 MG: 10 TABLET ORAL at 09:55

## 2017-06-24 RX ADMIN — PHENYTOIN SODIUM 400 MG: 100 CAPSULE, EXTENDED RELEASE ORAL at 21:31

## 2017-06-24 RX ADMIN — VANCOMYCIN HYDROCHLORIDE 1250 MG: 10 INJECTION, POWDER, LYOPHILIZED, FOR SOLUTION INTRAVENOUS at 13:59

## 2017-06-24 RX ADMIN — MEROPENEM 1 G: 1 INJECTION, POWDER, FOR SOLUTION INTRAVENOUS at 16:08

## 2017-06-24 RX ADMIN — IPRATROPIUM BROMIDE 0.5 MG: 0.5 SOLUTION RESPIRATORY (INHALATION) at 16:17

## 2017-06-24 RX ADMIN — ENOXAPARIN SODIUM 40 MG: 40 INJECTION SUBCUTANEOUS at 09:52

## 2017-06-24 NOTE — PLAN OF CARE
Problem: Skin Integrity Impairment, Risk/Actual (Adult)  Goal: Skin Integrity/Wound Healing  Outcome: Ongoing (interventions implemented as appropriate)    Problem: Pain, Acute (Adult)  Goal: Acceptable Pain Control/Comfort Level  Outcome: Ongoing (interventions implemented as appropriate)    Problem: Fall Risk (Adult)  Goal: Absence of Falls  Outcome: Ongoing (interventions implemented as appropriate)    Problem: Patient Care Overview (Adult)  Goal: Plan of Care Review  Outcome: Ongoing (interventions implemented as appropriate)    06/24/17 1119   Coping/Psychosocial Response Interventions   Plan Of Care Reviewed With patient;spouse   Patient Care Overview   Progress no change   Outcome Evaluation   Outcome Summary/Follow up Plan Patient has remained alert today. multiple BMs this morning. Decreased S/S of pain. CPAP added for bedtime. Abx started for possible pnuemonia         Problem: Fractured Hip (Adult)  Goal: Signs and Symptoms of Listed Potential Problems Will be Absent or Manageable (Fractured Hip)  Outcome: Ongoing (interventions implemented as appropriate)    Problem: Pressure Ulcer Risk (Wing Scale) (Adult,Obstetrics,Pediatric)  Goal: Skin Integrity  Outcome: Ongoing (interventions implemented as appropriate)

## 2017-06-24 NOTE — PROGRESS NOTES
"Pharmacokinetic Initial Note - Vancomycin    Renay Lazo is a 88 y.o. female   [Ht: 64.02\" (162.6 cm); Wt: 179 lb 5 oz (81.3 kg)]    Estimated Creatinine Clearance: 47.2 mL/min (by C-G formula based on Cr of 0.85).   Lab Results   Component Value Date    CREATININE 0.85 06/24/2017    CREATININE 1.18 06/23/2017    CREATININE 1.01 06/21/2017      Lab Results   Component Value Date    WBC 8.66 06/24/2017    WBC 8.46 06/23/2017    WBC 7.83 06/22/2017      Baseline culture results:  Microbiology Results (last 10 days)       Procedure Component Value - Date/Time      Blood Culture With TEDDY [[id number]]  (Normal) Collected:  06/22/17 2347    Lab Status:  Preliminary result Specimen:  Blood from Hand, Left Updated:  06/24/17 0201     Blood Culture No growth at 24 hours    Blood Culture With TEDDY [[id number]]  (Normal) Collected:  06/22/17 2347    Lab Status:  Preliminary result Specimen:  Blood from Arm, Left Updated:  06/24/17 0101     Blood Culture No growth at 24 hours    Urine Culture [[id number]]  (Abnormal)  (Susceptibility) Collected:  06/19/17 0625    Lab Status:  Final result Specimen:  Urine from Urine, Catheter Updated:  06/21/17 0748     Urine Culture >100,000 CFU/mL Escherichia coli (A)    Susceptibility        Escherichia coli     BRAVO     Ampicillin 4 ug/ml Susceptible     Ampicillin + Sulbactam <=2 ug/ml Susceptible     Cefazolin <=4 ug/ml Susceptible  [1]      Cefepime <=1 ug/ml Susceptible     Ceftriaxone <=1 ug/ml Susceptible     Ertapenem <=0.5 ug/ml Susceptible     ESBL Confirmation Test NEG  Negative     Gentamicin <=1 ug/ml Susceptible     Levofloxacin <=0.12 ug/ml Susceptible     Meropenem <=0.25 ug/ml Susceptible     Nitrofurantoin <=16 ug/ml Susceptible     Piperacillin + Tazobactam <=4 ug/ml Susceptible     Trimethoprim + Sulfamethoxazole <=20 ug/ml Susceptible              [1]   Cefazolin results may be used to predict the potential effectiveness of oral cephalosporins for treating uncomplicated " urinary tract infections.                           Indication for use: Pharmacy to dose Vancomycin and Merrem for pneumonia.      Assessment/Plan  Initiated Vancomycin 1250 mg IVPB once, followed by 1000 mg Q24H. Merrem initiated at 1000mg IV q12.  Pharmacy will monitor renal function and adjust dose accordingly.    Thanks for the consult,   FELICITY Kruger, Pharm.D.    06/24/17 1:39 PM

## 2017-06-24 NOTE — PROGRESS NOTES
[facility] Medicine Services  INPATIENT PROGRESS NOTE    Length of Stay: 5  Date of Admission: 6/19/2017  Primary Care Physician: Jung Collins MD    Subjective   Chief Complaint: follow up AMS/fever  HPI   Pt remains confused. Has not slept per . Having loose stools. No vomiting. Poor appetite. Pt states it's Thursday. She did know it was the 24th.  states she sang all night. IV fluids remain at 75 ml/hour. T max has been  99.5. Per nursing staff,  is giving pt un-thickened liquids.     Review of Systems   Difficult to ascertain given dementia with worsening confusion.     Objective    Temp:  [97.9 °F (36.6 °C)-99.5 °F (37.5 °C)] 99.4 °F (37.4 °C)  Heart Rate:  [] 97  Resp:  [16-20] 18  BP: (150-174)/(49-77) 150/58  Physical Exam   Constitutional: She appears well-developed and well-nourished.   HENT:   Head: Normocephalic and atraumatic.   Eyes: Conjunctivae and EOM are normal. Pupils are equal, round, and reactive to light.   Neck: Neck supple. No JVD present. No thyromegaly present.   Cardiovascular: Normal rate, regular rhythm, normal heart sounds and intact distal pulses.  Exam reveals no gallop and no friction rub.    No murmur heard.  Has had up to 7 min SVT on telemetry at 0945.    Pulmonary/Chest: Effort normal. No respiratory distress. She has no wheezes. She has rales (bilateral, left greater than right. ). She exhibits no tenderness.   Abdominal: Soft. Bowel sounds are normal. She exhibits no distension. There is no tenderness. There is no rebound and no guarding.   Musculoskeletal: Normal range of motion. She exhibits no edema, tenderness or deformity.   Lymphadenopathy:     She has no cervical adenopathy.   Neurological: She is alert. She displays normal reflexes. No cranial nerve deficit. She exhibits normal muscle tone.   Skin: Skin is warm and dry. No rash noted.   Psychiatric: She has a normal mood and affect. Her behavior is  normal.     Results Review:  I have reviewed the labs, radiology results, and diagnostic studies.    Laboratory Data:     Results from last 7 days  Lab Units 06/24/17  0628 06/23/17  0507 06/22/17  0420   WBC 10*3/mm3 8.66 8.46 7.83   HEMOGLOBIN g/dL 8.1* 8.1* 8.1*   HEMATOCRIT % 24.2* 24.3* 23.9*   PLATELETS 10*3/mm3 227 173 166       Results from last 7 days  Lab Units 06/24/17  0628 06/23/17  0507 06/21/17  0908 06/20/17  0947 06/19/17  0140   SODIUM mmol/L 139 138 138 140 144   POTASSIUM mmol/L 3.7 4.0 3.5 3.3* 3.7   CHLORIDE mmol/L 104 102 99 100 107   TOTAL CO2 mmol/L 25.0 23.0* 28.0 28.0 23.0*   BUN mg/dL 23* 34* 25* 17 21   CREATININE mg/dL 0.85 1.18 1.01 0.84 0.76   CALCIUM mg/dL 8.4 8.5 8.5 8.7 8.7   BILIRUBIN mg/dL  --  0.7  --  0.8 0.6   ALK PHOS U/L  --  83  --  110 189*   ALT (SGPT) U/L  --  45  --  28 34   AST (SGOT) U/L  --  63*  --  33 49*   GLUCOSE mg/dL 190* 120* 142* 131* 182*     Culture Data:   Blood Culture   Date Value Ref Range Status   06/22/2017 No growth at 24 hours  Preliminary   06/22/2017 No growth at 24 hours  Preliminary     Urine Culture   Date Value Ref Range Status   06/19/2017 >100,000 CFU/mL Escherichia coli (A)  Final       Radiology Data:   Imaging Results (last 24 hours)     Procedure Component Value Units Date/Time    XR Chest 1 View [[id number]] Collected:  06/23/17 1317     Updated:  06/23/17 1427    Narrative:       EXAMINATION: XR CHEST 1 VW-. 6/23/2017 12:54 PM CDT     CHEST, ONE VIEW:     HISTORY: Pulmonary edema.     COMPARISON: 06/20/2017, 06/19/2017 and 10/04/2016.     A single frontal chest radiograph was obtained.     FINDINGS:  The heart is generous with permanent cardiac pacemaker.     There is mild perihilar bronchovascular interstitial prominence  compatible with minimal residual pulmonary edema changes significantly  improved compared to the 6/19/2017 examination improving compared to the  06/20/2017 study.     The bony structures are intact.       Impression:        1. Significant improvement in pulmonary edema with minimal residual  perihilar bronchovascular interstitial prominence.     This report was finalized on 06/23/2017 14:23 by Dr. Mick Kimbrough MD.    CT Chest Without Contrast [[id number]] Collected:  06/23/17 2027     Updated:  06/23/17 2038    Narrative:       CT CHEST WO CONTRAST- 6/23/2017 7:26 PM CDT     HISTORY: Fever, right congestion, difficult to arouse;  S72.141A-Displaced intertrochanteric fracture of right femur, initial  encounter for closed fracture; S72.011A-Unspecified intracapsular  fracture of right femur, initial encounter for closed fracture;  Z78.9-Other specified health status; Z74.09-Other reduced mobility;  R13.12-Dysphagia, oropharyngeal phase.     COMPARISON: 12/21/2015     DOSE LENGTH PRODUCT: 480 mGy cm. Automated exposure control was also  utilized to decrease patient radiation dose.     TECHNIQUE: Serial helical tomographic images of the chest were acquired.  Multiplanar reformatted images were provided for review.     FINDINGS:  Patchy basilar infiltrates are present, greater on the left than the  right. Small posterior pleural effusions are present, also slightly  greater on the left than the right.      The trachea and bronchial tree are patent.     Vascular calcification is present in the aortic arch. The cardiac  silhouette is moderately enlarged. Coronary calcification is present.  There is no pericardial effusion. No enlarged axillary or mediastinal  lymph nodes are present.      No acute findings are seen in the bones or surrounding soft tissues.     A moderate-size hiatal hernia is present.       Impression:       1. Bibasilar patchy infiltrates and small posterior pleural effusions.  Most likely this is an inflammatory process superimposed on COPD.  2. Extensive vascular calcification is present.        This report was finalized on 06/23/2017 20:34 by Dr. Karlos Holcomb MD.        I have reviewed the patient current  medications.     Assessment/Plan     Assessment:  1. Closed subcapital fracture of neck of right femur status post right hip hemiarthroplasty. Post op day 3.   2. Concern for pneumonia superimposed on COPD-  3. Urinary tract infection-E. Coli   4. Obstructive sleep apnea-CPAP  5. Dementia at baseline  6. Hypertension-poorly controlled  7. Pulmonary edema-improved on chest x-ray  8. Diastolic dysfunction Ejection fraction 65%  9. Pulmonary HTN    Plan:  1. Will add Meropenum and vancomycin for possible healthcare associated pneumonia  2. Increase Coreg to 25 mg twice daily.   3. Discontinue Rocephin  4. Will add atrovent nebulizers  5. Discussed with respiratory regarding CPAP. She is on  CPAP at home. Will restart it today.   6. Check BNP.   7. Repeat labs in am.   8. Patient will be discharged to [facility] when she is more stable.     Discharge Planning: I expect patient to be discharged to SNF in 2-3 days.    Griselda Matthew, APRN   06/24/17   1:33 PM     Dr. Smith  Patient seen and examined. Right femur exposed. Hematoma, but wound appears clean. Discussed plan with NP. Abdomen obese and soft. Discussed BNP and antibiotics with NP. Will DC rocephin and advance antibiotics. Coreg increased due to episode of SVT. Restart Bumex at half dose secondary to concern for renal function.

## 2017-06-24 NOTE — PLAN OF CARE
Problem: Patient Care Overview (Adult)  Goal: Plan of Care Review  Outcome: Ongoing (interventions implemented as appropriate)    06/24/17 0814   Coping/Psychosocial Response Interventions   Plan Of Care Reviewed With patient   Patient Care Overview   Progress progress toward functional goals as expected   Outcome Evaluation   Outcome Summary/Follow up Plan Patient more alert today, transferred to the EOB max to mod x 2, mod x 2 sit to stand with walker. Stood x 2, had a BM nursing cleaned up with patient in standing. Will continue with progressing with gait activities.

## 2017-06-24 NOTE — PLAN OF CARE
Problem: Pain, Acute (Adult)  Goal: Acceptable Pain Control/Comfort Level  Outcome: Ongoing (interventions implemented as appropriate)    Problem: Fall Risk (Adult)  Goal: Absence of Falls  Outcome: Ongoing (interventions implemented as appropriate)    Problem: Patient Care Overview (Adult)  Goal: Plan of Care Review  Outcome: Ongoing (interventions implemented as appropriate)  Goal: Adult Individualization and Mutuality  Outcome: Ongoing (interventions implemented as appropriate)  Goal: Discharge Needs Assessment  Outcome: Ongoing (interventions implemented as appropriate)    Problem: Fractured Hip (Adult)  Goal: Signs and Symptoms of Listed Potential Problems Will be Absent or Manageable (Fractured Hip)  Outcome: Ongoing (interventions implemented as appropriate)    Problem: Pressure Ulcer Risk (Wing Scale) (Adult,Obstetrics,Pediatric)  Goal: Skin Integrity  Outcome: Ongoing (interventions implemented as appropriate)

## 2017-06-24 NOTE — PLAN OF CARE
Problem: Skin Integrity Impairment, Risk/Actual (Adult)  Goal: Skin Integrity/Wound Healing  Outcome: Ongoing (interventions implemented as appropriate)    06/23/17 2028   Skin Integrity Impairment, Risk/Actual (Adult)   Skin Integrity/Wound Healing making progress toward outcome

## 2017-06-24 NOTE — DISCHARGE INSTRUCTIONS
Lower Extremity Post-op Instructions  Dr. MARTIN        POST-OP CARE: Please follow these instructions closely!    Weight Bearing: Your surgery requires that you have the following weight bearing restrictions:   __x___ Weight Bearing as tolerated (with or without crutches) w/ posterior hip precautions   _____ Touch-Toe Weight-bearing    _____ Partial Weight Bearing  ___ % for ___ weeks (must use crutches)   _____ Non Weight Bearing for ____ weeks (must use crutches, wheelchair or                          knee walker)    IMPORTANT PHONE NUMBERS:  • For emergencies, please call 911  • You may reach Dr. Martin or his medical assistant Tona Reyna at [phone], M-F 8:0am-5:00pm  • After 5pm or on the weekends, please call   • Call immediately if you have any of the following symptoms:  - Elevated temperature above 101 degrees for more than 48hours after surgery  - Persistent drainage  from wound  - Severe pain around surgical site  - Calf pain  - Any signs of infection    Dressings: Do NOT remove dressing/splint unless noted below. SOME DRAINAGE IS NORMAL!  • DO NOT touch, remove, or apply ointment to the incision and/or steri strips  • Signs of infection that warrant a phone call to our clinical line:  o Excessive drainage or redness  o Red streaking coming away from the incision  o Increased pain  o Increased temperature above 101 degrees    Sutures:  If your physician uses sutures in your knee, they will dissolve on their own and will not need to be removed.  Some black sutures occasionally used will need to be removed 10-14 days after surgery.    Bathing:  If stated above, it is ok to remove your postop dressing and shower.  DO NOT SOAK the incision in water.  Pat the incision site dry after surgery  ___ You may remove your dressing and shower on ________   __x_Keep your splint/dressing clean, dry, intact.  Do not place foreign objects inside the splint/dressing.    Elevate: Place 2 pillows under your ankle to  get the incision area above the level of the heart to help in swelling (a recliner is not elevated!!!)    Ice: Ice your surgery site 5-6 times per day for 20 minutes at a time with dressing in place. You should wait at least 30 minutes before icing again to avoid ice irritation. It may be difficult at first to ice the surgery area due to the amount of dressing, but continue to be diligent with icing.  Your dressings will be taken down at your first post-op appointment.     Range of motion:   - For the knee- It is important to keep the knee as straight as possible following surgery. NO PILLOWS UNDER THE KNEE, ONLY under the ankle  - For the foot/ankle- range of motion restrictions will be given to you at your first post-op appointment. Until that time, avoid any unnecessary range o f motion.  - Physical therapy- Your physical therapy status will be discussed with you at your first post-op appointment.   **Achieving range of motion goals and decreasing swelling/inflammation are the primary focus for the first two (2) weeks following surgery. **    Medications: You will be discharged with the appropriate medications following your surgery. Fill these at the pharmacy and take them as directed on the label.   Possible medications that will be prescribed are below.  You may or may not receive all of these. Occasionally, additional medications may be given with specific instructions.  Percocet/Lortab (oxycodone/hydrocodone with tylenol) - Pain Medication.  o Take one tablet every 4-6 hours. DO NOT EXCEED 4,000mg of Tylenol in 24 hours.  **DO NOT MIX WITH ALCOHOL, DRIVE WHILE TAKING, OR TAKE EXTRA TYLENOL*   Zofran - Anti-nausea medication to help prevent nausea and vomiting after                             surgery.     Aspirin 81 mg - all patients with lower extremity surgery should take one aspirin                            81 mg for 17 days after surgery    **If you are running low on pain medications, please notify us  if you need a refill 24-48 hours prior to when you run out, so we can make arrangements to refill the prescription for you if we determine it is necessary**

## 2017-06-24 NOTE — PROGRESS NOTES
Orthopedic Surgery Progress Note    Renay Lazo  6/24/2017      Subjective:     Systemic or Specific Complaints: resting comfortably. Doing well.  agrees to go to [facility] for rehab.   Objective:     Patient Vitals for the past 24 hrs:   BP Temp Temp src Pulse Resp SpO2 Weight   06/24/17 0740 159/49 99.5 °F (37.5 °C) Oral 95 16 98 % -   06/24/17 0639 150/72 - - - - - -   06/24/17 0350 174/68 97.9 °F (36.6 °C) Oral 100 18 96 % -   06/24/17 0106 170/77 - - - - - -   06/24/17 0000 - 98.4 °F (36.9 °C) Oral 66 18 99 % -   06/23/17 1950 - - - - - - 179 lb 5 oz (81.3 kg)   06/23/17 1946 159/73 98.1 °F (36.7 °C) Oral 88 20 94 % -   06/23/17 1141 106/58 99.9 °F (37.7 °C) Oral 69 18 96 % -       right lower  General: alert, appears stated age and cooperative   Wound: clean, dry, intact             Dressing: clean, dry, intact   Extremity: Distal NVI           DVT Exam: No evidence of DVT seen on physical exam.                   Data Review:  Lab Results (last 24 hours)     Procedure Component Value Units Date/Time    Blood Gas, Arterial [[id number]]  (Abnormal) Collected:  06/23/17 1558    Specimen:  Arterial Blood Updated:  06/23/17 1600     Site Arterial: right radial     Cruz's Test --      Documented in Rapid Comm        pH, Arterial 7.501 (H) pH units      pCO2, Arterial 34.7 (L) mm Hg      pO2, Arterial 91.1 mm Hg      HCO3, Arterial 26.5 (H) mmol/L      Base Excess, Arterial 3.7 (H) mmol/L      O2 Saturation, Arterial 97.6 %      O2 Saturation Calculated 97.6 %      Barometric Pressure for Blood Gas -- mmHg       Component not reported at this site.        Modality Cannula     Flow Rate 2.00 lpm     Narrative:       Serial Number: [id number]    : [id number]    Blood Culture With TEDDY [[id number]]  (Normal) Collected:  06/22/17 2347    Specimen:  Blood from Arm, Left Updated:  06/24/17 0101     Blood Culture No growth at 24 hours    Blood Culture With TEDDY [[id number]]  (Normal) Collected:  06/22/17 9362     Specimen:  Blood from Hand, Left Updated:  06/24/17 0201     Blood Culture No growth at 24 hours    CBC (No Diff) [[id number]]  (Abnormal) Collected:  06/24/17 0628    Specimen:  Blood Updated:  06/24/17 0711     WBC 8.66 10*3/mm3      RBC 2.57 (L) 10*6/mm3      Hemoglobin 8.1 (L) g/dL      Hematocrit 24.2 (L) %      MCV 94.2 fL      MCH 31.5 pg      MCHC 33.5 g/dL      RDW 13.3 %      RDW-SD 46.3 fl      MPV 10.1 fL      Platelets 227 10*3/mm3     Basic Metabolic Panel [[id number]]  (Abnormal) Collected:  06/24/17 0628    Specimen:  Blood Updated:  06/24/17 0714     Glucose 190 (H) mg/dL      BUN 23 (H) mg/dL      Creatinine 0.85 mg/dL      Sodium 139 mmol/L      Potassium 3.7 mmol/L      Chloride 104 mmol/L      CO2 25.0 mmol/L      Calcium 8.4 mg/dL      eGFR Non African Amer 63 mL/min/1.73      BUN/Creatinine Ratio 27.1 (H)     Anion Gap 10.0 mmol/L     Narrative:       The MDRD GFR formula is only valid for adults with stable renal function between ages 18 and 70.        Imaging Results (last 24 hours)     Procedure Component Value Units Date/Time    XR Chest 1 View [[id number]] Collected:  06/23/17 1317     Updated:  06/23/17 1427    Narrative:       EXAMINATION: XR CHEST 1 VW-. 6/23/2017 12:54 PM CDT     CHEST, ONE VIEW:     HISTORY: Pulmonary edema.     COMPARISON: 06/20/2017, 06/19/2017 and 10/04/2016.     A single frontal chest radiograph was obtained.     FINDINGS:  The heart is generous with permanent cardiac pacemaker.     There is mild perihilar bronchovascular interstitial prominence  compatible with minimal residual pulmonary edema changes significantly  improved compared to the 6/19/2017 examination improving compared to the  06/20/2017 study.     The bony structures are intact.       Impression:       1. Significant improvement in pulmonary edema with minimal residual  perihilar bronchovascular interstitial prominence.     This report was finalized on 06/23/2017 14:23 by Dr. Mick Kimbrough MD.    CT  Chest Without Contrast [[id number]] Collected:  06/23/17 2027     Updated:  06/23/17 2038    Narrative:       CT CHEST WO CONTRAST- 6/23/2017 7:26 PM CDT     HISTORY: Fever, right congestion, difficult to arouse;  S72.141A-Displaced intertrochanteric fracture of right femur, initial  encounter for closed fracture; S72.011A-Unspecified intracapsular  fracture of right femur, initial encounter for closed fracture;  Z78.9-Other specified health status; Z74.09-Other reduced mobility;  R13.12-Dysphagia, oropharyngeal phase.     COMPARISON: 12/21/2015     DOSE LENGTH PRODUCT: 480 mGy cm. Automated exposure control was also  utilized to decrease patient radiation dose.     TECHNIQUE: Serial helical tomographic images of the chest were acquired.  Multiplanar reformatted images were provided for review.     FINDINGS:  Patchy basilar infiltrates are present, greater on the left than the  right. Small posterior pleural effusions are present, also slightly  greater on the left than the right.      The trachea and bronchial tree are patent.     Vascular calcification is present in the aortic arch. The cardiac  silhouette is moderately enlarged. Coronary calcification is present.  There is no pericardial effusion. No enlarged axillary or mediastinal  lymph nodes are present.      No acute findings are seen in the bones or surrounding soft tissues.     A moderate-size hiatal hernia is present.       Impression:       1. Bibasilar patchy infiltrates and small posterior pleural effusions.  Most likely this is an inflammatory process superimposed on COPD.  2. Extensive vascular calcification is present.        This report was finalized on 06/23/2017 20:34 by Dr. Karlos Holcomb MD.          Assessment:     POD# 4 R hip hemiarthroplasty, Acute Post op Blood Loss Anemia (expected)     Plan:      1:  DVT prophylaxis 3 weeks, ICE, elevate  2:  Pain control  3:  Physical therapy/Occupational therapy  4:  IV antibiotics for 24 hours  5:   Anticipate discharge today/tomorrow if pain well controlled-per Medicine, [facility] has offered bed.   6:  WBAT RLE with posterior hip precautions       Supa Cancino PA-C

## 2017-06-24 NOTE — PLAN OF CARE
Problem: Skin Integrity Impairment, Risk/Actual (Adult)  Goal: Skin Integrity/Wound Healing  Outcome: Ongoing (interventions implemented as appropriate)    Problem: Pain, Acute (Adult)  Goal: Acceptable Pain Control/Comfort Level  Outcome: Ongoing (interventions implemented as appropriate)    Problem: Fall Risk (Adult)  Goal: Absence of Falls  Outcome: Ongoing (interventions implemented as appropriate)    Problem: Patient Care Overview (Adult)  Goal: Plan of Care Review    06/24/17 0340   Coping/Psychosocial Response Interventions   Plan Of Care Reviewed With patient   Patient Care Overview   Progress no change   Outcome Evaluation   Outcome Summary/Follow up Plan patient continues to be drowsy but is more alert than before. she is not moaning anymore and has slept throughout the night. She is on a pureed honey thick diet but her  was has apparently given her thin sprite. I educated him that she could not have this and what could happen if she continued to have thin liquids. she is incontinent of bowel and bladder. BP high at times.          Problem: Fractured Hip (Adult)  Goal: Signs and Symptoms of Listed Potential Problems Will be Absent or Manageable (Fractured Hip)  Outcome: Ongoing (interventions implemented as appropriate)    Problem: Pressure Ulcer Risk (Wing Scale) (Adult,Obstetrics,Pediatric)  Goal: Skin Integrity  Outcome: Ongoing (interventions implemented as appropriate)

## 2017-06-24 NOTE — THERAPY TREATMENT NOTE
Acute Care - Physical Therapy Treatment Note  [facility]     Patient Name: Renay Lazo  : 1929  MRN: [id number]  Today's Date: 2017  Onset of Illness/Injury or Date of Surgery Date: 17  Date of Referral to PT: 17  Referring Physician: (S) Dr. Zaidi (Post hip precautions, WBAT)    Admit Date: 2017    Visit Dx:    ICD-10-CM ICD-9-CM   1. Closed displaced intertrochanteric fracture of right femur, initial encounter S72.141A 820.21   2. Closed subcapital fracture of neck of right femur, initial encounter S72.011A 820.09   3. Decreased activities of daily living (ADL) Z78.9 V49.89   4. Impaired mobility Z74.09 799.89   5. Oropharyngeal dysphagia R13.12 787.22     Patient Active Problem List   Diagnosis   • Closed C2 fracture   • C2 cervical fracture   • Transient alteration of awareness   • Paroxysmal atrial fibrillation   • History of meningioma of the brain   • UTI (urinary tract infection)   • Over weight   • Closed displaced fracture of second cervical vertebra with routine healing   • Closed subcapital fracture of neck of right femur   • Obstructive sleep apnea   • Dementia   • Hypertension   • Pulmonary edema   • Diastolic dysfunction   • Pulmonary HTN               Adult Rehabilitation Note       17 0814 17 1530 17 1200    Rehab Assessment/Intervention    Discipline occupational therapy assistant  -AL physical therapy assistant  - speech language pathologist  -CS    Document Type therapy note (daily note)  -AL therapy note (daily note)  - therapy note (daily note)  -CS    Subjective Information agree to therapy  -AL agree to therapy;complains of;weakness;fatigue;pain  - no complaints;agree to therapy  -CS    Patient Effort, Rehab Treatment   adequate  -CS    Precautions/Limitations fall precautions;hip precautions- right   knee immobilizer on when in bed  -AL fall precautions;hip precautions- right   R knee immobilizer when in bed  -     Recorded by [AL]  Yesenia Llanes PTA [AH] Yesenia Merritt PTA [CS] Yordy Whiting, MS, CFY-SLP    Pain Assessment    Pain Assessment Antony-Briggs FACES  -AL Antony-Briggs FACES  -AH No/denies pain  -CS    Antony-Briggs FACES Pain Rating 6  -AL 6  -AH     Pain Type Surgical pain  -AL Surgical pain  -AH     Pain Location Hip  -AL Hip  -AH     Pain Orientation Right  -AL Right  -AH     Pain Frequency Intermittent  -AL Intermittent   with bed mobility  -AH     Pain Intervention(s) Repositioned  -AL Repositioned  -AH     Response to Interventions tolerated  -AL tolerated  -AH     Recorded by [AL] Yesenia Llanes PTA [AH] Yesenia Merritt PTA [CS] Yordy Whiting, MS, CFY-SLP    Dysphagia/Swallow Intervention    Dysphagia/Swallow Therapeutic Feed   ST presented pt with mechanical soft, pureed, and honey thick consistencies at lunch. ST notes pt to be fatigued throughout session requiring moderate verbal and tactile cues to stay aroused. Pt's  was insistent that he feed the pt and ST observe. ST notes pt's  provided pt with large bolus portions in which ST cued the  to decrease bolus size with PO trials presented. Pt verbalized understanding and made adjustment. ST also informed pt's  that the pt always needed to be sitting in and upright position while completing PO intake. No overt s/s were noted with pureed consistencies and honey thick liquids. However, the pt exhibited significant difficulties with mechanical soft consistencies as noted by a weak rotary chew and prolonged mastication. ST eventually cued the pt to swallow in which she was able to complete. Post swallow a moderate-severe amount of residue remained throughout the oral cavity. Pt eventually cleared residue with multiple swallows and a honey thick liquid wash. Pt downgraded to pureed consistencies at this time. ST will continue to follow and treat.  -CS    Recorded by   [CS] Yordy Whiting, MS, CFY-SLP    Bed Mobility, Assessment/Treatment    Bed Mob, Supine to  Sit, [place] moderate assist (50% patient effort);maximum assist (25% patient effort);2 person assist required;verbal cues required  -AL moderate assist (50% patient effort);maximum assist (25% patient effort);2 person assist required;verbal cues required  -     Bed Mob, Sit to Supine, [place] maximum assist (25% patient effort);2 person assist required;verbal cues required  -AL maximum assist (25% patient effort);2 person assist required;verbal cues required  -     Recorded by [AL] Yesenia Llanes PTA [] Yesenia Merritt PTA     Transfer Assessment/Treatment    Transfers, Sit-Stand [place] moderate assist (50% patient effort);maximum assist (25% patient effort);2 person assist required;verbal cues required  -AL moderate assist (50% patient effort);maximum assist (25% patient effort);2 person assist required;verbal cues required  -     Transfers, Stand-Sit [place] moderate assist (50% patient effort);verbal cues required;2 person assist required  -AL moderate assist (50% patient effort);verbal cues required;2 person assist required  -     Transfers, Sit-Stand-Sit, Assist Device rolling walker  -AL      Toilet Transfer, [place] --   Had BM, CNA cleaned patient while standing  -AL      Transfer, Comment stood x 2 with mod assist  -AL      Recorded by [AL] Yesenia Llanes PTA [] Yesenia Merritt PTA     Balance Skills Training    Sitting-Level of Assistance Contact guard;Minimum assistance  -AL Contact guard;Minimum assistance  -     Sitting-Balance Support Left upper extremity supported;Feet supported  -AL Left upper extremity supported;Feet supported  -     Sitting-Balance Activities Trunk control activities  -AL Trunk control activities  -     Sitting # of Minutes 15  -AL 20  -     Standing-Level of Assistance Moderate assistance;x2  -AL Moderate assistance;x2  -     Static Standing Balance Support assistive device  -AL assistive device  -     Standing Balance # of  Minutes 3  -AL 2  -AH     Recorded by [AL] Yesenia Llanes PTA [] Yesenia Merritt PTA     Therapy Exercises    Right Lower Extremity  AAROM:;10 reps;sitting  -AH     Recorded by  [] Yesenia Merritt PTA     Orthotics Prosthetics    Additional Documentation Orthosis Location (Group)  -AL Orthosis Location (Group)  -AH     Recorded by [AL] Yesenia Llanes PTA [] Yesenia Merritt PTA     Orthosis Location    Orthosis Location/Type lower extremity  -AL lower extremity  -AH     Orthosis, Lower Extremity Right:;knee immobilizer  -AL Right:;knee immobilizer  -AH     Recorded by [AL] Yesenia Llanes PTA [] Yesenia Merritt PTA     Orthosis Management/Training    Orthosis Wear Schedule wear at night or when in bed  -AL wear at night or when in bed  -     Recorded by [AL] Yesenia Llanes PTA [] Yesenia Merritt PTA     Positioning and Restraints    Pre-Treatment Position in bed  -AL in bed  -     Post Treatment Position bed  -AL bed  -AH     In Bed fowlers;call light within reach;SCD pump applied;R knee immobilizer  -AL fowlers;call light within reach;encouraged to call for assist;with family/caregiver;SCD pump applied;R knee immobilizer  -     Recorded by [AL] Yesenia Lalnes PTA [] Yesenia Merritt PTA       06/23/17 1020 06/23/17 1000 06/23/17 0808    Rehab Assessment/Intervention    Discipline physical therapy assistant  - speech language pathologist  -TM occupational therapy assistant  -TS    Document Type therapy note (daily note)  - therapy note (daily note)  -TM therapy note (daily note)  -TS    Subjective Information no complaints   pt still lethargic  - fatigue   Significant fatigue, sternal rub, max verbal, tactile cues.  -TM no complaints   pt stated yes/no to questions  -TS    Patient Effort, Rehab Treatment  fair  -TM     Symptoms Noted Comment   lethagic and would not open eyes through tx  -TS    Precautions/Limitations fall precautions;hip precautions- right   Rknee immobilizer in bed  -AH  fall  precautions;swallowing precautions;hip precautions- right   knee immobilizer in bed RLE  -TS    Recorded by [AH] Yesenia Merritt PTA [TM] Marva Jamil, PASCALE-SLP [TS] KIM Naranjo/L    Pain Assessment    Pain Assessment Antony-Briggs FACES  -AH  Antony-Briggs FACES  -TS    Antony-Briggs FACES Pain Rating 2  -AH  0  -TS    Pain Type Surgical pain  -AH      Pain Location Hip  -AH      Pain Orientation Right  -AH      Pain Frequency Intermittent   with ROM  -AH      Pain Intervention(s) Repositioned  -AH      Recorded by [AH] Yesenia Merritt PTA  [TS] CITLALY NaranjoA/L    Cognitive Assessment/Intervention    Personal Safety Interventions   fall prevention program maintained  -TS    Recorded by   [TS] CITLALY NaranjoA/L    Dysphagia/Swallow Intervention    Dysphagia/Swallow Therapeutic Feed  Pt was quite fatigued at time of ST arrival, essentially lethargic; however, with able to arouse for brief periods with maximal, multi-modal cues including sternal rub, verbal cues, and tactile stim with labial ice chip stimulation. With labial and lingual ice chip stimulation, pt required max cues to awaken and to follow commands for lingual protrusion, though pt was able to follow. Pt was able to complete a swallow following stim and cues with each of the two attempted trials. Pt was then presented meds crushed in applesauce, presented to the pt by the RN, Kamla. Pt was noted to have what appeared to be quite immediate swallow initiations, with up to three multiple swallows across three trials. It must be noted that laryngeal elevation decreased when multiple swallows were observed. Educated RN and  on concerns with pt's level alertness placing her at an increased risk for aspiration with PO intake. Minutes following the final PO trial presentation, pt was noted to have two separate coughing instances. RN stated that concerns where noted this AM with  attempting to feed pt when pt appeared too  fatigued. Therefore, RN stated she called in nurse aid, Tiff, to assist with PO at that time and the aid removed the tray from the bedside post meal secondary to this concern. RN stated lungs are diminished. Cannot fully r/o aspiration with PO intake. Educated  that ST will follow up at lunch time today to monitor for safety with PO intake and to provide further recommendations as felt warranted.  -TM     Recorded by  [TM] Marva Jamil, CCC-SLP     ADL Assessment/Intervention    Additional Documentation   Self-Feeding Assessment/Training (Group)  -TS    Recorded by   [TS] KIM Naranjo/L    Self-Feeding Assessment/Training    Self-Feeding Assess/Train, Position   --   HOB elevated to 90  -TS    Self-Feeding Assess/Train, [place]   maximum assist (25% patient effort);dependent (less than 25% patient effort)  -TS    Self-Feeding Assess/Train, Impairments   --   decreased cognition  -TS    Self-Feeding Assess/Train, Comment   Pt responded yes/no x3 occurances during self feeding. Pt kept eyes closed through task with max encouragement to open eyes and hold utensils. Tamayo attempted [place] food to mouth, pt assisted 25% with bringing food to mouth. Pt provided verbal cues and increased time to chew and swallow with feeding task.   -TS    Recorded by   [TS] LUTHER Naranjo    Therapy Exercises    Exercise Protocols total hip  -      Total Hip Exercises right:;10 reps   PROM  -      Recorded by [] Yesenia Merritt PTA      Orthosis Location    Orthosis Location/Type lower extremity  -      Orthosis, Lower Extremity Right:;knee immobilizer  -      Recorded by [] Yesenia Merritt PTA      Orthosis Management/Training    Orthosis Wear Schedule wear at night or when in bed  -      Recorded by [] Yesenia Merritt PTA      Positioning and Restraints    Pre-Treatment Position in bed  -AH  in bed  -TS    Post Treatment Position bed  -  bed  -TS    In Bed fowlers;call light  within reach;encouraged to call for assist;exit alarm on;with family/caregiver;R knee immobilizer  -  fowlers;call light within reach;encouraged to call for assist;with family/caregiver;side rails up x2;with brace;SCD pump applied  -TS    Recorded by [] Yesenia Merritt PTA  [TS] CITLALY NaranjoA/YENNIFER      06/22/17 1455 06/22/17 1022 06/22/17 1000    Rehab Assessment/Intervention    Discipline  physical therapy assistant  - occupational therapy assistant  -TS    Document Type  therapy note (daily note)  - therapy note (daily note)  -TS    Subjective Information  agree to therapy   pt very drowsy  - agree to therapy;complains of;fatigue  -TS    Symptoms Noted Comment  lethargic  - VERY Lethargic  -    Precautions/Limitations  fall precautions;hip precautions- right   R knee immobilizer when in bed  - fall precautions;hip precautions- right;oxygen therapy device and L/min   R knee immobilizer  -TS    Recorded by  [] Yesenia Merritt PTA [TS] Julisa Balbuena ALVAREZ/L    Pain Assessment    Pain Assessment  Antony-Briggs FACES  - No/denies pain  -    Antony-Briggs FACES Pain Rating  2  -     Pain Location  Hip  -     Pain Orientation  Right  -     Pain Frequency  Intermittent   with bed mobility  -     Pain Intervention(s)  Medication (See MAR)  -     Response to Interventions  tolerated  -     Recorded by  [] Yesenia Merritt PTA [TS] Julisa Balbuena, ALVAREZ/L    Cognitive Assessment/Intervention    Personal Safety Interventions  fall prevention program maintained  - fall prevention program maintained  -TS    Recorded by  [] Yeesnia Merritt PTA [TS] Julisa Balbuena, ALVAREZ/L    Swallow Assessment/Intervention    Additional Documentation Dysphagia/Swallow Intervention (Group)  -MB      Recorded by [MB] Liss Breaux CCC-SLP      Dysphagia/Swallow Intervention    Dysphagia/Swallow Therapeutic Feed Pt will tolerate trials of current diet and upgraded consistencies with no  overt s/s of aspiration.  -MB      Recorded by [MB] Liss Breaux CCC-SLP      Bed Mobility, Assessment/Treatment    Bed Mob, Supine to Sit, [place]  moderate assist (50% patient effort);maximum assist (25% patient effort);verbal cues required;2 person assist required  -     Bed Mob, Sit to Supine, [place]  maximum assist (25% patient effort);2 person assist required;verbal cues required  -     Bed Mobility, Safety Issues  cognitive deficits limit understanding   lethargic  -AH     Recorded by  [] Yesenia Merritt PTA     Grooming Assessment/Training    Grooming Assess/Train, Position   supine  -TS    Grooming Assess/Train, Indepen Level   maximum assist (25% patient effort)  -TS    Grooming Assess/Train, Comment   pt present wash cloth, and then put in pt hand, pt then provided [place] to initiate, pt would open eyes occasionally but would not initiate or complete task.   -TS    Recorded by   [TS] KIM Naranjo/L    Motor Skills/Interventions    Additional Documentation  Balance Skills Training (Group)  -     Recorded by  [] Yesenia Merritt PTA     Balance Skills Training    Sitting-Level of Assistance  Moderate assistance;Maximum assistance   constant vc's to attempt to keep pt awake  -     Sitting-Balance Support  Feet supported  -AH     Recorded by  [] Yesenia Merritt PTA     Therapy Exercises    Bilateral Upper Extremity   AAROM:;15 reps;supine;elbow flexion/extension;shoulder extension/flexion   wrist flexion/extension  -TS    Recorded by   [TS] KIM Naranjo/L    Orthotics Prosthetics    Additional Documentation  Orthosis Location (Group);Orthosis Management/Training (Group)  -     Recorded by  [] Yesenia Merritt PTA     Orthosis Location    Orthosis Location/Type  lower extremity  -     Orthosis, Lower Extremity  Right:;knee immobilizer  -AH     Recorded by  [] Yesenia Merritt PTA     Orthosis Management/Training    Orthosis Indications  --   hip  precaution  -     Orthosis Wear Schedule  wear at night or when in bed  -     Recorded by  [] Yesenia Merritt PTA     Positioning and Restraints    Pre-Treatment Position  in bed  - in bed  -    Post Treatment Position  bed  - bed  -TS    In Bed  fowlers;call light within reach;encouraged to call for assist;with family/caregiver;R knee immobilizer  - fowlers;call light within reach;encouraged to call for assist;with family/caregiver;with PT;side rails up x2  -TS    Recorded by  [] Yesenia Merritt PTA [TS] CITLALY NaranjoA/YENNIFER      06/21/17 1316          Rehab Assessment/Intervention    Discipline physical therapy assistant  -      Document Type therapy note (daily note)  -      Subjective Information agree to therapy;complains of;pain  -      Symptoms Noted Comment Pt. very lethargic, sleeping through most of tx. Pt. did awake with pain when R Leg was moved.  -      Precautions/Limitations cardiac precautions;hip precautions- right   Rknee immobilizer  -      Recorded by [] Clara Saldana PTA      Pain Assessment    Pain Assessment Antony-Baker FACES  -      Antony-Baker FACES Pain Rating 8  -      Pain Type Acute pain;Surgical pain  -      Pain Location Hip  -      Pain Orientation Right  -      Pain Frequency Intermittent  -      Pain Intervention(s) Medication (See MAR);Repositioned  -      Response to Interventions tolerated, pt. would wake with pain.  -MF      Recorded by [] Clara Saldana PTA      Therapy Exercises    Left Lower Extremity PROM:;AAROM:;20 reps;supine;ankle pumps/circles;heel slides;hip abduction/adduction;SAQ  -      Exercise Protocols total hip  -      Total Hip Exercises right:;20 reps;completed protocol;with assist   no quad sets due to lethargy. All PROM.  -MF      Recorded by [] Clara Saldana PTA      Positioning and Restraints    Pre-Treatment Position in bed  -      Post Treatment Position bed  -      In Bed  fowlers;call light within reach;exit alarm on;with family/caregiver;side rails up x3;SCD pump applied;R knee immobilizer  -      Recorded by [MF] Clara Saldana, [facility]        User Key  (r) = Recorded By, (t) = Taken By, (c) = Cosigned By    Initials Name Effective Dates    MB Liss Breaux, CCC-SLP 08/02/16 -      Yesenia Merritt, PTA 08/02/16 -     AL Yesenia Llanes, [facility] 08/02/16 -      Marva Jamil, CCC-SLP 08/02/16 -     TS Julisaalix Balbuena, ALVAREZ/L 08/02/16 -     MF Clara Saldana, [facility] 08/02/16 -      Yordy Whiting, MS, CFY-SLP 08/02/16 -                 IP PT Goals       06/21/17 0810          Bed Mobility PT LTG    Bed Mobility PT LTG, Date Established 06/21/17  -EDMUND      Bed Mobility PT LTG, Time to Achieve by discharge  -EDMUND      Bed Mobility PT LTG, Activity Type supine to sit/sit to supine  -EDMUND      Bed Mobility PT LTG, [place] Level minimum assist (75% patient effort)  -EDMUND      Bed Mobility PT Goal  LTG, Assist Device bed rails  -EDMUND      Transfer Training PT LTG    Transfer Training PT LTG, Date Established 06/21/17  -EDMUND      Transfer Training PT LTG, Time to Achieve by discharge  -EDMUND      Transfer Training PT LTG, Activity Type bed to chair /chair to bed;sit to stand/stand to sit  -EDMUND      Transfer Training PT LTG, [place] Level minimum assist (75% patient effort)  -EDMUND      Transfer Training PT LTG, Assist Device walker, rolling  -EDMUND      Gait Training PT LTG    Gait Training Goal PT LTG, Date Established 06/21/17  -EDMUND      Gait Training Goal PT LTG, Time to Achieve by discharge  -EDMUND      Gait Training Goal PT LTG, [place] Level minimum assist (75% patient effort)  -EDMUND      Gait Training Goal PT LTG, Assist Device walker, rolling  -EDMUND      Gait Training Goal PT LTG, Distance to Achieve 75  -EDMUND      Strength Goal PT LTG    Strength Goal PT LTG, Date Established 06/21/17  -EDMUND      Strength Goal PT LTG, Time to Achieve by discharge  -EDMUND      Strength Goal PT LTG, Measure to  Achieve AROM/AAROM, 20 reps, B UE/LE, min assist  -EDMUND        User Key  (r) = Recorded By, (t) = Taken By, (c) = Cosigned By    Initials Name Provider Type    EDMUND Cesar, PT DPT Physical Therapist          Physical Therapy Education     Title: PT OT SLP Therapies (Active)     Topic: Physical Therapy (Active)     Point: Mobility training (Active)    Learning Progress Summary    Learner Readiness Method Response Comment Documented by Status   Patient Acceptance E NR Needs encouragement for standing AL 06/24/17 0847 Active    Acceptance E NL bed mobility  06/22/17 1129 Active    Acceptance E NR Educated pt./spouse on progression of PT POC, benefits of activity, R hip precautions.  06/21/17 0810 Active   Significant Other Acceptance E NR Educated pt./spouse on progression of PT POC, benefits of activity, R hip precautions.  06/21/17 0810 Active               Point: Home exercise program (Active)    Learning Progress Summary    Learner Readiness Method Response Comment Documented by Status   Patient Acceptance E NR Needs encouragement for standing AL 06/24/17 0847 Active               Point: Body mechanics (Active)    Learning Progress Summary    Learner Readiness Method Response Comment Documented by Status   Patient Acceptance E NR Needs encouragement for standing AL 06/24/17 0847 Active               Point: Precautions (Active)    Learning Progress Summary    Learner Readiness Method Response Comment Documented by Status   Patient Acceptance E NR Needs encouragement for standing AL 06/24/17 0847 Active    Acceptance E NR Educated pt./spouse on progression of PT POC, benefits of activity, R hip precautions.  06/21/17 0810 Active   Significant Other Acceptance E,D,H VU,NR THR HEP and precautions  06/23/17 1035 Done    Acceptance E NR Educated pt./spouse on progression of PT POC, benefits of activity, R hip precautions.  06/21/17 0810 Active                      User Key     Initials Effective Dates  Name Provider Type Discipline     08/02/16 -  Yesenia Merritt, PTA Physical Therapy Assistant PT    AL 08/02/16 -  Yesenia Llanes PTA Physical Therapy Assistant PT    EDMUND 08/02/16 -  Deonte Cesar, PT DPT Physical Therapist PT                    PT Recommendation and Plan  Anticipated Equipment Needs At Discharge: two wheeled walker  Anticipated Discharge Disposition: transitional care, skilled nursing facility, inpatient rehabilitation facility  Planned Therapy Interventions: bed mobility training, transfer training, gait training, balance training, home exercise program, orthotic fitting/training, patient/family education, postural re-education, strengthening  PT Frequency: 2 times/day, per priority policy  Plan of Care Review  Plan Of Care Reviewed With: patient  Progress: progress toward functional goals as expected  Outcome Summary/Follow up Plan: Patient more alert today, transfered to the EOB max to mod x 2, mod  x 2 sit to stand with walker.  Stood x 2, had a BM  nursing cleaned up with patient in standing.  Will continue with progressing with git activities.           Outcome Measures       06/24/17 0814 06/23/17 1000 06/23/17 0800    How much help from another person do you currently need...    Turning from your back to your side while in flat bed without using bedrails? 2  -AL 1  -AH     Moving from lying on back to sitting on the side of a flat bed without bedrails? 1  -AL 1  -AH     Moving to and from a bed to a chair (including a wheelchair)? 1  -AL 1  -AH     Standing up from a chair using your arms (e.g., wheelchair, bedside chair)? 1  -AL 1  -AH     Climbing 3-5 steps with a railing? 1  -AL 1  -AH     To walk in hospital room? 1  -AL 1  -AH     AM-PAC 6 Clicks Score 7  -AL 6  -AH     How much help from another is currently needed...    Putting on and taking off regular lower body clothing?   1  -TS    Bathing (including washing, rinsing, and drying)   1  -TS    Toileting (which includes using  toilet bed pan or urinal)   1  -TS    Putting on and taking off regular upper body clothing   1  -TS    Taking care of personal grooming (such as brushing teeth)   1  -TS    Eating meals   1  -TS    Score   6  -TS    Functional Assessment    Outcome Measure Options AM-PAC 6 Clicks Basic Mobility (PT)  -AL AM-PAC 6 Clicks Basic Mobility (PT)  - AM-PAC 6 Clicks Daily Activity (OT)  -TS      06/22/17 1000 06/21/17 1316       How much help from another person do you currently need...    Turning from your back to your side while in flat bed without using bedrails? 2  -AH 2  -MF     Moving from lying on back to sitting on the side of a flat bed without bedrails? 1  - 2  -MF     Moving to and from a bed to a chair (including a wheelchair)? 1  - 1  -MF     Standing up from a chair using your arms (e.g., wheelchair, bedside chair)? 1  - 2  -MF     Climbing 3-5 steps with a railing? 1  - 1  -MF     To walk in hospital room? 1  - 1  -MF     AM-PAC 6 Clicks Score 7  -AH 9  -MF     How much help from another is currently needed...    Putting on and taking off regular lower body clothing? 1  -TS      Bathing (including washing, rinsing, and drying) 1  -TS      Toileting (which includes using toilet bed pan or urinal) 2  -TS      Putting on and taking off regular upper body clothing 2  -TS      Taking care of personal grooming (such as brushing teeth) 2  -TS      Eating meals 2  -TS      Score 10  -TS      Functional Assessment    Outcome Measure Options AM-PAC 6 Clicks Daily Activity (OT)  -[facility]-PAC 6 Clicks Basic Mobility (PT)  -       User Key  (r) = Recorded By, (t) = Taken By, (c) = Cosigned By    Initials Name Provider Type     Yesenia Merritt, MIYA Physical Therapy Assistant    DAYA Llanes PTA Physical Therapy Assistant     KIM Naranjo/L Occupational Therapy Assistant     Clara Saldana PTA Physical Therapy Assistant           Time Calculation:         PT Charges       06/24/17  0814          Time Calculation    Start Time 0814  -AL      Stop Time 0839  -AL      Time Calculation (min) 25 min  -AL      PT Received On 06/24/17  -AL      PT Goal Re-Cert Due Date 07/01/17  -AL      Time Calculation- PT    Total Timed Code Minutes- PT 25 minute(s)  -AL        User Key  (r) = Recorded By, (t) = Taken By, (c) = Cosigned By    Initials Name Provider Type    AL Yesenia Llanes PTA Physical Therapy Assistant          Therapy Charges for Today     Code Description Service Date Service Provider Modifiers Qty    [id number] HC PT THERAPEUTIC ACT EA 15 MIN 6/24/2017 Yesenia Llanes PTA GP, KX 2          PT G-Codes  Outcome Measure Options: AM-PAC 6 Clicks Basic Mobility (PT)  Score: 9  Functional Limitation: Mobility: Walking and moving around  Mobility: Walking and Moving Around Current Status (): At least 60 percent but less than 80 percent impaired, limited or restricted  Mobility: Walking and Moving Around Goal Status (): At least 20 percent but less than 40 percent impaired, limited or restricted    Yesenia Llanes PTA  6/24/2017

## 2017-06-25 LAB
ABO GROUP BLD: NORMAL
ANION GAP SERPL CALCULATED.3IONS-SCNC: 9 MMOL/L (ref 4–13)
ANTI-E: NORMAL
ANTI-LITTLE C: NORMAL
BLD GP AB SCN SERPL QL: POSITIVE
BUN BLD-MCNC: 16 MG/DL (ref 5–21)
BUN/CREAT SERPL: 21.6 (ref 7–25)
CALCIUM SPEC-SCNC: 8.3 MG/DL (ref 8.4–10.4)
CHLORIDE SERPL-SCNC: 105 MMOL/L (ref 98–110)
CO2 SERPL-SCNC: 26 MMOL/L (ref 24–31)
CREAT BLD-MCNC: 0.74 MG/DL (ref 0.5–1.4)
DEPRECATED RDW RBC AUTO: 45.5 FL (ref 40–54)
ERYTHROCYTE [DISTWIDTH] IN BLOOD BY AUTOMATED COUNT: 13.3 % (ref 12–15)
GFR SERPL CREATININE-BSD FRML MDRD: 74 ML/MIN/1.73
GLUCOSE BLD-MCNC: 114 MG/DL (ref 70–100)
HCT VFR BLD AUTO: 23.2 % (ref 37–47)
HGB BLD-MCNC: 7.8 G/DL (ref 12–16)
MAGNESIUM SERPL-MCNC: 2.1 MG/DL (ref 1.4–2.2)
MCH RBC QN AUTO: 31.5 PG (ref 28–32)
MCHC RBC AUTO-ENTMCNC: 33.6 G/DL (ref 33–36)
MCV RBC AUTO: 93.5 FL (ref 82–98)
PLATELET # BLD AUTO: 244 10*3/MM3 (ref 130–400)
PMV BLD AUTO: 9.6 FL (ref 6–12)
POTASSIUM BLD-SCNC: 3.4 MMOL/L (ref 3.5–5.3)
RBC # BLD AUTO: 2.48 10*6/MM3 (ref 4.2–5.4)
RH BLD: POSITIVE
SODIUM BLD-SCNC: 140 MMOL/L (ref 135–145)
WBC NRBC COR # BLD: 7.92 10*3/MM3 (ref 4.8–10.8)

## 2017-06-25 PROCEDURE — 85027 COMPLETE CBC AUTOMATED: CPT | Performed by: FAMILY MEDICINE

## 2017-06-25 PROCEDURE — 97530 THERAPEUTIC ACTIVITIES: CPT

## 2017-06-25 PROCEDURE — 25010000002 HYDRALAZINE PER 20 MG: Performed by: FAMILY MEDICINE

## 2017-06-25 PROCEDURE — 86870 RBC ANTIBODY IDENTIFICATION: CPT | Performed by: NURSE PRACTITIONER

## 2017-06-25 PROCEDURE — 97110 THERAPEUTIC EXERCISES: CPT

## 2017-06-25 PROCEDURE — 94760 N-INVAS EAR/PLS OXIMETRY 1: CPT

## 2017-06-25 PROCEDURE — 94660 CPAP INITIATION&MGMT: CPT

## 2017-06-25 PROCEDURE — 83735 ASSAY OF MAGNESIUM: CPT | Performed by: PHYSICIAN ASSISTANT

## 2017-06-25 PROCEDURE — 25010000002 IRON SUCROSE PER 1 MG: Performed by: NURSE PRACTITIONER

## 2017-06-25 PROCEDURE — 94799 UNLISTED PULMONARY SVC/PX: CPT

## 2017-06-25 PROCEDURE — 25010000002 ENOXAPARIN PER 10 MG: Performed by: ORTHOPAEDIC SURGERY

## 2017-06-25 PROCEDURE — 25010000002 MEROPENEM: Performed by: NURSE PRACTITIONER

## 2017-06-25 PROCEDURE — P9016 RBC LEUKOCYTES REDUCED: HCPCS

## 2017-06-25 PROCEDURE — 99221 1ST HOSP IP/OBS SF/LOW 40: CPT | Performed by: INTERNAL MEDICINE

## 2017-06-25 PROCEDURE — 25010000002 CYANOCOBALAMIN PER 1000 MCG: Performed by: NURSE PRACTITIONER

## 2017-06-25 PROCEDURE — 86900 BLOOD TYPING SEROLOGIC ABO: CPT

## 2017-06-25 PROCEDURE — 36430 TRANSFUSION BLD/BLD COMPNT: CPT

## 2017-06-25 PROCEDURE — 25010000002 VANCOMYCIN PER 500 MG: Performed by: NURSE PRACTITIONER

## 2017-06-25 PROCEDURE — 86901 BLOOD TYPING SEROLOGIC RH(D): CPT | Performed by: NURSE PRACTITIONER

## 2017-06-25 PROCEDURE — 92526 ORAL FUNCTION THERAPY: CPT

## 2017-06-25 PROCEDURE — 86920 COMPATIBILITY TEST SPIN: CPT

## 2017-06-25 PROCEDURE — 86922 COMPATIBILITY TEST ANTIGLOB: CPT

## 2017-06-25 PROCEDURE — 80048 BASIC METABOLIC PNL TOTAL CA: CPT | Performed by: NURSE PRACTITIONER

## 2017-06-25 PROCEDURE — 86900 BLOOD TYPING SEROLOGIC ABO: CPT | Performed by: NURSE PRACTITIONER

## 2017-06-25 PROCEDURE — 86850 RBC ANTIBODY SCREEN: CPT | Performed by: NURSE PRACTITIONER

## 2017-06-25 RX ORDER — FAMOTIDINE 10 MG/ML
10 INJECTION, SOLUTION INTRAVENOUS EVERY 12 HOURS SCHEDULED
Status: DISCONTINUED | OUTPATIENT
Start: 2017-06-25 | End: 2017-06-28 | Stop reason: HOSPADM

## 2017-06-25 RX ORDER — CARVEDILOL 6.25 MG/1
12.5 TABLET ORAL 2 TIMES DAILY WITH MEALS
Status: DISCONTINUED | OUTPATIENT
Start: 2017-06-25 | End: 2017-06-25

## 2017-06-25 RX ORDER — POTASSIUM CHLORIDE 750 MG/1
40 CAPSULE, EXTENDED RELEASE ORAL ONCE
Status: COMPLETED | OUTPATIENT
Start: 2017-06-25 | End: 2017-06-25

## 2017-06-25 RX ORDER — HYDRALAZINE HYDROCHLORIDE 25 MG/1
25 TABLET, FILM COATED ORAL EVERY 8 HOURS SCHEDULED
Status: DISCONTINUED | OUTPATIENT
Start: 2017-06-25 | End: 2017-06-28

## 2017-06-25 RX ORDER — CARVEDILOL 25 MG/1
25 TABLET ORAL 2 TIMES DAILY WITH MEALS
Status: DISCONTINUED | OUTPATIENT
Start: 2017-06-25 | End: 2017-06-28 | Stop reason: HOSPADM

## 2017-06-25 RX ORDER — BUMETANIDE 0.25 MG/ML
0.5 INJECTION INTRAMUSCULAR; INTRAVENOUS ONCE
Status: COMPLETED | OUTPATIENT
Start: 2017-06-25 | End: 2017-06-25

## 2017-06-25 RX ADMIN — PHENYTOIN SODIUM 400 MG: 100 CAPSULE, EXTENDED RELEASE ORAL at 21:46

## 2017-06-25 RX ADMIN — HYDRALAZINE HYDROCHLORIDE 25 MG: 25 TABLET ORAL at 21:46

## 2017-06-25 RX ADMIN — BUMETANIDE 0.5 MG: 0.25 INJECTION, SOLUTION INTRAMUSCULAR; INTRAVENOUS at 22:33

## 2017-06-25 RX ADMIN — IPRATROPIUM BROMIDE 0.5 MG: 0.5 SOLUTION RESPIRATORY (INHALATION) at 11:24

## 2017-06-25 RX ADMIN — IPRATROPIUM BROMIDE 0.5 MG: 0.5 SOLUTION RESPIRATORY (INHALATION) at 15:30

## 2017-06-25 RX ADMIN — LISINOPRIL 20 MG: 20 TABLET ORAL at 08:59

## 2017-06-25 RX ADMIN — IPRATROPIUM BROMIDE 0.5 MG: 0.5 SOLUTION RESPIRATORY (INHALATION) at 19:34

## 2017-06-25 RX ADMIN — CARVEDILOL 25 MG: 25 TABLET, FILM COATED ORAL at 08:59

## 2017-06-25 RX ADMIN — AMLODIPINE BESYLATE 10 MG: 10 TABLET ORAL at 08:59

## 2017-06-25 RX ADMIN — FAMOTIDINE 10 MG: 10 INJECTION, SOLUTION INTRAVENOUS at 21:46

## 2017-06-25 RX ADMIN — ENOXAPARIN SODIUM 40 MG: 40 INJECTION SUBCUTANEOUS at 08:58

## 2017-06-25 RX ADMIN — VANCOMYCIN HYDROCHLORIDE 1000 MG: 1 INJECTION, SOLUTION INTRAVENOUS at 14:21

## 2017-06-25 RX ADMIN — FAMOTIDINE 10 MG: 10 INJECTION, SOLUTION INTRAVENOUS at 08:57

## 2017-06-25 RX ADMIN — POTASSIUM CHLORIDE 40 MEQ: 750 CAPSULE, EXTENDED RELEASE ORAL at 08:59

## 2017-06-25 RX ADMIN — MEROPENEM 1 G: 1 INJECTION, POWDER, FOR SOLUTION INTRAVENOUS at 16:38

## 2017-06-25 RX ADMIN — BUMETANIDE 0.5 MG: 0.5 TABLET ORAL at 08:58

## 2017-06-25 RX ADMIN — IPRATROPIUM BROMIDE 0.5 MG: 0.5 SOLUTION RESPIRATORY (INHALATION) at 07:20

## 2017-06-25 RX ADMIN — IRON SUCROSE 300 MG: 20 INJECTION, SOLUTION INTRAVENOUS at 08:57

## 2017-06-25 RX ADMIN — HYDRALAZINE HYDROCHLORIDE 20 MG: 20 INJECTION INTRAMUSCULAR; INTRAVENOUS at 08:54

## 2017-06-25 RX ADMIN — HYDRALAZINE HYDROCHLORIDE 25 MG: 25 TABLET ORAL at 14:20

## 2017-06-25 RX ADMIN — CYANOCOBALAMIN 1000 MCG: 1000 INJECTION, SOLUTION INTRAMUSCULAR; SUBCUTANEOUS at 08:57

## 2017-06-25 RX ADMIN — MEROPENEM 1 G: 1 INJECTION, POWDER, FOR SOLUTION INTRAVENOUS at 03:54

## 2017-06-25 NOTE — CONSULTS
[facility] HEART GROUP CONSULT NOTE    Referring Provider: Kera Zavala MD    Reason for Consultation: SVT    Chief Complaint   Patient presents with   • Fall       Subjective .     History of present illness:  Renay Lzao is a 88 y.o. female with history of paroxysmal atrial fibrillation, s/p pacemaker, hypertension, hyperlipidemia, dementia who presented to [facility] ED on 6/19/17 with complaints of right leg pain status-post fall. Imaging revealed closed right femoral neck fracture. She is now status-post right hip hemiarthroplasty. She has also been noted with pneumonia, urinary tract infection and uncontrolled hypertension. She remains on antibiotics. She has been noted with multiple runs of short-lived episodes of supraventricular tachycardia, thus we were consulted. The patient has been noted with several episodes, some as high as 160s. The patient does not offer me anything in the way of history. The patient's  is present and aids somewhat in history. He relates they were both at home when she fell, but he was not in the same room. He says she was using her walker in the bedroom and tried to reach for the bed rail and fell at this time. He relates she had some difficulty with attempted therapy today. He denies any specific complaints that she's described today. He also reports she sees Dr. Puri and is unsure when her last pacemaker interrogation was.     History  Past Medical History:   Diagnosis Date   • Adrenal adenoma    • Anemia    • Atrial fibrillation    • Benign neoplasm of cerebral meninges    • Brain tumor    • CHF (congestive heart failure)    • Dementia    • Disease of thyroid gland    • Hip fracture requiring operative repair     7/2016   • Hyperlipidemia    • Hypertension    • Obstructive sleep apnea    • Osteoarthritis    • Peripheral vascular disease    • Pneumonia    • Seizures    • Sick sinus syndrome     s/p pacemaker per Dr. Puri   • TIA (transient ischemic attack)    ,    Past Surgical History:   Procedure Laterality Date   • APPENDECTOMY     • BRAIN SURGERY     • CAROTID ENDARTERECTOMY  02/2011   • CATARACT EXTRACTION     • CHOLECYSTECTOMY     • HEMORRHOIDECTOMY     • HIP HEMIARTHROPLASTY Right 6/20/2017    Procedure: HIP HEMIARTHROPLASTY;  Surgeon: Jeremiah Zaidi MD;  Location: [facility];  Service:    • INSERT / REPLACE / REMOVE PACEMAKER     • JOINT REPLACEMENT     • PACEMAKER IMPLANTATION     • TONSILLECTOMY     • TOTAL HIP ARTHROPLASTY     ,   Family History   Problem Relation Age of Onset   • Arthritis Mother    • Cancer Mother    • Heart disease Mother    • Arthritis Father    • Arthritis Brother    • Cancer Brother    • Heart disease Brother    • Arthritis Sister    • Heart disease Sister    ,   Social History   Substance Use Topics   • Smoking status: Never Smoker   • Smokeless tobacco: None   • Alcohol use No   ,     Medications  Current Facility-Administered Medications   Medication Dose Route Frequency Provider Last Rate Last Dose   • acetaminophen (TYLENOL) tablet 650 mg  650 mg Oral Q6H PRN BETO Hammond   650 mg at 06/22/17 1453   • amLODIPine (NORVASC) tablet 10 mg  10 mg Oral Daily Kwaku Frey DO   10 mg at 06/25/17 0859   • bisacodyl (DULCOLAX) suppository 10 mg  10 mg Rectal Daily PRN Harriet Smith DO       • bumetanide (BUMEX) injection 0.5 mg  0.5 mg Intravenous Once BETO Wahl       • bumetanide (BUMEX) tablet 0.5 mg  0.5 mg Oral Daily BETO Wahl   0.5 mg at 06/25/17 0858   • carvedilol (COREG) tablet 25 mg  25 mg Oral BID With Meals BETO Wahl   25 mg at 06/25/17 0859   • CloNIDine (CATAPRES-TTS) 0.2 MG/24HR patch 1 patch  1 patch Transdermal Weekly Kwaku Frey DO   1 patch at 06/20/17 0925   • cyanocobalamin injection 1,000 mcg  1,000 mcg Intramuscular Daily BETO Hammond   1,000 mcg at 06/25/17 0857   • [START ON 7/6/2017] cyanocobalamin injection 1,000 mcg   1,000 mcg Intramuscular Q7 Days BETO Hammond       • docusate sodium (COLACE) capsule 100 mg  100 mg Oral BID PRN Jeremiah Zaidi MD       • enoxaparin (LOVENOX) syringe 40 mg  40 mg Subcutaneous Daily Jeremiah Zaidi MD   40 mg at 06/25/17 0858   • famotidine (PEPCID) injection 10 mg  10 mg Intravenous Q12H Kwaku Frey, DO   10 mg at 06/25/17 0857   • hydrALAZINE (APRESOLINE) injection 20 mg  20 mg Intravenous Q4H PRN Kwaku Frye, DO   20 mg at 06/25/17 0854   • hydrALAZINE (APRESOLINE) tablet 25 mg  25 mg Oral Q8H BETO Wahl       • HYDROmorphone (DILAUDID) injection 1 mg  1 mg Intravenous Q3H PRN Kwaku Frey, DO   1 mg at 06/22/17 0926   • ipratropium (ATROVENT) nebulizer solution 0.5 mg  0.5 mg Nebulization 4x Daily - RT BETO Wahl   0.5 mg at 06/25/17 0720   • labetalol (NORMODYNE,TRANDATE) injection 20 mg  20 mg Intravenous Q2H PRN Kwaku Frey, DO   20 mg at 06/23/17 0405   • lisinopril (PRINIVIL,ZESTRIL) tablet 20 mg  20 mg Oral Daily Griselda Matthew APRN   20 mg at 06/25/17 0859   • magnesium hydroxide (MILK OF MAGNESIA) suspension 2400 mg/10mL 10 mL  10 mL Oral Daily PRN Jeremiah Zaidi MD   10 mL at 06/21/17 2005   • meropenem (MERREM) 1 g/100 mL 0.9% NS VTB (mbp)  1 g Intravenous Q12H Griselda Matthew APRN   1 g at 06/25/17 0354   • ondansetron (ZOFRAN) tablet 4 mg  4 mg Oral Q6H PRN Jeremiah Zaidi MD        Or   • ondansetron ODT (ZOFRAN-ODT) disintegrating tablet 4 mg  4 mg Oral Q6H PRN Jeremiah Zaidi MD        Or   • ondansetron (ZOFRAN) injection 4 mg  4 mg Intravenous Q6H PRN Jeremiah Zaidi MD   4 mg at 06/21/17 1959   • Pharmacy to Dose meropenem (MERREM)   Does not apply Continuous PRN BETO Wahl       • Pharmacy to dose vancomycin   Does not apply Continuous PRN BETO Wahl       • phenytoin (DILANTIN) ER capsule 400 mg  400 mg Oral  Nightly Kwaku rFey DO   400 mg at 06/24/17 2131   • sennosides-docusate sodium (SENOKOT-S) 8.6-50 MG tablet 2 tablet  2 tablet Oral BID Kwaku Frey DO   2 tablet at 06/23/17 0980   • sodium chloride 0.9 % flush 1-10 mL  1-10 mL Intravenous PRN Jeremiah Zaidi MD       • vancomycin (VANCOCIN) in iso-osmotic dextrose IVPB 1 g (premix) 200 mL  1,000 mg Intravenous Q24H BETO Wahl           Allergies:  Chocolate; Ampicillin; Benadryl [diphenhydramine]; Biaxin [clarithromycin]; Capoten [captopril]; Ciprofloxacin; Ciprofloxacin hcl; Codeine; Contrast dye; Darvon [propoxyphene]; Diphenhydramine hcl; Dyazide [triamterene-hctz]; Enablex [darifenacin hydrobromide er]; Gadolinium derivatives; Homatropine; Hydrochlorothiazide w-triamterene; Hydrocodone; Inderal la [propranolol hcl]; Iodine; Levaquin [levofloxacin]; Lortab [hydrocodone-acetaminophen]; Lotrel [amlodipine besy-benazepril hcl]; Macrodantin [nitrofurantoin macrocrystal]; Motrin [ibuprofen]; Macrodantin  [nitrofurantoin]; Other; Percocet [oxycodone-acetaminophen]; Phosphate; Propranolol; Qvar [beclomethasone]; Sulfa antibiotics; Terramycin [oxytetracycline]; and Vesicare [solifenacin]    Review of Systems  Review of Systems   Unable to perform ROS: dementia       Objective     Physical Exam:  Patient Vitals for the past 24 hrs:   BP Temp Temp src Pulse Resp SpO2 Weight   06/25/17 0757 (!) 198/74 - - - - - -   06/25/17 0725 (!) 216/75 99.4 °F (37.4 °C) Oral 93 16 96 % -   06/25/17 0720 - - - 87 18 98 % -   06/25/17 0630 155/66 - - - - - -   06/25/17 0448 (!) 183/81 99 °F (37.2 °C) Oral 91 18 95 % -   06/24/17 2331 134/99 97.7 °F (36.5 °C) Axillary 86 18 99 % -   06/24/17 2047 - - - 97 18 97 % -   06/24/17 2032 175/60 98.5 °F (36.9 °C) Oral 97 18 96 % 178 lb (80.7 kg)   06/24/17 1617 - - - 89 18 99 % -   06/24/17 1544 163/80 97.9 °F (36.6 °C) Oral 83 18 99 % -   06/24/17 1200 150/58 99.4 °F (37.4 °C) Oral 97 18 96 % -      Physical Exam   Constitutional: She appears well-developed and well-nourished.   HENT:   Head: Normocephalic and atraumatic.   Eyes: Conjunctivae and EOM are normal. Pupils are equal, round, and reactive to light.   Neck: Normal range of motion. Neck supple. No JVD present.   Cardiovascular: Normal rate, regular rhythm, S1 normal, S2 normal, intact distal pulses and normal pulses.    Murmur heard.   Systolic murmur is present with a grade of 2/6   Pulmonary/Chest: Effort normal. No respiratory distress. She has wheezes.   Abdominal: Soft. Bowel sounds are normal. She exhibits no distension.   Musculoskeletal: She exhibits no edema.        Legs:  Neurological: She is alert.   Skin: Skin is warm and dry.       Results Review:   I reviewed the patient's new clinical results.    Lab Results (last 72 hours)     Procedure Component Value Units Date/Time    CBC (No Diff) [[id number]]  (Abnormal) Collected:  06/23/17 0507    Specimen:  Blood Updated:  06/23/17 0555     WBC 8.46 10*3/mm3      RBC 2.57 (L) 10*6/mm3      Hemoglobin 8.1 (L) g/dL      Hematocrit 24.3 (L) %      MCV 94.6 fL      MCH 31.5 pg      MCHC 33.3 g/dL      RDW 13.3 %      RDW-SD 46.3 fl      MPV 10.1 fL      Platelets 173 10*3/mm3     Comprehensive Metabolic Panel [[id number]]  (Abnormal) Collected:  06/23/17 0507    Specimen:  Blood Updated:  06/23/17 0609     Glucose 120 (H) mg/dL      BUN 34 (H) mg/dL      Creatinine 1.18 mg/dL      Sodium 138 mmol/L      Potassium 4.0 mmol/L      Chloride 102 mmol/L      CO2 23.0 (L) mmol/L      Calcium 8.5 mg/dL      Total Protein 6.6 g/dL      Albumin 3.30 (L) g/dL      ALT (SGPT) 45 U/L      AST (SGOT) 63 (H) U/L      Alkaline Phosphatase 83 U/L      Total Bilirubin 0.7 mg/dL      eGFR Non African Amer 43 (L) mL/min/1.73      Globulin 3.3 gm/dL      A/G Ratio 1.0 (L) g/dL      BUN/Creatinine Ratio 28.8 (H)     Anion Gap 13.0 mmol/L     Narrative:       The MDRD GFR formula is only valid for adults with stable  renal function between ages 18 and 70.    Iron Profile [[id number]]  (Abnormal) Collected:  06/23/17 0507    Specimen:  Blood Updated:  06/23/17 0619     Iron 23 (L) mcg/dL      TIBC 200 (L) mcg/dL      Iron Saturation 12 (L) %     Ferritin [[id number]]  (Normal) Collected:  06/23/17 0507    Specimen:  Blood Updated:  06/23/17 0644     Ferritin 202.00 ng/mL     Folate [[id number]] Collected:  06/23/17 0507    Specimen:  Blood Updated:  06/23/17 0715     Folate >20.00 ng/mL     Vitamin B12 [[id number]]  (Normal) Collected:  06/23/17 0507    Specimen:  Blood Updated:  06/23/17 0715     Vitamin B-12 245 pg/mL     Blood Gas, Arterial [[id number]]  (Abnormal) Collected:  06/23/17 1558    Specimen:  Arterial Blood Updated:  06/23/17 1600     Site Arterial: right radial     Cruz's Test --      Documented in Rapid Comm        pH, Arterial 7.501 (H) pH units      pCO2, Arterial 34.7 (L) mm Hg      pO2, Arterial 91.1 mm Hg      HCO3, Arterial 26.5 (H) mmol/L      Base Excess, Arterial 3.7 (H) mmol/L      O2 Saturation, Arterial 97.6 %      O2 Saturation Calculated 97.6 %      Barometric Pressure for Blood Gas -- mmHg       Component not reported at this site.        Modality Cannula     Flow Rate 2.00 lpm     Narrative:       Serial Number: [id number]    : [id number]    CBC (No Diff) [[id number]]  (Abnormal) Collected:  06/24/17 0628    Specimen:  Blood Updated:  06/24/17 0711     WBC 8.66 10*3/mm3      RBC 2.57 (L) 10*6/mm3      Hemoglobin 8.1 (L) g/dL      Hematocrit 24.2 (L) %      MCV 94.2 fL      MCH 31.5 pg      MCHC 33.5 g/dL      RDW 13.3 %      RDW-SD 46.3 fl      MPV 10.1 fL      Platelets 227 10*3/mm3     Basic Metabolic Panel [[id number]]  (Abnormal) Collected:  06/24/17 0628    Specimen:  Blood Updated:  06/24/17 0714     Glucose 190 (H) mg/dL      BUN 23 (H) mg/dL      Creatinine 0.85 mg/dL      Sodium 139 mmol/L      Potassium 3.7 mmol/L      Chloride 104 mmol/L      CO2 25.0 mmol/L      Calcium 8.4 mg/dL      eGFR  Non  Amer 63 mL/min/1.73      BUN/Creatinine Ratio 27.1 (H)     Anion Gap 10.0 mmol/L     Narrative:       The MDRD GFR formula is only valid for adults with stable renal function between ages 18 and 70.    BNP [[id number]]  (Abnormal) Collected:  06/24/17 0628    Specimen:  Blood Updated:  06/24/17 1411     proBNP 7140.0 (H) pg/mL     Blood Culture With TEDDY [[id number]]  (Normal) Collected:  06/22/17 2347    Specimen:  Blood from Arm, Left Updated:  06/25/17 0101     Blood Culture No growth at 2 days    Blood Culture With TEDDY [[id number]]  (Normal) Collected:  06/22/17 2347    Specimen:  Blood from Hand, Left Updated:  06/25/17 0202     Blood Culture No growth at 2 days    CBC (No Diff) [[id number]]  (Abnormal) Collected:  06/25/17 0532    Specimen:  Blood Updated:  06/25/17 0605     WBC 7.92 10*3/mm3      RBC 2.48 (L) 10*6/mm3      Hemoglobin 7.8 (L) g/dL      Hematocrit 23.2 (L) %      MCV 93.5 fL      MCH 31.5 pg      MCHC 33.6 g/dL      RDW 13.3 %      RDW-SD 45.5 fl      MPV 9.6 fL      Platelets 244 10*3/mm3     Basic Metabolic Panel [[id number]]  (Abnormal) Collected:  06/25/17 0532    Specimen:  Blood Updated:  06/25/17 0617     Glucose 114 (H) mg/dL      BUN 16 mg/dL      Creatinine 0.74 mg/dL      Sodium 140 mmol/L      Potassium 3.4 (L) mmol/L      Chloride 105 mmol/L      CO2 26.0 mmol/L      Calcium 8.3 (L) mg/dL      eGFR Non African Amer 74 mL/min/1.73      BUN/Creatinine Ratio 21.6     Anion Gap 9.0 mmol/L     Narrative:       The MDRD GFR formula is only valid for adults with stable renal function between ages 18 and 70.          Lab Results   Component Value Date    ECHOEFEST 65 06/20/2017       Imaging Results (last 72 hours)     Procedure Component Value Units Date/Time    CT Head Without Contrast [[id number]] Collected:  06/22/17 1626     Updated:  06/22/17 1724    Narrative:       EXAMINATION: CT HEAD WO CONTRAST-  6/22/2017 4:14 PM CDT     CT SCAN OF THE HEAD, WITHOUT CONTRAST:      HISTORY:  Altered mental status     COMPARISONS: 10/04/2016      TECHNIQUE:     Radiation dose equals DLP 1395 mGy-cm.  Automated exposure control dose  reduction technique was implemented.     CT evaluation of the head without intravenous contrast. 5 mm transaxial  images were obtained.   2-D sagittal and coronal reconstruction images  were generated.     FINDINGS:      A craniotomy defect is again identified in the left temporofrontal lobe  region, with a large amount of cystic encephalomalacia involving the  temporal lobe and frontal lobe, most likely stable.     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     There is decreased attenuation in the periventricular white matter;  suspect chronic ischemic changes.     There is mild diffuse central and cortical atrophy.     There is no intra or extra-axial hemorrhage.     There is a partially calcified mass identified along the left posterior  parietal region peripherally, and a meningioma is suspected. The mass  measures nearly 2 cm and was noted previously, likely unchanged.             Impression:       1. No CT evidence of an acute intracranial process.    2. Postsurgical and chronic changes.   3. Partially calcified left convexity meningioma suspected.     These findings were described on a digital voice clip recorded on the  PACS system at the time of dictation.                            This report was finalized on 06/22/2017 17:21 by Dr. Mick Kimbrough MD.    XR Chest 1 View [[id number]] Collected:  06/23/17 1317     Updated:  06/23/17 1427    Narrative:       EXAMINATION: XR CHEST 1 VW-. 6/23/2017 12:54 PM CDT     CHEST, ONE VIEW:     HISTORY: Pulmonary edema.     COMPARISON: 06/20/2017, 06/19/2017 and 10/04/2016.     A single frontal chest radiograph was obtained.     FINDINGS:  The heart is generous with permanent cardiac pacemaker.     There is mild perihilar bronchovascular interstitial  prominence  compatible with minimal residual pulmonary edema changes significantly  improved compared to the 6/19/2017 examination improving compared to the  06/20/2017 study.     The bony structures are intact.       Impression:       1. Significant improvement in pulmonary edema with minimal residual  perihilar bronchovascular interstitial prominence.     This report was finalized on 06/23/2017 14:23 by Dr. Mick Kimbrough MD.    CT Chest Without Contrast [[id number]] Collected:  06/23/17 2027     Updated:  06/23/17 2038    Narrative:       CT CHEST WO CONTRAST- 6/23/2017 7:26 PM CDT     HISTORY: Fever, right congestion, difficult to arouse;  S72.141A-Displaced intertrochanteric fracture of right femur, initial  encounter for closed fracture; S72.011A-Unspecified intracapsular  fracture of right femur, initial encounter for closed fracture;  Z78.9-Other specified health status; Z74.09-Other reduced mobility;  R13.12-Dysphagia, oropharyngeal phase.     COMPARISON: 12/21/2015     DOSE LENGTH PRODUCT: 480 mGy cm. Automated exposure control was also  utilized to decrease patient radiation dose.     TECHNIQUE: Serial helical tomographic images of the chest were acquired.  Multiplanar reformatted images were provided for review.     FINDINGS:  Patchy basilar infiltrates are present, greater on the left than the  right. Small posterior pleural effusions are present, also slightly  greater on the left than the right.      The trachea and bronchial tree are patent.     Vascular calcification is present in the aortic arch. The cardiac  silhouette is moderately enlarged. Coronary calcification is present.  There is no pericardial effusion. No enlarged axillary or mediastinal  lymph nodes are present.      No acute findings are seen in the bones or surrounding soft tissues.     A moderate-size hiatal hernia is present.       Impression:       1. Bibasilar patchy infiltrates and small posterior pleural effusions.  Most likely  this is an inflammatory process superimposed on COPD.  2. Extensive vascular calcification is present.        This report was finalized on 06/23/2017 20:34 by Dr. Karlos Holcomb MD.        Assessment   1. Paroxysmal NSVT: currently a pacing 80s-90s  2. Closed subcapital fracture of neck of right femur  3. Anemia  4. Urinary tract infection  5. Hypertension: uncontrolled  6. Dementia  7. History of paroxysmal atrial fibrillation  8. Pacemaker  9. Diastolic dysfunction per echo 6/21/17 with LVEF 65%    Plan   1. Check Mg. Keep K > 4 and Mg > 2  2. Will have pacemaker interrogated.   3. Defer resistent hypertension evaluation and treatment to primary  4. Not long term anticoagulation candidate given falls, anemia  5. Discontinue Coreg, begin Lopressor.   6. Monitor telemetry     Further orders per Dr. Fernandez upon his evaluation of the patient.     Thank you for the consultation, cardiology will gladly continue to follow.     Matilda Ayala PA-C        Please note this cardiology consultation note is the result of a face to face consultation with the patient, in addition to reviewing medical records at length by myself, Matilda Ayala PA-C.    Time: appx 35 minutes

## 2017-06-25 NOTE — PLAN OF CARE
Problem: Skin Integrity Impairment, Risk/Actual (Adult)  Goal: Skin Integrity/Wound Healing  Outcome: Ongoing (interventions implemented as appropriate)    Problem: Pain, Acute (Adult)  Goal: Acceptable Pain Control/Comfort Level  Outcome: Outcome(s) achieved Date Met:  06/25/17    Problem: Fall Risk (Adult)  Goal: Absence of Falls  Outcome: Ongoing (interventions implemented as appropriate)    Problem: Patient Care Overview (Adult)  Goal: Plan of Care Review  Outcome: Ongoing (interventions implemented as appropriate)    06/25/17 0454   Coping/Psychosocial Response Interventions   Plan Of Care Reviewed With patient   Patient Care Overview   Progress no change   Outcome Evaluation   Outcome Summary/Follow up Plan Pt confused in conversation. CPAP at night. No c/o of pain. Cont IV abx.          Problem: Fractured Hip (Adult)  Goal: Signs and Symptoms of Listed Potential Problems Will be Absent or Manageable (Fractured Hip)  Outcome: Ongoing (interventions implemented as appropriate)    Problem: Pressure Ulcer Risk (Wing Scale) (Adult,Obstetrics,Pediatric)  Goal: Skin Integrity  Outcome: Ongoing (interventions implemented as appropriate)

## 2017-06-25 NOTE — PROGRESS NOTES
[facility] Medicine Services  INPATIENT PROGRESS NOTE    Length of Stay: 6  Date of Admission: 6/19/2017  Primary Care Physician: Jung Collins MD    Subjective   Chief Complaint:   HPI   Still didn't sleep per . Her normal sleep pattern at home is 4am-10am. He states staff is in there more in the am so she doesn't sleep at all. Pt is awake and alert. Is able to speak more clearly today. Denies any shortness of breath. She stood with physical therapy but did not take a step. Denies any chest pain. She laughs frequently.     Review of Systems   Unable to obtain given patient's confusion.     Objective    Temp:  [97.7 °F (36.5 °C)-99.4 °F (37.4 °C)] 99.4 °F (37.4 °C)  Heart Rate:  [83-97] 93  Resp:  [16-18] 16  BP: (134-216)/(58-99) 198/74  Physical Exam   Constitutional: She appears well-developed and well-nourished.   HENT:   Head: Normocephalic and atraumatic.   Eyes: Conjunctivae and EOM are normal. Pupils are equal, round, and reactive to light.   Neck: Neck supple. No JVD present. No thyromegaly present.   Cardiovascular: Normal rate.  Exam reveals no gallop and no friction rub.    No murmur heard.  Pt has had 25 runs SVT this am. Usually less than 1 min.    Pulmonary/Chest: Effort normal. No respiratory distress. She has no wheezes. She has no rales. She exhibits no tenderness.   Diminished bilaterally. Overall improved from yesterday.    Abdominal: Soft. Bowel sounds are normal. She exhibits no distension. There is no tenderness. There is no rebound and no guarding.   Musculoskeletal: Normal range of motion. She exhibits no edema, tenderness or deformity.   Lymphadenopathy:     She has no cervical adenopathy.   Neurological: She is alert. She displays normal reflexes. No cranial nerve deficit. She exhibits normal muscle tone.   Skin: Skin is warm and dry. No rash noted.   Psychiatric: She has a normal mood and affect. Judgment normal.     Results Review:  I have  reviewed the labs, radiology results, and diagnostic studies.    Laboratory Data:     Results from last 7 days  Lab Units 06/25/17  0532 06/24/17  0628 06/23/17  0507   WBC 10*3/mm3 7.92 8.66 8.46   HEMOGLOBIN g/dL 7.8* 8.1* 8.1*   HEMATOCRIT % 23.2* 24.2* 24.3*   PLATELETS 10*3/mm3 244 227 173       Results from last 7 days  Lab Units 06/25/17  0532 06/24/17  0628 06/23/17  0507  06/20/17  0947 06/19/17  0140   SODIUM mmol/L 140 139 138  < > 140 144   POTASSIUM mmol/L 3.4* 3.7 4.0  < > 3.3* 3.7   CHLORIDE mmol/L 105 104 102  < > 100 107   TOTAL CO2 mmol/L 26.0 25.0 23.0*  < > 28.0 23.0*   BUN mg/dL 16 23* 34*  < > 17 21   CREATININE mg/dL 0.74 0.85 1.18  < > 0.84 0.76   CALCIUM mg/dL 8.3* 8.4 8.5  < > 8.7 8.7   BILIRUBIN mg/dL  --   --  0.7  --  0.8 0.6   ALK PHOS U/L  --   --  83  --  110 189*   ALT (SGPT) U/L  --   --  45  --  28 34   AST (SGOT) U/L  --   --  63*  --  33 49*   GLUCOSE mg/dL 114* 190* 120*  < > 131* 182*   < > = values in this interval not displayed.    Culture Data:   Blood Culture   Date Value Ref Range Status   06/22/2017 No growth at 2 days  Preliminary   06/22/2017 No growth at 2 days  Preliminary     Urine Culture   Date Value Ref Range Status   06/19/2017 >100,000 CFU/mL Escherichia coli (A)  Final     Radiology Data:   Imaging Results (last 24 hours)     ** No results found for the last 24 hours. **          I have reviewed the patient current medications.     Assessment/Plan   Assessment:  1. Closed subcapital fracture of neck of right femur status post right hip hemiarthroplasty. Post op day 3.   2. Concern for pneumonia superimposed on COPD-  3. Urinary tract infection-E. Coli   4. Obstructive sleep apnea-CPAP  5. Dementia at baseline  6. Hypertension-poorly controlled  7. Pulmonary edema-improved on chest x-ray  8. Diastolic dysfunction Ejection fraction 65%  9. Pulmonary HTN  10. Supraventricular tachycardia  11. Vitamin B-12 deficiency  12. Hypokalemia    Plan:  1. Vancomycin/Merrem  day 2  2. Consult cardiology for SVT   3. Continue nebs/oxygen  4. Add magnesium to labs.   5. Transfuse 1 units packed red blood cells with 0.5 mg bumex after  6. Repeat labs in am.   7. Add hydralazine 25 mg three times per day.   8. Venofer day 3  9. Currently on Lovenox for DVT prophylaxis.   10. Replace potassium     Discharge Planning: I expect patient to be discharged to SNF in ? days.    Luis:  Patient seen and examined.  at bedside, napping. Slightly more alert today. Will answer my questions.  states that he feels like his wife is getting hungry and starting to eat the pureed diet a bit more.  Will consult cardiology for ongoing SVT. Abdomen obese.     Griselda Matthew, APRN   06/25/17   8:48 AM     Dr. Smith:

## 2017-06-25 NOTE — PROGRESS NOTES
"Pharmacy Renal Dose Conversion    Renay Lazo is a 88 y.o. female   [Ht: 64.02\" (162.6 cm); Wt: 178 lb (80.7 kg)]    Estimated Creatinine Clearance: 47 mL/min (by C-G formula based on Cr of 0.85).   Lab Results   Component Value Date    CREATININE 0.85 06/24/2017    CREATININE 1.18 06/23/2017    CREATININE 1.01 06/21/2017       Assessment/Plan:  Due to Protonix injectable drug shortage, Protonix 40mg IV Daily was changed to Pepcid IV (patient still has some difficulty swallowing, so did not change to Protonix po at this time). Automatic substitution dose of Pepcid would be 20mg IV Q12H in patients with a CrCl greater than 50 ml/min. Based on the patient's current renal function and prescribing information provided by the drug , Pepcid 20 mg IV Q12H,  has been changed to Pepcid 10 mg IV Q12H.    Adjusted per the directives and guidelines established by [facility] and Therapeutics Committee and [facility] Medical Executive Committee.  Pharmacy will continue to monitor daily and make further adjustment(s) accordingly.    Everardo Williamson, [facility]  06/25/172:05 AM    "

## 2017-06-25 NOTE — THERAPY TREATMENT NOTE
Acute Care - Physical Therapy Treatment Note  [facility]     Patient Name: Renay Lazo  : 1929  MRN: [id number]  Today's Date: 2017  Onset of Illness/Injury or Date of Surgery Date: 17  Date of Referral to PT: 17  Referring Physician: (S) Dr. Zaidi (Post hip precautions, WBAT)    Admit Date: 2017    Visit Dx:    ICD-10-CM ICD-9-CM   1. Closed displaced intertrochanteric fracture of right femur, initial encounter S72.141A 820.21   2. Closed subcapital fracture of neck of right femur, initial encounter S72.011A 820.09   3. Decreased activities of daily living (ADL) Z78.9 V49.89   4. Impaired mobility Z74.09 799.89   5. Oropharyngeal dysphagia R13.12 787.22     Patient Active Problem List   Diagnosis   • Closed C2 fracture   • C2 cervical fracture   • Transient alteration of awareness   • Paroxysmal atrial fibrillation   • History of meningioma of the brain   • UTI (urinary tract infection)   • Over weight   • Closed displaced fracture of second cervical vertebra with routine healing   • Closed subcapital fracture of neck of right femur   • Obstructive sleep apnea   • Dementia   • Hypertension   • Pulmonary edema   • Diastolic dysfunction   • Pulmonary HTN               Adult Rehabilitation Note       17 1320 17 1248 17 0725    Rehab Assessment/Intervention    Discipline physical therapy assistant  -AL speech language pathologist  -MB physical therapy assistant  -AL    Document Type therapy note (daily note)  -AL therapy note (daily note)  -MB therapy note (daily note)  -AL    Subjective Information agree to therapy  -AL  agree to therapy  -AL    Precautions/Limitations fall precautions;hip precautions- right   knee immobilizer R leg to be worn in bed, confused  -AL  fall precautions;hip precautions- right   knee immobilizer on when in bed  -AL    Recorded by [AL] Yesenia Llanes PTA [MB] Liss Breaux CCC-SLP [AL] Yesenia Llanes PTA    Pain Assessment    Pain  Assessment Antony-Briggs FACES  -AL  Antony-Briggs FACES  -AL    Antony-Briggs FACES Pain Rating 2  -AL  4  -AL    Pain Type Surgical pain  -AL  Surgical pain  -AL    Pain Location Hip  -AL  Hip  -AL    Pain Orientation Right  -AL  Right  -AL    Pain Frequency Intermittent  -AL  Intermittent  -AL    Pain Intervention(s) Medication (See MAR)  -AL  Medication (See MAR)  -AL    Response to Interventions tolerated  -AL  Tolerated  -AL    Recorded by [AL] Yesenia Llanes PTA  [AL] Yesenia Llanes PTA    Dysphagia/Swallow Intervention    Dysphagia/Swallow Therapeutic Feed  Observed pt's  feeding pt lunch. The pt a throat clear type behavior 1 to 2 times during the meal, however it is unclear if this is a typical habit for her. The pt's  was noted to provide large bites at times, however did self-correct independently and state he would give her smaller bites.  -MB     Recorded by  [MB] Liss Breaux CCC-SLP     Bed Mobility, Assessment/Treatment    Bed Mob, Supine to Sit, [place]   moderate assist (50% patient effort);maximum assist (25% patient effort);2 person assist required;verbal cues required  -AL    Bed Mob, Sit to Supine, [place]   maximum assist (25% patient effort);2 person assist required;verbal cues required  -AL    Recorded by   [AL] Yesenia Llanes PTA    Transfer Assessment/Treatment    Transfers, Sit-Stand [place]   moderate assist (50% patient effort);maximum assist (25% patient effort);2 person assist required;verbal cues required  -AL    Transfers, Stand-Sit [place]   moderate assist (50% patient effort);verbal cues required;2 person assist required  -AL    Transfers, Sit-Stand-Sit, Assist Device   rolling walker  -AL    Transfer, Comment   Stood x 2 with Mod assist, cues to stand tall   -AL    Recorded by   [AL] Yesenia Llanes PTA    Balance Skills Training    Sitting-Level of Assistance   Contact guard;Minimum assistance  -AL    Sitting-Balance Support   Left upper extremity  supported;Feet supported  -AL    Sitting-Balance Activities   Trunk control activities  -AL    Sitting # of Minutes   15  -AL    Standing-Level of Assistance   Moderate assistance;x2  -AL    Static Standing Balance Support   assistive device  -AL    Standing Balance # of Minutes   5  -AL    Stepping Strategy Assessment (Balance Skills)   attempted side steps but wasn't able to move her feet.  Needs cues to stand tall and keep hips forward.  -AL    Recorded by   [AL] Yesenia Llanes PTA    Therapy Exercises    Right Lower Extremity AAROM:;20 reps;supine;ankle pumps/circles;hip abduction/adduction;SAQ;heel slides;PROM:   patient needs verbal and tactile cues to stay awake  -AL  AAROM:;10 reps;LAQ;hip abduction/adduction  -AL    Recorded by [AL] Yesenia Llanes PTA  [AL] Yesenia Llanes PTA    Orthotics Prosthetics    Additional Documentation Orthosis Location (Group)  -AL  Orthosis Location (Group)  -AL    Recorded by [AL] Yesenia Llanes PTA  [AL] Yesenia Llanes PTA    Orthosis Location    Orthosis Location/Type lower extremity  -AL  lower extremity  -AL    Orthosis, Lower Extremity Right:;knee immobilizer   removed for exercisees, put brace back on after exercisees.  -AL  Right:;knee immobilizer  -AL    Recorded by [AL] Yesenia Llanes PTA  [AL] Yesenia Llanes PTA    Orthosis Management/Training    Orthosis Wear Schedule wear at night or when in bed  -AL  wear at night or when in bed  -AL    Recorded by [AL] Yesenia Llanes PTA  [AL] Yesenia Llanes PTA    Positioning and Restraints    Pre-Treatment Position in bed  -AL  in bed  -AL    Post Treatment Position bed  -AL  bed  -AL    In Bed fowlers;call light within reach;with family/caregiver  -AL  fowlers;call light within reach;with family/caregiver  -AL    Recorded by [AL] Yesenia Llanes PTA  [AL] Yesenia Llanes PTA      06/24/17 0814 06/23/17 1530 06/23/17 1200    Rehab Assessment/Intervention    Discipline physical therapy assistant  -AL physical therapy assistant  -  speech language pathologist  -CS    Document Type therapy note (daily note)  -AL therapy note (daily note)  -AH therapy note (daily note)  -CS    Subjective Information agree to therapy  -AL agree to therapy;complains of;weakness;fatigue;pain  -AH no complaints;agree to therapy  -CS    Patient Effort, Rehab Treatment   adequate  -CS    Precautions/Limitations fall precautions;hip precautions- right   knee immobilizer on when in bed  -AL fall precautions;hip precautions- right   R knee immobilizer when in bed  -AH     Recorded by [AL] Yesenia Llanes PTA [AH] Yesenia eMrritt PTA [CS] Yordy Whiting, MS, CFY-SLP    Pain Assessment    Pain Assessment Antony-Briggs FACES  -AL Antony-Briggs FACES  -AH No/denies pain  -CS    Antony-Briggs FACES Pain Rating 6  -AL 6  -AH     Pain Type Surgical pain  -AL Surgical pain  -AH     Pain Location Hip  -AL Hip  -AH     Pain Orientation Right  -AL Right  -AH     Pain Frequency Intermittent  -AL Intermittent   with bed mobility  -AH     Pain Intervention(s) Repositioned  -AL Repositioned  -AH     Response to Interventions tolerated  -AL tolerated  -AH     Recorded by [AL] Yesenia Llanes PTA [] Yesenia Merritt PTA [CS] Yordy Whiting, MS, CFY-SLP    Dysphagia/Swallow Intervention    Dysphagia/Swallow Therapeutic Feed   ST presented pt with mechanical soft, pureed, and honey thick consistencies at lunch. ST notes pt to be fatigued throughout session requiring moderate verbal and tactile cues to stay aroused. Pt's  was insistent that he feed the pt and ST observe. ST notes pt's  provided pt with large bolus portions in which ST cued the  to decrease bolus size with PO trials presented. Pt verbalized understanding and made adjustment. ST also informed pt's  that the pt always needed to be sitting in and upright position while completing PO intake. No overt s/s were noted with pureed consistencies and honey thick liquids. However, the pt exhibited significant difficulties  with mechanical soft consistencies as noted by a weak rotary chew and prolonged mastication. ST eventually cued the pt to swallow in which she was able to complete. Post swallow a moderate-severe amount of residue remained throughout the oral cavity. Pt eventually cleared residue with multiple swallows and a honey thick liquid wash. Pt downgraded to pureed consistencies at this time. ST will continue to follow and treat.  -CS    Recorded by   [CS] Yordy Whiting, MS, CFY-SLP    Bed Mobility, Assessment/Treatment    Bed Mob, Supine to Sit, [place] moderate assist (50% patient effort);maximum assist (25% patient effort);2 person assist required;verbal cues required  -AL moderate assist (50% patient effort);maximum assist (25% patient effort);2 person assist required;verbal cues required  -AH     Bed Mob, Sit to Supine, [place] maximum assist (25% patient effort);2 person assist required;verbal cues required  -AL maximum assist (25% patient effort);2 person assist required;verbal cues required  -     Recorded by [AL] Yesenia Llanes PTA [] Yesenia Merritt PTA     Transfer Assessment/Treatment    Transfers, Sit-Stand [place] moderate assist (50% patient effort);maximum assist (25% patient effort);2 person assist required;verbal cues required  -AL moderate assist (50% patient effort);maximum assist (25% patient effort);2 person assist required;verbal cues required  -AH     Transfers, Stand-Sit [place] moderate assist (50% patient effort);verbal cues required;2 person assist required  -AL moderate assist (50% patient effort);verbal cues required;2 person assist required  -     Transfers, Sit-Stand-Sit, Assist Device rolling walker  -AL      Toilet Transfer, [place] --   Had BM, CNA cleaned patient while standing  -AL      Transfer, Comment stood x 2 with mod assist  -AL      Recorded by [AL] Yesenia Llanes PTA [] Yesenia Merritt PTA     Balance Skills Training    Sitting-Level of Assistance  Contact guard;Minimum assistance  -AL Contact guard;Minimum assistance  -     Sitting-Balance Support Left upper extremity supported;Feet supported  -AL Left upper extremity supported;Feet supported  -     Sitting-Balance Activities Trunk control activities  -AL Trunk control activities  -     Sitting # of Minutes 15  -AL 20  -AH     Standing-Level of Assistance Moderate assistance;x2  -AL Moderate assistance;x2  -AH     Static Standing Balance Support assistive device  -AL assistive device  -     Standing Balance # of Minutes 3  -AL 2  -AH     Recorded by [AL] Yesenia Llanes PTA [] Yesenia Merritt PTA     Therapy Exercises    Right Lower Extremity  AAROM:;10 reps;sitting  -AH     Recorded by  [] Yesenia Merritt PTA     Orthotics Prosthetics    Additional Documentation Orthosis Location (Group)  -AL Orthosis Location (Group)  -     Recorded by [AL] Yesenia Llanes PTA [] Yesenia Merritt PTA     Orthosis Location    Orthosis Location/Type lower extremity  -AL lower extremity  -AH     Orthosis, Lower Extremity Right:;knee immobilizer  -AL Right:;knee immobilizer  -     Recorded by [AL] Yesenia Llanes PTA [] Yesenia Merritt PTA     Orthosis Management/Training    Orthosis Wear Schedule wear at night or when in bed  -AL wear at night or when in bed  -     Recorded by [AL] Yesenia Llanes PTA [] Yesenia Merritt PTA     Positioning and Restraints    Pre-Treatment Position in bed  -AL in bed  -     Post Treatment Position bed  -AL bed  -AH     In Bed fowlers;call light within reach;SCD pump applied;R knee immobilizer  -AL fowlers;call light within reach;encouraged to call for assist;with family/caregiver;SCD pump applied;R knee immobilizer  -     Recorded by [AL] Yesenia Llanes PTA [] Yesenia Merritt PTA       06/23/17 1020 06/23/17 1000 06/23/17 0808    Rehab Assessment/Intervention    Discipline physical therapy assistant  - speech language pathologist  - occupational therapy assistant   -TS    Document Type therapy note (daily note)  - therapy note (daily note)  -TM therapy note (daily note)  -TS    Subjective Information no complaints   pt still lethargic  - fatigue   Significant fatigue, sternal rub, max verbal, tactile cues.  -TM no complaints   pt stated yes/no to questions  -TS    Patient Effort, Rehab Treatment  fair  -TM     Symptoms Noted Comment   lethagic and would not open eyes through tx  -TS    Precautions/Limitations fall precautions;hip precautions- right   Rknee immobilizer in bed  -AH  fall precautions;swallowing precautions;hip precautions- right   knee immobilizer in bed RLE  -TS    Recorded by [AH] Yesenia Merritt PTA [TM] Marva Jamil, CCC-SLP [TS] CITLALY NaranjoA/L    Pain Assessment    Pain Assessment Antony-Briggs FACES  -  Antony-Briggs FACES  -TS    Antony-Briggs FACES Pain Rating 2  -AH  0  -TS    Pain Type Surgical pain  -AH      Pain Location Hip  -AH      Pain Orientation Right  -AH      Pain Frequency Intermittent   with ROM  -AH      Pain Intervention(s) Repositioned  -AH      Recorded by [AH] Yesenia Merritt PTA  [TS] Julisa Balbuena, ALVAREZ/L    Cognitive Assessment/Intervention    Personal Safety Interventions   fall prevention program maintained  -TS    Recorded by   [TS] Julisa Balbuena, ALVAREZ/L    Dysphagia/Swallow Intervention    Dysphagia/Swallow Therapeutic Feed  Pt was quite fatigued at time of ST arrival, essentially lethargic; however, with able to arouse for brief periods with maximal, multi-modal cues including sternal rub, verbal cues, and tactile stim with labial ice chip stimulation. With labial and lingual ice chip stimulation, pt required max cues to awaken and to follow commands for lingual protrusion, though pt was able to follow. Pt was able to complete a swallow following stim and cues with each of the two attempted trials. Pt was then presented meds crushed in applesauce, presented to the pt by the RN, Kamla. Pt was noted to have  what appeared to be quite immediate swallow initiations, with up to three multiple swallows across three trials. It must be noted that laryngeal elevation decreased when multiple swallows were observed. Educated RN and  on concerns with pt's level alertness placing her at an increased risk for aspiration with PO intake. Minutes following the final PO trial presentation, pt was noted to have two separate coughing instances. RN stated that concerns where noted this AM with  attempting to feed pt when pt appeared too fatigued. Therefore, RN stated she called in nurse aid, Tiff, to assist with PO at that time and the aid removed the tray from the bedside post meal secondary to this concern. RN stated lungs are diminished. Cannot fully r/o aspiration with PO intake. Educated  that ST will follow up at lunch time today to monitor for safety with PO intake and to provide further recommendations as felt warranted.  -TM     Recorded by  [TM] Marva Jamil CCC-SLP     ADL Assessment/Intervention    Additional Documentation   Self-Feeding Assessment/Training (Group)  -TS    Recorded by   [TS] KIM Naranjo/L    Self-Feeding Assessment/Training    Self-Feeding Assess/Train, Position   --   HOB elevated to 90  -TS    Self-Feeding Assess/Train, [place]   maximum assist (25% patient effort);dependent (less than 25% patient effort)  -TS    Self-Feeding Assess/Train, Impairments   --   decreased cognition  -TS    Self-Feeding Assess/Train, Comment   Pt responded yes/no x3 occurances during self feeding. Pt kept eyes closed through task with max encouragement to open eyes and hold utensils. Tamayo attempted [place] food to mouth, pt assisted 25% with bringing food to mouth. Pt provided verbal cues and increased time to chew and swallow with feeding task.   -TS    Recorded by   [TS] KIM Naranjo/L    Therapy Exercises    Exercise Protocols total hip  -AH      Total Hip Exercises  right:;10 reps   PROM  -      Recorded by [] Yesenia Merritt PTA      Orthosis Location    Orthosis Location/Type lower extremity  -      Orthosis, Lower Extremity Right:;knee immobilizer  -      Recorded by [] Yesenia Merritt PTA      Orthosis Management/Training    Orthosis Wear Schedule wear at night or when in bed  -      Recorded by [] Yesenia Merritt PTA      Positioning and Restraints    Pre-Treatment Position in bed  -  in bed  -    Post Treatment Position bed  -  bed  -TS    In Bed fowlers;call light within reach;encouraged to call for assist;exit alarm on;with family/caregiver;R knee immobilizer  -  fowlers;call light within reach;encouraged to call for assist;with family/caregiver;side rails up x2;with brace;SCD pump applied  -    Recorded by [] Yesenia Merritt PTA  [TS] Julisa Balbuena, ALVAREZ/L      06/22/17 2433          Swallow Assessment/Intervention    Additional Documentation Dysphagia/Swallow Intervention (Group)  -MB      Recorded by [MB] Liss Breaux [facility]-SLP      Dysphagia/Swallow Intervention    Dysphagia/Swallow Therapeutic Feed Pt will tolerate trials of current diet and upgraded consistencies with no overt s/s of aspiration.  -MB      Recorded by [MB] Liss Breaux CCC-SLP        User Key  (r) = Recorded By, (t) = Taken By, (c) = Cosigned By    Initials Name Effective Dates    MB Liss Breaux CCC-SLP 08/02/16 -      Yesenia Merritt, [facility] 08/02/16 -     AL Yesenia Llanes, [facility] 08/02/16 -      Marva Jamil CCC-SLP 08/02/16 -     TS Julisa Balbuena, ALVAREZ/L 08/02/16 -     CS Yordy Whiting MS, Y-SLP 08/02/16 -                 IP PT Goals       06/21/17 0810          Bed Mobility PT LTG    Bed Mobility PT LTG, Date Established 06/21/17  -EDMUND      Bed Mobility PT LTG, Time to Achieve by discharge  -EDMUND      Bed Mobility PT LTG, Activity Type supine to sit/sit to supine  -EDMUND      Bed Mobility PT LTG, [place] Level minimum assist (75% patient  effort)  -EDMUND      Bed Mobility PT Goal  LTG, Assist Device bed rails  -EDMUND      Transfer Training PT LTG    Transfer Training PT LTG, Date Established 06/21/17  -EDMUND      Transfer Training PT LTG, Time to Achieve by discharge  -EDMUND      Transfer Training PT LTG, Activity Type bed to chair /chair to bed;sit to stand/stand to sit  -EDMUND      Transfer Training PT LTG, [place] Level minimum assist (75% patient effort)  -EDMUND      Transfer Training PT LTG, Assist Device walker, rolling  -EDMUND      Gait Training PT LTG    Gait Training Goal PT LTG, Date Established 06/21/17  -EDMUND      Gait Training Goal PT LTG, Time to Achieve by discharge  -EDMUND      Gait Training Goal PT LTG, [place] Level minimum assist (75% patient effort)  -EDMUND      Gait Training Goal PT LTG, Assist Device walker, rolling  -EDMUND      Gait Training Goal PT LTG, Distance to Achieve 75  -EDMUND      Strength Goal PT LTG    Strength Goal PT LTG, Date Established 06/21/17  -EDMUND      Strength Goal PT LTG, Time to Achieve by discharge  -EDMUND      Strength Goal PT LTG, Measure to Achieve AROM/AAROM, 20 reps, B UE/LE, min assist  -EDMUND        User Key  (r) = Recorded By, (t) = Taken By, (c) = Cosigned By    Initials Name Provider Type    EDMUND Cesar, PT DPT Physical Therapist          Physical Therapy Education     Title: PT OT SLP Therapies (Active)     Topic: Physical Therapy (Active)     Point: Mobility training (Active)    Learning Progress Summary    Learner Readiness Method Response Comment Documented by Status   Patient Acceptance E NR Standing tall, taking steps AL 06/25/17 0838 Active    Acceptance E NR Needs encouragement for standing AL 06/24/17 0847 Active    Acceptance E NL bed mobility  06/22/17 1129 Active    Acceptance E NR Educated pt./spouse on progression of PT POC, benefits of activity, R hip precautions. EDMUND 06/21/17 0810 Active   Significant Other Acceptance E NR Educated pt./spouse on progression of PT POC, benefits of activity, R hip  precautions.  06/21/17 0810 Active               Point: Home exercise program (Active)    Learning Progress Summary    Learner Readiness Method Response Comment Documented by Status   Patient Acceptance E NR Needs encouragement for standing AL 06/24/17 0847 Active               Point: Body mechanics (Active)    Learning Progress Summary    Learner Readiness Method Response Comment Documented by Status   Patient Acceptance E NR Needs encouragement for standing AL 06/24/17 0847 Active               Point: Precautions (Active)    Learning Progress Summary    Learner Readiness Method Response Comment Documented by Status   Patient Acceptance E NR Needs encouragement for standing AL 06/24/17 0847 Active    Acceptance E NR Educated pt./spouse on progression of PT POC, benefits of activity, R hip precautions.  06/21/17 0810 Active   Significant Other Acceptance E,D,H VU,NR THR HEP and precautions  06/23/17 1035 Done    Acceptance E NR Educated pt./spouse on progression of PT POC, benefits of activity, R hip precautions.  06/21/17 0810 Active                      User Key     Initials Effective Dates Name Provider Type Discipline     08/02/16 -  Yesenia Merritt, MIYA Physical Therapy Assistant PT    AL 08/02/16 -  Yesenia Llanes PTA Physical Therapy Assistant PT     08/02/16 -  Deonte Cesar, PT DPT Physical Therapist PT                    PT Recommendation and Plan  Anticipated Equipment Needs At Discharge: two wheeled walker  Anticipated Discharge Disposition: transitional care, skilled nursing facility, inpatient rehabilitation facility  Planned Therapy Interventions: bed mobility training, transfer training, gait training, balance training, home exercise program, orthotic fitting/training, patient/family education, postural re-education, strengthening  PT Frequency: 2 times/day, per priority policy  Plan of Care Review  Plan Of Care Reviewed With: patient  Progress: progress toward functional goals is  gradual  Outcome Summary/Follow up Plan: Patient needed more verbal and tactile cues to keep her eyes open and stay focused during treatment.  She needs mod to max assist x 2 for trasfers.  She stands with a RWX mod assist x 2.  She is not able to take any steps today.  Will continue to work on improving pre-gait activities and progressing toward gait.          Outcome Measures       06/25/17 0725 06/24/17 0814 06/23/17 1000    How much help from another person do you currently need...    Turning from your back to your side while in flat bed without using bedrails? 2  -AL 2  -AL 1  -AH    Moving from lying on back to sitting on the side of a flat bed without bedrails? 1  -AL 1  -AL 1  -AH    Moving to and from a bed to a chair (including a wheelchair)? 1  -AL 1  -AL 1  -AH    Standing up from a chair using your arms (e.g., wheelchair, bedside chair)? 1  -AL 1  -AL 1  -AH    Climbing 3-5 steps with a railing? 1  -AL 1  -AL 1  -AH    To walk in hospital room? 1  -AL 1  -AL 1  -AH    AM-PAC 6 Clicks Score 7  -AL 7  -AL 6  -AH    Functional Assessment    Outcome Measure Options AM-PAC 6 Clicks Basic Mobility (PT)  -AL AM-PAC 6 Clicks Basic Mobility (PT)  -AL AM-PAC 6 Clicks Basic Mobility (PT)  -AH      06/23/17 0800          How much help from another is currently needed...    Putting on and taking off regular lower body clothing? 1  -TS      Bathing (including washing, rinsing, and drying) 1  -TS      Toileting (which includes using toilet bed pan or urinal) 1  -TS      Putting on and taking off regular upper body clothing 1  -TS      Taking care of personal grooming (such as brushing teeth) 1  -TS      Eating meals 1  -TS      Score 6  -TS      Functional Assessment    Outcome Measure Options AM-PAC 6 Clicks Daily Activity (OT)  -TS        User Key  (r) = Recorded By, (t) = Taken By, (c) = Cosigned By    Initials Name Provider Type    NOEMY Merritt PTA Physical Therapy Assistant    DAYA Llanes PTA Physical  Therapy Assistant    KIM Almeida/L Occupational Therapy Assistant           Time Calculation:         PT Charges       06/25/17 1320 06/25/17 0725       Time Calculation    Start Time 1320  -AL 0725  -AL     Stop Time 1332  -AL 0754  -AL     Time Calculation (min) 12 min  -AL 29 min  -AL     PT Received On 06/24/17  -AL 06/25/17  -AL     PT Goal Re-Cert Due Date 07/01/17  -AL 07/01/17  -AL     Time Calculation- PT    Total Timed Code Minutes- PT 12 minute(s)  -AL 29 minute(s)  -AL       User Key  (r) = Recorded By, (t) = Taken By, (c) = Cosigned By    Initials Name Provider Type    DAYA Llanes PTA Physical Therapy Assistant          Therapy Charges for Today     Code Description Service Date Service Provider Modifiers Qty    [id number] HC PT THERAPEUTIC ACT EA 15 MIN 6/24/2017 Yesenia Llanes PTA GP, KX 2    31732057863 HC PT THERAPEUTIC ACT EA 15 MIN 6/25/2017 Yesenia Llanes PTA GP, KX 2    16030088365 HC PT THER PROC EA 15 MIN 6/25/2017 Yesenia Llanes PTA GP, KX 1          PT G-Codes  Outcome Measure Options: AM-PAC 6 Clicks Basic Mobility (PT)  Score: 9  Functional Limitation: Mobility: Walking and moving around  Mobility: Walking and Moving Around Current Status (): At least 60 percent but less than 80 percent impaired, limited or restricted  Mobility: Walking and Moving Around Goal Status (): At least 20 percent but less than 40 percent impaired, limited or restricted    Yesenia Llanes PTA  6/25/2017

## 2017-06-25 NOTE — PROGRESS NOTES
"Pharmacokinetic Follow-up Note - Vancomycin  Pharmacy to Dose Merrem    Renay Lazo is a 88 y.o. female [Ht: 64.02\" (162.6 cm); Wt: 178 lb (80.7 kg)]    Estimated Creatinine Clearance: 50 mL/min (by C-G formula based on Cr of 0.74).   Lab Results   Component Value Date    CREATININE 0.74 06/25/2017    CREATININE 0.85 06/24/2017    CREATININE 1.18 06/23/2017      Lab Results   Component Value Date    WBC 7.92 06/25/2017    WBC 8.66 06/24/2017    WBC 8.46 06/23/2017      Lab Results   Component Value Date    VANCOTROUGH 15.14 03/29/2014         Current Vancomycin Dose:  1000 mg IVPB every 24 hours, day 2 of therapy.  Current Merrem Dose:  1000 mg IVPB every 12 hours, day 2 of therapy.  Indication for use: pneumonia    Assessment/Plan:  SCr = 0.74 (down from 0.85) Increase Merrem to 1000 mg iv Q8H. Continue Vancomycin 1000 mg iv q24h  Pharmacy will continue to monitor renal function and adjust dose accordingly.    Brandon Cedeno, [facility]   06/25/17 5:52 PM    "

## 2017-06-25 NOTE — PLAN OF CARE
Problem: Patient Care Overview (Adult)  Goal: Plan of Care Review  Outcome: Ongoing (interventions implemented as appropriate)    06/25/17 0839   Coping/Psychosocial Response Interventions   Plan Of Care Reviewed With patient   Patient Care Overview   Progress progress toward functional goals is gradual   Outcome Evaluation   Outcome Summary/Follow up Plan Patient needed more verbal and tactile cues to keep her eyes open and stay focused during treatment. She needs mod to max assist x 2 for transfers. She stands with a RWX mod assist x 2. She is not able to take any steps today. Will continue to work on improving pre-gait activities and progressing toward gait.

## 2017-06-25 NOTE — PLAN OF CARE
Problem: Patient Care Overview (Adult)  Goal: Plan of Care Review  Outcome: Ongoing (interventions implemented as appropriate)    06/25/17 1304   Coping/Psychosocial Response Interventions   Plan Of Care Reviewed With patient   Patient Care Overview   Progress no change   Outcome Evaluation   Outcome Summary/Follow up Plan Observed pt's  feeding pt lunch. The pt a throat clear type behavior 1 to 2 times during the meal, however it is unclear if this is a typical habit for her. The pt's  was noted to provide large bites at times, however did self-correct independently and state he would give her smaller bites.         Problem: Inpatient SLP  Goal: Dysphagia- Patient will safely consume diet as per recommendation with no signs/symptoms of aspiration  Outcome: Ongoing (interventions implemented as appropriate)    06/22/17 1533 06/25/17 1304   Safely Consume Diet   Safely Consume Diet- SLP, Date Established 06/22/17 --    Safely Consume Diet- SLP, Time to Achieve by discharge --    Safely Consume Diet- SLP, Additional Goal Pt will tolerate trials of current diet and upgraded consistencies with no overt s/s of aspiration. --    Safely Consume Diet- SLP, Date Goal Reviewed --  06/25/17   Safely Consume Diet- SLP, Outcome goal ongoing --

## 2017-06-25 NOTE — THERAPY TREATMENT NOTE
Acute Care - Physical Therapy Treatment Note  [facility]     Patient Name: Renay Lazo  : 1929  MRN: [id number]  Today's Date: 2017  Onset of Illness/Injury or Date of Surgery Date: 17  Date of Referral to PT: 17  Referring Physician: (S) Dr. Zaidi (Post hip precautions, WBAT)    Admit Date: 2017    Visit Dx:    ICD-10-CM ICD-9-CM   1. Closed displaced intertrochanteric fracture of right femur, initial encounter S72.141A 820.21   2. Closed subcapital fracture of neck of right femur, initial encounter S72.011A 820.09   3. Decreased activities of daily living (ADL) Z78.9 V49.89   4. Impaired mobility Z74.09 799.89   5. Oropharyngeal dysphagia R13.12 787.22     Patient Active Problem List   Diagnosis   • Closed C2 fracture   • C2 cervical fracture   • Transient alteration of awareness   • Paroxysmal atrial fibrillation   • History of meningioma of the brain   • UTI (urinary tract infection)   • Over weight   • Closed displaced fracture of second cervical vertebra with routine healing   • Closed subcapital fracture of neck of right femur   • Obstructive sleep apnea   • Dementia   • Hypertension   • Pulmonary edema   • Diastolic dysfunction   • Pulmonary HTN               Adult Rehabilitation Note       17 0725 17 0814 17 1530    Rehab Assessment/Intervention    Discipline physical therapy assistant  -AL physical therapy assistant  -AL physical therapy assistant  -    Document Type therapy note (daily note)  -AL therapy note (daily note)  -AL therapy note (daily note)  -    Subjective Information agree to therapy  -AL agree to therapy  -AL agree to therapy;complains of;weakness;fatigue;pain  -    Precautions/Limitations fall precautions;hip precautions- right   knee immobilizer on when in bed  -AL fall precautions;hip precautions- right   knee immobilizer on when in bed  -AL fall precautions;hip precautions- right   R knee immobilizer when in bed  -    Recorded by  [AL] Yesenia Llanes PTA [AL] Yesenia Llanes PTA [AH] Yesenia Merritt PTA    Pain Assessment    Pain Assessment Antony-Briggs FACES  -AL Antony-Baker FACES  -AL Antony-Briggs FACES  -AH    Antony-Briggs FACES Pain Rating 4  -AL 6  -AL 6  -AH    Pain Type Surgical pain  -AL Surgical pain  -AL Surgical pain  -AH    Pain Location Hip  -AL Hip  -AL Hip  -AH    Pain Orientation Right  -AL Right  -AL Right  -AH    Pain Frequency Intermittent  -AL Intermittent  -AL Intermittent   with bed mobility  -    Pain Intervention(s) Medication (See MAR)  -AL Repositioned  -AL Repositioned  -AH    Response to Interventions Tolerated  -AL tolerated  -AL tolerated  -AH    Recorded by [AL] Yesenia Llanes PTA [AL] Yesenia Llanes PTA [] Yesenia Merritt PTA    Bed Mobility, Assessment/Treatment    Bed Mob, Supine to Sit, [place] moderate assist (50% patient effort);maximum assist (25% patient effort);2 person assist required;verbal cues required  -AL moderate assist (50% patient effort);maximum assist (25% patient effort);2 person assist required;verbal cues required  -AL moderate assist (50% patient effort);maximum assist (25% patient effort);2 person assist required;verbal cues required  -AH    Bed Mob, Sit to Supine, [place] maximum assist (25% patient effort);2 person assist required;verbal cues required  -AL maximum assist (25% patient effort);2 person assist required;verbal cues required  -AL maximum assist (25% patient effort);2 person assist required;verbal cues required  -AH    Recorded by [AL] Yesenia Llanes PTA [AL] Yesenia Llanes PTA [] Yesenia Merritt PTA    Transfer Assessment/Treatment    Transfers, Sit-Stand [place] moderate assist (50% patient effort);maximum assist (25% patient effort);2 person assist required;verbal cues required  -AL moderate assist (50% patient effort);maximum assist (25% patient effort);2 person assist required;verbal cues required  -AL moderate assist (50% patient effort);maximum assist  (25% patient effort);2 person assist required;verbal cues required  -    Transfers, Stand-Sit [place] moderate assist (50% patient effort);verbal cues required;2 person assist required  -AL moderate assist (50% patient effort);verbal cues required;2 person assist required  -AL moderate assist (50% patient effort);verbal cues required;2 person assist required  -    Transfers, Sit-Stand-Sit, Assist Device rolling walker  -AL rolling walker  -AL     Toilet Transfer, [place]  --   Had BM, CNA cleaned patient while standing  -AL     Transfer, Comment Stood x 2 with Mod assist, cues to stand tall   -AL stood x 2 with mod assist  -AL     Recorded by [AL] Yesenia Llanes PTA [AL] Yesenia Llanes PTA [] Yesenia Merritt PTA    Balance Skills Training    Sitting-Level of Assistance Contact guard;Minimum assistance  -AL Contact guard;Minimum assistance  -AL Contact guard;Minimum assistance  -    Sitting-Balance Support Left upper extremity supported;Feet supported  -AL Left upper extremity supported;Feet supported  -AL Left upper extremity supported;Feet supported  -    Sitting-Balance Activities Trunk control activities  -AL Trunk control activities  -AL Trunk control activities  -    Sitting # of Minutes 15  -AL 15  -AL 20  -AH    Standing-Level of Assistance Moderate assistance;x2  -AL Moderate assistance;x2  -AL Moderate assistance;x2  -AH    Static Standing Balance Support assistive device  -AL assistive device  -AL assistive device  -    Standing Balance # of Minutes 5  -AL 3  -AL 2  -AH    Stepping Strategy Assessment (Balance Skills) attempted side steps but wasn't able to move her feet.  Needs cues to stand tall and keep hips forward.  -AL      Recorded by [AL] Yesenia Llanes PTA [AL] Yesenia Llanes PTA [] Yesenia Merritt PTA    Therapy Exercises    Right Lower Extremity AAROM:;10 reps;LAQ;hip abduction/adduction  -AL  AAROM:;10 reps;sitting  -AH    Recorded by [AL] Yesenia Llanes PTA  []  Yesenia Merritt PTA    Orthotics Prosthetics    Additional Documentation Orthosis Location (Group)  -AL Orthosis Location (Group)  -AL Orthosis Location (Group)  -AH    Recorded by [AL] Yesenia Llanes PTA [AL] Yesenia Llanes PTA [] Yesenia Merritt PTA    Orthosis Location    Orthosis Location/Type lower extremity  -AL lower extremity  -AL lower extremity  -AH    Orthosis, Lower Extremity Right:;knee immobilizer  -AL Right:;knee immobilizer  -AL Right:;knee immobilizer  -AH    Recorded by [AL] Yesenia Llanes PTA [AL] Yesenia Llanes PTA [] Yesenia Merritt PTA    Orthosis Management/Training    Orthosis Wear Schedule wear at night or when in bed  -AL wear at night or when in bed  -AL wear at night or when in bed  -AH    Recorded by [AL] Yesenia Llanes PTA [AL] Yesenia Llanes PTA [] Yesenia Merritt PTA    Positioning and Restraints    Pre-Treatment Position in bed  -AL in bed  -AL in bed  -AH    Post Treatment Position bed  -AL bed  -AL bed  -AH    In Bed fowlers;call light within reach;with family/caregiver  -AL fowlers;call light within reach;SCD pump applied;R knee immobilizer  -AL fowlers;call light within reach;encouraged to call for assist;with family/caregiver;SCD pump applied;R knee immobilizer  -AH    Recorded by [AL] Yesenia Llanes PTA [AL] Yesenia Llanes PTA [] Yesenia Merritt PTA      06/23/17 1200 06/23/17 1020 06/23/17 1000    Rehab Assessment/Intervention    Discipline speech language pathologist  -CS physical therapy assistant  - speech language pathologist  -    Document Type therapy note (daily note)  -CS therapy note (daily note)  - therapy note (daily note)  -    Subjective Information no complaints;agree to therapy  - no complaints   pt still lethargic  - fatigue   Significant fatigue, sternal rub, max verbal, tactile cues.  -TM    Patient Effort, Rehab Treatment adequate  -CS  fair  -TM    Precautions/Limitations  fall precautions;hip precautions- right   Rknee immobilizer in  bed  -AH     Recorded by [CS] Yordy Whiting, MS, CFY-SLP [AH] Yesenia Merritt PTA [TM] Marva Jamil, [facility]-SLP    Pain Assessment    Pain Assessment No/denies pain  - Antony-Briggs FACES  -     Antony-Briggs FACES Pain Rating  2  -AH     Pain Type  Surgical pain  -AH     Pain Location  Hip  -AH     Pain Orientation  Right  -AH     Pain Frequency  Intermittent   with ROM  -AH     Pain Intervention(s)  Repositioned  -AH     Recorded by [CS] Yordy Whiting, MS, CFY-SLP [AH] Yesenia Merritt PTA     Dysphagia/Swallow Intervention    Dysphagia/Swallow Therapeutic Feed ST presented pt with mechanical soft, pureed, and honey thick consistencies at lunch. ST notes pt to be fatigued throughout session requiring moderate verbal and tactile cues to stay aroused. Pt's  was insistent that he feed the pt and ST observe. ST notes pt's  provided pt with large bolus portions in which ST cued the  to decrease bolus size with PO trials presented. Pt verbalized understanding and made adjustment. ST also informed pt's  that the pt always needed to be sitting in and upright position while completing PO intake. No overt s/s were noted with pureed consistencies and honey thick liquids. However, the pt exhibited significant difficulties with mechanical soft consistencies as noted by a weak rotary chew and prolonged mastication. ST eventually cued the pt to swallow in which she was able to complete. Post swallow a moderate-severe amount of residue remained throughout the oral cavity. Pt eventually cleared residue with multiple swallows and a honey thick liquid wash. Pt downgraded to pureed consistencies at this time. ST will continue to follow and treat.  -CS  Pt was quite fatigued at time of ST arrival, essentially lethargic; however, with able to arouse for brief periods with maximal, multi-modal cues including sternal rub, verbal cues, and tactile stim with labial ice chip stimulation. With labial and lingual ice  chip stimulation, pt required max cues to awaken and to follow commands for lingual protrusion, though pt was able to follow. Pt was able to complete a swallow following stim and cues with each of the two attempted trials. Pt was then presented meds crushed in applesauce, presented to the pt by the RN, Kamla. Pt was noted to have what appeared to be quite immediate swallow initiations, with up to three multiple swallows across three trials. It must be noted that laryngeal elevation decreased when multiple swallows were observed. Educated RN and  on concerns with pt's level alertness placing her at an increased risk for aspiration with PO intake. Minutes following the final PO trial presentation, pt was noted to have two separate coughing instances. RN stated that concerns where noted this AM with  attempting to feed pt when pt appeared too fatigued. Therefore, RN stated she called in nurse aid, Tiff, to assist with PO at that time and the aid removed the tray from the bedside post meal secondary to this concern. RN stated lungs are diminished. Cannot fully r/o aspiration with PO intake. Educated  that ST will follow up at lunch time today to monitor for safety with PO intake and to provide further recommendations as felt warranted.  -TM    Recorded by [] Yordy Whiting MS, CFY-SLP  [TM] Marva Jamil, [facility]-SLP    Therapy Exercises    Exercise Protocols  total hip  -     Total Hip Exercises  right:;10 reps   PROM  -     Recorded by  [] Yesenia Merritt PTA     Orthosis Location    Orthosis Location/Type  lower extremity  -     Orthosis, Lower Extremity  Right:;knee immobilizer  -     Recorded by  [] Yesenia Merritt PTA     Orthosis Management/Training    Orthosis Wear Schedule  wear at night or when in bed  -     Recorded by  [] Yesenia Merritt PTA     Positioning and Restraints    Pre-Treatment Position  in bed  -     Post Treatment Position  bed  -     In Bed   fowlers;call light within reach;encouraged to call for assist;exit alarm on;with family/caregiver;R knee immobilizer  -     Recorded by  [] Yesenia Merritt PTA       06/23/17 0808 06/22/17 1455 06/22/17 1022    Rehab Assessment/Intervention    Discipline occupational therapy assistant  -  physical therapy assistant  -    Document Type therapy note (daily note)  -  therapy note (daily note)  -    Subjective Information no complaints   pt stated yes/no to questions  -TS  agree to therapy   pt very drowsy  -    Symptoms Noted Comment lethagic and would not open eyes through tx  -TS  lethargic  -    Precautions/Limitations fall precautions;swallowing precautions;hip precautions- right   knee immobilizer in bed RLE  -TS  fall precautions;hip precautions- right   R knee immobilizer when in bed  -    Recorded by [TS] CITLALY NaranjoA/L  [] Yesenia Merritt PTA    Pain Assessment    Pain Assessment Antony-Briggs FACES  -TS  Antony-Briggs FACES  -    Antony-Briggs FACES Pain Rating 0  -TS  2  -AH    Pain Location   Hip  -AH    Pain Orientation   Right  -AH    Pain Frequency   Intermittent   with bed mobility  -    Pain Intervention(s)   Medication (See MAR)  -    Response to Interventions   tolerated  -AH    Recorded by [TS] CITLALY NaranjoA/L  [] Yesenia Merritt PTA    Cognitive Assessment/Intervention    Personal Safety Interventions fall prevention program maintained  -  fall prevention program maintained  -AH    Recorded by [TS] Julisa Balbuena ALVAREZ/L  [] Yesenia Merritt PTA    Swallow Assessment/Intervention    Additional Documentation  Dysphagia/Swallow Intervention (Group)  -MB     Recorded by  [MB] Liss Breaux CCC-SLP     Dysphagia/Swallow Intervention    Dysphagia/Swallow Therapeutic Feed  Pt will tolerate trials of current diet and upgraded consistencies with no overt s/s of aspiration.  -MB     Recorded by  [MB] Liss Breaux CCC-SLP     Bed Mobility,  Assessment/Treatment    Bed Mob, Supine to Sit, [place]   moderate assist (50% patient effort);maximum assist (25% patient effort);verbal cues required;2 person assist required  -    Bed Mob, Sit to Supine, [place]   maximum assist (25% patient effort);2 person assist required;verbal cues required  -    Bed Mobility, Safety Issues   cognitive deficits limit understanding   lethargic  -    Recorded by   [] Yesenia Merritt PTA    ADL Assessment/Intervention    Additional Documentation Self-Feeding Assessment/Training (Group)  -TS      Recorded by [] LUTHER Naranjo      Self-Feeding Assessment/Training    Self-Feeding Assess/Train, Position --   HOB elevated to 90  -      Self-Feeding Assess/Train, [place] maximum assist (25% patient effort);dependent (less than 25% patient effort)  -      Self-Feeding Assess/Train, Impairments --   decreased cognition  -      Self-Feeding Assess/Train, Comment Pt responded yes/no x3 occurances during self feeding. Pt kept eyes closed through task with max encouragement to open eyes and hold utensils. Tamayo attempted [place] food to mouth, pt assisted 25% with bringing food to mouth. Pt provided verbal cues and increased time to chew and swallow with feeding task.   -TS      Recorded by [TS] LUTHER Naranjo      Motor Skills/Interventions    Additional Documentation   Balance Skills Training (Group)  -    Recorded by   [] Yesenia Merritt PTA    Balance Skills Training    Sitting-Level of Assistance   Moderate assistance;Maximum assistance   constant vc's to attempt to keep pt awake  -    Sitting-Balance Support   Feet supported  -    Recorded by   [] Yesenia Merritt PTA    Orthotics Prosthetics    Additional Documentation   Orthosis Location (Group);Orthosis Management/Training (Group)  -    Recorded by   [] Yesenia Merritt PTA    Orthosis Location    Orthosis Location/Type   lower extremity  -    Orthosis, Lower  Extremity   Right:;knee immobilizer  -AH    Recorded by   [] Yesenia Merritt PTA    Orthosis Management/Training    Orthosis Indications   --   hip precaution  -AH    Orthosis Wear Schedule   wear at night or when in bed  -AH    Recorded by   [] Yesenia Merritt PTA    Positioning and Restraints    Pre-Treatment Position in bed  -TS  in bed  -AH    Post Treatment Position bed  -TS  bed  -AH    In Bed fowlers;call light within reach;encouraged to call for assist;with family/caregiver;side rails up x2;with brace;SCD pump applied  -TS  fowlers;call light within reach;encouraged to call for assist;with family/caregiver;R knee immobilizer  -AH    Recorded by [TS] KIM Naranjo/YENNIFER  [] Yesenia Merritt PTA      06/22/17 1000          Rehab Assessment/Intervention    Discipline occupational therapy assistant  -TS      Document Type therapy note (daily note)  -TS      Subjective Information agree to therapy;complains of;fatigue  -TS      Symptoms Noted Comment VERY Lethargic  -TS      Precautions/Limitations fall precautions;hip precautions- right;oxygen therapy device and L/min   R knee immobilizer  -TS      Recorded by [TS] KIM Naranjo/L      Pain Assessment    Pain Assessment No/denies pain  -TS      Recorded by [TS] KIM Naranjo/L      Cognitive Assessment/Intervention    Personal Safety Interventions fall prevention program maintained  -TS      Recorded by [TS] KIM Naranjo/L      Grooming Assessment/Training    Grooming Assess/Train, Position supine  -TS      Grooming Assess/Train, Indepen Level maximum assist (25% patient effort)  -TS      Grooming Assess/Train, Comment pt present wash cloth, and then put in pt hand, pt then provided [place] to initiate, pt would open eyes occasionally but would not initiate or complete task.   -TS      Recorded by [TS] KIM Naranjo/L      Therapy Exercises    Bilateral Upper Extremity AAROM:;15 reps;supine;elbow  flexion/extension;shoulder extension/flexion   wrist flexion/extension  -TS      Recorded by [TS] Julisa Balbuena, ALVAREZ/L      Positioning and Restraints    Pre-Treatment Position in bed  -TS      Post Treatment Position bed  -TS      In Bed fowlers;call light within reach;encouraged to call for assist;with family/caregiver;with PT;side rails up x2  -TS      Recorded by [TS] Julisa Balbuena, ALVAREZ/L        User Key  (r) = Recorded By, (t) = Taken By, (c) = Cosigned By    Initials Name Effective Dates    MB Liss Breaux, [facility]-SLP 08/02/16 -      Yesenia Merritt, PTA 08/02/16 -     AL Yesenia Llanes, PTA 08/02/16 -     TM Marva Jamil, CCC-SLP 08/02/16 -     TS Julisa Balbuena, ALVAREZ/L 08/02/16 -     CS Yordy Whiting, MS, [facility]-SLP 08/02/16 -                 IP PT Goals       06/21/17 0810          Bed Mobility PT LTG    Bed Mobility PT LTG, Date Established 06/21/17  -EDMUND      Bed Mobility PT LTG, Time to Achieve by discharge  -EDMUND      Bed Mobility PT LTG, Activity Type supine to sit/sit to supine  -EDMUND      Bed Mobility PT LTG, [place] Level minimum assist (75% patient effort)  -EDMUND      Bed Mobility PT Goal  LTG, Assist Device bed rails  -EDMUND      Transfer Training PT LTG    Transfer Training PT LTG, Date Established 06/21/17  -EDMUND      Transfer Training PT LTG, Time to Achieve by discharge  -EDMUND      Transfer Training PT LTG, Activity Type bed to chair /chair to bed;sit to stand/stand to sit  -EDMUND      Transfer Training PT LTG, [place] Level minimum assist (75% patient effort)  -EDMUND      Transfer Training PT LTG, Assist Device walker, rolling  -EDMUND      Gait Training PT LTG    Gait Training Goal PT LTG, Date Established 06/21/17  -EDMUND      Gait Training Goal PT LTG, Time to Achieve by discharge  -EDMUND      Gait Training Goal PT LTG, [place] Level minimum assist (75% patient effort)  -EDMUND      Gait Training Goal PT LTG, Assist Device walker, rolling  -EDMUND      Gait Training Goal PT LTG, Distance to  Achieve 75  -EDMUND      Strength Goal PT LTG    Strength Goal PT LTG, Date Established 06/21/17  -EDMUND      Strength Goal PT LTG, Time to Achieve by discharge  -EDMUND      Strength Goal PT LTG, Measure to Achieve AROM/AAROM, 20 reps, B UE/LE, min assist  -EDMUND        User Key  (r) = Recorded By, (t) = Taken By, (c) = Cosigned By    Initials Name Provider Type    EDMUND Cesar, PT DPT Physical Therapist          Physical Therapy Education     Title: PT OT SLP Therapies (Active)     Topic: Physical Therapy (Active)     Point: Mobility training (Active)    Learning Progress Summary    Learner Readiness Method Response Comment Documented by Status   Patient Acceptance E NR Standing tall, taking steps AL 06/25/17 0838 Active    Acceptance E NR Needs encouragement for standing AL 06/24/17 0847 Active    Acceptance E NL bed mobility  06/22/17 1129 Active    Acceptance E NR Educated pt./spouse on progression of PT POC, benefits of activity, R hip precautions.  06/21/17 0810 Active   Significant Other Acceptance E NR Educated pt./spouse on progression of PT POC, benefits of activity, R hip precautions.  06/21/17 0810 Active               Point: Home exercise program (Active)    Learning Progress Summary    Learner Readiness Method Response Comment Documented by Status   Patient Acceptance E NR Needs encouragement for standing AL 06/24/17 0847 Active               Point: Body mechanics (Active)    Learning Progress Summary    Learner Readiness Method Response Comment Documented by Status   Patient Acceptance E NR Needs encouragement for standing AL 06/24/17 0847 Active               Point: Precautions (Active)    Learning Progress Summary    Learner Readiness Method Response Comment Documented by Status   Patient Acceptance E NR Needs encouragement for standing AL 06/24/17 0847 Active    Acceptance E NR Educated pt./spouse on progression of PT POC, benefits of activity, R hip precautions. EDMUND 06/21/17 0810 Active    Significant Other Acceptance E,D,H VU,NR THR HEP and precautions  06/23/17 1035 Done    Acceptance E NR Educated pt./spouse on progression of PT POC, benefits of activity, R hip precautions. EDMUND 06/21/17 0810 Active                      User Key     Initials Effective Dates Name Provider Type Discipline     08/02/16 -  Yesenia Merritt, PTA Physical Therapy Assistant PT    AL 08/02/16 -  Yesenia Llanes, PTA Physical Therapy Assistant PT    EDMUND 08/02/16 -  Deonte Cesar, PT DPT Physical Therapist PT                    PT Recommendation and Plan  Anticipated Equipment Needs At Discharge: two wheeled walker  Anticipated Discharge Disposition: transitional care, skilled nursing facility, inpatient rehabilitation facility  Planned Therapy Interventions: bed mobility training, transfer training, gait training, balance training, home exercise program, orthotic fitting/training, patient/family education, postural re-education, strengthening  PT Frequency: 2 times/day, per priority policy  Plan of Care Review  Plan Of Care Reviewed With: patient  Progress: progress toward functional goals is gradual  Outcome Summary/Follow up Plan: Patient needed more verbal and tactile cues to keep her eyes open and stay focused during treatment.  She needs mod to max assist x 2 for trasfers.  She stands with a RWX mod assist x 2.  She is not able to take any steps today.  Will continue to work on improving pre-gait activities and progressing toward gait.          Outcome Measures       06/25/17 0725 06/24/17 0814 06/23/17 1000    How much help from another person do you currently need...    Turning from your back to your side while in flat bed without using bedrails? 2  -AL 2  -AL 1  -AH    Moving from lying on back to sitting on the side of a flat bed without bedrails? 1  -AL 1  -AL 1  -AH    Moving to and from a bed to a chair (including a wheelchair)? 1  -AL 1  -AL 1  -AH    Standing up from a chair using your arms (e.g.,  wheelchair, bedside chair)? 1  -AL 1  -AL 1  -AH    Climbing 3-5 steps with a railing? 1  -AL 1  -AL 1  -AH    To walk in hospital room? 1  -AL 1  -AL 1  -AH    AM-PAC 6 Clicks Score 7  -AL 7  -AL 6  -AH    Functional Assessment    Outcome Measure Options AM-PAC 6 Clicks Basic Mobility (PT)  -AL AM-PAC 6 Clicks Basic Mobility (PT)  -AL AM-PAC 6 Clicks Basic Mobility (PT)  -AH      06/23/17 0800 06/22/17 1000       How much help from another person do you currently need...    Turning from your back to your side while in flat bed without using bedrails?  2  -AH     Moving from lying on back to sitting on the side of a flat bed without bedrails?  1  -AH     Moving to and from a bed to a chair (including a wheelchair)?  1  -AH     Standing up from a chair using your arms (e.g., wheelchair, bedside chair)?  1  -AH     Climbing 3-5 steps with a railing?  1  -AH     To walk in hospital room?  1  -AH     AM-PAC 6 Clicks Score  7  -AH     How much help from another is currently needed...    Putting on and taking off regular lower body clothing? 1  -TS 1  -TS     Bathing (including washing, rinsing, and drying) 1  -TS 1  -TS     Toileting (which includes using toilet bed pan or urinal) 1  -TS 2  -TS     Putting on and taking off regular upper body clothing 1  -TS 2  -TS     Taking care of personal grooming (such as brushing teeth) 1  -TS 2  -TS     Eating meals 1  -TS 2  -TS     Score 6  -TS 10  -TS     Functional Assessment    Outcome Measure Options AM-PAC 6 Clicks Daily Activity (OT)  -TS AM-PAC 6 Clicks Daily Activity (OT)  -TS       User Key  (r) = Recorded By, (t) = Taken By, (c) = Cosigned By    Initials Name Provider Type     Yesenia Merritt, MIYA Physical Therapy Assistant    AL Yesenia Llanes PTA Physical Therapy Assistant     KIM Naranjo/L Occupational Therapy Assistant           Time Calculation:         PT Charges       06/25/17 0725          Time Calculation    Start Time 0725  -AL      Stop Time  0754  -AL      Time Calculation (min) 29 min  -AL      PT Received On 06/25/17  -AL      PT Goal Re-Cert Due Date 07/01/17  -AL      Time Calculation- PT    Total Timed Code Minutes- PT 29 minute(s)  -AL        User Key  (r) = Recorded By, (t) = Taken By, (c) = Cosigned By    Initials Name Provider Type    AL Yesenia Llanes PTA Physical Therapy Assistant          Therapy Charges for Today     Code Description Service Date Service Provider Modifiers Qty    18074007180 HC PT THERAPEUTIC ACT EA 15 MIN 6/24/2017 Yesenia Llanes PTA GP, KX 2    28305788266 HC PT THERAPEUTIC ACT EA 15 MIN 6/25/2017 Yesenia Llanes PTA GP, KX 2          PT G-Codes  Outcome Measure Options: AM-PAC 6 Clicks Basic Mobility (PT)  Score: 9  Functional Limitation: Mobility: Walking and moving around  Mobility: Walking and Moving Around Current Status (): At least 60 percent but less than 80 percent impaired, limited or restricted  Mobility: Walking and Moving Around Goal Status (): At least 20 percent but less than 40 percent impaired, limited or restricted    Yesenia Llanes PTA  6/25/2017

## 2017-06-25 NOTE — THERAPY TREATMENT NOTE
Acute Care - Speech Language Pathology   Swallow Treatment Note [facility]     Patient Name: Renay Lazo  : 1929  MRN: [id number]  Today's Date: 2017  Onset of Illness/Injury or Date of Surgery Date: 17            Admit Date: 2017  Observed pt's  feeding pt lunch. The pt a throat clear type behavior 1 to 2 times during the meal, however it is unclear if this is a typical habit for her. The pt's  was noted to provide large bites at times, however did self-correct independently and state he would give her smaller bites.  Liss Breaux, CCC-SLP 2017 1:06 PM    Visit Dx:      ICD-10-CM ICD-9-CM   1. Closed displaced intertrochanteric fracture of right femur, initial encounter S72.141A 820.21   2. Closed subcapital fracture of neck of right femur, initial encounter S72.011A 820.09   3. Decreased activities of daily living (ADL) Z78.9 V49.89   4. Impaired mobility Z74.09 799.89   5. Oropharyngeal dysphagia R13.12 787.22     Patient Active Problem List   Diagnosis   • Closed C2 fracture   • C2 cervical fracture   • Transient alteration of awareness   • Paroxysmal atrial fibrillation   • History of meningioma of the brain   • UTI (urinary tract infection)   • Over weight   • Closed displaced fracture of second cervical vertebra with routine healing   • Closed subcapital fracture of neck of right femur   • Obstructive sleep apnea   • Dementia   • Hypertension   • Pulmonary edema   • Diastolic dysfunction   • Pulmonary HTN             Adult Rehabilitation Note       17 1248 17 0725 17 0814    Rehab Assessment/Intervention    Discipline speech language pathologist  -MB physical therapy assistant  -AL physical therapy assistant  -AL    Document Type therapy note (daily note)  -MB therapy note (daily note)  -AL therapy note (daily note)  -AL    Subjective Information  agree to therapy  -AL agree to therapy  -AL    Precautions/Limitations  fall precautions;hip  precautions- right   knee immobilizer on when in bed  -AL fall precautions;hip precautions- right   knee immobilizer on when in bed  -AL    Recorded by [MB] Liss Breaux CCC-SLP [AL] Yesenia Llanes PTA [AL] Yesenia Llanes PTA    Pain Assessment    Pain Assessment  Antony-Briggs FACES  -AL Antony-Baker FACES  -AL    Antony-Briggs FACES Pain Rating  4  -AL 6  -AL    Pain Type  Surgical pain  -AL Surgical pain  -AL    Pain Location  Hip  -AL Hip  -AL    Pain Orientation  Right  -AL Right  -AL    Pain Frequency  Intermittent  -AL Intermittent  -AL    Pain Intervention(s)  Medication (See MAR)  -AL Repositioned  -AL    Response to Interventions  Tolerated  -AL tolerated  -AL    Recorded by  [AL] Yesenia Llanes PTA [AL] Yesenia Llanes PTA    Dysphagia/Swallow Intervention    Dysphagia/Swallow Therapeutic Feed Observed pt's  feeding pt lunch. The pt a throat clear type behavior 1 to 2 times during the meal, however it is unclear if this is a typical habit for her. The pt's  was noted to provide large bites at times, however did self-correct independently and state he would give her smaller bites.  -MB      Recorded by [MB] Liss Breaux CCC-SLP      Bed Mobility, Assessment/Treatment    Bed Mob, Supine to Sit, [place]  moderate assist (50% patient effort);maximum assist (25% patient effort);2 person assist required;verbal cues required  -AL moderate assist (50% patient effort);maximum assist (25% patient effort);2 person assist required;verbal cues required  -AL    Bed Mob, Sit to Supine, [place]  maximum assist (25% patient effort);2 person assist required;verbal cues required  -AL maximum assist (25% patient effort);2 person assist required;verbal cues required  -AL    Recorded by  [AL] Yesenia Llanes PTA [AL] Yesenia Llanes PTA    Transfer Assessment/Treatment    Transfers, Sit-Stand [place]  moderate assist (50% patient effort);maximum assist (25% patient effort);2 person assist  required;verbal cues required  -AL moderate assist (50% patient effort);maximum assist (25% patient effort);2 person assist required;verbal cues required  -AL    Transfers, Stand-Sit [place]  moderate assist (50% patient effort);verbal cues required;2 person assist required  -AL moderate assist (50% patient effort);verbal cues required;2 person assist required  -AL    Transfers, Sit-Stand-Sit, Assist Device  rolling walker  -AL rolling walker  -AL    Toilet Transfer, [place]   --   Had BM, CNA cleaned patient while standing  -AL    Transfer, Comment  Stood x 2 with Mod assist, cues to stand tall   -AL stood x 2 with mod assist  -AL    Recorded by  [AL] Yesenia Llanes PTA [AL] Yesenia Llanes PTA    Balance Skills Training    Sitting-Level of Assistance  Contact guard;Minimum assistance  -AL Contact guard;Minimum assistance  -AL    Sitting-Balance Support  Left upper extremity supported;Feet supported  -AL Left upper extremity supported;Feet supported  -AL    Sitting-Balance Activities  Trunk control activities  -AL Trunk control activities  -AL    Sitting # of Minutes  15  -AL 15  -AL    Standing-Level of Assistance  Moderate assistance;x2  -AL Moderate assistance;x2  -AL    Static Standing Balance Support  assistive device  -AL assistive device  -AL    Standing Balance # of Minutes  5  -AL 3  -AL    Stepping Strategy Assessment (Balance Skills)  attempted side steps but wasn't able to move her feet.  Needs cues to stand tall and keep hips forward.  -AL     Recorded by  [AL] Yesenia Llanes PTA [AL] Yesenia Llanes PTA    Therapy Exercises    Right Lower Extremity  AAROM:;10 reps;LAQ;hip abduction/adduction  -AL     Recorded by  [AL] Yesenia Llanes PTA     Orthotics Prosthetics    Additional Documentation  Orthosis Location (Group)  -AL Orthosis Location (Group)  -AL    Recorded by  [AL] Yesenia Llanes PTA [AL] Yesenia Llanes PTA    Orthosis Location    Orthosis Location/Type  lower extremity  -AL lower  extremity  -AL    Orthosis, Lower Extremity  Right:;knee immobilizer  -AL Right:;knee immobilizer  -AL    Recorded by  [AL] Yesenia Llanes PTA [AL] Yesenia Llanes PTA    Orthosis Management/Training    Orthosis Wear Schedule  wear at night or when in bed  -AL wear at night or when in bed  -AL    Recorded by  [AL] Yesenia Llanes PTA [AL] Yesenia Llanes PTA    Positioning and Restraints    Pre-Treatment Position  in bed  -AL in bed  -AL    Post Treatment Position  bed  -AL bed  -AL    In Bed  fowlers;call light within reach;with family/caregiver  -AL fowlers;call light within reach;SCD pump applied;R knee immobilizer  -AL    Recorded by  [AL] Yesenia Llanes PTA [AL] Yesenia Llanes PTA      06/23/17 1530 06/23/17 1200 06/23/17 1020    Rehab Assessment/Intervention    Discipline physical therapy assistant  - speech language pathologist  -CS physical therapy assistant  -    Document Type therapy note (daily note)  - therapy note (daily note)  - therapy note (daily note)  -    Subjective Information agree to therapy;complains of;weakness;fatigue;pain  - no complaints;agree to therapy  - no complaints   pt still lethargic  -    Patient Effort, Rehab Treatment  adequate  -     Precautions/Limitations fall precautions;hip precautions- right   R knee immobilizer when in bed  -  fall precautions;hip precautions- right   Rknee immobilizer in bed  -    Recorded by [AH] Yesenia Merritt PTA [CS] Yordy Whiting MS, CFY-SLP [] Yesenia Merritt PTA    Pain Assessment    Pain Assessment Antony-Briggs FACES  - No/denies pain  - Antony-Briggs FACES  -    Antony-Briggs FACES Pain Rating 6  -  2  -AH    Pain Type Surgical pain  -  Surgical pain  -    Pain Location Hip  -  Hip  -    Pain Orientation Right  -  Right  -AH    Pain Frequency Intermittent   with bed mobility  -  Intermittent   with ROM  -    Pain Intervention(s) Repositioned  -  Repositioned  -    Response to Interventions tolerated  -       Recorded by [] Yesenia Merritt PTA [CS] Yordy Whiting MS, CFY-SLP [AH] Yesenia Merritt PTA    Dysphagia/Swallow Intervention    Dysphagia/Swallow Therapeutic Feed  ST presented pt with mechanical soft, pureed, and honey thick consistencies at lunch. ST notes pt to be fatigued throughout session requiring moderate verbal and tactile cues to stay aroused. Pt's  was insistent that he feed the pt and ST observe. ST notes pt's  provided pt with large bolus portions in which ST cued the  to decrease bolus size with PO trials presented. Pt verbalized understanding and made adjustment. ST also informed pt's  that the pt always needed to be sitting in and upright position while completing PO intake. No overt s/s were noted with pureed consistencies and honey thick liquids. However, the pt exhibited significant difficulties with mechanical soft consistencies as noted by a weak rotary chew and prolonged mastication. ST eventually cued the pt to swallow in which she was able to complete. Post swallow a moderate-severe amount of residue remained throughout the oral cavity. Pt eventually cleared residue with multiple swallows and a honey thick liquid wash. Pt downgraded to pureed consistencies at this time. ST will continue to follow and treat.  -CS     Recorded by  [CS] Yordy Whiting, MS, CFY-SLP     Bed Mobility, Assessment/Treatment    Bed Mob, Supine to Sit, [place] moderate assist (50% patient effort);maximum assist (25% patient effort);2 person assist required;verbal cues required  -      Bed Mob, Sit to Supine, [place] maximum assist (25% patient effort);2 person assist required;verbal cues required  -      Recorded by [] Yesenia Merritt PTA      Transfer Assessment/Treatment    Transfers, Sit-Stand [place] moderate assist (50% patient effort);maximum assist (25% patient effort);2 person assist required;verbal cues required  -      Transfers, Stand-Sit [place]  moderate assist (50% patient effort);verbal cues required;2 person assist required  -      Recorded by [] Yesenia Merritt PTA      Balance Skills Training    Sitting-Level of Assistance Contact guard;Minimum assistance  -      Sitting-Balance Support Left upper extremity supported;Feet supported  -      Sitting-Balance Activities Trunk control activities  -      Sitting # of Minutes 20  -      Standing-Level of Assistance Moderate assistance;x2  -      Static Standing Balance Support assistive device  -      Standing Balance # of Minutes 2  -AH      Recorded by [] Yesenia Merritt PTA      Therapy Exercises    Right Lower Extremity AAROM:;10 reps;sitting  -      Exercise Protocols   total hip  -    Total Hip Exercises   right:;10 reps   PROM  -    Recorded by [] Yesenia Merritt PTA  [] Yesenia Merritt PTA    Orthotics Prosthetics    Additional Documentation Orthosis Location (Group)  -      Recorded by [] Yesenia Merritt PTA      Orthosis Location    Orthosis Location/Type lower extremity  -  lower extremity  -    Orthosis, Lower Extremity Right:;knee immobilizer  -  Right:;knee immobilizer  -    Recorded by [] Yesenia Merritt PTA  [] Yesenia Merritt PTA    Orthosis Management/Training    Orthosis Wear Schedule wear at night or when in bed  -  wear at night or when in bed  -    Recorded by [] Yesenia Merritt PTA  [] Yesenia Merritt PTA    Positioning and Restraints    Pre-Treatment Position in bed  -  in bed  -    Post Treatment Position bed  -  bed  -    In Bed fowlers;call light within reach;encouraged to call for assist;with family/caregiver;SCD pump applied;R knee immobilizer  -  fowlers;call light within reach;encouraged to call for assist;exit alarm on;with family/caregiver;R knee immobilizer  -    Recorded by [] Yesenia Merritt PTA  [] Yesenia Merritt PTA      06/23/17 1000 06/23/17 0808 06/22/17 1455    Rehab Assessment/Intervention     Discipline speech language pathologist  -TM occupational therapy assistant  -TS     Document Type therapy note (daily note)  -TM therapy note (daily note)  -TS     Subjective Information fatigue   Significant fatigue, sternal rub, max verbal, tactile cues.  -TM no complaints   pt stated yes/no to questions  -TS     Patient Effort, Rehab Treatment fair  -TM      Symptoms Noted Comment  lethagic and would not open eyes through tx  -TS     Precautions/Limitations  fall precautions;swallowing precautions;hip precautions- right   knee immobilizer in bed RLE  -TS     Recorded by [TM] Marva Jamil CCC-SLP [TS] CITLALY NaranjoA/L     Pain Assessment    Pain Assessment  Antony-Briggs FACES  -TS     Antony-Briggs FACES Pain Rating  0  -TS     Recorded by  [TS] CITLALY NaranjoA/L     Cognitive Assessment/Intervention    Personal Safety Interventions  fall prevention program maintained  -TS     Recorded by  [TS] Julisa Balbuena ALVAREZ/L     Swallow Assessment/Intervention    Additional Documentation   Dysphagia/Swallow Intervention (Group)  -MB    Recorded by   [MB] Liss Breaux CCC-SLP    Dysphagia/Swallow Intervention    Dysphagia/Swallow Therapeutic Feed Pt was quite fatigued at time of ST arrival, essentially lethargic; however, with able to arouse for brief periods with maximal, multi-modal cues including sternal rub, verbal cues, and tactile stim with labial ice chip stimulation. With labial and lingual ice chip stimulation, pt required max cues to awaken and to follow commands for lingual protrusion, though pt was able to follow. Pt was able to complete a swallow following stim and cues with each of the two attempted trials. Pt was then presented meds crushed in applesauce, presented to the pt by the RN, Kamla. Pt was noted to have what appeared to be quite immediate swallow initiations, with up to three multiple swallows across three trials. It must be noted that laryngeal elevation decreased  when multiple swallows were observed. Educated RN and  on concerns with pt's level alertness placing her at an increased risk for aspiration with PO intake. Minutes following the final PO trial presentation, pt was noted to have two separate coughing instances. RN stated that concerns where noted this AM with  attempting to feed pt when pt appeared too fatigued. Therefore, RN stated she called in nurse aid, Tiff, to assist with PO at that time and the aid removed the tray from the bedside post meal secondary to this concern. RN stated lungs are diminished. Cannot fully r/o aspiration with PO intake. Educated  that ST will follow up at lunch time today to monitor for safety with PO intake and to provide further recommendations as felt warranted.  -TM  Pt will tolerate trials of current diet and upgraded consistencies with no overt s/s of aspiration.  -MB    Recorded by [TM] Marva Jamil, CCC-SLP  [MB] Liss Breaux, CCC-SLP    ADL Assessment/Intervention    Additional Documentation  Self-Feeding Assessment/Training (Group)  -TS     Recorded by  [TS] KIM Naranjo/L     Self-Feeding Assessment/Training    Self-Feeding Assess/Train, Position  --   HOB elevated to 90  -TS     Self-Feeding Assess/Train, [place]  maximum assist (25% patient effort);dependent (less than 25% patient effort)  -TS     Self-Feeding Assess/Train, Impairments  --   decreased cognition  -TS     Self-Feeding Assess/Train, Comment  Pt responded yes/no x3 occurances during self feeding. Pt kept eyes closed through task with max encouragement to open eyes and hold utensils. Tamayo attempted [place] food to mouth, pt assisted 25% with bringing food to mouth. Pt provided verbal cues and increased time to chew and swallow with feeding task.   -TS     Recorded by  [TS] KIM Naranjo/L     Positioning and Restraints    Pre-Treatment Position  in bed  -TS     Post Treatment Position  bed  -TS     In  Bed  fowlers;call light within reach;encouraged to call for assist;with family/caregiver;side rails up x2;with brace;SCD pump applied  -TS     Recorded by  [TS] Julisa Balbuena, ALVAREZ/L       User Key  (r) = Recorded By, (t) = Taken By, (c) = Cosigned By    Initials Name Effective Dates    RED Breaux, CCC-SLP 08/02/16 -      Yesneia Merritt, [facility] 08/02/16 -     AL Yesenia Llanes, [facility] 08/02/16 -     TM Marva Jamil, CCC-SLP 08/02/16 -     TS Julisa Balbuena, ALVAREZ/L 08/02/16 -     CS Yordy Whiting, MS, [facility]-SLP 08/02/16 -                   IP SLP Goals       06/25/17 1304 06/23/17 1037 06/22/17 1533    Safely Consume Diet    Safely Consume Diet- SLP, Date Established   06/22/17  -MB    Safely Consume Diet- SLP, Time to Achieve   by discharge  -MB    Safely Consume Diet- SLP, Additional Goal   Pt will tolerate trials of current diet and upgraded consistencies with no overt s/s of aspiration.  -MB    Safely Consume Diet- SLP, Date Goal Reviewed 06/25/17  -MB 06/23/17  -TM 06/22/17  -MB    Safely Consume Diet- SLP, Outcome   goal ongoing  -MB      User Key  (r) = Recorded By, (t) = Taken By, (c) = Cosigned By    Initials Name Provider Type    RED Breaux, CCC-SLP Speech and Language Pathologist     Marva Jamil, CCC-SLP Speech and Language Pathologist          EDUCATION  The patient has been educated in the following areas:   Dysphagia (Swallowing Impairment).    SLP Recommendation and Plan                                           Plan of Care Review  Plan Of Care Reviewed With: patient  Progress: no change  Outcome Summary/Follow up Plan: Observed pt's  feeding pt lunch. The pt a throat clear type behavior 1 to 2 times during the meal, however it is unclear if this is a typical habit for her. The pt's  was noted to provide large bites at times, however did self-correct independently and state he would give her smaller bites.       SLP Outcome Measures (last 72 hours)      SLP  Outcome Measures       06/22/17 1500          SLP Outcome Measures    Outcome Measure Used? Adult NOMS  -MB      FCM Scores    FCM Chosen Swallowing  -MB      Swallowing FCM Score 2  -MB        User Key  (r) = Recorded By, (t) = Taken By, (c) = Cosigned By    Initials Name Effective Dates    BASILIO Alex 08/02/16 -              Time Calculation:         Time Calculation- SLP       06/25/17 1305          Time Calculation- SLP    SLP Start Time 1248  -MB      SLP Stop Time 1300  -MB      SLP Time Calculation (min) 12 min  -MB      SLP Received On 06/25/17  -MB        User Key  (r) = Recorded By, (t) = Taken By, (c) = Cosigned By    Initials Name Provider Type    BASILIO Alex Speech and Language Pathologist          Therapy Charges for Today     Code Description Service Date Service Provider Modifiers Qty    82218846668 HC ST TREATMENT SWALLOW 1 6/25/2017 BASILIO Arteaga GN, KX 1          SLP G-Codes  SLP NOMS Used?: Yes  Functional Limitations: Swallowing  Swallow Current Status (): At least 80 percent but less than 100 percent impaired, limited or restricted  Swallow Goal Status (): At least 40 percent but less than 60 percent impaired, limited or restricted      BASILIO Miranda  6/25/2017

## 2017-06-25 NOTE — PROGRESS NOTES
Orthopedic Surgery Progress Note    Renay Lazo  6/25/2017      Subjective:     Systemic or Specific Complaints: resting comfortably. Doing well.  agrees to go to [facility] for rehab.   Objective:     Patient Vitals for the past 24 hrs:   BP Temp Temp src Pulse Resp SpO2 Weight   06/25/17 0757 (!) 198/74 - - - - - -   06/25/17 0725 (!) 216/75 99.4 °F (37.4 °C) Oral 93 16 96 % -   06/25/17 0720 - - - 87 18 98 % -   06/25/17 0630 155/66 - - - - - -   06/25/17 0448 (!) 183/81 99 °F (37.2 °C) Oral 91 18 95 % -   06/24/17 2331 134/99 97.7 °F (36.5 °C) Axillary 86 18 99 % -   06/24/17 2047 - - - 97 18 97 % -   06/24/17 2032 175/60 98.5 °F (36.9 °C) Oral 97 18 96 % 178 lb (80.7 kg)   06/24/17 1617 - - - 89 18 99 % -   06/24/17 1544 163/80 97.9 °F (36.6 °C) Oral 83 18 99 % -   06/24/17 1200 150/58 99.4 °F (37.4 °C) Oral 97 18 96 % -       right lower  General: alert, appears stated age and cooperative   Wound: clean, dry, intact             Dressing: clean, dry, intact   Extremity: Distal NVI           DVT Exam: No evidence of DVT seen on physical exam.                   Data Review:  Lab Results (last 24 hours)     Procedure Component Value Units Date/Time    BNP [[id number]]  (Abnormal) Collected:  06/24/17 0628    Specimen:  Blood Updated:  06/24/17 1411     proBNP 7140.0 (H) pg/mL     Blood Culture With TEDDY [[id number]]  (Normal) Collected:  06/22/17 2347    Specimen:  Blood from Arm, Left Updated:  06/25/17 0101     Blood Culture No growth at 2 days    Blood Culture With TEDDY [[id number]]  (Normal) Collected:  06/22/17 2347    Specimen:  Blood from Hand, Left Updated:  06/25/17 0202     Blood Culture No growth at 2 days    CBC (No Diff) [[id number]]  (Abnormal) Collected:  06/25/17 0532    Specimen:  Blood Updated:  06/25/17 0605     WBC 7.92 10*3/mm3      RBC 2.48 (L) 10*6/mm3      Hemoglobin 7.8 (L) g/dL      Hematocrit 23.2 (L) %      MCV 93.5 fL      MCH 31.5 pg      MCHC 33.6 g/dL      RDW 13.3 %      RDW-SD  45.5 fl      MPV 9.6 fL      Platelets 244 10*3/mm3     Basic Metabolic Panel [[id number]]  (Abnormal) Collected:  06/25/17 0532    Specimen:  Blood Updated:  06/25/17 0617     Glucose 114 (H) mg/dL      BUN 16 mg/dL      Creatinine 0.74 mg/dL      Sodium 140 mmol/L      Potassium 3.4 (L) mmol/L      Chloride 105 mmol/L      CO2 26.0 mmol/L      Calcium 8.3 (L) mg/dL      eGFR Non African Amer 74 mL/min/1.73      BUN/Creatinine Ratio 21.6     Anion Gap 9.0 mmol/L     Narrative:       The MDRD GFR formula is only valid for adults with stable renal function between ages 18 and 70.        Imaging Results (last 24 hours)     ** No results found for the last 24 hours. **          Assessment:     POD# 5 R hip hemiarthroplasty, Acute Post op Blood Loss Anemia (expected)     Plan:      1:  DVT prophylaxis 3 weeks, ICE, elevate  2:  Pain control  3:  Physical therapy/Occupational therapy  4:  IV antibiotics for 24 hours  5:  Anticipate discharge today/tomorrow if pain well controlled-per Medicine, [facility] has offered bed.   6:  WBAT RLE with posterior hip precautions       Supa Cancino PA-C

## 2017-06-25 NOTE — PLAN OF CARE
Problem: Fall Risk (Adult)  Goal: Absence of Falls  Outcome: Ongoing (interventions implemented as appropriate)    Problem: Patient Care Overview (Adult)  Goal: Plan of Care Review  Outcome: Ongoing (interventions implemented as appropriate)    06/25/17 3653   Coping/Psychosocial Response Interventions   Plan Of Care Reviewed With patient   Patient Care Overview   Progress no change   Outcome Evaluation   Outcome Summary/Follow up Plan Patient was more alert this AM. No pain meds given. Multiple BMs. 1 unit PRBC ordered. Cardiology consult due to intermittent SVT         Problem: Fractured Hip (Adult)  Goal: Signs and Symptoms of Listed Potential Problems Will be Absent or Manageable (Fractured Hip)  Outcome: Ongoing (interventions implemented as appropriate)    Problem: Pressure Ulcer Risk (Wing Scale) (Adult,Obstetrics,Pediatric)  Goal: Skin Integrity  Outcome: Ongoing (interventions implemented as appropriate)

## 2017-06-26 LAB
ANION GAP SERPL CALCULATED.3IONS-SCNC: 7 MMOL/L (ref 4–13)
BUN BLD-MCNC: 20 MG/DL (ref 5–21)
BUN/CREAT SERPL: 22 (ref 7–25)
CALCIUM SPEC-SCNC: 8.3 MG/DL (ref 8.4–10.4)
CHLORIDE SERPL-SCNC: 108 MMOL/L (ref 98–110)
CO2 SERPL-SCNC: 27 MMOL/L (ref 24–31)
CREAT BLD-MCNC: 0.91 MG/DL (ref 0.5–1.4)
DEPRECATED RDW RBC AUTO: 52.2 FL (ref 40–54)
ERYTHROCYTE [DISTWIDTH] IN BLOOD BY AUTOMATED COUNT: 15.2 % (ref 12–15)
GFR SERPL CREATININE-BSD FRML MDRD: 58 ML/MIN/1.73
GLUCOSE BLD-MCNC: 111 MG/DL (ref 70–100)
HCT VFR BLD AUTO: 25.8 % (ref 37–47)
HGB BLD-MCNC: 8.5 G/DL (ref 12–16)
MCH RBC QN AUTO: 30.7 PG (ref 28–32)
MCHC RBC AUTO-ENTMCNC: 32.9 G/DL (ref 33–36)
MCV RBC AUTO: 93.1 FL (ref 82–98)
PLATELET # BLD AUTO: 244 10*3/MM3 (ref 130–400)
PMV BLD AUTO: 9.7 FL (ref 6–12)
POTASSIUM BLD-SCNC: 3.7 MMOL/L (ref 3.5–5.3)
RBC # BLD AUTO: 2.77 10*6/MM3 (ref 4.2–5.4)
SODIUM BLD-SCNC: 142 MMOL/L (ref 135–145)
VANCOMYCIN TROUGH SERPL-MCNC: 8.93 MCG/ML (ref 10–20)
WBC NRBC COR # BLD: 7.38 10*3/MM3 (ref 4.8–10.8)

## 2017-06-26 PROCEDURE — 25010000002 MEROPENEM: Performed by: INTERNAL MEDICINE

## 2017-06-26 PROCEDURE — 80202 ASSAY OF VANCOMYCIN: CPT

## 2017-06-26 PROCEDURE — 94799 UNLISTED PULMONARY SVC/PX: CPT

## 2017-06-26 PROCEDURE — 94760 N-INVAS EAR/PLS OXIMETRY 1: CPT

## 2017-06-26 PROCEDURE — 97530 THERAPEUTIC ACTIVITIES: CPT

## 2017-06-26 PROCEDURE — 25010000002 VANCOMYCIN PER 500 MG: Performed by: NURSE PRACTITIONER

## 2017-06-26 PROCEDURE — 92526 ORAL FUNCTION THERAPY: CPT

## 2017-06-26 PROCEDURE — 25010000002 HYDROMORPHONE PER 4 MG: Performed by: FAMILY MEDICINE

## 2017-06-26 PROCEDURE — 99232 SBSQ HOSP IP/OBS MODERATE 35: CPT | Performed by: INTERNAL MEDICINE

## 2017-06-26 PROCEDURE — 25010000002 CYANOCOBALAMIN PER 1000 MCG: Performed by: NURSE PRACTITIONER

## 2017-06-26 PROCEDURE — 80048 BASIC METABOLIC PNL TOTAL CA: CPT | Performed by: NURSE PRACTITIONER

## 2017-06-26 PROCEDURE — 94660 CPAP INITIATION&MGMT: CPT

## 2017-06-26 PROCEDURE — 85027 COMPLETE CBC AUTOMATED: CPT | Performed by: FAMILY MEDICINE

## 2017-06-26 PROCEDURE — 97530 THERAPEUTIC ACTIVITIES: CPT | Performed by: OCCUPATIONAL THERAPIST

## 2017-06-26 PROCEDURE — 25010000002 ENOXAPARIN PER 10 MG: Performed by: ORTHOPAEDIC SURGERY

## 2017-06-26 RX ADMIN — IPRATROPIUM BROMIDE 0.5 MG: 0.5 SOLUTION RESPIRATORY (INHALATION) at 15:47

## 2017-06-26 RX ADMIN — HYDRALAZINE HYDROCHLORIDE 25 MG: 25 TABLET ORAL at 22:20

## 2017-06-26 RX ADMIN — MEROPENEM 1 G: 1 INJECTION, POWDER, FOR SOLUTION INTRAVENOUS at 00:19

## 2017-06-26 RX ADMIN — HYDROMORPHONE HYDROCHLORIDE 1 MG: 1 INJECTION, SOLUTION INTRAMUSCULAR; INTRAVENOUS; SUBCUTANEOUS at 15:30

## 2017-06-26 RX ADMIN — MEROPENEM 1 G: 1 INJECTION, POWDER, FOR SOLUTION INTRAVENOUS at 10:18

## 2017-06-26 RX ADMIN — PHENYTOIN SODIUM 400 MG: 100 CAPSULE, EXTENDED RELEASE ORAL at 22:19

## 2017-06-26 RX ADMIN — HYDRALAZINE HYDROCHLORIDE 25 MG: 25 TABLET ORAL at 06:07

## 2017-06-26 RX ADMIN — CARVEDILOL 25 MG: 25 TABLET, FILM COATED ORAL at 19:06

## 2017-06-26 RX ADMIN — AMLODIPINE BESYLATE 10 MG: 10 TABLET ORAL at 09:41

## 2017-06-26 RX ADMIN — FAMOTIDINE 10 MG: 10 INJECTION, SOLUTION INTRAVENOUS at 10:09

## 2017-06-26 RX ADMIN — LISINOPRIL 20 MG: 20 TABLET ORAL at 09:40

## 2017-06-26 RX ADMIN — IPRATROPIUM BROMIDE 0.5 MG: 0.5 SOLUTION RESPIRATORY (INHALATION) at 07:15

## 2017-06-26 RX ADMIN — VANCOMYCIN HYDROCHLORIDE 1000 MG: 1 INJECTION, SOLUTION INTRAVENOUS at 15:25

## 2017-06-26 RX ADMIN — CEFEPIME HYDROCHLORIDE 1 G: 1 INJECTION, POWDER, FOR SOLUTION INTRAMUSCULAR; INTRAVENOUS at 19:07

## 2017-06-26 RX ADMIN — Medication 10 ML: at 22:19

## 2017-06-26 RX ADMIN — HYDRALAZINE HYDROCHLORIDE 25 MG: 25 TABLET ORAL at 15:34

## 2017-06-26 RX ADMIN — CYANOCOBALAMIN 1000 MCG: 1000 INJECTION, SOLUTION INTRAMUSCULAR; SUBCUTANEOUS at 09:40

## 2017-06-26 RX ADMIN — HYDROMORPHONE HYDROCHLORIDE 1 MG: 1 INJECTION, SOLUTION INTRAMUSCULAR; INTRAVENOUS; SUBCUTANEOUS at 22:18

## 2017-06-26 RX ADMIN — IPRATROPIUM BROMIDE 0.5 MG: 0.5 SOLUTION RESPIRATORY (INHALATION) at 11:07

## 2017-06-26 RX ADMIN — IPRATROPIUM BROMIDE 0.5 MG: 0.5 SOLUTION RESPIRATORY (INHALATION) at 19:41

## 2017-06-26 RX ADMIN — ENOXAPARIN SODIUM 40 MG: 40 INJECTION SUBCUTANEOUS at 10:11

## 2017-06-26 RX ADMIN — FAMOTIDINE 10 MG: 10 INJECTION, SOLUTION INTRAVENOUS at 22:29

## 2017-06-26 RX ADMIN — BUMETANIDE 0.5 MG: 0.5 TABLET ORAL at 09:41

## 2017-06-26 RX ADMIN — CARVEDILOL 25 MG: 25 TABLET, FILM COATED ORAL at 09:40

## 2017-06-26 NOTE — PLAN OF CARE
Problem: Patient Care Overview (Adult)  Goal: Plan of Care Review  Outcome: Ongoing (interventions implemented as appropriate)    06/26/17 1404   Coping/Psychosocial Response Interventions   Plan Of Care Reviewed With patient   Patient Care Overview   Progress progress towards functional goals is fair   Outcome Evaluation   Outcome Summary/Follow up Plan ST recommends continuing a pureed diet with honey thick liquids at this time. ST will continue to follow and treat.         Problem: Inpatient SLP  Goal: Dysphagia- Patient will safely consume diet as per recommendation with no signs/symptoms of aspiration  Outcome: Ongoing (interventions implemented as appropriate)    06/22/17 1533 06/26/17 1404   Safely Consume Diet   Safely Consume Diet- SLP, Date Established 06/22/17 --    Safely Consume Diet- SLP, Time to Achieve by discharge --    Safely Consume Diet- SLP, Additional Goal Pt will tolerate trials of current diet and upgraded consistencies with no overt s/s of aspiration. --    Safely Consume Diet- SLP, Date Goal Reviewed --  06/26/17   Safely Consume Diet- SLP, Outcome goal ongoing --

## 2017-06-26 NOTE — PROGRESS NOTES
[facility] Medicine Services  INPATIENT PROGRESS NOTE    Length of Stay: 7  Date of Admission: 6/19/2017  Primary Care Physician: Jung Collins MD    Subjective   Chief Complaint: follow up hip fracture  HPI   Pt states she feels much better.  states she slept better. Pt is more awake and talkative. States she has chronic bronchitis. Still having diarrhea most of the night. She received 1 units PRBC;s yesterday. Significant improvement of SVT with metoprolol.    Review of Systems   All pertinent negatives and positives are as above. All other systems have been reviewed and are negative unless otherwise stated.     Objective    Temp:  [97.6 °F (36.4 °C)-99.5 °F (37.5 °C)] 98.3 °F (36.8 °C)  Heart Rate:  [59-92] 74  Resp:  [18] 18  BP: (123-160)/(39-49) 152/45  Physical Exam   Constitutional: She appears well-developed and well-nourished.   HENT:   Head: Normocephalic and atraumatic.   Eyes: Conjunctivae and EOM are normal. Pupils are equal, round, and reactive to light.   Neck: Neck supple. No JVD present. No thyromegaly present.   Cardiovascular: Normal rate, regular rhythm, normal heart sounds and intact distal pulses.  Exam reveals no gallop and no friction rub.    No murmur heard.  AVpacing 70. No further SVT overnight.   Pulmonary/Chest: Effort normal and breath sounds normal. No respiratory distress. She has no wheezes. She has no rales. She exhibits no tenderness.   Diminished bilaterally.    Abdominal: Soft. Bowel sounds are normal. She exhibits no distension. There is no tenderness. There is no rebound and no guarding.   Musculoskeletal: Normal range of motion. She exhibits no edema, tenderness or deformity.   Lymphadenopathy:     She has no cervical adenopathy.   Neurological: She is alert. She displays normal reflexes. No cranial nerve deficit. She exhibits normal muscle tone.   Skin: Skin is warm and dry. No rash noted.   Psychiatric: She has a normal mood  and affect. Her behavior is normal. Judgment and thought content normal.     Results Review:  I have reviewed the labs, radiology results, and diagnostic studies.    Laboratory Data:     Results from last 7 days  Lab Units 06/26/17  0437 06/25/17  0532 06/24/17  0628   WBC 10*3/mm3 7.38 7.92 8.66   HEMOGLOBIN g/dL 8.5* 7.8* 8.1*   HEMATOCRIT % 25.8* 23.2* 24.2*   PLATELETS 10*3/mm3 244 244 227       Results from last 7 days  Lab Units 06/26/17  0437 06/25/17  0532 06/24/17  0628 06/23/17  0507  06/20/17  0947   SODIUM mmol/L 142 140 139 138  < > 140   POTASSIUM mmol/L 3.7 3.4* 3.7 4.0  < > 3.3*   CHLORIDE mmol/L 108 105 104 102  < > 100   TOTAL CO2 mmol/L 27.0 26.0 25.0 23.0*  < > 28.0   BUN mg/dL 20 16 23* 34*  < > 17   CREATININE mg/dL 0.91 0.74 0.85 1.18  < > 0.84   CALCIUM mg/dL 8.3* 8.3* 8.4 8.5  < > 8.7   BILIRUBIN mg/dL  --   --   --  0.7  --  0.8   ALK PHOS U/L  --   --   --  83  --  110   ALT (SGPT) U/L  --   --   --  45  --  28   AST (SGOT) U/L  --   --   --  63*  --  33   GLUCOSE mg/dL 111* 114* 190* 120*  < > 131*   < > = values in this interval not displayed.    Culture Data:   Blood Culture   Date Value Ref Range Status   06/22/2017 No growth at 3 days  Preliminary   06/22/2017 No growth at 3 days  Preliminary       Radiology Data:   Imaging Results (last 24 hours)     ** No results found for the last 24 hours. **          I have reviewed the patient current medications.     Assessment/Plan   Assessment:  1. Closed subcapital fracture of neck of right femur status post right hip hemiarthroplasty. Post op day 6 .   2. Concern for pneumonia superimposed on COPD-healthcare associated  3. Urinary tract infection-E. Coli   4. Obstructive sleep apnea-CPAP  5. Dementia at baseline  6. Hypertension-poorly controlled  7. Pulmonary edema-improved on chest x-ray  8. Diastolic dysfunction Ejection fraction 65%  9. Pulmonary HTN  10. Supraventricular tachycardia  11. Vitamin B-12 deficiency  12. Hypokalemia  13.  Diarrhea    Plan:  1. Day 4/7  B-12 intramuscularly  2. [place]um day 3, vancomycin day 3  3. Continue Physical and occupational therapy  4. Continue metoprolol  5. Check GI panel  6. Wean oxygen as she is only on CPAP at home.    Discharge Planning: I expect patient to be discharged to SNF in 1-2 days.  Discussed above plan with Ericka Matthew NP. Agree with above plan. Reviewed antibiotics, labs, vitals. Plan is to change to cefepime today from meropenem.  Keep here long enough to receive adequate antibiotic coverage as she has multiple allergies. Transition to oral when patient is ready for d/c. MD Griselda Merida, APRN   06/26/17   10:39 AM

## 2017-06-26 NOTE — PLAN OF CARE
Problem: Patient Care Overview (Adult)  Goal: Plan of Care Review  Outcome: Ongoing (interventions implemented as appropriate)    06/26/17 5241   Coping/Psychosocial Response Interventions   Plan Of Care Reviewed With patient   Patient Care Overview   Progress progress toward functional goals is gradual   Outcome Evaluation   Outcome Summary/Follow up Plan Pt. completed guided and graded challenges in BUE strength to increase her fxl usage of UEs for ADLs. She required [place] cues to complete the exercises and encouragement to increase her participation. MsJorge Lazo appear pleasantly confused. Lo wilks DC to SNF

## 2017-06-26 NOTE — PROGRESS NOTES
Referring Provider: Kera Zavala MD    Length of Stay: 7    Chief Complaint:   Chief Complaint   Patient presents with   • Fall       Subjective: sleeping, no reported chest pain    Medications  Current Facility-Administered Medications   Medication Dose Route Frequency Provider Last Rate Last Dose   • acetaminophen (TYLENOL) tablet 650 mg  650 mg Oral Q6H PRN BETO Hammond   650 mg at 06/22/17 1453   • amLODIPine (NORVASC) tablet 10 mg  10 mg Oral Daily Kwaku Frey DO   10 mg at 06/26/17 0941   • bisacodyl (DULCOLAX) suppository 10 mg  10 mg Rectal Daily PRN Harriet Smith DO       • bumetanide (BUMEX) tablet 0.5 mg  0.5 mg Oral Daily BETO Wahl   0.5 mg at 06/26/17 0941   • carvedilol (COREG) tablet 25 mg  25 mg Oral BID With Meals Marlon Fernandez MD   25 mg at 06/26/17 0940   • cefepime (MAXIPIME) 1 g/100 mL 0.9% NS IVPB (mbp)  1 g Intravenous Q12H BETO Wahl       • CloNIDine (CATAPRES-TTS) 0.2 MG/24HR patch 1 patch  1 patch Transdermal Weekly Kwaku Frey DO   1 patch at 06/20/17 0925   • cyanocobalamin injection 1,000 mcg  1,000 mcg Intramuscular Daily BETO Hammond   1,000 mcg at 06/26/17 0940   • [START ON 7/6/2017] cyanocobalamin injection 1,000 mcg  1,000 mcg Intramuscular Q7 Days BETO Hammond       • docusate sodium (COLACE) capsule 100 mg  100 mg Oral BID PRN Jeremiah Zaidi MD       • enoxaparin (LOVENOX) syringe 40 mg  40 mg Subcutaneous Daily Jeremiah Zaidi MD   40 mg at 06/26/17 1011   • famotidine (PEPCID) injection 10 mg  10 mg Intravenous Q12H Kwaku Frey DO   10 mg at 06/26/17 1009   • hydrALAZINE (APRESOLINE) injection 20 mg  20 mg Intravenous Q4H PRN Kwaku Frey DO   20 mg at 06/25/17 0854   • hydrALAZINE (APRESOLINE) tablet 25 mg  25 mg Oral Q8H Griselda Matthew, APRN   25 mg at 06/26/17 0607   • HYDROmorphone (DILAUDID) injection 1 mg  1 mg Intravenous Q3H VERENA LEE  Tk DO   1 mg at 06/22/17 0926   • ipratropium (ATROVENT) nebulizer solution 0.5 mg  0.5 mg Nebulization 4x Daily - RT BETO Wahl   0.5 mg at 06/26/17 1107   • labetalol (NORMODYNE,TRANDATE) injection 20 mg  20 mg Intravenous Q2H PRN Kwaku Frey DO   20 mg at 06/23/17 0405   • lisinopril (PRINIVIL,ZESTRIL) tablet 20 mg  20 mg Oral Daily Griselda Matthew APRN   20 mg at 06/26/17 0940   • magnesium hydroxide (MILK OF MAGNESIA) suspension 2400 mg/10mL 10 mL  10 mL Oral Daily PRN Jeremiah Zaidi MD   10 mL at 06/21/17 2005   • ondansetron (ZOFRAN) tablet 4 mg  4 mg Oral Q6H PRN Jeremiah Zaidi MD        Or   • ondansetron ODT (ZOFRAN-ODT) disintegrating tablet 4 mg  4 mg Oral Q6H PRN Jeremiah Zaidi MD        Or   • ondansetron (ZOFRAN) injection 4 mg  4 mg Intravenous Q6H PRN Jeremiah Zaidi MD   4 mg at 06/21/17 1959   • Pharmacy to Dose meropenem (MERREM)   Does not apply Continuous PRN BETO Wahl       • phenytoin (DILANTIN) ER capsule 400 mg  400 mg Oral Nightly Kwaku Frey DO   400 mg at 06/25/17 2146   • sodium chloride 0.9 % flush 1-10 mL  1-10 mL Intravenous PRN Jeremiah Zaidi MD       • vancomycin (VANCOCIN) in iso-osmotic dextrose IVPB 1 g (premix) 200 mL  1,000 mg Intravenous Q24H Griselda Matthew APRN   1,000 mg at 06/25/17 1421       Past Medical History:   Diagnosis Date   • Adrenal adenoma    • Anemia    • Atrial fibrillation    • Benign neoplasm of cerebral meninges    • Brain tumor    • CHF (congestive heart failure)    • Dementia    • Disease of thyroid gland    • Hip fracture requiring operative repair     7/2016   • Hyperlipidemia    • Hypertension    • Obstructive sleep apnea    • Osteoarthritis    • Peripheral vascular disease    • Pneumonia    • Seizures    • Sick sinus syndrome     s/p pacemaker per Dr. Puri   • TIA (transient ischemic attack)    ,   Past Surgical History:   Procedure  "Laterality Date   • APPENDECTOMY     • BRAIN SURGERY     • CAROTID ENDARTERECTOMY  02/2011   • CATARACT EXTRACTION     • CHOLECYSTECTOMY     • HEMORRHOIDECTOMY     • HIP HEMIARTHROPLASTY Right 6/20/2017    Procedure: HIP HEMIARTHROPLASTY;  Surgeon: Jeremiah Zaidi MD;  Location: [facility];  Service:    • INSERT / REPLACE / REMOVE PACEMAKER     • JOINT REPLACEMENT     • PACEMAKER IMPLANTATION     • TONSILLECTOMY     • TOTAL HIP ARTHROPLASTY     ,   Family History   Problem Relation Age of Onset   • Arthritis Mother    • Cancer Mother    • Heart disease Mother    • Arthritis Father    • Arthritis Brother    • Cancer Brother    • Heart disease Brother    • Arthritis Sister    • Heart disease Sister    ,   Social History   Substance Use Topics   • Smoking status: Never Smoker   • Smokeless tobacco: None   • Alcohol use No   ,    Review of Systems  Review of Systems   Unable to perform ROS: dementia       Objective     Physical Exam:  Patient Vitals for the past 24 hrs:   BP Temp Temp src Pulse Resp SpO2 Height Weight   06/26/17 1146 139/48 97.5 °F (36.4 °C) Oral 68 20 96 % - -   06/26/17 1056 - - - - - - 64.02\" (162.6 cm) 178 lb (80.7 kg)   06/26/17 0725 152/45 98.3 °F (36.8 °C) Oral 74 18 98 % - -   06/26/17 0715 - - - 60 18 97 % - -   06/26/17 0420 123/48 98 °F (36.7 °C) Oral 59 18 98 % - -   06/25/17 2230 160/46 99.5 °F (37.5 °C) Axillary 65 18 95 % - -   06/1934 - - - 60 18 98 % - -   06/25/17 1858 158/48 97.6 °F (36.4 °C) Oral 92 18 97 % - -   06/25/17 1815 (!) 136/39 99 °F (37.2 °C) Oral 65 18 98 % - -   06/25/17 1524 - - - - - 97 % - -   06/25/17 1523 133/49 99.1 °F (37.3 °C) Axillary 67 18 97 % - -     Physical Exam   Constitutional: She appears well-developed and well-nourished. No distress.   HENT:   Head: Normocephalic and atraumatic.   Eyes: No scleral icterus.   Cardiovascular: Normal rate, regular rhythm and normal heart sounds.  Exam reveals no gallop and no friction rub.    No murmur " heard.  Pulmonary/Chest: Effort normal and breath sounds normal. No respiratory distress. She has no wheezes. She has no rales.   Abdominal: Soft. Bowel sounds are normal. She exhibits no distension. There is no tenderness.   Neurological: No cranial nerve deficit.   Skin: Skin is warm and dry. She is not diaphoretic. No erythema.       Results Review:   I reviewed the patient's new clinical results.  Lab Results (last 24 hours)     Procedure Component Value Units Date/Time    Blood Culture With TEDDY [[id number]]  (Normal) Collected:  06/22/17 2347    Specimen:  Blood from Arm, Left Updated:  06/26/17 0101     Blood Culture No growth at 3 days    Blood Culture With TEDDY [[id number]]  (Normal) Collected:  06/22/17 2347    Specimen:  Blood from Hand, Left Updated:  06/26/17 0206     Blood Culture No growth at 3 days    CBC (No Diff) [[id number]]  (Abnormal) Collected:  06/26/17 0437    Specimen:  Blood Updated:  06/26/17 0526     WBC 7.38 10*3/mm3      RBC 2.77 (L) 10*6/mm3      Hemoglobin 8.5 (L) g/dL      Hematocrit 25.8 (L) %      MCV 93.1 fL      MCH 30.7 pg      MCHC 32.9 (L) g/dL      RDW 15.2 (H) %      RDW-SD 52.2 fl      MPV 9.7 fL      Platelets 244 10*3/mm3     Basic Metabolic Panel [[id number]]  (Abnormal) Collected:  06/26/17 0437    Specimen:  Blood Updated:  06/26/17 0537     Glucose 111 (H) mg/dL      BUN 20 mg/dL      Creatinine 0.91 mg/dL      Sodium 142 mmol/L      Potassium 3.7 mmol/L      Chloride 108 mmol/L      CO2 27.0 mmol/L      Calcium 8.3 (L) mg/dL      eGFR Non African Amer 58 (L) mL/min/1.73      BUN/Creatinine Ratio 22.0     Anion Gap 7.0 mmol/L     Narrative:       The MDRD GFR formula is only valid for adults with stable renal function between ages 18 and 70.        Lab Results   Component Value Date    ECHOEFEST 65 06/20/2017     Imaging Results (last 24 hours)     ** No results found for the last 24 hours. **          I have reviewed telemetry which reveals atrial pacing  68    Assessment/Plan   Principal Problem:    Closed subcapital fracture of neck of right femur  Active Problems:    Paroxysmal atrial fibrillation    UTI (urinary tract infection)    Obstructive sleep apnea    Dementia    Hypertension    Pulmonary edema    Diastolic dysfunction    Pulmonary HTN      New Problems that need treatment:  SVT, resolved. Cont telemetry  Right femoral fracture, s/p ORIF  Hypokalemia, improving      Old problems that are stable:   Anemia  PAF  Dementia    MDM Moderate Complexity

## 2017-06-26 NOTE — PLAN OF CARE
Problem: Skin Integrity Impairment, Risk/Actual (Adult)  Goal: Skin Integrity/Wound Healing  Outcome: Ongoing (interventions implemented as appropriate)    Problem: Fall Risk (Adult)  Goal: Absence of Falls  Outcome: Ongoing (interventions implemented as appropriate)    Problem: Patient Care Overview (Adult)  Goal: Plan of Care Review  Outcome: Ongoing (interventions implemented as appropriate)    06/26/17 0337   Coping/Psychosocial Response Interventions   Plan Of Care Reviewed With patient;spouse   Patient Care Overview   Progress no change   Outcome Evaluation   Outcome Summary/Follow up Plan Pt drowsy. Disoriented to time and situation. Incontinent. No c/o of pain. Tele on.          Problem: Fractured Hip (Adult)  Goal: Signs and Symptoms of Listed Potential Problems Will be Absent or Manageable (Fractured Hip)  Outcome: Ongoing (interventions implemented as appropriate)    Problem: Pressure Ulcer Risk (Wing Scale) (Adult,Obstetrics,Pediatric)  Goal: Skin Integrity  Outcome: Ongoing (interventions implemented as appropriate)

## 2017-06-26 NOTE — PROGRESS NOTES
"Pharmacy Antimicrobial Dosing Service  Vancomycin    Renay Lazo is a 88 y.o. female  [Ht: 64.02\" (162.6 cm); Wt: 178 lb (80.7 kg)] Body mass index is 30.53 kg/(m^2).    Estimated Creatinine Clearance: 43.9 mL/min (by C-G formula based on Cr of 0.91).   Lab Results   Component Value Date    CREATININE 0.91 06/26/2017    CREATININE 0.74 06/25/2017    CREATININE 0.85 06/24/2017      Lab Results   Component Value Date    WBC 7.38 06/26/2017    WBC 7.92 06/25/2017    WBC 8.66 06/24/2017      Lab Results   Component Value Date    VANCOTROUGH 8.93 (L) 06/26/2017         Microbiology Results (last 10 days)     Procedure Component Value - Date/Time    Blood Culture With TEDDY [[id number]]  (Normal) Collected:  06/22/17 2347    Lab Status:  Preliminary result Specimen:  Blood from Hand, Left Updated:  06/26/17 0206     Blood Culture No growth at 3 days    Blood Culture With TEDDY [[id number]]  (Normal) Collected:  06/22/17 2347    Lab Status:  Preliminary result Specimen:  Blood from Arm, Left Updated:  06/26/17 0101     Blood Culture No growth at 3 days    Urine Culture [[id number]]  (Abnormal)  (Susceptibility) Collected:  06/19/17 0625    Lab Status:  Final result Specimen:  Urine from Urine, Catheter Updated:  06/21/17 0748     Urine Culture >100,000 CFU/mL Escherichia coli (A)    Susceptibility      Escherichia coli     BRAVO     Ampicillin 4 ug/ml Susceptible     Ampicillin + Sulbactam <=2 ug/ml Susceptible     Cefazolin <=4 ug/ml Susceptible  [1]      Cefepime <=1 ug/ml Susceptible     Ceftriaxone <=1 ug/ml Susceptible     Ertapenem <=0.5 ug/ml Susceptible     ESBL Confirmation Test NEG  Negative     Gentamicin <=1 ug/ml Susceptible     Levofloxacin <=0.12 ug/ml Susceptible     Meropenem <=0.25 ug/ml Susceptible     Nitrofurantoin <=16 ug/ml Susceptible     Piperacillin + Tazobactam <=4 ug/ml Susceptible     Trimethoprim + Sulfamethoxazole <=20 ug/ml Susceptible            [1]   Cefazolin results may be used to predict the " potential effectiveness of oral cephalosporins for treating uncomplicated urinary tract infections.                       Current vancomycin dose:  1000 mg IVPB every 24 hours, day 3 of therapy.    Indication for use: PNEUMONIA    Adjusted vancomycin dose: 1250 mg IVPB every 24 hours     Assessment/Plan:  Trough level returned sub-therapeutic; level obtained ~24 hours post dose. Dosage adjusted to maintain adequate AUC:BRAVO based on kinetic parameters. Pharmacy will continue daily follow-up evaluation.    Kana Llanes RPH PharmD  06/26/17 3:29 PM

## 2017-06-26 NOTE — PLAN OF CARE
Problem: Patient Care Overview (Adult)  Goal: Plan of Care Review  Outcome: Ongoing (interventions implemented as appropriate)    06/26/17 0961   Coping/Psychosocial Response Interventions   Plan Of Care Reviewed With patient   Patient Care Overview   Progress progress towards functional goals is fair   Outcome Evaluation   Outcome Summary/Follow up Plan Pt required mod-max of 2 for bed mobility, sit-stand mod-max of 2 with rwx. Pt not able to take to any steps. Pt will need SNF

## 2017-06-26 NOTE — THERAPY TREATMENT NOTE
Acute Care - Speech Language Pathology   Swallow Treatment Note [facility]     Patient Name: Renay Lazo  : 1929  MRN: [id number]  Today's Date: 2017  Onset of Illness/Injury or Date of Surgery Date: 17            Admit Date: 2017  Pt completed trials of pureed, honey thick, nectar thick, and thin consistencies. 1x immediate cough with thin liquids as well as 1x significant audible swallow as well as delayed throat clear with nectar thick liquids. No other overt s/s of aspiration noted. However, ST cannot fully r/o aspiration with PO trials completed.  ST will continue to follow and treat.  Yordy Whiting MS, CFY-SLP 2017 2:08 PM    Visit Dx:      ICD-10-CM ICD-9-CM   1. Closed displaced intertrochanteric fracture of right femur, initial encounter S72.141A 820.21   2. Closed subcapital fracture of neck of right femur, initial encounter S72.011A 820.09   3. Decreased activities of daily living (ADL) Z78.9 V49.89   4. Impaired mobility Z74.09 799.89   5. Oropharyngeal dysphagia R13.12 787.22     Patient Active Problem List   Diagnosis   • Closed C2 fracture   • C2 cervical fracture   • Transient alteration of awareness   • Paroxysmal atrial fibrillation   • History of meningioma of the brain   • UTI (urinary tract infection)   • Over weight   • Closed displaced fracture of second cervical vertebra with routine healing   • Closed subcapital fracture of neck of right femur   • Obstructive sleep apnea   • Dementia   • Hypertension   • Pulmonary edema   • Diastolic dysfunction   • Pulmonary HTN             Adult Rehabilitation Note       17 1040 17 0900 17 1320    Rehab Assessment/Intervention    Discipline speech language pathologist  -CS physical therapy assistant  -AH physical therapy assistant  -AL    Document Type therapy note (daily note)  -CS therapy note (daily note)  -AH therapy note (daily note)  -AL    Subjective Information agree to therapy  -CS agree to therapy   - agree to therapy  -AL    Patient Effort, Rehab Treatment adequate  -CS      Precautions/Limitations  fall precautions;hip precautions- right   R knee immobilizer  - fall precautions;hip precautions- right   knee immobilizer R leg to be worn in bed, confused  -AL    Recorded by [CS] Yordy Whiting, MS, CFY-SLP [] Yesenia Merritt PTA [AL] Yesenia Llanes PTA    Pain Assessment    Pain Assessment Unable to assess   Pt indictaes pain however she is unable to rate pain.  -CS Antony-Briggs FACES  - Antony-Briggs FACES  -AL    Antony-Briggs FACES Pain Rating  4  - 2  -AL    Pain Type  Surgical pain  - Surgical pain  -AL    Pain Location  Hip  -AH Hip  -AL    Pain Orientation  Right  -AH Right  -AL    Pain Frequency  Intermittent  - Intermittent  -AL    Pain Intervention(s)  Repositioned  - Medication (See MAR)  -AL    Response to Interventions  tolerated  - tolerated  -AL    Recorded by [CS] Yordy Whiting, MS, CFY-SLP [] Yesenia Merritt PTA [AL] Yesenia Llanes PTA    Dysphagia/Swallow Intervention    Dysphagia/Swallow Therapeutic Feed Pt completed trials of pureed, honey thick, nectar thick, and thin consistencies. 1x immediate cough with thin liquids as well as 1x significant audible swallow as well as delayed throat clear with nectar thick liquids. No other overt s/s of aspiration noted. However, ST cannot fully r/o aspiration with PO trials completed.   -CS      Recorded by [CS] Yordy Whiting, MS, CFY-SLP      Bed Mobility, Assessment/Treatment    Bed Mob, Supine to Sit, [place]  maximum assist (25% patient effort);moderate assist (50% patient effort);2 person assist required;verbal cues required  -     Bed Mob, Sit to Supine, [place]  maximum assist (25% patient effort);2 person assist required;verbal cues required  -     Recorded by  [] Yesenia Merritt PTA     Transfer Assessment/Treatment    Transfers, Sit-Stand [place]  moderate assist (50% patient effort);2 person assist required;verbal  cues required  -     Transfers, Stand-Sit [place]  moderate assist (50% patient effort);minimum assist (75% patient effort);2 person assist required;verbal cues required  -     Transfers, Sit-Stand-Sit, Assist Device  rolling walker  -     Recorded by  [] Yesenia Merritt PTA     Therapy Exercises    Right Lower Extremity  AAROM:;10 reps;sitting  - AAROM:;20 reps;supine;ankle pumps/circles;hip abduction/adduction;SAQ;heel slides;PROM:   patient needs verbal and tactile cues to stay awake  -AL    Left Lower Extremity  AROM:;AAROM:;10 reps  -     Recorded by  [] Yesenia Merritt PTA [AL] Yesenia Llanes PTA    Orthotics Prosthetics    Additional Documentation  Orthosis Location (Group)  - Orthosis Location (Group)  -AL    Recorded by  [] Yesenia Merritt PTA [AL] Yesenia Llanes PTA    Orthosis Location    Orthosis Location/Type  lower extremity  - lower extremity  -AL    Orthosis, Lower Extremity  Right:;knee immobilizer  - Right:;knee immobilizer   removed for exercisees, put brace back on after exercisees.  -AL    Recorded by  [] Yesenia Merritt PTA [AL] Yesenia Llanes PTA    Orthosis Management/Training    Orthosis Wear Schedule  wear at night or when in bed  - wear at night or when in bed  -AL    Recorded by  [] Yesenia Merritt PTA [AL] Yesenia Llanes PTA    Positioning and Restraints    Pre-Treatment Position  in bed  - in bed  -AL    Post Treatment Position  bed  - bed  -AL    In Bed  fowlers;call light within reach;encouraged to call for assist;with family/caregiver  - fowlers;call light within reach;with family/caregiver  -AL    Recorded by  [] Yesenia Merritt PTA [AL] Yesenia Llanes PTA      06/25/17 1248 06/25/17 0725 06/24/17 0814    Rehab Assessment/Intervention    Discipline speech language pathologist  -MB physical therapy assistant  -AL physical therapy assistant  -AL    Document Type therapy note (daily note)  -MB therapy note (daily note)  -AL therapy note (daily  note)  -AL    Subjective Information  agree to therapy  -AL agree to therapy  -AL    Precautions/Limitations  fall precautions;hip precautions- right   knee immobilizer on when in bed  -AL fall precautions;hip precautions- right   knee immobilizer on when in bed  -AL    Recorded by [MB] Liss Breaux CCC-SLP [AL] Yesenia Llanes PTA [AL] Yesenia Llanes PTA    Pain Assessment    Pain Assessment  Antony-Briggs FACES  -AL Antony-Baker FACES  -AL    Antony-Briggs FACES Pain Rating  4  -AL 6  -AL    Pain Type  Surgical pain  -AL Surgical pain  -AL    Pain Location  Hip  -AL Hip  -AL    Pain Orientation  Right  -AL Right  -AL    Pain Frequency  Intermittent  -AL Intermittent  -AL    Pain Intervention(s)  Medication (See MAR)  -AL Repositioned  -AL    Response to Interventions  Tolerated  -AL tolerated  -AL    Recorded by  [AL] Yesenia Llanes PTA [AL] Yesenia Llanes PTA    Dysphagia/Swallow Intervention    Dysphagia/Swallow Therapeutic Feed Observed pt's  feeding pt lunch. The pt a throat clear type behavior 1 to 2 times during the meal, however it is unclear if this is a typical habit for her. The pt's  was noted to provide large bites at times, however did self-correct independently and state he would give her smaller bites.  -MB      Recorded by [MB] Liss Breaux CCC-SLP      Bed Mobility, Assessment/Treatment    Bed Mob, Supine to Sit, [place]  moderate assist (50% patient effort);maximum assist (25% patient effort);2 person assist required;verbal cues required  -AL moderate assist (50% patient effort);maximum assist (25% patient effort);2 person assist required;verbal cues required  -AL    Bed Mob, Sit to Supine, [place]  maximum assist (25% patient effort);2 person assist required;verbal cues required  -AL maximum assist (25% patient effort);2 person assist required;verbal cues required  -AL    Recorded by  [AL] Yesenia Llanes PTA [AL] Yesenia Llanes PTA    Transfer Assessment/Treatment     Transfers, Sit-Stand [place]  moderate assist (50% patient effort);maximum assist (25% patient effort);2 person assist required;verbal cues required  -AL moderate assist (50% patient effort);maximum assist (25% patient effort);2 person assist required;verbal cues required  -AL    Transfers, Stand-Sit [place]  moderate assist (50% patient effort);verbal cues required;2 person assist required  -AL moderate assist (50% patient effort);verbal cues required;2 person assist required  -AL    Transfers, Sit-Stand-Sit, Assist Device  rolling walker  -AL rolling walker  -AL    Toilet Transfer, [place]   --   Had BM, CNA cleaned patient while standing  -AL    Transfer, Comment  Stood x 2 with Mod assist, cues to stand tall   -AL stood x 2 with mod assist  -AL    Recorded by  [AL] Yesenia Llanes PTA [AL] Yesenia Llanes PTA    Balance Skills Training    Sitting-Level of Assistance  Contact guard;Minimum assistance  -AL Contact guard;Minimum assistance  -AL    Sitting-Balance Support  Left upper extremity supported;Feet supported  -AL Left upper extremity supported;Feet supported  -AL    Sitting-Balance Activities  Trunk control activities  -AL Trunk control activities  -AL    Sitting # of Minutes  15  -AL 15  -AL    Standing-Level of Assistance  Moderate assistance;x2  -AL Moderate assistance;x2  -AL    Static Standing Balance Support  assistive device  -AL assistive device  -AL    Standing Balance # of Minutes  5  -AL 3  -AL    Stepping Strategy Assessment (Balance Skills)  attempted side steps but wasn't able to move her feet.  Needs cues to stand tall and keep hips forward.  -AL     Recorded by  [AL] Yesenia Llanes PTA [AL] Yesenia Llanes PTA    Therapy Exercises    Right Lower Extremity  AAROM:;10 reps;LAQ;hip abduction/adduction  -AL     Recorded by  [AL] Yesenia Llanes PTA     Orthotics Prosthetics    Additional Documentation  Orthosis Location (Group)  -AL Orthosis Location (Group)  -AL    Recorded by  [AL]  Yesenia Llanes PTA [AL] Yesenia Llanes PTA    Orthosis Location    Orthosis Location/Type  lower extremity  -AL lower extremity  -AL    Orthosis, Lower Extremity  Right:;knee immobilizer  -AL Right:;knee immobilizer  -AL    Recorded by  [AL] Yesenia Llanes PTA [AL] Yesenia Llanes PTA    Orthosis Management/Training    Orthosis Wear Schedule  wear at night or when in bed  -AL wear at night or when in bed  -AL    Recorded by  [AL] Yesenia Llanes PTA [AL] Yesenia Llanes PTA    Positioning and Restraints    Pre-Treatment Position  in bed  -AL in bed  -AL    Post Treatment Position  bed  -AL bed  -AL    In Bed  fowlers;call light within reach;with family/caregiver  -AL fowlers;call light within reach;SCD pump applied;R knee immobilizer  -AL    Recorded by  [AL] Yesenia Llanes PTA [AL] Yesenia Llanes PTA      06/23/17 1530          Rehab Assessment/Intervention    Discipline physical therapy assistant  -      Document Type therapy note (daily note)  -      Subjective Information agree to therapy;complains of;weakness;fatigue;pain  -      Precautions/Limitations fall precautions;hip precautions- right   R knee immobilizer when in bed  -      Recorded by [] Yesenia Merritt PTA      Pain Assessment    Pain Assessment Antony-Briggs FACES  -      Antony-Briggs FACES Pain Rating 6  -      Pain Type Surgical pain  -      Pain Location Hip  -      Pain Orientation Right  -      Pain Frequency Intermittent   with bed mobility  -      Pain Intervention(s) Repositioned  -      Response to Interventions tolerated  -      Recorded by [] Yesenia Merritt PTA      Bed Mobility, Assessment/Treatment    Bed Mob, Supine to Sit, [place] moderate assist (50% patient effort);maximum assist (25% patient effort);2 person assist required;verbal cues required  -      Bed Mob, Sit to Supine, [place] maximum assist (25% patient effort);2 person assist required;verbal cues required  -      Recorded by [] Yesenia COX  MIYA Merritt      Transfer Assessment/Treatment    Transfers, Sit-Stand [place] moderate assist (50% patient effort);maximum assist (25% patient effort);2 person assist required;verbal cues required  -      Transfers, Stand-Sit [place] moderate assist (50% patient effort);verbal cues required;2 person assist required  -      Recorded by [] Yesenia Merritt PTA      Balance Skills Training    Sitting-Level of Assistance Contact guard;Minimum assistance  -      Sitting-Balance Support Left upper extremity supported;Feet supported  -      Sitting-Balance Activities Trunk control activities  -      Sitting # of Minutes 20  -      Standing-Level of Assistance Moderate assistance;x2  -      Static Standing Balance Support assistive device  -      Standing Balance # of Minutes 2  -      Recorded by [] Yesenia Merritt PTA      Therapy Exercises    Right Lower Extremity AAROM:;10 reps;sitting  -      Recorded by [] Yesenia Merritt PTA      Orthotics Prosthetics    Additional Documentation Orthosis Location (Group)  -      Recorded by [] Yesenia Merritt PTA      Orthosis Location    Orthosis Location/Type lower extremity  -      Orthosis, Lower Extremity Right:;knee immobilizer  -      Recorded by [] Yesenia Merritt PTA      Orthosis Management/Training    Orthosis Wear Schedule wear at night or when in bed  -      Recorded by [] Yesenia Merritt PTA      Positioning and Restraints    Pre-Treatment Position in bed  -      Post Treatment Position bed  -      In Bed fowlers;call light within reach;encouraged to call for assist;with family/caregiver;SCD pump applied;R knee immobilizer  -      Recorded by [] Yesenia Merritt PTA        User Key  (r) = Recorded By, (t) = Taken By, (c) = Cosigned By    Initials Name Effective Dates    RED Breaux CCC-SLP 08/02/16 -      Yesenia Merritt, MIYA 08/02/16 -     DAYA Llanes [facility] 08/02/16 -     WILLA Whiting MS,  CFY-SLP 08/02/16 -                   IP SLP Goals       06/26/17 1404 06/25/17 1304 06/23/17 1037    Safely Consume Diet    Safely Consume Diet- SLP, Date Goal Reviewed 06/26/17  -CS 06/25/17  -MB 06/23/17  -TM      06/22/17 1533          Safely Consume Diet    Safely Consume Diet- SLP, Date Established 06/22/17  -MB      Safely Consume Diet- SLP, Time to Achieve by discharge  -MB      Safely Consume Diet- SLP, Additional Goal Pt will tolerate trials of current diet and upgraded consistencies with no overt s/s of aspiration.  -MB      Safely Consume Diet- SLP, Date Goal Reviewed 06/22/17  -MB      Safely Consume Diet- SLP, Outcome goal ongoing  -MB        User Key  (r) = Recorded By, (t) = Taken By, (c) = Cosigned By    Initials Name Provider Type    RED Breaux, CCC-SLP Speech and Language Pathologist     Marva Jamil, CCC-SLP Speech and Language Pathologist     Yordy Whiting MS, CFY-SLP Speech and Language Pathologist          EDUCATION  The patient has been educated in the following areas:   Dysphagia (Swallowing Impairment).    SLP Recommendation and Plan                                           Plan of Care Review  Plan Of Care Reviewed With: patient  Progress: progress towards functional goals is fair  Outcome Summary/Follow up Plan:  recommends continuing a pureed diet with honey thick liquids at this time. ST will continue to follow and treat.           Time Calculation:         Time Calculation- SLP       06/26/17 1407 06/26/17 1406       Time Calculation- SLP    SLP Start Time 1035  - 1040  -CS     SLP Stop Time 1059  -      SLP Time Calculation (min) 24 min  -      SLP Received On 06/26/17  -        User Key  (r) = Recorded By, (t) = Taken By, (c) = Cosigned By    Initials Name Provider Type    WILLA Whiting, MS, CFY-SLP Speech and Language Pathologist          Therapy Charges for Today     Code Description Service Date Service Provider Modifiers Qty    [id number] [facility]  TREATMENT SWALLOW 2 6/26/2017 Yordy Whiting MS, CFY-SLP GN, KX 1          SLP G-Codes  SLP NOMS Used?: Yes  Functional Limitations: Swallowing  Swallow Current Status (): At least 80 percent but less than 100 percent impaired, limited or restricted  Swallow Goal Status (): At least 40 percent but less than 60 percent impaired, limited or restricted      Yordy Whiting MS, JOB-SLP  6/26/2017

## 2017-06-26 NOTE — PROGRESS NOTES
Continued Stay Note   [place]     Patient Name: Renay Lazo  MRN: [id number]  Today's Date: 6/26/2017    Admit Date: 6/19/2017          Discharge Plan       06/26/17 1334    Case Management/Social Work Plan    Plan [facility] - possibly tomorrow    Additional Comments SW following for discharge to [facility]. Potential discharge tomorrow, 6/27.              Discharge Codes     None        Expected Discharge Date and Time     Expected Discharge Date Expected Discharge Time    Jun 28, 2017             ADAN Harris

## 2017-06-27 ENCOUNTER — APPOINTMENT (OUTPATIENT)
Dept: GENERAL RADIOLOGY | Facility: HOSPITAL | Age: 82
End: 2017-06-27

## 2017-06-27 LAB
ABO + RH BLD: NORMAL
BH BB BLOOD EXPIRATION DATE: NORMAL
BH BB BLOOD TYPE BARCODE: 5100
BH BB DISPENSE STATUS: NORMAL
BH BB PRODUCT CODE: NORMAL
BH BB UNIT NUMBER: NORMAL
CROSSMATCH INTERPRETATION: NORMAL
DEPRECATED RDW RBC AUTO: 51.2 FL (ref 40–54)
ERYTHROCYTE [DISTWIDTH] IN BLOOD BY AUTOMATED COUNT: 15.2 % (ref 12–15)
HCT VFR BLD AUTO: 26.7 % (ref 37–47)
HGB BLD-MCNC: 8.7 G/DL (ref 12–16)
MCH RBC QN AUTO: 30.9 PG (ref 28–32)
MCHC RBC AUTO-ENTMCNC: 32.6 G/DL (ref 33–36)
MCV RBC AUTO: 94.7 FL (ref 82–98)
PLATELET # BLD AUTO: 266 10*3/MM3 (ref 130–400)
PMV BLD AUTO: 9.4 FL (ref 6–12)
RBC # BLD AUTO: 2.82 10*6/MM3 (ref 4.2–5.4)
UNIT  ABO: NORMAL
UNIT  RH: NORMAL
WBC NRBC COR # BLD: 8.32 10*3/MM3 (ref 4.8–10.8)

## 2017-06-27 PROCEDURE — 94760 N-INVAS EAR/PLS OXIMETRY 1: CPT

## 2017-06-27 PROCEDURE — 25010000002 ENOXAPARIN PER 10 MG: Performed by: ORTHOPAEDIC SURGERY

## 2017-06-27 PROCEDURE — 87070 CULTURE OTHR SPECIMN AEROBIC: CPT | Performed by: INTERNAL MEDICINE

## 2017-06-27 PROCEDURE — 94799 UNLISTED PULMONARY SVC/PX: CPT

## 2017-06-27 PROCEDURE — 25010000002 HYDROMORPHONE PER 4 MG: Performed by: FAMILY MEDICINE

## 2017-06-27 PROCEDURE — 97535 SELF CARE MNGMENT TRAINING: CPT | Performed by: OCCUPATIONAL THERAPIST

## 2017-06-27 PROCEDURE — 87205 SMEAR GRAM STAIN: CPT | Performed by: INTERNAL MEDICINE

## 2017-06-27 PROCEDURE — 25010000002 CYANOCOBALAMIN PER 1000 MCG: Performed by: NURSE PRACTITIONER

## 2017-06-27 PROCEDURE — 97530 THERAPEUTIC ACTIVITIES: CPT

## 2017-06-27 PROCEDURE — 85027 COMPLETE CBC AUTOMATED: CPT | Performed by: FAMILY MEDICINE

## 2017-06-27 PROCEDURE — 92526 ORAL FUNCTION THERAPY: CPT

## 2017-06-27 PROCEDURE — 99232 SBSQ HOSP IP/OBS MODERATE 35: CPT | Performed by: INTERNAL MEDICINE

## 2017-06-27 PROCEDURE — 94660 CPAP INITIATION&MGMT: CPT

## 2017-06-27 PROCEDURE — 97110 THERAPEUTIC EXERCISES: CPT

## 2017-06-27 PROCEDURE — 71010 HC CHEST PA OR AP: CPT

## 2017-06-27 PROCEDURE — 25010000002 VANCOMYCIN 10 G RECONSTITUTED SOLUTION

## 2017-06-27 RX ADMIN — IPRATROPIUM BROMIDE 0.5 MG: 0.5 SOLUTION RESPIRATORY (INHALATION) at 06:56

## 2017-06-27 RX ADMIN — BUMETANIDE 0.5 MG: 0.5 TABLET ORAL at 09:25

## 2017-06-27 RX ADMIN — CLONIDINE 1 PATCH: 0.2 PATCH TRANSDERMAL at 09:23

## 2017-06-27 RX ADMIN — FAMOTIDINE 10 MG: 10 INJECTION, SOLUTION INTRAVENOUS at 09:25

## 2017-06-27 RX ADMIN — HYDROMORPHONE HYDROCHLORIDE 1 MG: 1 INJECTION, SOLUTION INTRAMUSCULAR; INTRAVENOUS; SUBCUTANEOUS at 23:46

## 2017-06-27 RX ADMIN — CEFEPIME HYDROCHLORIDE 1 G: 1 INJECTION, POWDER, FOR SOLUTION INTRAMUSCULAR; INTRAVENOUS at 17:46

## 2017-06-27 RX ADMIN — Medication 10 ML: at 23:46

## 2017-06-27 RX ADMIN — FAMOTIDINE 10 MG: 10 INJECTION, SOLUTION INTRAVENOUS at 21:50

## 2017-06-27 RX ADMIN — IPRATROPIUM BROMIDE 0.5 MG: 0.5 SOLUTION RESPIRATORY (INHALATION) at 21:18

## 2017-06-27 RX ADMIN — ENOXAPARIN SODIUM 40 MG: 40 INJECTION SUBCUTANEOUS at 09:24

## 2017-06-27 RX ADMIN — HYDRALAZINE HYDROCHLORIDE 25 MG: 25 TABLET ORAL at 21:50

## 2017-06-27 RX ADMIN — AMLODIPINE BESYLATE 10 MG: 10 TABLET ORAL at 09:26

## 2017-06-27 RX ADMIN — HYDRALAZINE HYDROCHLORIDE 25 MG: 25 TABLET ORAL at 06:17

## 2017-06-27 RX ADMIN — Medication 10 ML: at 21:49

## 2017-06-27 RX ADMIN — IPRATROPIUM BROMIDE 0.5 MG: 0.5 SOLUTION RESPIRATORY (INHALATION) at 11:35

## 2017-06-27 RX ADMIN — CARVEDILOL 25 MG: 25 TABLET, FILM COATED ORAL at 17:46

## 2017-06-27 RX ADMIN — IPRATROPIUM BROMIDE 0.5 MG: 0.5 SOLUTION RESPIRATORY (INHALATION) at 15:12

## 2017-06-27 RX ADMIN — CARVEDILOL 25 MG: 25 TABLET, FILM COATED ORAL at 09:26

## 2017-06-27 RX ADMIN — Medication 10 ML: at 06:17

## 2017-06-27 RX ADMIN — LISINOPRIL 20 MG: 20 TABLET ORAL at 09:26

## 2017-06-27 RX ADMIN — CYANOCOBALAMIN 1000 MCG: 1000 INJECTION, SOLUTION INTRAMUSCULAR; SUBCUTANEOUS at 09:25

## 2017-06-27 RX ADMIN — VANCOMYCIN HYDROCHLORIDE 1250 MG: 100 INJECTION, POWDER, LYOPHILIZED, FOR SOLUTION INTRAVENOUS at 09:24

## 2017-06-27 RX ADMIN — CEFEPIME HYDROCHLORIDE 1 G: 1 INJECTION, POWDER, FOR SOLUTION INTRAMUSCULAR; INTRAVENOUS at 06:17

## 2017-06-27 RX ADMIN — PHENYTOIN SODIUM 400 MG: 100 CAPSULE, EXTENDED RELEASE ORAL at 21:49

## 2017-06-27 RX ADMIN — HYDRALAZINE HYDROCHLORIDE 25 MG: 25 TABLET ORAL at 14:28

## 2017-06-27 NOTE — PROGRESS NOTES
Referring Provider: Kera Zavala MD    Length of Stay: 8    Chief Complaint:   Chief Complaint   Patient presents with   • Fall       Subjective: awake, nonverbal    Medications  Current Facility-Administered Medications   Medication Dose Route Frequency Provider Last Rate Last Dose   • acetaminophen (TYLENOL) tablet 650 mg  650 mg Oral Q6H PRN BETO Hammond   650 mg at 06/22/17 1453   • amLODIPine (NORVASC) tablet 10 mg  10 mg Oral Daily Kwaku Frey DO   10 mg at 06/27/17 0926   • bisacodyl (DULCOLAX) suppository 10 mg  10 mg Rectal Daily PRN Harriet Smith DO       • bumetanide (BUMEX) tablet 0.5 mg  0.5 mg Oral Daily BETO Wahl   0.5 mg at 06/27/17 0925   • carvedilol (COREG) tablet 25 mg  25 mg Oral BID With Meals Marlon Fernandez MD   25 mg at 06/27/17 0926   • cefepime (MAXIPIME) 1 g/100 mL 0.9% NS IVPB (mbp)  1 g Intravenous Q12H Griselda Matthew APRN   1 g at 06/27/17 0617   • CloNIDine (CATAPRES-TTS) 0.2 MG/24HR patch 1 patch  1 patch Transdermal Weekly Kwaku Frey DO   1 patch at 06/27/17 0923   • cyanocobalamin injection 1,000 mcg  1,000 mcg Intramuscular Daily BETO Hammond   1,000 mcg at 06/27/17 0925   • [START ON 7/6/2017] cyanocobalamin injection 1,000 mcg  1,000 mcg Intramuscular Q7 Days BETO Hammond       • docusate sodium (COLACE) capsule 100 mg  100 mg Oral BID PRN Jeremiah Zaidi MD       • enoxaparin (LOVENOX) syringe 40 mg  40 mg Subcutaneous Daily Jeremiah Zaidi MD   40 mg at 06/27/17 0924   • famotidine (PEPCID) injection 10 mg  10 mg Intravenous Q12H Kwaku Frey DO   10 mg at 06/27/17 0925   • hydrALAZINE (APRESOLINE) injection 20 mg  20 mg Intravenous Q4H PRN Kwaku Frey DO   20 mg at 06/25/17 0854   • hydrALAZINE (APRESOLINE) tablet 25 mg  25 mg Oral Q8H BETO Wahl   25 mg at 06/27/17 0617   • HYDROmorphone (DILAUDID) injection 1 mg  1 mg Intravenous Q3H SHEILAN Kwaku LEE  Tk DO   1 mg at 06/26/17 2218   • ipratropium (ATROVENT) nebulizer solution 0.5 mg  0.5 mg Nebulization 4x Daily - RT Griselda Matthew APRN   0.5 mg at 06/27/17 0656   • labetalol (NORMODYNE,TRANDATE) injection 20 mg  20 mg Intravenous Q2H PRN Kwaku Frey DO   20 mg at 06/23/17 0405   • lisinopril (PRINIVIL,ZESTRIL) tablet 20 mg  20 mg Oral Daily Griselda Matthew APRN   20 mg at 06/27/17 0926   • magnesium hydroxide (MILK OF MAGNESIA) suspension 2400 mg/10mL 10 mL  10 mL Oral Daily PRN Jeremiah Zaidi MD   10 mL at 06/21/17 2005   • ondansetron (ZOFRAN) tablet 4 mg  4 mg Oral Q6H PRN Jeremiah Zaidi MD        Or   • ondansetron ODT (ZOFRAN-ODT) disintegrating tablet 4 mg  4 mg Oral Q6H PRN Jeremiah Zaidi MD        Or   • ondansetron (ZOFRAN) injection 4 mg  4 mg Intravenous Q6H PRN Jeremiah Zaidi MD   4 mg at 06/21/17 1959   • phenytoin (DILANTIN) ER capsule 400 mg  400 mg Oral Nightly Kwaku Frey, DO   400 mg at 06/26/17 2219   • sodium chloride 0.9 % flush 1-10 mL  1-10 mL Intravenous PRN Jeremiah Zaidi MD   10 mL at 06/27/17 0617   • vancomycin 1250 mg/250 mL 0.9% NS IVPB ([facility])  1,250 mg Intravenous Q24H Kana Llanes [facility]   1,250 mg at 06/27/17 0924       Past Medical History:   Diagnosis Date   • Adrenal adenoma    • Anemia    • Atrial fibrillation    • Benign neoplasm of cerebral meninges    • Brain tumor    • CHF (congestive heart failure)    • Dementia    • Disease of thyroid gland    • Hip fracture requiring operative repair     7/2016   • Hyperlipidemia    • Hypertension    • Obstructive sleep apnea    • Osteoarthritis    • Peripheral vascular disease    • Pneumonia    • Seizures    • Sick sinus syndrome     s/p pacemaker per Dr. Puri   • TIA (transient ischemic attack)    ,   Past Surgical History:   Procedure Laterality Date   • APPENDECTOMY     • BRAIN SURGERY     • CAROTID ENDARTERECTOMY  02/2011   • CATARACT EXTRACTION     •  CHOLECYSTECTOMY     • HEMORRHOIDECTOMY     • HIP HEMIARTHROPLASTY Right 6/20/2017    Procedure: HIP HEMIARTHROPLASTY;  Surgeon: Jeremiah Zaidi MD;  Location: [facility] OR;  Service:    • INSERT / REPLACE / REMOVE PACEMAKER     • JOINT REPLACEMENT     • PACEMAKER IMPLANTATION     • TONSILLECTOMY     • TOTAL HIP ARTHROPLASTY     ,   Family History   Problem Relation Age of Onset   • Arthritis Mother    • Cancer Mother    • Heart disease Mother    • Arthritis Father    • Arthritis Brother    • Cancer Brother    • Heart disease Brother    • Arthritis Sister    • Heart disease Sister    ,   Social History   Substance Use Topics   • Smoking status: Never Smoker   • Smokeless tobacco: None   • Alcohol use No   ,    Review of Systems  Review of Systems   Unable to perform ROS: dementia       Objective     Physical Exam:  Patient Vitals for the past 24 hrs:   BP Temp Temp src Pulse Resp SpO2   06/27/17 0827 154/50 98.2 °F (36.8 °C) Oral 68 18 94 %   06/27/17 0656 - - - 74 20 93 %   06/27/17 0356 175/51 98.6 °F (37 °C) Oral 61 18 94 %   06/27/17 0257 - - - - - 92 %   06/26/17 2330 145/65 98.4 °F (36.9 °C) - 60 18 94 %   06/26/17 2300 - - - - - 96 %   06/26/17 1941 - - - - - 93 %   06/26/17 1931 139/40 97.7 °F (36.5 °C) Oral 64 16 94 %   06/26/17 1905 - - - - - 95 %   06/26/17 1628 166/55 98.9 °F (37.2 °C) Oral 65 16 95 %   06/26/17 1146 139/48 97.5 °F (36.4 °C) Oral 68 20 96 %     Physical Exam   Constitutional: She appears well-developed and well-nourished. No distress.   HENT:   Head: Normocephalic and atraumatic.   Eyes: No scleral icterus.   Neck: Normal range of motion.   Cardiovascular: Normal rate, regular rhythm and normal heart sounds.  Exam reveals no gallop and no friction rub.    No murmur heard.  Pulmonary/Chest: Effort normal. No respiratory distress. She has no wheezes. She has rhonchi. She has no rales.   Abdominal: Soft. Bowel sounds are normal. She exhibits no distension. There is no tenderness.    Musculoskeletal: She exhibits no edema.   Neurological: She is alert. No cranial nerve deficit.   Skin: Skin is warm and dry. She is not diaphoretic. No erythema.   Psychiatric: She has a normal mood and affect. Her behavior is normal.       Results Review:   I reviewed the patient's new clinical results.  Lab Results (last 24 hours)     Procedure Component Value Units Date/Time    Vancomycin, Trough [[id number]]  (Abnormal) Collected:  06/26/17 1409    Specimen:  Blood Updated:  06/26/17 1430     Vancomycin Trough 8.93 (L) mcg/mL     Blood Culture With TEDDY [[id number]]  (Normal) Collected:  06/22/17 2347    Specimen:  Blood from Arm, Left Updated:  06/27/17 0101     Blood Culture No growth at 4 days    Blood Culture With TEDDY [[id number]]  (Normal) Collected:  06/22/17 2347    Specimen:  Blood from Hand, Left Updated:  06/27/17 0206     Blood Culture No growth at 4 days    CBC (No Diff) [[id number]]  (Abnormal) Collected:  06/27/17 0427    Specimen:  Blood Updated:  06/27/17 0446     WBC 8.32 10*3/mm3      RBC 2.82 (L) 10*6/mm3      Hemoglobin 8.7 (L) g/dL      Hematocrit 26.7 (L) %      MCV 94.7 fL      MCH 30.9 pg      MCHC 32.6 (L) g/dL      RDW 15.2 (H) %      RDW-SD 51.2 fl      MPV 9.4 fL      Platelets 266 10*3/mm3         Lab Results   Component Value Date    ECHOEFEST 65 06/20/2017     Imaging Results (last 24 hours)     ** No results found for the last 24 hours. **          I have reviewed telemetry which reveals atrial pacing    Assessment/Plan   Principal Problem:    Closed subcapital fracture of neck of right femur  Active Problems:    Paroxysmal atrial fibrillation    UTI (urinary tract infection)    Obstructive sleep apnea    Dementia    Hypertension    Pulmonary edema    Diastolic dysfunction    Pulmonary HTN      New Problems that need treatment:  1. SVT, resolved.   2. Hip fx  3. Family relates she had a nose bleed for 2 days prior to her fall. Baseline Hgb 12 and now 8.7. Could be the etiology of  her fall (orthostasis). May not be a good long term Pt for anticoagulation    Will see prn  Thanks      MDM Moderate Complexity

## 2017-06-27 NOTE — THERAPY TREATMENT NOTE
Acute Care - Speech Language Pathology   Swallow Treatment Note [facility]     Patient Name: Renay Lazo  : 1929  MRN: [id number]  Today's Date: 2017  Onset of Illness/Injury or Date of Surgery Date: 17            Admit Date: 2017  Upon ST arrival, pt laying down in bed. ST repositioned pt into upright position for PO trials. Pt trialed on current diet of pureed consistency and honey thick liquids. ST to note pt had a delayed cough following honey thick trial. No other overt s/s of aspiration noted. ST to continue to follow and treat.   Tiff Ortiz, Speech Therapy Student 2017 2:41 PM    Visit Dx:      ICD-10-CM ICD-9-CM   1. Closed displaced intertrochanteric fracture of right femur, initial encounter S72.141A 820.21   2. Closed subcapital fracture of neck of right femur, initial encounter S72.011A 820.09   3. Decreased activities of daily living (ADL) Z78.9 V49.89   4. Impaired mobility Z74.09 799.89   5. Oropharyngeal dysphagia R13.12 787.22     Patient Active Problem List   Diagnosis   • Closed C2 fracture   • C2 cervical fracture   • Transient alteration of awareness   • Paroxysmal atrial fibrillation   • History of meningioma of the brain   • UTI (urinary tract infection)   • Over weight   • Closed displaced fracture of second cervical vertebra with routine healing   • Closed subcapital fracture of neck of right femur   • Obstructive sleep apnea   • Dementia   • Hypertension   • Pulmonary edema   • Diastolic dysfunction   • Pulmonary HTN             Adult Rehabilitation Note       17 1424 17 1413 17 1042    Rehab Assessment/Intervention    Discipline physical therapy assistant  -CW (P)  speech language pathologist  -CM physical therapy assistant  -KJ    Document Type therapy note (daily note)  -CW (P)  therapy note (daily note)  -CM therapy note (daily note)  -KJ    Subjective Information agree to therapy  -CW (P)  agree to therapy  -CM complains of;pain   -KJ    Patient Effort, Rehab Treatment   adequate  -KJ    Precautions/Limitations fall precautions;hip precautions- right  -CW  fall precautions;anterior hip precautions- right  -KJ    Recorded by [CW] Hoa Valentin PTA [CM] Tiff Ortiz, Speech Therapy Student [KJ] Amanda Carlin PTA    Pain Assessment    Pain Assessment Antony-Baker FACES  -CW (P)  0-10  -CM Antony-Briggs FACES  -KJ    Antony-Briggs FACES Pain Rating  (P)  10  -CM 8  -KJ    Pain Type   Surgical pain  -KJ    Pain Location Hip  -CW (P)  Hip  -CM Hip  -KJ    Pain Orientation Right  -CW (P)  Right  -CM Right  -KJ    Pain Descriptors   Aching;Sharp  -KJ    Pain Frequency   Intermittent   wiith movement  -KJ    Pain Intervention(s)   Repositioned  -KJ    Recorded by [CW] Hoa Valentin PTA [CM] Tiff Ortiz, Speech Therapy Student [KJ] Amanda Carlin PTA    Cognitive Assessment/Intervention    Current Cognitive/Communication Assessment impaired  -CW      Personal Safety Interventions fall prevention program maintained;gait belt;nonskid shoes/slippers when out of bed  -CW      Recorded by [CW] Hoa Valentin PTA      Dysphagia/Swallow Intervention    Dysphagia/Swallow Therapeutic Feed  (P)  Pt trialed on current diet of pureed consistency and honey thick liquids. ST to note pt had a delayed cough following honey thick trial. No other overt s/s of aspiration noted.   -CM     Recorded by  [CM] Tiff Ortiz, Speech Therapy Student     Mobility Assessment/Training    Extremity Weight-Bearing Status right lower extremity  -CW  right lower extremity  -KJ    Right Lower Extremity Weight-Bearing weight-bearing as tolerated  -CW  weight-bearing as tolerated  -KJ    Recorded by [CW] Hoa Valentin PTA  [KJ] Amanda Carlin PTA    Bed Mobility, Assessment/Treatment    Bed Mobility, Comment   just laid down from working with OT  -KJ    Recorded by   [KJ] Amanda Carlin PTA    Therapy Exercises    Right Lower Extremity   AAROM:;PROM:;15  reps;supine;ankle pumps/circles;glut sets;hip abduction/adduction  -KJ    Left Lower Extremity   AROM:;20 reps  -KJ    Recorded by   [KJ] Amanda Carlin PTA    Orthosis Location    Orthosis Location/Type lower extremity  -CW  lower extremity  -KJ    Orthosis, Lower Extremity Right:;knee immobilizer  -CW  knee immobilizer  -KJ    Recorded by [CW] Hoa Valentin PTA  [KJ] Amanda Carlin PTA    Positioning and Restraints    Pre-Treatment Position in bed  -CW  in bed  -KJ    Post Treatment Position   bed  -KJ    Recorded by [CW] Hoa Valentin PTA  [KJ] Amanda Carlin PTA      06/27/17 0950 06/27/17 0854 06/26/17 1453    Rehab Assessment/Intervention    Discipline occupational therapist  -CH --  -KJ occupational therapist  -CH    Document Type therapy note (daily note)  -CH  therapy note (daily note)  -CH    Subjective Information agree to therapy;complains of;pain  -CH  agree to therapy  -    Patient Effort, Rehab Treatment good  -  adequate  -    Precautions/Limitations   fall precautions;hip precautions- right  -CH    Recorded by [CH] Shagufta Mehta OTR/L [KJ] Amanda Carlin PTA [CH] Shagufta Mehta, OTR/L    Pain Assessment    Pain Assessment   No/denies pain  -CH    Recorded by   [CH] Shagufta Mehta OTR/L    Bed Mobility, Assessment/Treatment    Bed Mob, Supine to Sit, [place] minimum assist (75% patient effort);2 person assist required;verbal cues required  -      Bed Mob, Sit to Supine, [place] maximum assist (25% patient effort);2 person assist required;verbal cues required  -      Bed Mobility, Safety Issues cognitive deficits limit understanding;decreased use of arms for pushing/pulling;decreased use of legs for bridging/pushing  -      Bed Mobility, Impairments pain;strength decreased  -CH      Recorded by [CH] Shagufta Mehta, OTR/L      Upper Body Bathing Assessment/Training    UB Bathing Assess/Train, Position sitting;edge of bed  -      UB Bathing Assess/Train,  [place] Level minimum assist (75% patient effort);verbal cues required  -      UB Bathing Assess/Train, Impairments pain;strength decreased;impaired balance  -CH      Recorded by [CH] Shagufta Mehta OTR/L      Lower Body Bathing Assessment/Training    LB Bathing Assess/Train, Position sitting;edge of bed  -      LB Bathing Assess/Train, [place] Level maximum assist (25% patient effort);verbal cues required  -      LB Bathing Assess/Train, Impairments pain;impaired balance;strength decreased  -CH      Recorded by [] Shagufta Mehta OTR/L      Upper Body Dressing Assessment/Training    UB Dressing Assess/Train, Clothing Type donning:;doffing:;hospital gown  -      UB Dressing Assess/Train, Position supine  -      UB Dressing Assess/Train, [place] minimum assist (75% patient effort);verbal cues required  -      UB Dressing Assess/Train, Impairments strength decreased;pain  -CH      Recorded by [] Shagufta Mehta OTR/L      Balance Skills Training    Sitting-Level of Assistance Contact guard  -      Sitting-Balance Support Right upper extremity supported;Left upper extremity supported;Feet supported   leans Left  -CH      Recorded by [CH] Shagufta Mehta OTR/L      Therapy Exercises    Bilateral Upper Extremity   AROM:;10 reps;30 reps;elbow flexion/extension;hand pumps;pronation/supination;shoulder extension/flexion  -CH    Recorded by   [CH] SHAY Breen/L    Orthosis Location    Orthosis Location/Type lower extremity  -      Orthosis, Lower Extremity knee immobilizer  -CH      Recorded by [CH] Shagufta Mehta OTR/L      Positioning and Restraints    Pre-Treatment Position in bed  -  in bed  -    Post Treatment Position bed  -  bed  -    In Bed fowlers;call light within reach;encouraged to call for assist;with family/caregiver;side rails up x2;RLE elevated  -  fowlers;call light within reach;encouraged to call for assist;side rails up x2;with brace  -    Recorded by [CH]  Shagufta Mehta, OTR/L  [CH] Shagufta Mehta, OTR/L      06/26/17 1040 06/26/17 0900 06/25/17 1320    Rehab Assessment/Intervention    Discipline speech language pathologist  -CS physical therapy assistant  -AH physical therapy assistant  -AL    Document Type therapy note (daily note)  -CS therapy note (daily note)  - therapy note (daily note)  -AL    Subjective Information agree to therapy  -CS agree to therapy  - agree to therapy  -AL    Patient Effort, Rehab Treatment adequate  -CS      Precautions/Limitations  fall precautions;hip precautions- right   R knee immobilizer  - fall precautions;hip precautions- right   knee immobilizer R leg to be worn in bed, confused  -AL    Recorded by [CS] Yordy Whiting MS, CFY-SLP [] Yesenia Merritt PTA [AL] Yesenia Llanes PTA    Pain Assessment    Pain Assessment Unable to assess   Pt indictaes pain however she is unable to rate pain.  -CS Antony-Briggs FACES  - Antony-Briggs FACES  -AL    Antony-Briggs FACES Pain Rating  4  - 2  -AL    Pain Type  Surgical pain  - Surgical pain  -AL    Pain Location  Hip  - Hip  -AL    Pain Orientation  Right  -AH Right  -AL    Pain Frequency  Intermittent  - Intermittent  -AL    Pain Intervention(s)  Repositioned  - Medication (See MAR)  -AL    Response to Interventions  tolerated  - tolerated  -AL    Recorded by [CS] Yordy Whiting MS, CFY-SLP [] Yesenia Merritt PTA [AL] Yesenia Llanes PTA    Dysphagia/Swallow Intervention    Dysphagia/Swallow Therapeutic Feed Pt completed trials of pureed, honey thick, nectar thick, and thin consistencies. 1x immediate cough with thin liquids as well as 1x significant audible swallow as well as delayed throat clear with nectar thick liquids. No other overt s/s of aspiration noted. However, ST cannot fully r/o aspiration with PO trials completed.   -CS      Recorded by [] Yordy Whiting MS, CFY-SLP      Bed Mobility, Assessment/Treatment    Bed Mob, Supine to Sit, [place]  maximum assist (25%  patient effort);moderate assist (50% patient effort);2 person assist required;verbal cues required  -     Bed Mob, Sit to Supine, [place]  maximum assist (25% patient effort);2 person assist required;verbal cues required  -     Recorded by  [] Yesenia Merritt PTA     Transfer Assessment/Treatment    Transfers, Sit-Stand [place]  moderate assist (50% patient effort);2 person assist required;verbal cues required  -     Transfers, Stand-Sit [place]  moderate assist (50% patient effort);minimum assist (75% patient effort);2 person assist required;verbal cues required  -     Transfers, Sit-Stand-Sit, Assist Device  rolling walker  -     Recorded by  [] Yesenia Merritt PTA     Therapy Exercises    Right Lower Extremity  AAROM:;10 reps;sitting  - AAROM:;20 reps;supine;ankle pumps/circles;hip abduction/adduction;SAQ;heel slides;PROM:   patient needs verbal and tactile cues to stay awake  -AL    Left Lower Extremity  AROM:;AAROM:;10 reps  -     Recorded by  [] Yesenia Merritt PTA [AL] Yesenia Llanes PTA    Orthotics Prosthetics    Additional Documentation  Orthosis Location (Group)  - Orthosis Location (Group)  -AL    Recorded by  [] Yesenia Merritt PTA [AL] Yesenia Llanes PTA    Orthosis Location    Orthosis Location/Type  lower extremity  - lower extremity  -AL    Orthosis, Lower Extremity  Right:;knee immobilizer  - Right:;knee immobilizer   removed for exercisees, put brace back on after exercisees.  -AL    Recorded by  [] Yesenia Merritt PTA [AL] Yesenia Llanes PTA    Orthosis Management/Training    Orthosis Wear Schedule  wear at night or when in bed  - wear at night or when in bed  -AL    Recorded by  [] Yesenia Merritt PTA [AL] Yesenia Llanes PTA    Positioning and Restraints    Pre-Treatment Position  in bed  - in bed  -AL    Post Treatment Position  bed  - bed  -AL    In Bed  fowlers;call light within reach;encouraged to call for assist;with family/caregiver  -  fowlers;call light within reach;with family/caregiver  -AL    Recorded by  [AH] Yesenia Merritt PTA [AL] Yesenia Llanes PTA      06/25/17 1248 06/25/17 0725       Rehab Assessment/Intervention    Discipline speech language pathologist  -MB physical therapy assistant  -AL     Document Type therapy note (daily note)  -MB therapy note (daily note)  -AL     Subjective Information  agree to therapy  -AL     Precautions/Limitations  fall precautions;hip precautions- right   knee immobilizer on when in bed  -AL     Recorded by [MB] Liss Breaux CCC-SLP [AL] Yesenia Llanes PTA     Pain Assessment    Pain Assessment  Antony-Briggs FACES  -AL     Antony-Briggs FACES Pain Rating  4  -AL     Pain Type  Surgical pain  -AL     Pain Location  Hip  -AL     Pain Orientation  Right  -AL     Pain Frequency  Intermittent  -AL     Pain Intervention(s)  Medication (See MAR)  -AL     Response to Interventions  Tolerated  -AL     Recorded by  [AL] Yesenia Llanes PTA     Dysphagia/Swallow Intervention    Dysphagia/Swallow Therapeutic Feed Observed pt's  feeding pt lunch. The pt a throat clear type behavior 1 to 2 times during the meal, however it is unclear if this is a typical habit for her. The pt's  was noted to provide large bites at times, however did self-correct independently and state he would give her smaller bites.  -MB      Recorded by [MB] Liss Breaux CCC-SLP      Bed Mobility, Assessment/Treatment    Bed Mob, Supine to Sit, [place]  moderate assist (50% patient effort);maximum assist (25% patient effort);2 person assist required;verbal cues required  -AL     Bed Mob, Sit to Supine, [place]  maximum assist (25% patient effort);2 person assist required;verbal cues required  -AL     Recorded by  [AL] Yesenia Llanes PTA     Transfer Assessment/Treatment    Transfers, Sit-Stand [place]  moderate assist (50% patient effort);maximum assist (25% patient effort);2 person assist required;verbal cues  required  -AL     Transfers, Stand-Sit [place]  moderate assist (50% patient effort);verbal cues required;2 person assist required  -AL     Transfers, Sit-Stand-Sit, Assist Device  rolling walker  -AL     Transfer, Comment  Stood x 2 with Mod assist, cues to stand tall   -AL     Recorded by  [AL] Yesenia Llanes PTA     Balance Skills Training    Sitting-Level of Assistance  Contact guard;Minimum assistance  -AL     Sitting-Balance Support  Left upper extremity supported;Feet supported  -AL     Sitting-Balance Activities  Trunk control activities  -AL     Sitting # of Minutes  15  -AL     Standing-Level of Assistance  Moderate assistance;x2  -AL     Static Standing Balance Support  assistive device  -AL     Standing Balance # of Minutes  5  -AL     Stepping Strategy Assessment (Balance Skills)  attempted side steps but wasn't able to move her feet.  Needs cues to stand tall and keep hips forward.  -AL     Recorded by  [AL] Yesenia Llanes PTA     Therapy Exercises    Right Lower Extremity  AAROM:;10 reps;LAQ;hip abduction/adduction  -AL     Recorded by  [AL] Yesenia Llanes PTA     Orthotics Prosthetics    Additional Documentation  Orthosis Location (Group)  -AL     Recorded by  [AL] Yesenia Llanes PTA     Orthosis Location    Orthosis Location/Type  lower extremity  -AL     Orthosis, Lower Extremity  Right:;knee immobilizer  -AL     Recorded by  [AL] Yesenia Llanes PTA     Orthosis Management/Training    Orthosis Wear Schedule  wear at night or when in bed  -AL     Recorded by  [AL] Yesenia Llanes PTA     Positioning and Restraints    Pre-Treatment Position  in bed  -AL     Post Treatment Position  bed  -AL     In Bed  fowlers;call light within reach;with family/caregiver  -AL     Recorded by  [AL] Yesenia Llanes PTA       User Key  (r) = Recorded By, (t) = Taken By, (c) = Cosigned By    Initials Name Effective Dates    RED Breaux CCC-SLP 08/02/16 -     NOEMY Merritt PTA 08/02/16 -     OSMIN BRIGHT  Hunt, OTR/L 08/02/16 -     KJ Amanda Carlin, PTA 08/02/16 -     AL Yesenia Llanes, PTA 08/02/16 -     CW Hoa Valentin, PTA 06/22/15 -     CS Yordy Whiting, MS, CFY-SLP 08/02/16 -     CM Tiff Ortiz, Speech Therapy Student 03/20/17 -                   IP SLP Goals       06/27/17 1438 06/26/17 1404 06/25/17 1304    Safely Consume Diet    Safely Consume Diet- SLP, Date Goal Reviewed (P)  06/27/17  -CM 06/26/17  -CS 06/25/17  -MB      06/23/17 1037 06/22/17 1533       Safely Consume Diet    Safely Consume Diet- SLP, Date Established  06/22/17  -MB     Safely Consume Diet- SLP, Time to Achieve  by discharge  -MB     Safely Consume Diet- SLP, Additional Goal  Pt will tolerate trials of current diet and upgraded consistencies with no overt s/s of aspiration.  -MB     Safely Consume Diet- SLP, Date Goal Reviewed 06/23/17  -TM 06/22/17  -MB     Safely Consume Diet- SLP, Outcome  goal ongoing  -MB       User Key  (r) = Recorded By, (t) = Taken By, (c) = Cosigned By    Initials Name Provider Type    RED Breaux, CCC-SLP Speech and Language Pathologist     Marva Jamil, CCC-SLP Speech and Language Pathologist     Yordy Whiting, MS, CFY-SLP Speech and Language Pathologist     Tiff Ortiz, Speech Therapy Student Speech Therapy Student          EDUCATION  The patient has been educated in the following areas:   Dysphagia (Swallowing Impairment).    SLP Recommendation and Plan                                           Plan of Care Review  Plan Of Care Reviewed With: (P) patient, spouse  Progress: (P) no change  Outcome Summary/Follow up Plan: (P) Upon ST arrival, pt laying down in bed. ST repositioned pt into upright position for PO trials. Pt trialed on current diet of pureed consistency and honey thick liquids. ST to note pt had a delayed cough following honey thick trial. No other overt s/s of aspiration noted.  ST to continue to follow and treat.            Time Calculation:         Time  Calculation- SLP       06/27/17 1440          Time Calculation- SLP    SLP Start Time (P)  1413  -CM      SLP Stop Time (P)  1426  -CM      SLP Time Calculation (min) (P)  13 min  -CM      SLP Received On (P)  06/27/17  -CM        User Key  (r) = Recorded By, (t) = Taken By, (c) = Cosigned By    Initials Name Provider Type     Tiff Ortiz Speech Therapy Student Speech Therapy Student          Therapy Charges for Today     Code Description Service Date Service Provider Modifiers Qty    [id number]  ST TREATMENT SWALLOW 1 6/27/2017 Tiff Ortiz Speech Therapy Student GN, KX 1          SLP G-Codes  SLP NOMS Used?: Yes  Functional Limitations: Swallowing  Swallow Current Status (): At least 80 percent but less than 100 percent impaired, limited or restricted  Swallow Goal Status (): At least 40 percent but less than 60 percent impaired, limited or restricted      Tiff Ortiz Speech Therapy Student  6/27/2017

## 2017-06-27 NOTE — PLAN OF CARE
Problem: Patient Care Overview (Adult)  Goal: Plan of Care Review  Outcome: Ongoing (interventions implemented as appropriate)    06/27/17 0950   Coping/Psychosocial Response Interventions   Plan Of Care Reviewed With patient   Patient Care Overview   Progress improving   Outcome Evaluation   Outcome Summary/Follow up Plan OT tx completed. Pt. ccompleted bed mobility, EOB sponge bathing, & gown change this AM. Her ability to follow directions, arousal level, & level of participation has significantly improved. Pt. verablized pain during mobility. Cont OT POC for increase in indep in ADLs.

## 2017-06-27 NOTE — PLAN OF CARE
Problem: Patient Care Overview (Adult)  Goal: Plan of Care Review  Outcome: Ongoing (interventions implemented as appropriate)    06/27/17 1438   Coping/Psychosocial Response Interventions   Plan Of Care Reviewed With patient;spouse   Patient Care Overview   Progress no change   Outcome Evaluation   Outcome Summary/Follow up Plan Upon ST arrival, pt laying down in bed. ST repositioned pt into upright position for PO trials. Pt trialed on current diet of pureed consistency and honey thick liquids. ST to note pt had a delayed cough following honey thick trial. No other overt s/s of aspiration noted. ST to continue to follow and treat.          Problem: Inpatient SLP  Goal: Dysphagia- Patient will safely consume diet as per recommendation with no signs/symptoms of aspiration  Outcome: Ongoing (interventions implemented as appropriate)    06/22/17 1533 06/27/17 1438   Safely Consume Diet   Safely Consume Diet- SLP, Date Established 06/22/17 --    Safely Consume Diet- SLP, Time to Achieve by discharge --    Safely Consume Diet- SLP, Additional Goal Pt will tolerate trials of current diet and upgraded consistencies with no overt s/s of aspiration. --    Safely Consume Diet- SLP, Date Goal Reviewed --  06/27/17   Safely Consume Diet- SLP, Outcome goal ongoing --

## 2017-06-27 NOTE — PLAN OF CARE
Problem: Patient Care Overview (Adult)  Goal: Plan of Care Review  Outcome: Ongoing (interventions implemented as appropriate)    06/27/17 1042   Coping/Psychosocial Response Interventions   Plan Of Care Reviewed With patient   Patient Care Overview   Progress progress towards functional goals is fair   Outcome Evaluation   Outcome Summary/Follow up Plan PT tx completed. Pt supine in bed. C/O pain with movement of RLE. AROM LLE x 20 and AAROM/PROM RLE x 15. Patient just laid down from tx with OT. Will sit her edge of bed in pm. Benefit from cont'd strengthening from PT/OT.

## 2017-06-27 NOTE — THERAPY TREATMENT NOTE
Acute Care - Physical Therapy Treatment Note  [facility]     Patient Name: Renay Lazo  : 1929  MRN: [id number]  Today's Date: 2017  Onset of Illness/Injury or Date of Surgery Date: 17  Date of Referral to PT: 17  Referring Physician: (S) Dr. Zaidi (Post hip precautions, WBAT)    Admit Date: 2017    Visit Dx:    ICD-10-CM ICD-9-CM   1. Closed displaced intertrochanteric fracture of right femur, initial encounter S72.141A 820.21   2. Closed subcapital fracture of neck of right femur, initial encounter S72.011A 820.09   3. Decreased activities of daily living (ADL) Z78.9 V49.89   4. Impaired mobility Z74.09 799.89   5. Oropharyngeal dysphagia R13.12 787.22     Patient Active Problem List   Diagnosis   • Closed C2 fracture   • C2 cervical fracture   • Transient alteration of awareness   • Paroxysmal atrial fibrillation   • History of meningioma of the brain   • UTI (urinary tract infection)   • Over weight   • Closed displaced fracture of second cervical vertebra with routine healing   • Closed subcapital fracture of neck of right femur   • Obstructive sleep apnea   • Dementia   • Hypertension   • Pulmonary edema   • Diastolic dysfunction   • Pulmonary HTN               Adult Rehabilitation Note       17 1424 17 1413 17 1042    Rehab Assessment/Intervention    Discipline physical therapy assistant  -CW (P)  speech language pathologist  -CM physical therapy assistant  -KJ    Document Type therapy note (daily note)  -CW (P)  therapy note (daily note)  -CM therapy note (daily note)  -KJ    Subjective Information agree to therapy  -CW (P)  agree to therapy  -CM complains of;pain  -KJ    Patient Effort, Rehab Treatment good  -CW  adequate  -KJ    Precautions/Limitations fall precautions;hip precautions- right  -CW  fall precautions;hip precautions- right  -KJ    Recorded by [CW] Hoa Valentin PTA [CM] Tiff Ortiz, Speech Therapy Student [KJ] Amanda Carlin PTA     Vital Signs    Pre SpO2 (%) 96  -CW      O2 Delivery Pre Treatment supplemental O2   1 L  -CW      O2 Delivery Intra Treatment room air  -CW      Post SpO2 (%) 95  -CW      O2 Delivery Post Treatment room air   placed back on 1 L O2  -CW      Pre Patient Position Supine  -CW      Intra Patient Position Sitting  -CW      Post Patient Position Supine  -CW      Recorded by [CW] Hoa Valentin PTA      Pain Assessment    Pain Assessment Antony-Baker FACES  -CW (P)  0-10  -CM Antony-Briggs FACES  -KJ    Antony-Briggs FACES Pain Rating 4  -CW (P)  10  -CM 8  -KJ    Pain Type   Surgical pain  -KJ    Pain Location Hip  -CW (P)  Hip  -CM Hip  -KJ    Pain Orientation Right  -CW (P)  Right  -CM Right  -KJ    Pain Descriptors   Aching;Sharp  -KJ    Pain Frequency Intermittent  -CW  Intermittent   wiith movement  -KJ    Pain Intervention(s) Repositioned  -CW  Repositioned  -KJ    Recorded by [CW] Hoa Valentin PTA [CM] Tiff Ortiz, Speech Therapy Student [KJ] Amanda Carlin PTA    Cognitive Assessment/Intervention    Current Cognitive/Communication Assessment impaired  -CW      Orientation Status oriented to;person;situation  -CW      Follows Commands/Answers Questions 100% of the time;able to follow single-step instructions  -CW      Personal Safety mild impairment;decreased awareness, need for assist;decreased awareness, need for safety  -CW      Personal Safety Interventions fall prevention program maintained;gait belt;nonskid shoes/slippers when out of bed  -CW      Recorded by [CW] Hoa Valentin PTA      Dysphagia/Swallow Intervention    Dysphagia/Swallow Therapeutic Feed  (P)  Pt trialed on current diet of pureed consistency and honey thick liquids. ST to note pt had a delayed cough following honey thick trial. No other overt s/s of aspiration noted.   -CM     Recorded by  [CM] Tiff Ortiz, Speech Therapy Student     Mobility Assessment/Training    Extremity Weight-Bearing Status right lower  extremity  -CW  right lower extremity  -KJ    Right Lower Extremity Weight-Bearing weight-bearing as tolerated  -CW  weight-bearing as tolerated  -KJ    Recorded by [CW] Hoa Valentin PTA  [KJ] Amanda Carlin PTA    Bed Mobility, Assessment/Treatment    Bed Mob, Supine to Sit, [place] minimum assist (75% patient effort);2 person assist required;verbal cues required  -CW      Bed Mob, Sit to Supine, [place] maximum assist (25% patient effort);2 person assist required;verbal cues required  -CW      Bed Mobility, Safety Issues decreased use of legs for bridging/pushing;cognitive deficits limit understanding  -CW      Bed Mobility, Impairments strength decreased;pain  -CW      Bed Mobility, Comment   just laid down from working with OT  -KJ    Recorded by [CW] Hoa Valentin PTA  [KJ] Amanda Carlin PTA    Transfer Assessment/Treatment    Transfers, Sit-Stand [place] moderate assist (50% patient effort);2 person assist required;verbal cues required  -CW      Transfers, Stand-Sit [place] minimum assist (75% patient effort);2 person assist required;verbal cues required;nonverbal cues required (demo/gesture)   stood x 4  -CW      Transfers, Sit-Stand-Sit, Assist Device rolling walker  -CW      Toilet Transfer, [place] moderate assist (50% patient effort);2 person assist required;verbal cues required  -CW      Toilet Transfer, Assistive Device bedside commode without drop arms;rolling walker  -CW      Transfer, Safety Issues step length decreased;weight-shifting ability decreased;knees buckling   R knee aimee  -CW      Transfer, Impairments strength decreased;pain  -CW      Transfer, Comment R knee aimee  -CW      Recorded by [CW] Hoa Valentin PTA      Gait Assessment/Treatment    Gait, [place] Level minimum assist (75% patient effort);moderate assist (50% patient effort);2 person assist required;verbal cues required  -CW      Gait, Assistive Device rolling walker  -CW       Gait, Distance (Feet) --   Small steps bed to BSC and back to bed  -CW      Gait, Safety Issues weight-shifting ability decreased;step length decreased  -CW      Gait, Impairments strength decreased;pain  -CW      Gait, Comment R knee aimee  -CW      Recorded by [CW] Hoa Valentin PTA      Balance Skills Training    Sitting-Level of Assistance Close supervision  -CW      Sitting-Balance Support Feet supported  -CW      Standing-Level of Assistance Minimum assistance;x2  -CW      Static Standing Balance Support assistive device  -CW      Gait Balance-Level of Assistance Minimum assistance;Moderate assistance;x2  -CW      Gait Balance Support assistive device  -CW      Recorded by [CW] Hoa Valentin PTA      Therapy Exercises    Right Lower Extremity   AAROM:;PROM:;15 reps;supine;ankle pumps/circles;glut sets;hip abduction/adduction  -KJ    Left Lower Extremity   AROM:;20 reps  -KJ    Recorded by   [KJ] Amanda Carlin PTA    Orthosis Location    Orthosis Location/Type lower extremity  -CW  lower extremity  -KJ    Orthosis, Lower Extremity Right:;knee immobilizer  -CW  knee immobilizer  -KJ    Recorded by [CW] Hoa Valentin PTA  [KJ] Amanda Carlin PTA    Orthosis Management/Training    Orthosis Wear Schedule wear at night or when in bed  -CW      Recorded by [CW] Hoa Valentin PTA      Positioning and Restraints    Pre-Treatment Position in bed  -CW  in bed  -KJ    Post Treatment Position bed  -CW  bed  -KJ    In Bed fowlers;call light within reach;with family/caregiver;exit alarm on  -CW      Recorded by [CW] Hoa Valentin PTA  [KJ] Amanda Carlin PTA      06/27/17 0950 06/27/17 0854 06/26/17 1314    Rehab Assessment/Intervention    Discipline occupational therapist  -CH --  -KJ occupational therapist  -CH    Document Type therapy note (daily note)  -CH  therapy note (daily note)  -CH    Subjective Information agree to therapy;complains of;pain  -CH  agree to therapy  -     Patient Effort, Rehab Treatment good  -CH  adequate  -CH    Precautions/Limitations   fall precautions;hip precautions- right  -CH    Recorded by [CH] Shagufta Mehta, OTR/L [KJ] Amanda Carlin PTA [CH] Shagufta Mehta, OTR/L    Pain Assessment    Pain Assessment   No/denies pain  -CH    Recorded by   [CH] Shagufta Mehta, OTR/L    Bed Mobility, Assessment/Treatment    Bed Mob, Supine to Sit, [place] minimum assist (75% patient effort);2 person assist required;verbal cues required  -      Bed Mob, Sit to Supine, [place] maximum assist (25% patient effort);2 person assist required;verbal cues required  -      Bed Mobility, Safety Issues cognitive deficits limit understanding;decreased use of arms for pushing/pulling;decreased use of legs for bridging/pushing  -      Bed Mobility, Impairments pain;strength decreased  -CH      Recorded by [] Shagufta Mehta, OTR/L      Upper Body Bathing Assessment/Training    UB Bathing Assess/Train, Position sitting;edge of bed  -      UB Bathing Assess/Train, [place] Level minimum assist (75% patient effort);verbal cues required  -      UB Bathing Assess/Train, Impairments pain;strength decreased;impaired balance  -CH      Recorded by [] Shagufta Mehta, OTR/L      Lower Body Bathing Assessment/Training    LB Bathing Assess/Train, Position sitting;edge of bed  -      LB Bathing Assess/Train, [place] Level maximum assist (25% patient effort);verbal cues required  -      LB Bathing Assess/Train, Impairments pain;impaired balance;strength decreased  -      Recorded by [] Shagufta Mehta OTR/L      Upper Body Dressing Assessment/Training    UB Dressing Assess/Train, Clothing Type donning:;doffing:;hospital gown  -      UB Dressing Assess/Train, Position supine  -      UB Dressing Assess/Train, [place] minimum assist (75% patient effort);verbal cues required  -      UB Dressing Assess/Train, Impairments strength decreased;pain  -CH      Recorded  by [CH] Shagufta Mehta OTR/L      Balance Skills Training    Sitting-Level of Assistance Contact guard  -      Sitting-Balance Support Right upper extremity supported;Left upper extremity supported;Feet supported   leans Left  -      Recorded by [CH] Shagufta Mehta OTR/L      Therapy Exercises    Bilateral Upper Extremity   AROM:;10 reps;30 reps;elbow flexion/extension;hand pumps;pronation/supination;shoulder extension/flexion  -CH    Recorded by   [CH] Shagufta Mehta OTR/L    Orthosis Location    Orthosis Location/Type lower extremity  -      Orthosis, Lower Extremity knee immobilizer  -      Recorded by [CH] Shagufta Mehta OTR/L      Positioning and Restraints    Pre-Treatment Position in bed  -  in bed  -    Post Treatment Position bed  -  bed  -    In Bed fowlers;call light within reach;encouraged to call for assist;with family/caregiver;side rails up x2;RLE elevated  -  fowlers;call light within reach;encouraged to call for assist;side rails up x2;with brace  -    Recorded by [CH] SHAY Breen/L  [CH] Shagufta Mehta OTR/YENNIFER      06/26/17 1040 06/26/17 0900 06/25/17 1320    Rehab Assessment/Intervention    Discipline speech language pathologist  -CS physical therapy assistant  - physical therapy assistant  -AL    Document Type therapy note (daily note)  - therapy note (daily note)  - therapy note (daily note)  -AL    Subjective Information agree to therapy  - agree to therapy  - agree to therapy  -AL    Patient Effort, Rehab Treatment adequate  -CS      Precautions/Limitations  fall precautions;hip precautions- right   R knee immobilizer  - fall precautions;hip precautions- right   knee immobilizer R leg to be worn in bed, confused  -AL    Recorded by [] Yordy Whiting, MS, CFY-SLP [] Yesenia Merritt PTA [AL] Yesenia Llanes PTA    Pain Assessment    Pain Assessment Unable to assess   Pt indictaes pain however she is unable to rate pain.  - George FACES  - George  FACES  -AL    Antony-Briggs FACES Pain Rating  4  - 2  -AL    Pain Type  Surgical pain  - Surgical pain  -AL    Pain Location  Hip  - Hip  -AL    Pain Orientation  Right  -AH Right  -AL    Pain Frequency  Intermittent  -AH Intermittent  -AL    Pain Intervention(s)  Repositioned  - Medication (See MAR)  -AL    Response to Interventions  tolerated  - tolerated  -AL    Recorded by [CS] Yordy Whiting MS, CFY-SLP [] Yesenia Merritt PTA [AL] Yesenia Llanes PTA    Dysphagia/Swallow Intervention    Dysphagia/Swallow Therapeutic Feed Pt completed trials of pureed, honey thick, nectar thick, and thin consistencies. 1x immediate cough with thin liquids as well as 1x significant audible swallow as well as delayed throat clear with nectar thick liquids. No other overt s/s of aspiration noted. However, ST cannot fully r/o aspiration with PO trials completed.   -CS      Recorded by [CS] Yordy Whiting, MS, CFY-SLP      Bed Mobility, Assessment/Treatment    Bed Mob, Supine to Sit, [place]  maximum assist (25% patient effort);moderate assist (50% patient effort);2 person assist required;verbal cues required  -     Bed Mob, Sit to Supine, [place]  maximum assist (25% patient effort);2 person assist required;verbal cues required  -     Recorded by  [] Yesenia Merritt PTA     Transfer Assessment/Treatment    Transfers, Sit-Stand [place]  moderate assist (50% patient effort);2 person assist required;verbal cues required  -     Transfers, Stand-Sit [place]  moderate assist (50% patient effort);minimum assist (75% patient effort);2 person assist required;verbal cues required  -     Transfers, Sit-Stand-Sit, Assist Device  rolling walker  -     Recorded by  [] Yesenia Merritt PTA     Therapy Exercises    Right Lower Extremity  AAROM:;10 reps;sitting  - AAROM:;20 reps;supine;ankle pumps/circles;hip abduction/adduction;SAQ;heel slides;PROM:   patient needs verbal and tactile cues to stay awake  -AL     Left Lower Extremity  AROM:;AAROM:;10 reps  -     Recorded by  [] Yesenia Merritt PTA [AL] Yesenia Llanes PTA    Orthotics Prosthetics    Additional Documentation  Orthosis Location (Group)  - Orthosis Location (Group)  -AL    Recorded by  [] Yesenia Merritt PTA [AL] Yesenia Llanes PTA    Orthosis Location    Orthosis Location/Type  lower extremity  - lower extremity  -AL    Orthosis, Lower Extremity  Right:;knee immobilizer  - Right:;knee immobilizer   removed for exercisees, put brace back on after exercisees.  -AL    Recorded by  [] Yesenia Merritt PTA [AL] Yesenia Llanes PTA    Orthosis Management/Training    Orthosis Wear Schedule  wear at night or when in bed  - wear at night or when in bed  -AL    Recorded by  [] Yesenia Merritt PTA [AL] Yesenia Llanes PTA    Positioning and Restraints    Pre-Treatment Position  in bed  - in bed  -AL    Post Treatment Position  bed  - bed  -AL    In Bed  fowlers;call light within reach;encouraged to call for assist;with family/caregiver  - fowlers;call light within reach;with family/caregiver  -AL    Recorded by  [] Yesenia Merritt PTA [AL] Yesenia Llanes PTA      06/25/17 1248 06/25/17 0725       Rehab Assessment/Intervention    Discipline speech language pathologist  -MB physical therapy assistant  -AL     Document Type therapy note (daily note)  -MB therapy note (daily note)  -AL     Subjective Information  agree to therapy  -AL     Precautions/Limitations  fall precautions;hip precautions- right   knee immobilizer on when in bed  -AL     Recorded by [MB] Liss Breaux CCC-SLP [AL] Yesenia Llanes PTA     Pain Assessment    Pain Assessment  Antony-Briggs FACES  -AL     Antony-Briggs FACES Pain Rating  4  -AL     Pain Type  Surgical pain  -AL     Pain Location  Hip  -AL     Pain Orientation  Right  -AL     Pain Frequency  Intermittent  -AL     Pain Intervention(s)  Medication (See MAR)  -AL     Response to Interventions  Tolerated  -AL      Recorded by  [AL] Yesenia Llanes PTA     Dysphagia/Swallow Intervention    Dysphagia/Swallow Therapeutic Feed Observed pt's  feeding pt lunch. The pt a throat clear type behavior 1 to 2 times during the meal, however it is unclear if this is a typical habit for her. The pt's  was noted to provide large bites at times, however did self-correct independently and state he would give her smaller bites.  -MB      Recorded by [MB] Liss Breaux CCC-SLP      Bed Mobility, Assessment/Treatment    Bed Mob, Supine to Sit, [place]  moderate assist (50% patient effort);maximum assist (25% patient effort);2 person assist required;verbal cues required  -AL     Bed Mob, Sit to Supine, [place]  maximum assist (25% patient effort);2 person assist required;verbal cues required  -AL     Recorded by  [AL] Yesenia Llanes PTA     Transfer Assessment/Treatment    Transfers, Sit-Stand [place]  moderate assist (50% patient effort);maximum assist (25% patient effort);2 person assist required;verbal cues required  -AL     Transfers, Stand-Sit [place]  moderate assist (50% patient effort);verbal cues required;2 person assist required  -AL     Transfers, Sit-Stand-Sit, Assist Device  rolling walker  -AL     Transfer, Comment  Stood x 2 with Mod assist, cues to stand tall   -AL     Recorded by  [AL] Yesenia Llanes PTA     Balance Skills Training    Sitting-Level of Assistance  Contact guard;Minimum assistance  -AL     Sitting-Balance Support  Left upper extremity supported;Feet supported  -AL     Sitting-Balance Activities  Trunk control activities  -AL     Sitting # of Minutes  15  -AL     Standing-Level of Assistance  Moderate assistance;x2  -AL     Static Standing Balance Support  assistive device  -AL     Standing Balance # of Minutes  5  -AL     Stepping Strategy Assessment (Balance Skills)  attempted side steps but wasn't able to move her feet.  Needs cues to stand tall and keep hips forward.  -AL      Recorded by  [AL] Yesenia Llanes PTA     Therapy Exercises    Right Lower Extremity  AAROM:;10 reps;LAQ;hip abduction/adduction  -AL     Recorded by  [AL] Yesenia Llanes PTA     Orthotics Prosthetics    Additional Documentation  Orthosis Location (Group)  -AL     Recorded by  [AL] Yesenia Llanes PTA     Orthosis Location    Orthosis Location/Type  lower extremity  -AL     Orthosis, Lower Extremity  Right:;knee immobilizer  -AL     Recorded by  [AL] Yesenia Llanes PTA     Orthosis Management/Training    Orthosis Wear Schedule  wear at night or when in bed  -AL     Recorded by  [AL] Yesenia Llanes PTA     Positioning and Restraints    Pre-Treatment Position  in bed  -AL     Post Treatment Position  bed  -AL     In Bed  fowlers;call light within reach;with family/caregiver  -AL     Recorded by  [AL] Yesenia Llanes PTA       User Key  (r) = Recorded By, (t) = Taken By, (c) = Cosigned By    Initials Name Effective Dates    RED Breaux, [facility]-SLP 08/02/16 -     NOEMY Merritt, PTA 08/02/16 -     CH Shagufta Mehta, OTR/L 08/02/16 -     KJ Amanda Carlin, PTA 08/02/16 -     AL Yesenia Llanes, PTA 08/02/16 -     CW Hoa Valentin, PTA 06/22/15 -     CS Yordy Whiting, MS, CFY-SLP 08/02/16 -     CM Tiff Ortiz, Speech Therapy Student 03/20/17 -                 IP PT Goals       06/21/17 0810          Bed Mobility PT LTG    Bed Mobility PT LTG, Date Established 06/21/17  -EDMUND      Bed Mobility PT LTG, Time to Achieve by discharge  -EDMUND      Bed Mobility PT LTG, Activity Type supine to sit/sit to supine  -EDMUND      Bed Mobility PT LTG, [place] Level minimum assist (75% patient effort)  -EDMUND      Bed Mobility PT Goal  LTG, Assist Device bed rails  -EDMUND      Transfer Training PT LTG    Transfer Training PT LTG, Date Established 06/21/17  -EDMUND      Transfer Training PT LTG, Time to Achieve by discharge  -EDMUND      Transfer Training PT LTG, Activity Type bed to chair /chair to bed;sit to stand/stand to sit  -EDMUND       Transfer Training PT LTG, [place] Level minimum assist (75% patient effort)  -EDMUND      Transfer Training PT LTG, Assist Device walker, rolling  -EDMUND      Gait Training PT LTG    Gait Training Goal PT LTG, Date Established 06/21/17  -EDMUND      Gait Training Goal PT LTG, Time to Achieve by discharge  -EDMUND      Gait Training Goal PT LTG, [place] Level minimum assist (75% patient effort)  -EDMUND      Gait Training Goal PT LTG, Assist Device walker, rolling  -EDMUND      Gait Training Goal PT LTG, Distance to Achieve 75  -EDMUND      Strength Goal PT LTG    Strength Goal PT LTG, Date Established 06/21/17  -EDMUND      Strength Goal PT LTG, Time to Achieve by discharge  -EDMUND      Strength Goal PT LTG, Measure to Achieve AROM/AAROM, 20 reps, B UE/LE, min assist  -EDMUND        User Key  (r) = Recorded By, (t) = Taken By, (c) = Cosigned By    Initials Name Provider Type    EDMUND Cesar, PT DPT Physical Therapist          Physical Therapy Education     Title: PT OT SLP Therapies (Active)     Topic: Physical Therapy (Active)     Point: Mobility training (Active)    Learning Progress Summary    Learner Readiness Method Response Comment Documented by Status   Patient Acceptance E NR benefits of activity, bed mobility, and exxercise KJ 06/27/17 1059 Active    Acceptance E NR Standing tall, taking steps AL 06/25/17 0838 Active    Acceptance E NR Needs encouragement for standing AL 06/24/17 0847 Active    Acceptance E NL bed mobility AH 06/22/17 1129 Active    Acceptance E NR Educated pt./spouse on progression of PT POC, benefits of activity, R hip precautions. EDMUND 06/21/17 0810 Active   Significant Other Acceptance E NR Educated pt./spouse on progression of PT POC, benefits of activity, R hip precautions. EDMUND 06/21/17 0810 Active               Point: Home exercise program (Active)    Learning Progress Summary    Learner Readiness Method Response Comment Documented by Status   Patient Acceptance E NR Needs encouragement for standing AL  06/24/17 0847 Active               Point: Body mechanics (Active)    Learning Progress Summary    Learner Readiness Method Response Comment Documented by Status   Patient Acceptance E NR Needs encouragement for standing AL 06/24/17 0847 Active               Point: Precautions (Active)    Learning Progress Summary    Learner Readiness Method Response Comment Documented by Status   Patient Acceptance E,H NR hip precautions  06/26/17 0947 Active    Acceptance E NR Needs encouragement for standing AL 06/24/17 0847 Active    Acceptance E NR Educated pt./spouse on progression of PT POC, benefits of activity, R hip precautions.  06/21/17 0810 Active   Family Acceptance E,H NR hip precautions  06/26/17 0947 Active   Significant Other Acceptance E,D,H VU,NR THR HEP and precautions  06/23/17 1035 Done    Acceptance E NR Educated pt./spouse on progression of PT POC, benefits of activity, R hip precautions.  06/21/17 0810 Active                      User Key     Initials Effective Dates Name Provider Type Discipline     08/02/16 -  Yesenia Merritt, PTA Physical Therapy Assistant PT     08/02/16 -  Amanda Carlin, PTA Physical Therapy Assistant PT    AL 08/02/16 -  Yesenia Llanes, PTA Physical Therapy Assistant PT    EDMUND 08/02/16 -  Deonte Cesar, PT DPT Physical Therapist PT                    PT Recommendation and Plan  Anticipated Equipment Needs At Discharge: two wheeled walker  Anticipated Discharge Disposition: transitional care, skilled nursing facility, inpatient rehabilitation facility  Planned Therapy Interventions: bed mobility training, transfer training, gait training, balance training, home exercise program, orthotic fitting/training, patient/family education, postural re-education, strengthening  PT Frequency: 2 times/day, per priority policy             Outcome Measures       06/27/17 0950 06/26/17 0900 06/25/17 0725    How much help from another person do you currently need...    Turning from your  back to your side while in flat bed without using bedrails?  2  -AH 2  -AL    Moving from lying on back to sitting on the side of a flat bed without bedrails?  1  -AH 1  -AL    Moving to and from a bed to a chair (including a wheelchair)?  1  -AH 1  -AL    Standing up from a chair using your arms (e.g., wheelchair, bedside chair)?  1  -AH 1  -AL    Climbing 3-5 steps with a railing?  1  -AH 1  -AL    To walk in hospital room?  1  -AH 1  -AL    AM-PAC 6 Clicks Score  7  -AH 7  -AL    How much help from another is currently needed...    Putting on and taking off regular lower body clothing? 1  -CH      Bathing (including washing, rinsing, and drying) 2  -CH      Toileting (which includes using toilet bed pan or urinal) 2  -CH      Putting on and taking off regular upper body clothing 3  -CH      Taking care of personal grooming (such as brushing teeth) 4  -CH      Eating meals 4  -CH      Score 16  -CH      Functional Assessment    Outcome Measure Options AM-PAC 6 Clicks Daily Activity (OT)  -CH AM-PAC 6 Clicks Basic Mobility (PT)  - AM-PAC 6 Clicks Basic Mobility (PT)  -AL      User Key  (r) = Recorded By, (t) = Taken By, (c) = Cosigned By    Initials Name Provider Type     Yesenia Merritt, MIYA Physical Therapy Assistant     Shagufta Mehta, OTR/L Occupational Therapist    AL Yesenia Llanes PTA Physical Therapy Assistant           Time Calculation:         PT Charges       06/27/17 1520 06/27/17 1103       Time Calculation    Start Time 1424  - 1042  -KJ     Stop Time 1504  -CW 1105  -KJ     Time Calculation (min) 40 min  -CW 23 min  -KJ     PT Received On 06/27/17  -CW 06/27/17  -KJ     PT Goal Re-Cert Due Date 07/01/17  -CW 07/01/17  -KJ     Time Calculation- PT    Total Timed Code Minutes- PT 40 minute(s)  -CW 23 minute(s)  -KJ       User Key  (r) = Recorded By, (t) = Taken By, (c) = Cosigned By    Initials Name Provider Type     Amanda Carlin, MIYA Physical Therapy Assistant    ANA Valentin PTA  Physical Therapy Assistant          Therapy Charges for Today     Code Description Service Date Service Provider Modifiers Qty    08344711767 HC PT THERAPEUTIC ACT EA 15 MIN 6/27/2017 Hoa Valentin PTA GP, KX 3          PT G-Codes  Outcome Measure Options: AM-PAC 6 Clicks Daily Activity (OT)  Score: 9  Functional Limitation: Mobility: Walking and moving around  Mobility: Walking and Moving Around Current Status (): At least 60 percent but less than 80 percent impaired, limited or restricted  Mobility: Walking and Moving Around Goal Status (): At least 20 percent but less than 40 percent impaired, limited or restricted    Hoa Valentin PTA  6/27/2017

## 2017-06-27 NOTE — PROGRESS NOTES
[facility] Medicine Services  INPATIENT PROGRESS NOTE    Length of Stay: 8  Date of Admission: 6/19/2017  Primary Care Physician: Jung Collins MD    Subjective   Chief Complaint: follow up pneumonia  HPI   Pt is much more awake and talkative.  states she slept much better. Blood pressure is stable. No further ectopy on telemetry. Cardiology has signed off. She states her sputum production is at her baseline.  is unaware of diarrhea this am.     Review of Systems   Difficult to ascertain given dementia.     Objective    Temp:  [97.7 °F (36.5 °C)-98.9 °F (37.2 °C)] 98.2 °F (36.8 °C)  Heart Rate:  [60-74] 62  Resp:  [16-20] 18  BP: (139-175)/(40-65) 154/50  Physical Exam   Constitutional: She is oriented to person, place, and time. She appears well-developed and well-nourished.   HENT:   Head: Normocephalic and atraumatic.   Eyes: Conjunctivae and EOM are normal. Pupils are equal, round, and reactive to light.   Neck: Neck supple. No JVD present. No thyromegaly present.   Cardiovascular: Normal rate, regular rhythm, normal heart sounds and intact distal pulses.  Exam reveals no gallop and no friction rub.    No murmur heard.  Pulmonary/Chest: Effort normal. No respiratory distress. She has no wheezes. She has rales (bilateral rales left greater than right. ). She exhibits no tenderness.   Abdominal: Soft. Bowel sounds are normal. She exhibits no distension. There is no tenderness. There is no rebound and no guarding.   Musculoskeletal: Normal range of motion. She exhibits no edema, tenderness or deformity.   Lymphadenopathy:     She has no cervical adenopathy.   Neurological: She is alert and oriented to person, place, and time. She displays normal reflexes. No cranial nerve deficit. She exhibits normal muscle tone.   Skin: Skin is warm and dry. No rash noted.   Psychiatric: She has a normal mood and affect. Her behavior is normal. Judgment and thought content  normal.     Results Review:  I have reviewed the labs, radiology results, and diagnostic studies.    Laboratory Data:     Results from last 7 days  Lab Units 06/27/17  0427 06/26/17  0437 06/25/17  0532   WBC 10*3/mm3 8.32 7.38 7.92   HEMOGLOBIN g/dL 8.7* 8.5* 7.8*   HEMATOCRIT % 26.7* 25.8* 23.2*   PLATELETS 10*3/mm3 266 244 244       Results from last 7 days  Lab Units 06/26/17  0437 06/25/17  0532 06/24/17  0628 06/23/17  0507   SODIUM mmol/L 142 140 139 138   POTASSIUM mmol/L 3.7 3.4* 3.7 4.0   CHLORIDE mmol/L 108 105 104 102   TOTAL CO2 mmol/L 27.0 26.0 25.0 23.0*   BUN mg/dL 20 16 23* 34*   CREATININE mg/dL 0.91 0.74 0.85 1.18   CALCIUM mg/dL 8.3* 8.3* 8.4 8.5   BILIRUBIN mg/dL  --   --   --  0.7   ALK PHOS U/L  --   --   --  83   ALT (SGPT) U/L  --   --   --  45   AST (SGOT) U/L  --   --   --  63*   GLUCOSE mg/dL 111* 114* 190* 120*       Culture Data:   Blood Culture   Date Value Ref Range Status   06/22/2017 No growth at 4 days  Preliminary   06/22/2017 No growth at 4 days  Preliminary       Radiology Data:   Imaging Results (last 24 hours)     ** No results found for the last 24 hours. **          I have reviewed the patient current medications.     Assessment/Plan   Assessment:  1. Closed subcapital fracture of neck of right femur status post right hip hemiarthroplasty. Post op day 6 .   2. Concern for pneumonia superimposed on COPD-healthcare associated  3. Urinary tract infection-E. Coli   4. Obstructive sleep apnea-CPAP  5. Dementia at baseline  6. Hypertension-poorly controlled  7. Pulmonary edema-improved on chest x-ray  8. Diastolic dysfunction Ejection fraction 65%  9. Pulmonary HTN  10. Supraventricular tachycardia  11. Vitamin B-12 deficiency  12. Hypokalemia  13. Diarrhea    Plan:  1. Day 5/7 B12 intramuscular  2. Cefepime day 2 (day 4 antibiotics)/Vancomycin day 4  3. Continue coreg.   4. Pt remains on 1 L nasal cannula- states her oxygen went to 89% and they replaced her on oxygen.   5.  "Hopefully to SNF tomorrow.  is aware.     Discharge Planning: I expect patient to be discharged to SNF in 1 days.    BETO Wahl   06/27/17   1:54 PM     I personally evaluated and examined the patient in conjunction with BETO Garcia and agree with the assessment, treatment plan, and disposition of the patient as recorded by her. My history, exam, and further recommendations are: I am going to repeat a chest x-ray today.  I will go ahead and discontinue vancomycin today, and continue cefepime for now, but would like to de-escalate and narrow antibiotic therapy given anticipated transition to SNF tomorrow.  Patient has multiple antibiotic allergies, but is tolerating cefepime.  She reports having a chronic cough, and states that at this time it is productive of white sputum which is normal for her.  She does state that she was able to stand briefly with physical therapy today which is \"progress for me\".  Patient has faint Rales on examination, no significant wheezes at this time, and nonlabored breathing.        Phil Allen MD  06/27/17  3:55 PM  "

## 2017-06-27 NOTE — PLAN OF CARE
Problem: Skin Integrity Impairment, Risk/Actual (Adult)  Goal: Skin Integrity/Wound Healing  Outcome: Ongoing (interventions implemented as appropriate)    Problem: Fall Risk (Adult)  Goal: Absence of Falls  Outcome: Ongoing (interventions implemented as appropriate)    Problem: Patient Care Overview (Adult)  Goal: Plan of Care Review  Outcome: Ongoing (interventions implemented as appropriate)    06/27/17 0528   Coping/Psychosocial Response Interventions   Plan Of Care Reviewed With patient;spouse   Patient Care Overview   Progress progress toward functional goals as expected   Outcome Evaluation   Outcome Summary/Follow up Plan Medicated x1 with iv pain med for relief of right hip pain, effective. Sleeping through the night. Neuro wnl. PPP. Reposition every 2 hours. Spouse at bedside. Possible dc to [facility] today.       Goal: Adult Individualization and Mutuality  Outcome: Ongoing (interventions implemented as appropriate)  Goal: Discharge Needs Assessment  Outcome: Ongoing (interventions implemented as appropriate)    Problem: Fractured Hip (Adult)  Goal: Signs and Symptoms of Listed Potential Problems Will be Absent or Manageable (Fractured Hip)  Outcome: Ongoing (interventions implemented as appropriate)    Problem: Pressure Ulcer Risk (Wing Scale) (Adult,Obstetrics,Pediatric)  Goal: Skin Integrity  Outcome: Ongoing (interventions implemented as appropriate)

## 2017-06-27 NOTE — PLAN OF CARE
Problem: Skin Integrity Impairment, Risk/Actual (Adult)  Goal: Skin Integrity/Wound Healing  Outcome: Ongoing (interventions implemented as appropriate)    06/27/17 1636   Skin Integrity Impairment, Risk/Actual (Adult)   Skin Integrity/Wound Healing making progress toward outcome         Problem: Fall Risk (Adult)  Goal: Absence of Falls  Outcome: Ongoing (interventions implemented as appropriate)    06/27/17 1636   Fall Risk (Adult)   Absence of Falls making progress toward outcome         Problem: Patient Care Overview (Adult)  Goal: Plan of Care Review  Outcome: Ongoing (interventions implemented as appropriate)    06/27/17 1636   Coping/Psychosocial Response Interventions   Plan Of Care Reviewed With patient;spouse   Outcome Evaluation   Outcome Summary/Follow up Plan possible dc to [facility] tomorrow. on bi pap. catapres patch to left shoulder. no c/o pain this shift         Problem: Pressure Ulcer Risk (Wing Scale) (Adult,Obstetrics,Pediatric)  Goal: Skin Integrity  Outcome: Ongoing (interventions implemented as appropriate)    06/27/17 1636   Pressure Ulcer Risk (Wing Scale) (Adult,Obstetrics,Pediatric)   Skin Integrity making progress toward outcome

## 2017-06-27 NOTE — THERAPY TREATMENT NOTE
Acute Care - Occupational Therapy Treatment Note  [facility]     Patient Name: Renay Lazo  : 1929  MRN: [id number]  Today's Date: 2017  Onset of Illness/Injury or Date of Surgery Date: 17  Date of Referral to OT: 17  Referring Physician: (S) Dr. Zaidi (Post hip precautions, WBAT)      Admit Date: 2017    Visit Dx:     ICD-10-CM ICD-9-CM   1. Closed displaced intertrochanteric fracture of right femur, initial encounter S72.141A 820.21   2. Closed subcapital fracture of neck of right femur, initial encounter S72.011A 820.09   3. Decreased activities of daily living (ADL) Z78.9 V49.89   4. Impaired mobility Z74.09 799.89   5. Oropharyngeal dysphagia R13.12 787.22     Patient Active Problem List   Diagnosis   • Closed C2 fracture   • C2 cervical fracture   • Transient alteration of awareness   • Paroxysmal atrial fibrillation   • History of meningioma of the brain   • UTI (urinary tract infection)   • Over weight   • Closed displaced fracture of second cervical vertebra with routine healing   • Closed subcapital fracture of neck of right femur   • Obstructive sleep apnea   • Dementia   • Hypertension   • Pulmonary edema   • Diastolic dysfunction   • Pulmonary HTN             Adult Rehabilitation Note       17 1042 17 0950 17 0854    Rehab Assessment/Intervention    Discipline physical therapy assistant  -KJ occupational therapist  -CH --  -KJ    Document Type therapy note (daily note)  -KJ therapy note (daily note)  -CH     Subjective Information complains of;pain  -KJ agree to therapy;complains of;pain  -CH     Patient Effort, Rehab Treatment adequate  -KJ good  -CH     Precautions/Limitations fall precautions;anterior hip precautions- right  -KJ      Recorded by [KJ] Amanda Carlin PTA [CH] Shagufta Mehta OTR/L [KJ] Amanda Carlin PTA    Pain Assessment    Pain Assessment Antony-Briggs FACES  -KJ      Antony-Briggs FACES Pain Rating 8  -KJ      Pain Type Surgical pain   -KJ      Pain Location Hip  -KJ      Pain Orientation Right  -KJ      Pain Descriptors Aching;Sharp  -KJ      Pain Frequency Intermittent   wiith movement  -KJ      Pain Intervention(s) Repositioned  -KJ      Recorded by [KJ] Amanda Carlin PTA      Mobility Assessment/Training    Extremity Weight-Bearing Status right lower extremity  -KJ      Right Lower Extremity Weight-Bearing weight-bearing as tolerated  -KJ      Recorded by [KJ] Amanda Carlin PTA      Bed Mobility, Assessment/Treatment    Bed Mob, Supine to Sit, [place]  minimum assist (75% patient effort);2 person assist required;verbal cues required  -     Bed Mob, Sit to Supine, [place]  maximum assist (25% patient effort);2 person assist required;verbal cues required  -     Bed Mobility, Safety Issues  cognitive deficits limit understanding;decreased use of arms for pushing/pulling;decreased use of legs for bridging/pushing  -     Bed Mobility, Impairments  pain;strength decreased  -     Bed Mobility, Comment just laid down from working with OT  -KJ      Recorded by [KJ] Amanda Carlin PTA [CH] Shagufta Mehta OTR/L     Upper Body Bathing Assessment/Training    UB Bathing Assess/Train, Position  sitting;edge of bed  -     UB Bathing Assess/Train, [place] Level  minimum assist (75% patient effort);verbal cues required  -     UB Bathing Assess/Train, Impairments  pain;strength decreased;impaired balance  -     Recorded by  [] Shagufta Mehta OTR/L     Lower Body Bathing Assessment/Training    LB Bathing Assess/Train, Position  sitting;edge of bed  -     LB Bathing Assess/Train, [place] Level  maximum assist (25% patient effort);verbal cues required  -     LB Bathing Assess/Train, Impairments  pain;impaired balance;strength decreased  -     Recorded by  [] Shagufta Mehta OTR/L     Upper Body Dressing Assessment/Training    UB Dressing Assess/Train, Clothing Type  donning:;doffing:;hospital gown  -     UB Dressing  Assess/Train, Position  supine  -     UB Dressing Assess/Train, [place]  minimum assist (75% patient effort);verbal cues required  -     UB Dressing Assess/Train, Impairments  strength decreased;pain  -CH     Recorded by  [CH] Shagufta Mehta, OTR/L     Balance Skills Training    Sitting-Level of Assistance  Contact guard  -     Sitting-Balance Support  Right upper extremity supported;Left upper extremity supported;Feet supported   leans Left  -     Recorded by  [CH] Shagufta Mehta, OTR/L     Therapy Exercises    Right Lower Extremity AAROM:;PROM:;15 reps;supine;ankle pumps/circles;glut sets;hip abduction/adduction  -      Left Lower Extremity AROM:;20 reps  -KJ      Recorded by [KJ] Amanda Carlin PTA      Orthosis Location    Orthosis Location/Type lower extremity  - lower extremity  -     Orthosis, Lower Extremity knee immobilizer  - knee immobilizer  -     Recorded by [KJ] Amanda Carlin PTA [CH] Shagufta Mehta, OTR/L     Positioning and Restraints    Pre-Treatment Position in bed  - in bed  -     Post Treatment Position bed  - bed  -     In Bed  fowlers;call light within reach;encouraged to call for assist;with family/caregiver;side rails up x2;RLE elevated  -     Recorded by [KJ] Amanda Carlin PTA [CH] Shagufta Mehta OTR/L       06/26/17 1453 06/26/17 1040 06/26/17 0900    Rehab Assessment/Intervention    Discipline occupational therapist  -CH speech language pathologist  -CS physical therapy assistant  -    Document Type therapy note (daily note)  -CH therapy note (daily note)  -CS therapy note (daily note)  -    Subjective Information agree to therapy  -CH agree to therapy  -CS agree to therapy  -    Patient Effort, Rehab Treatment adequate  -CH adequate  -CS     Precautions/Limitations fall precautions;hip precautions- right  -CH  fall precautions;hip precautions- right   R knee immobilizer  -    Recorded by [CH] Shagufta Mehta OTR/L [CS] Yordy Whiting MS, CFY-SLP  [] Yesenia Merritt PTA    Pain Assessment    Pain Assessment No/denies pain  - Unable to assess   Pt indictaes pain however she is unable to rate pain.  -CS Antony-Briggs FACES  -AH    Antony-Briggs FACES Pain Rating   4  -    Pain Type   Surgical pain  -    Pain Location   Hip  -    Pain Orientation   Right  -    Pain Frequency   Intermittent  -    Pain Intervention(s)   Repositioned  -    Response to Interventions   tolerated  -    Recorded by [] Shagufta Mehta, OTR/L [] Yordy Whiting MS, CFY-SLP [] Yesenia Merritt PTA    Dysphagia/Swallow Intervention    Dysphagia/Swallow Therapeutic Feed  Pt completed trials of pureed, honey thick, nectar thick, and thin consistencies. 1x immediate cough with thin liquids as well as 1x significant audible swallow as well as delayed throat clear with nectar thick liquids. No other overt s/s of aspiration noted. However, ST cannot fully r/o aspiration with PO trials completed.   -CS     Recorded by  [] Yordy Whiting, MS, CFY-SLP     Bed Mobility, Assessment/Treatment    Bed Mob, Supine to Sit, [place]   maximum assist (25% patient effort);moderate assist (50% patient effort);2 person assist required;verbal cues required  -    Bed Mob, Sit to Supine, [place]   maximum assist (25% patient effort);2 person assist required;verbal cues required  -    Recorded by   [] Yesenia Merritt PTA    Transfer Assessment/Treatment    Transfers, Sit-Stand [place]   moderate assist (50% patient effort);2 person assist required;verbal cues required  -    Transfers, Stand-Sit [place]   moderate assist (50% patient effort);minimum assist (75% patient effort);2 person assist required;verbal cues required  -    Transfers, Sit-Stand-Sit, Assist Device   rolling walker  -    Recorded by   [] Yesenia Merritt PTA    Therapy Exercises    Right Lower Extremity   AAROM:;10 reps;sitting  -AH    Left Lower Extremity   AROM:;AAROM:;10 reps  -    Bilateral Upper  Extremity AROM:;10 reps;30 reps;elbow flexion/extension;hand pumps;pronation/supination;shoulder extension/flexion  -      Recorded by [] Shagufta Mehta, OTR/L  [] Yesenia Merritt PTA    Orthotics Prosthetics    Additional Documentation   Orthosis Location (Group)  -    Recorded by   [] Yesenia Merritt PTA    Orthosis Location    Orthosis Location/Type   lower extremity  -    Orthosis, Lower Extremity   Right:;knee immobilizer  -    Recorded by   [] Yesenia Merritt PTA    Orthosis Management/Training    Orthosis Wear Schedule   wear at night or when in bed  -    Recorded by   [] Yesenia Merritt PTA    Positioning and Restraints    Pre-Treatment Position in bed  -  in bed  -    Post Treatment Position bed  -  bed  -    In Bed fowlers;call light within reach;encouraged to call for assist;side rails up x2;with brace  -  fowlers;call light within reach;encouraged to call for assist;with family/caregiver  -    Recorded by [] Shagufta Mehta, OTR/L  [] Yesenia Merritt PTA      06/25/17 1320 06/25/17 1248 06/25/17 0725    Rehab Assessment/Intervention    Discipline physical therapy assistant  -AL speech language pathologist  -MB physical therapy assistant  -AL    Document Type therapy note (daily note)  -AL therapy note (daily note)  -MB therapy note (daily note)  -AL    Subjective Information agree to therapy  -AL  agree to therapy  -AL    Precautions/Limitations fall precautions;hip precautions- right   knee immobilizer R leg to be worn in bed, confused  -AL  fall precautions;hip precautions- right   knee immobilizer on when in bed  -AL    Recorded by [AL] Yesenia Llanes PTA [MB] Liss Breaux, PASCALE-SLP [AL] Yesenia Llanes PTA    Pain Assessment    Pain Assessment Antony-Briggs FACES  -AL  Antony-Briggs FACES  -AL    Antony-Briggs FACES Pain Rating 2  -AL  4  -AL    Pain Type Surgical pain  -AL  Surgical pain  -AL    Pain Location Hip  -AL  Hip  -AL    Pain Orientation Right  -AL  Right  -AL     Pain Frequency Intermittent  -AL  Intermittent  -AL    Pain Intervention(s) Medication (See MAR)  -AL  Medication (See MAR)  -AL    Response to Interventions tolerated  -AL  Tolerated  -AL    Recorded by [AL] Yesenia Llanes PTA  [AL] Yesenia Llanes PTA    Dysphagia/Swallow Intervention    Dysphagia/Swallow Therapeutic Feed  Observed pt's  feeding pt lunch. The pt a throat clear type behavior 1 to 2 times during the meal, however it is unclear if this is a typical habit for her. The pt's  was noted to provide large bites at times, however did self-correct independently and state he would give her smaller bites.  -MB     Recorded by  [MB] Liss Breaux CCC-SLP     Bed Mobility, Assessment/Treatment    Bed Mob, Supine to Sit, [place]   moderate assist (50% patient effort);maximum assist (25% patient effort);2 person assist required;verbal cues required  -AL    Bed Mob, Sit to Supine, [place]   maximum assist (25% patient effort);2 person assist required;verbal cues required  -AL    Recorded by   [AL] Yesenia Llanes PTA    Transfer Assessment/Treatment    Transfers, Sit-Stand [place]   moderate assist (50% patient effort);maximum assist (25% patient effort);2 person assist required;verbal cues required  -AL    Transfers, Stand-Sit [place]   moderate assist (50% patient effort);verbal cues required;2 person assist required  -AL    Transfers, Sit-Stand-Sit, Assist Device   rolling walker  -AL    Transfer, Comment   Stood x 2 with Mod assist, cues to stand tall   -AL    Recorded by   [AL] Yesenia Llanes PTA    Balance Skills Training    Sitting-Level of Assistance   Contact guard;Minimum assistance  -AL    Sitting-Balance Support   Left upper extremity supported;Feet supported  -AL    Sitting-Balance Activities   Trunk control activities  -AL    Sitting # of Minutes   15  -AL    Standing-Level of Assistance   Moderate assistance;x2  -AL    Static Standing Balance Support   assistive  device  -AL    Standing Balance # of Minutes   5  -AL    Stepping Strategy Assessment (Balance Skills)   attempted side steps but wasn't able to move her feet.  Needs cues to stand tall and keep hips forward.  -AL    Recorded by   [AL] Yesenia Llanes PTA    Therapy Exercises    Right Lower Extremity AAROM:;20 reps;supine;ankle pumps/circles;hip abduction/adduction;SAQ;heel slides;PROM:   patient needs verbal and tactile cues to stay awake  -AL  AAROM:;10 reps;LAQ;hip abduction/adduction  -AL    Recorded by [AL] Yesenia Llanes PTA  [AL] Yesenia Llanes PTA    Orthotics Prosthetics    Additional Documentation Orthosis Location (Group)  -AL  Orthosis Location (Group)  -AL    Recorded by [AL] Yesenia Llanes PTA  [AL] Yesenia Llanes PTA    Orthosis Location    Orthosis Location/Type lower extremity  -AL  lower extremity  -AL    Orthosis, Lower Extremity Right:;knee immobilizer   removed for exercisees, put brace back on after exercisees.  -AL  Right:;knee immobilizer  -AL    Recorded by [AL] Yesenia Llanes PTA  [AL] Yesenia Llanes PTA    Orthosis Management/Training    Orthosis Wear Schedule wear at night or when in bed  -AL  wear at night or when in bed  -AL    Recorded by [AL] Yesenia Llanes PTA  [AL] Yesenia Llanes PTA    Positioning and Restraints    Pre-Treatment Position in bed  -AL  in bed  -AL    Post Treatment Position bed  -AL  bed  -AL    In Bed fowlers;call light within reach;with family/caregiver  -AL  fowlers;call light within reach;with family/caregiver  -AL    Recorded by [AL] Yesenia Llanes PTA  [AL] Yesenia Llanes PTA      User Key  (r) = Recorded By, (t) = Taken By, (c) = Cosigned By    Initials Name Effective Dates    RED Breaux, [facility]-SLP 08/02/16 -     NOEMY Merritt, PTA 08/02/16 -     OSMIN Mehta, OTR/L 08/02/16 -     KJ Amanda Carlin, PTA 08/02/16 -     AL Yesenia Llanes, PTA 08/02/16 -     CS Yordy Whiting, MS, CFY-SLP 08/02/16 -                 OT Goals       06/21/17 0852           Bed Mobility OT LTG    Bed Mobility OT LTG, Date Established 06/21/17  -ND      Bed Mobility OT LTG, Time to Achieve by discharge  -ND      Bed Mobility OT LTG, Activity Type all bed mobility  -ND      Bed Mobility OT LTG, [place] Level minimum assist (75% patient effort)  -ND      Transfer Training OT LTG    Transfer Training OT LTG, Date Established 06/21/17  -ND      Transfer Training OT LTG, Time to Achieve by discharge  -ND      Transfer Training OT LTG, Activity Type bed to chair /chair to bed;sit to stand/stand to sit;toilet  -ND      Transfer Training OT LTG, [place] Level moderate assist (50% patient effort)  -ND      Transfer Training OT LTG, Assist Device commode, bedside;walker, rolling  -ND      Strength OT LTG    Strength Goal OT LTG, Date Established 06/21/17  -ND      Strength Goal OT LTG, Time to Achieve by discharge  -ND      Strength Goal OT LTG, Measure to Achieve Pt. will complete BUE strengthening exercises to increase BUE strength to 4/5 for ADLs.   -ND      ADL OT LTG    ADL OT LTG, Date Established 06/21/17  -ND      ADL OT LTG, Time to Achieve by discharge  -ND      ADL OT LTG, Activity Type ADL skills  -ND      ADL OT LTG, [place] Level mod assist  -ND      ADL OT LTG, Additional Goal AE PRN  -ND        User Key  (r) = Recorded By, (t) = Taken By, (c) = Cosigned By    Initials Name Provider Type    GEE Zamarripa, OTR/L Occupational Therapist          Occupational Therapy Education     Title: PT OT SLP Therapies (Active)     Topic: Occupational Therapy (Active)     Point: ADL training (Active)    Description: Instruct learner(s) on proper safety adaptation and remediation techniques during self care or transfers.   Instruct in proper use of assistive devices.    Learning Progress Summary    Learner Readiness Method Response Comment Documented by Status   Patient Acceptance E NR  CH 06/27/17 0950 Active    Acceptance E,SRI JOSEPH,NR ADL's TS 06/23/17 0857 Done    Acceptance  E,D VU progression of POC, strengthening TS 06/22/17 1043 Done    Acceptance E VU,NR Pt. educated on role of OT, Safe bed mob, PHP, benefit of activity, progression with poc ND 06/21/17 0851 Done   Family Acceptance E NR   06/27/17 0950 Active   Significant Other Acceptance E VU,NR Pt. educated on role of OT, Safe bed mob, PHP, benefit of activity, progression with poc ND 06/21/17 0851 Done   Caregiver Acceptance E,D VU progression of POC, strengthening TS 06/22/17 1043 Done               Point: Home exercise program (Active)    Description: Instruct learner(s) on appropriate technique for monitoring, assisting and/or progressing therapeutic exercises/activities.    Learning Progress Summary    Learner Readiness Method Response Comment Documented by Status   Patient Acceptance E,D NR   06/26/17 1551 Active    Acceptance E VU,NR Pt. educated on role of OT, Safe bed mob, PHP, benefit of activity, progression with poc ND 06/21/17 0851 Done   Significant Other Acceptance E VU,NR Pt. educated on role of OT, Safe bed mob, PHP, benefit of activity, progression with poc ND 06/21/17 0851 Done               Point: Body mechanics (Done)    Description: Instruct learner(s) on proper positioning and spine alignment during self-care, functional mobility activities and/or exercises.    Learning Progress Summary    Learner Readiness Method Response Comment Documented by Status   Patient Acceptance E VU,NR Pt. educated on role of OT, Safe bed mob, PHP, benefit of activity, progression with poc ND 06/21/17 0851 Done   Significant Other Acceptance E VU,NR Pt. educated on role of OT, Safe bed mob, PHP, benefit of activity, progression with poc ND 06/21/17 0851 Done                      User Key     Initials Effective Dates Name Provider Type Discipline     08/02/16 -  SHAY Breen/L Occupational Therapist OT     08/02/16 -  KIM Naranjo/L Occupational Therapy Assistant OT    ND 10/21/16 -  Jess Zamarripa,  OTR/L Occupational Therapist OT                  OT Recommendation and Plan  Anticipated Equipment Needs At Discharge: bathing equipment, dressing equipment  Anticipated Discharge Disposition: skilled nursing facility  Planned Therapy Interventions: activity intolerance, adaptive equipment training, ADL retraining, balance training, bed mobility training, energy conservation, home exercise program, orthotic fitting/training, strengthening, transfer training  Therapy Frequency: 3-5 times/wk  Plan of Care Review  Plan Of Care Reviewed With: patient  Progress: improving  Outcome Summary/Follow up Plan: OT tx completed.  Pt. ccompleted bed mobility, EOB sponge bathing, & gown change this AM.  Her ability to follow directions, arousal level, & level of participation has significantly improved.  Pt. verablized pain during mobility.  Cont OT POC for increase in indep in ADLs.        Outcome Measures       06/27/17 0950 06/26/17 0900 06/25/17 0725    How much help from another person do you currently need...    Turning from your back to your side while in flat bed without using bedrails?  2  -AH 2  -AL    Moving from lying on back to sitting on the side of a flat bed without bedrails?  1  -AH 1  -AL    Moving to and from a bed to a chair (including a wheelchair)?  1  -AH 1  -AL    Standing up from a chair using your arms (e.g., wheelchair, bedside chair)?  1  -AH 1  -AL    Climbing 3-5 steps with a railing?  1  -AH 1  -AL    To walk in hospital room?  1  -AH 1  -AL    AM-PAC 6 Clicks Score  7  -AH 7  -AL    How much help from another is currently needed...    Putting on and taking off regular lower body clothing? 1  -CH      Bathing (including washing, rinsing, and drying) 2  -CH      Toileting (which includes using toilet bed pan or urinal) 2  -CH      Putting on and taking off regular upper body clothing 3  -CH      Taking care of personal grooming (such as brushing teeth) 4  -CH      Eating meals 4  -CH      Score 16   -      Functional Assessment    Outcome Measure Options AM-PAC 6 Clicks Daily Activity (OT)  - AM-PAC 6 Clicks Basic Mobility (PT)  - AM-PAC 6 Clicks Basic Mobility (PT)  -AL      User Key  (r) = Recorded By, (t) = Taken By, (c) = Cosigned By    Initials Name Provider Type     Yesenia Merritt, PTA Physical Therapy Assistant     Shagufta Mehta, OTR/L Occupational Therapist    AL Yesenia Llanes, PTA Physical Therapy Assistant           Time Calculation:         Time Calculation- OT       06/27/17 0950          Time Calculation- OT    OT Start Time 0950  -      OT Stop Time 1022  -      OT Time Calculation (min) 32 min  -      Total Timed Code Minutes- OT 32 minute(s)  -      OT Received On 06/27/17  -      OT Goal Re-Cert Due Date 07/01/17  -        User Key  (r) = Recorded By, (t) = Taken By, (c) = Cosigned By    Initials Name Provider Type     Shagufta Mehta, OTR/L Occupational Therapist           Therapy Charges for Today     Code Description Service Date Service Provider Modifiers Qty    [id number]  OT THERAPEUTIC ACT EA 15 MIN 6/26/2017 Shagufta Mehta, OTR/L GO, KX 2    [id number] HC OT SELF CARE/MGMT/TRAIN EA 15 MIN 6/27/2017 Shagufta Mehta OTR/L BRIA, KX 2          OT G-codes  OT Functional Scales Options: AM-PAC 6 Clicks Daily Activity (OT)  Functional Limitation: Self care  Self Care Current Status (): At least 60 percent but less than 80 percent impaired, limited or restricted  Self Care Goal Status (): At least 40 percent but less than 60 percent impaired, limited or restricted    Shagufta Mehta OTR/L  6/27/2017

## 2017-06-27 NOTE — PROGRESS NOTES
"  Orthopedic Surgery Progress Note    Renay Lazo  6/27/2017      Subjective:     Systemic or Specific Complaints: no complaints.   Objective:     Patient Vitals for the past 24 hrs:   BP Temp Temp src Pulse Resp SpO2 Height Weight   06/27/17 0656 - - - 74 20 93 % - -   06/27/17 0356 175/51 98.6 °F (37 °C) Oral 61 18 94 % - -   06/27/17 0257 - - - - - 92 % - -   06/26/17 2330 145/65 98.4 °F (36.9 °C) - 60 18 94 % - -   06/26/17 2300 - - - - - 96 % - -   06/26/17 1941 - - - - - 93 % - -   06/26/17 1931 139/40 97.7 °F (36.5 °C) Oral 64 16 94 % - -   06/26/17 1905 - - - - - 95 % - -   06/26/17 1628 166/55 98.9 °F (37.2 °C) Oral 65 16 95 % - -   06/26/17 1146 139/48 97.5 °F (36.4 °C) Oral 68 20 96 % - -   06/26/17 1056 - - - - - - 64.02\" (162.6 cm) 178 lb (80.7 kg)   06/26/17 0725 152/45 98.3 °F (36.8 °C) Oral 74 18 98 % - -   06/26/17 0715 - - - 60 18 97 % - -       right lower  General: alert, appears stated age and cooperative   Wound: clean, dry, intact             Dressing: clean, dry, intact   Extremity: Distal NVI           DVT Exam: No evidence of DVT seen on physical exam.                   Data Review:  Lab Results (last 24 hours)     Procedure Component Value Units Date/Time    Vancomycin, Trough [[id number]]  (Abnormal) Collected:  06/26/17 1409    Specimen:  Blood Updated:  06/26/17 1430     Vancomycin Trough 8.93 (L) mcg/mL     Blood Culture With TEDDY [[id number]]  (Normal) Collected:  06/22/17 2347    Specimen:  Blood from Arm, Left Updated:  06/27/17 0101     Blood Culture No growth at 4 days    Blood Culture With TEDDY [[id number]]  (Normal) Collected:  06/22/17 8904    Specimen:  Blood from Hand, Left Updated:  06/27/17 0206     Blood Culture No growth at 4 days    CBC (No Diff) [[id number]]  (Abnormal) Collected:  06/27/17 0427    Specimen:  Blood Updated:  06/27/17 0446     WBC 8.32 10*3/mm3      RBC 2.82 (L) 10*6/mm3      Hemoglobin 8.7 (L) g/dL      Hematocrit 26.7 (L) %      MCV 94.7 fL      MCH 30.9 " pg      MCHC 32.6 (L) g/dL      RDW 15.2 (H) %      RDW-SD 51.2 fl      MPV 9.4 fL      Platelets 266 10*3/mm3         Imaging Results (last 24 hours)     ** No results found for the last 24 hours. **          Assessment:     POD# 7 R hip hemiarthroplasty, Acute Post op Blood Loss Anemia (expected)     Plan:      1:  DVT prophylaxis 3 weeks, ICE, elevate  2:  Pain control  3:  Physical therapy/Occupational therapy  4:  IV antibiotics for 24 hours  5:  Anticipate discharge today/tomorrow if pain well controlled-per Medicine, [facility] has offered bed.   6:  WBAT RLE with posterior hip precautions       Supa Cancino PA-C

## 2017-06-28 VITALS
WEIGHT: 180.6 LBS | OXYGEN SATURATION: 95 % | RESPIRATION RATE: 18 BRPM | SYSTOLIC BLOOD PRESSURE: 188 MMHG | BODY MASS INDEX: 30.83 KG/M2 | HEART RATE: 79 BPM | DIASTOLIC BLOOD PRESSURE: 86 MMHG | HEIGHT: 64 IN | TEMPERATURE: 99.3 F

## 2017-06-28 LAB
BACTERIA SPEC AEROBE CULT: NORMAL
BACTERIA SPEC AEROBE CULT: NORMAL
DEPRECATED RDW RBC AUTO: 50 FL (ref 40–54)
ERYTHROCYTE [DISTWIDTH] IN BLOOD BY AUTOMATED COUNT: 14.7 % (ref 12–15)
HCT VFR BLD AUTO: 27.9 % (ref 37–47)
HGB BLD-MCNC: 9.1 G/DL (ref 12–16)
MCH RBC QN AUTO: 31 PG (ref 28–32)
MCHC RBC AUTO-ENTMCNC: 32.6 G/DL (ref 33–36)
MCV RBC AUTO: 94.9 FL (ref 82–98)
PLATELET # BLD AUTO: 342 10*3/MM3 (ref 130–400)
PMV BLD AUTO: 9.5 FL (ref 6–12)
RBC # BLD AUTO: 2.94 10*6/MM3 (ref 4.2–5.4)
WBC NRBC COR # BLD: 11.08 10*3/MM3 (ref 4.8–10.8)

## 2017-06-28 PROCEDURE — 92526 ORAL FUNCTION THERAPY: CPT

## 2017-06-28 PROCEDURE — 25010000002 CYANOCOBALAMIN PER 1000 MCG: Performed by: NURSE PRACTITIONER

## 2017-06-28 PROCEDURE — 94660 CPAP INITIATION&MGMT: CPT

## 2017-06-28 PROCEDURE — 84134 ASSAY OF PREALBUMIN: CPT | Performed by: INTERNAL MEDICINE

## 2017-06-28 PROCEDURE — 80076 HEPATIC FUNCTION PANEL: CPT | Performed by: INTERNAL MEDICINE

## 2017-06-28 PROCEDURE — 94799 UNLISTED PULMONARY SVC/PX: CPT

## 2017-06-28 PROCEDURE — 80061 LIPID PANEL: CPT | Performed by: INTERNAL MEDICINE

## 2017-06-28 PROCEDURE — 84443 ASSAY THYROID STIM HORMONE: CPT | Performed by: INTERNAL MEDICINE

## 2017-06-28 PROCEDURE — 82607 VITAMIN B-12: CPT | Performed by: INTERNAL MEDICINE

## 2017-06-28 PROCEDURE — 97110 THERAPEUTIC EXERCISES: CPT

## 2017-06-28 PROCEDURE — 97535 SELF CARE MNGMENT TRAINING: CPT | Performed by: OCCUPATIONAL THERAPIST

## 2017-06-28 PROCEDURE — 25010000002 ENOXAPARIN PER 10 MG: Performed by: ORTHOPAEDIC SURGERY

## 2017-06-28 PROCEDURE — 85025 COMPLETE CBC W/AUTO DIFF WBC: CPT | Performed by: INTERNAL MEDICINE

## 2017-06-28 PROCEDURE — 85027 COMPLETE CBC AUTOMATED: CPT | Performed by: FAMILY MEDICINE

## 2017-06-28 PROCEDURE — 83036 HEMOGLOBIN GLYCOSYLATED A1C: CPT | Performed by: INTERNAL MEDICINE

## 2017-06-28 PROCEDURE — 94760 N-INVAS EAR/PLS OXIMETRY 1: CPT

## 2017-06-28 PROCEDURE — 25010000002 HYDROMORPHONE PER 4 MG: Performed by: FAMILY MEDICINE

## 2017-06-28 PROCEDURE — 80048 BASIC METABOLIC PNL TOTAL CA: CPT | Performed by: INTERNAL MEDICINE

## 2017-06-28 RX ORDER — HYDRALAZINE HYDROCHLORIDE 50 MG/1
50 TABLET, FILM COATED ORAL EVERY 8 HOURS SCHEDULED
Status: DISCONTINUED | OUTPATIENT
Start: 2017-06-28 | End: 2017-06-28 | Stop reason: HOSPADM

## 2017-06-28 RX ORDER — BUMETANIDE 0.25 MG/ML
1 INJECTION INTRAMUSCULAR; INTRAVENOUS ONCE
Status: COMPLETED | OUTPATIENT
Start: 2017-06-28 | End: 2017-06-28

## 2017-06-28 RX ORDER — PSEUDOEPHEDRINE HCL 30 MG
100 TABLET ORAL 2 TIMES DAILY PRN
Refills: 0
Start: 2017-06-28 | End: 2019-02-25

## 2017-06-28 RX ORDER — HYDRALAZINE HYDROCHLORIDE 50 MG/1
50 TABLET, FILM COATED ORAL EVERY 8 HOURS SCHEDULED
Status: ON HOLD
Start: 2017-06-28 | End: 2020-10-07

## 2017-06-28 RX ORDER — AMLODIPINE BESYLATE 10 MG/1
10 TABLET ORAL DAILY
Start: 2017-06-28 | End: 2019-02-25 | Stop reason: DRUGHIGH

## 2017-06-28 RX ORDER — BUMETANIDE 1 MG/1
1 TABLET ORAL DAILY
Status: DISCONTINUED | OUTPATIENT
Start: 2017-06-28 | End: 2017-06-28 | Stop reason: HOSPADM

## 2017-06-28 RX ORDER — LISINOPRIL 20 MG/1
20 TABLET ORAL DAILY
Start: 2017-06-28 | End: 2019-02-25 | Stop reason: DRUGHIGH

## 2017-06-28 RX ORDER — CYANOCOBALAMIN 1000 UG/ML
INJECTION, SOLUTION INTRAMUSCULAR; SUBCUTANEOUS
Start: 2017-06-28 | End: 2019-02-25

## 2017-06-28 RX ORDER — HYDROMORPHONE HYDROCHLORIDE 2 MG/1
1 TABLET ORAL EVERY 6 HOURS PRN
Refills: 0
Start: 2017-06-28 | End: 2019-02-25

## 2017-06-28 RX ORDER — ACETAMINOPHEN 325 MG/1
650 TABLET ORAL EVERY 6 HOURS PRN
Refills: 0 | Status: ON HOLD
Start: 2017-06-28 | End: 2020-10-07

## 2017-06-28 RX ORDER — CARVEDILOL 25 MG/1
25 TABLET ORAL 2 TIMES DAILY WITH MEALS
Start: 2017-06-28 | End: 2019-02-25 | Stop reason: DRUGHIGH

## 2017-06-28 RX ORDER — CEFDINIR 300 MG/1
300 CAPSULE ORAL 2 TIMES DAILY
Qty: 5 CAPSULE | Refills: 0
Start: 2017-06-28 | End: 2017-07-01

## 2017-06-28 RX ADMIN — HYDROMORPHONE HYDROCHLORIDE 1 MG: 1 INJECTION, SOLUTION INTRAMUSCULAR; INTRAVENOUS; SUBCUTANEOUS at 14:51

## 2017-06-28 RX ADMIN — HYDRALAZINE HYDROCHLORIDE 25 MG: 25 TABLET ORAL at 06:29

## 2017-06-28 RX ADMIN — BUMETANIDE 1 MG: 0.25 INJECTION, SOLUTION INTRAMUSCULAR; INTRAVENOUS at 13:04

## 2017-06-28 RX ADMIN — IPRATROPIUM BROMIDE 0.5 MG: 0.5 SOLUTION RESPIRATORY (INHALATION) at 07:01

## 2017-06-28 RX ADMIN — ENOXAPARIN SODIUM 40 MG: 40 INJECTION SUBCUTANEOUS at 09:01

## 2017-06-28 RX ADMIN — BUMETANIDE 1 MG: 0.5 TABLET ORAL at 08:17

## 2017-06-28 RX ADMIN — IPRATROPIUM BROMIDE 0.5 MG: 0.5 SOLUTION RESPIRATORY (INHALATION) at 11:20

## 2017-06-28 RX ADMIN — HYDRALAZINE HYDROCHLORIDE 50 MG: 25 TABLET, FILM COATED ORAL at 13:05

## 2017-06-28 RX ADMIN — CEFEPIME HYDROCHLORIDE 1 G: 1 INJECTION, POWDER, FOR SOLUTION INTRAMUSCULAR; INTRAVENOUS at 06:29

## 2017-06-28 RX ADMIN — HYDROMORPHONE HYDROCHLORIDE 1 MG: 1 INJECTION, SOLUTION INTRAMUSCULAR; INTRAVENOUS; SUBCUTANEOUS at 19:57

## 2017-06-28 RX ADMIN — CYANOCOBALAMIN 1000 MCG: 1000 INJECTION, SOLUTION INTRAMUSCULAR; SUBCUTANEOUS at 08:57

## 2017-06-28 RX ADMIN — IPRATROPIUM BROMIDE 0.5 MG: 0.5 SOLUTION RESPIRATORY (INHALATION) at 15:41

## 2017-06-28 RX ADMIN — CARVEDILOL 25 MG: 25 TABLET, FILM COATED ORAL at 19:52

## 2017-06-28 RX ADMIN — LISINOPRIL 20 MG: 20 TABLET ORAL at 08:17

## 2017-06-28 RX ADMIN — Medication 10 ML: at 06:29

## 2017-06-28 RX ADMIN — AMLODIPINE BESYLATE 10 MG: 10 TABLET ORAL at 08:17

## 2017-06-28 RX ADMIN — FAMOTIDINE 10 MG: 10 INJECTION, SOLUTION INTRAVENOUS at 09:01

## 2017-06-28 RX ADMIN — CARVEDILOL 25 MG: 25 TABLET, FILM COATED ORAL at 08:17

## 2017-06-28 NOTE — THERAPY TREATMENT NOTE
Acute Care - Physical Therapy Treatment Note  [facility]     Patient Name: Renay Lazo  : 1929  MRN: [id number]  Today's Date: 2017  Onset of Illness/Injury or Date of Surgery Date: 17  Date of Referral to PT: 17  Referring Physician: (S) Dr. Zaidi (Post hip precautions, WBAT)    Admit Date: 2017    Visit Dx:    ICD-10-CM ICD-9-CM   1. Closed displaced intertrochanteric fracture of right femur, initial encounter S72.141A 820.21   2. Closed subcapital fracture of neck of right femur, initial encounter S72.011A 820.09   3. Decreased activities of daily living (ADL) Z78.9 V49.89   4. Impaired mobility Z74.09 799.89   5. Oropharyngeal dysphagia R13.12 787.22     Patient Active Problem List   Diagnosis   • Closed C2 fracture   • C2 cervical fracture   • Transient alteration of awareness   • Paroxysmal atrial fibrillation   • History of meningioma of the brain   • UTI (urinary tract infection)   • Over weight   • Closed displaced fracture of second cervical vertebra with routine healing   • Closed subcapital fracture of neck of right femur   • Obstructive sleep apnea   • Dementia   • Hypertension   • Pulmonary edema   • Diastolic dysfunction   • Pulmonary HTN               Adult Rehabilitation Note       17 1429 17 0828 17 0723    Rehab Assessment/Intervention    Discipline physical therapy assistant  -AE speech language pathologist  -CS physical therapy assistant  -CW    Document Type therapy note (daily note)  -AE therapy note (daily note)  -CS therapy note (daily note)  -CW    Subjective Information agree to therapy;complains of;pain  -AE no complaints;agree to therapy  -CS agree to therapy;complains of;fatigue  -CW    Patient Effort, Rehab Treatment  adequate  -CS adequate  -CW    Precautions/Limitations fall precautions  -AE  fall precautions;hip precautions- right  -CW    Recorded by [AE] Rosalba Thurston PTA [CS] Yordy Whiting MS CCC-SLP [CW] Hoa Valentin PTA     Vital Signs    Pre SpO2 (%)   93  -CW    O2 Delivery Pre Treatment   supplemental O2   1 L  -CW    Pre Patient Position   Supine  -CW    Recorded by   [CW] Hoa Valentin PTA    Pain Assessment    Pain Assessment Antony-Briggs FACES  -AE No/denies pain  -CS Antony-Baker FACES  -CW    Antony-Baker FACES Pain Rating 4  -AE  0  -CW    Post Pain Score   0  -CW    Pain Location Hip  -AE  Hip  -CW    Pain Orientation Right  -AE  Right  -CW    Pain Descriptors Aching  -AE      Pain Intervention(s) Repositioned  -AE  Repositioned  -CW    Response to Interventions tolerated  -AE      Recorded by [AE] Rosalba Thurston PTA [CS] Yordy Whiting, MS CCC-SLP [CW] Hoa Valentin PTA    Cognitive Assessment/Intervention    Current Cognitive/Communication Assessment   impaired  -CW    Follows Commands/Answers Questions   100% of the time;able to follow single-step instructions  -CW    Personal Safety Interventions   elopement precautions initiated;fall prevention program maintained;gait belt;nonskid shoes/slippers when out of bed  -CW    Recorded by   [CW] Hoa Valentin PTA    Dysphagia/Swallow Intervention    Dysphagia/Swallow Therapeutic Feed  Pt completed trials of honey thick, nectar thick, and regular solid consistencies. Adequate bolus clearance with regular solids, however she experienced a 1x delayed cough and 1x immediate throat clear. 2x delayed throat clear following nectar thick liquids. ST cannot fully r/o aspiration. Pt to continue current diet of pureed consistencies with honey thick liquids.  -CS     Recorded by  [CS] Yordy Whiting, MS CCC-SLP     Mobility Assessment/Training    Extremity Weight-Bearing Status   right lower extremity  -CW    Right Lower Extremity Weight-Bearing   weight-bearing as tolerated  -CW    Recorded by   [CW] Hoa Valentin PTA    Bed Mobility, Assessment/Treatment    Bed Mobility, Roll Left, [place] moderate assist (50% patient effort);verbal cues required  -AE      Bed  Mobility, Roll Right, [place] moderate assist (50% patient effort);verbal cues required  -AE      Bed Mobility, Comment assisted nurse rios clean up  -AE      Recorded by [AE] Rosalba Thurston PTA      Therapy Exercises    Exercise Protocols total hip  -AE  total hip  -CW    Total Hip Exercises 15 reps;with assist  -AE  right:;15 reps;with assist  -CW    Recorded by [AE] Rosalba Thurston PTA  [CW] Hoa Valentin PTA    Orthosis Location    Orthosis Location/Type   lower extremity  -CW    Orthosis, Lower Extremity   Right:;knee immobilizer  -CW    Recorded by   [CW] Hoa Valentin PTA    Orthosis Management/Training    Orthosis Wear Schedule   wear at night or when in bed  -CW    Recorded by   [CW] Hoa Valentin PTA    Positioning and Restraints    Pre-Treatment Position in bed  -AE  in bed  -CW    Post Treatment Position bed  -AE  bed  -CW    In Bed fowlers;call light within reach  -AE  fowlers;call light within reach;exit alarm on  -CW    Recorded by [AE] Rosalba Thurston PTA  [CW] Hoa Valentin PTA      06/27/17 1424 06/27/17 1413 06/27/17 1042    Rehab Assessment/Intervention    Discipline physical therapy assistant  -CW speech language pathologist  -TM,CM,TM2 physical therapy assistant  -KJ    Document Type therapy note (daily note)  -CW therapy note (daily note)  -TM,CM,TM2 therapy note (daily note)  -KJ    Subjective Information agree to therapy  -CW agree to therapy  -TM,CM,TM2 complains of;pain  -KJ    Patient Effort, Rehab Treatment good  -CW  adequate  -KJ    Precautions/Limitations fall precautions;hip precautions- right  -CW  fall precautions;hip precautions- right  -KJ    Recorded by [CW] Hoa Valentin PTA [TM,CM,TM2] Marva Jamil CCC-SLP (r) Tiff Ortiz, Speech Therapy Student (t) Marva Jamil CCC-SLP (c) [KJ] Amanda Carlin PTA    Vital Signs    Pre SpO2 (%) 96  -CW      O2 Delivery Pre Treatment supplemental O2   1 L  -CW      O2 Delivery Intra Treatment  room air  -CW      Post SpO2 (%) 95  -CW      O2 Delivery Post Treatment room air   placed back on 1 L O2  -CW      Pre Patient Position Supine  -CW      Intra Patient Position Sitting  -CW      Post Patient Position Supine  -CW      Recorded by [CW] Hoa Valentin PTA      Pain Assessment    Pain Assessment Antony-Baker FACES  -CW 0-10  -TM,CM,TM2 Antony-Baker FACES  -KJ    Antony-Briggs FACES Pain Rating 4  -CW 10  -TM,CM,TM2 8  -KJ    Pain Type   Surgical pain  -KJ    Pain Location Hip  -CW Hip  -TM,CM,TM2 Hip  -KJ    Pain Orientation Right  -CW Right  -TM,CM,TM2 Right  -KJ    Pain Descriptors   Aching;Sharp  -KJ    Pain Frequency Intermittent  -CW  Intermittent   wiith movement  -KJ    Pain Intervention(s) Repositioned  -CW  Repositioned  -KJ    Recorded by [CW] Hoa Valentin PTA [TM,CM,TM2] Marva Jamil CCC-SLP (r) Tiff Ortiz, Speech Therapy Student (t) Marva Jamil CCC-SLP (c) [KJ] Amanda Carlin PTA    Cognitive Assessment/Intervention    Current Cognitive/Communication Assessment impaired  -CW      Orientation Status oriented to;person;situation  -CW      Follows Commands/Answers Questions 100% of the time;able to follow single-step instructions  -CW      Personal Safety mild impairment;decreased awareness, need for assist;decreased awareness, need for safety  -CW      Personal Safety Interventions fall prevention program maintained;gait belt;nonskid shoes/slippers when out of bed  -CW      Recorded by [CW] Hoa Valentin PTA      Dysphagia/Swallow Intervention    Dysphagia/Swallow Therapeutic Feed  Pt trialed on current diet of pureed consistency and honey thick liquids. ST to note pt had a delayed cough following honey thick trial. No other overt s/s of aspiration noted.   -TM,CM,TM2     Recorded by  [TM,CM,TM2] Marva Jamil CCC-SLP (r) Tiff Ortiz, Speech Therapy Student (t) Marva Jamil CCC-SLP (c)     Mobility Assessment/Training    Extremity Weight-Bearing Status  right lower extremity  -CW  right lower extremity  -KJ    Right Lower Extremity Weight-Bearing weight-bearing as tolerated  -CW  weight-bearing as tolerated  -KJ    Recorded by [CW] Hoa Valentin PTA  [KJ] Amanda Carlin PTA    Bed Mobility, Assessment/Treatment    Bed Mob, Supine to Sit, [place] minimum assist (75% patient effort);2 person assist required;verbal cues required  -CW      Bed Mob, Sit to Supine, [place] maximum assist (25% patient effort);2 person assist required;verbal cues required  -CW      Bed Mobility, Safety Issues decreased use of legs for bridging/pushing;cognitive deficits limit understanding  -CW      Bed Mobility, Impairments strength decreased;pain  -CW      Bed Mobility, Comment   just laid down from working with OT  -KJ    Recorded by [CW] Hoa Valentin PTA  [KJ] Amanda Carlin PTA    Transfer Assessment/Treatment    Transfers, Sit-Stand [place] moderate assist (50% patient effort);2 person assist required;verbal cues required  -CW      Transfers, Stand-Sit [place] minimum assist (75% patient effort);2 person assist required;verbal cues required;nonverbal cues required (demo/gesture)   stood x 4  -CW      Transfers, Sit-Stand-Sit, Assist Device rolling walker  -CW      Toilet Transfer, [place] moderate assist (50% patient effort);2 person assist required;verbal cues required  -CW      Toilet Transfer, Assistive Device bedside commode without drop arms;rolling walker  -CW      Transfer, Safety Issues step length decreased;weight-shifting ability decreased;knees buckling   R knee aimee  -CW      Transfer, Impairments strength decreased;pain  -CW      Transfer, Comment R knee aimee  -CW      Recorded by [CW] Hoa Valentin PTA      Gait Assessment/Treatment    Gait, [place] Level minimum assist (75% patient effort);moderate assist (50% patient effort);2 person assist required;verbal cues required  -CW      Gait, Assistive Device rolling  walker  -CW      Gait, Distance (Feet) --   Small steps bed to [facility] and back to bed  -CW      Gait, Safety Issues weight-shifting ability decreased;step length decreased  -CW      Gait, Impairments strength decreased;pain  -CW      Gait, Comment R knee aimee  -CW      Recorded by [CW] Hoa Valentin PTA      Balance Skills Training    Sitting-Level of Assistance Close supervision  -CW      Sitting-Balance Support Feet supported  -CW      Standing-Level of Assistance Minimum assistance;x2  -CW      Static Standing Balance Support assistive device  -CW      Gait Balance-Level of Assistance Minimum assistance;Moderate assistance;x2  -CW      Gait Balance Support assistive device  -CW      Recorded by [CW] Hoa Valentin PTA      Therapy Exercises    Right Lower Extremity   AAROM:;PROM:;15 reps;supine;ankle pumps/circles;glut sets;hip abduction/adduction  -KJ    Left Lower Extremity   AROM:;20 reps  -KJ    Recorded by   [KJ] Amanda Carlin PTA    Orthosis Location    Orthosis Location/Type lower extremity  -CW  lower extremity  -KJ    Orthosis, Lower Extremity Right:;knee immobilizer  -CW  knee immobilizer  -KJ    Recorded by [CW] Hoa Valentin PTA  [KJ] Amanda Carlin PTA    Orthosis Management/Training    Orthosis Wear Schedule wear at night or when in bed  -CW      Recorded by [CW] Hoa Valentin PTA      Positioning and Restraints    Pre-Treatment Position in bed  -CW  in bed  -KJ    Post Treatment Position bed  -CW  bed  -KJ    In Bed fowlers;call light within reach;with family/caregiver;exit alarm on  -CW      Recorded by [CW] Hoa Valentin PTA  [KJ] Amanda Carlin PTA      06/27/17 0950 06/27/17 0854 06/26/17 2201    Rehab Assessment/Intervention    Discipline occupational therapist  -CH --  -KJ occupational therapist  -CH    Document Type therapy note (daily note)  -CH  therapy note (daily note)  -CH    Subjective Information agree to therapy;complains of;pain  -CH  agree to  therapy  -CH    Patient Effort, Rehab Treatment good  -CH  adequate  -CH    Precautions/Limitations   fall precautions;hip precautions- right  -CH    Recorded by [CH] Shagufta Mehta, OTR/L [KJ] Amanda Carlin PTA [CH] Shagufta Mehta, OTR/L    Pain Assessment    Pain Assessment   No/denies pain  -CH    Recorded by   [CH] Shagufta Mehta, OTR/L    Bed Mobility, Assessment/Treatment    Bed Mob, Supine to Sit, [place] minimum assist (75% patient effort);2 person assist required;verbal cues required  -      Bed Mob, Sit to Supine, [place] maximum assist (25% patient effort);2 person assist required;verbal cues required  -      Bed Mobility, Safety Issues cognitive deficits limit understanding;decreased use of arms for pushing/pulling;decreased use of legs for bridging/pushing  -      Bed Mobility, Impairments pain;strength decreased  -CH      Recorded by [] Shagufta Mehta, OTR/L      Upper Body Bathing Assessment/Training    UB Bathing Assess/Train, Position sitting;edge of bed  -      UB Bathing Assess/Train, [place] Level minimum assist (75% patient effort);verbal cues required  -      UB Bathing Assess/Train, Impairments pain;strength decreased;impaired balance  -CH      Recorded by [] Shagufta Mehta, OTR/L      Lower Body Bathing Assessment/Training    LB Bathing Assess/Train, Position sitting;edge of bed  -      LB Bathing Assess/Train, [place] Level maximum assist (25% patient effort);verbal cues required  -      LB Bathing Assess/Train, Impairments pain;impaired balance;strength decreased  -      Recorded by [] Shagufta Mehta, OTR/L      Upper Body Dressing Assessment/Training    UB Dressing Assess/Train, Clothing Type donning:;doffing:;hospital gown  -      UB Dressing Assess/Train, Position supine  -      UB Dressing Assess/Train, [place] minimum assist (75% patient effort);verbal cues required  -      UB Dressing Assess/Train, Impairments strength decreased;pain   -CH      Recorded by [CH] Shagufta Mehta OTR/L      Balance Skills Training    Sitting-Level of Assistance Contact guard  -      Sitting-Balance Support Right upper extremity supported;Left upper extremity supported;Feet supported   leans Left  -      Recorded by [CH] Shagufta Mehta OTR/L      Therapy Exercises    Bilateral Upper Extremity   AROM:;10 reps;30 reps;elbow flexion/extension;hand pumps;pronation/supination;shoulder extension/flexion  -CH    Recorded by   [CH] Shagufta Mehta OTR/L    Orthosis Location    Orthosis Location/Type lower extremity  -      Orthosis, Lower Extremity knee immobilizer  -      Recorded by [CH] Shagufta Mehta OTR/L      Positioning and Restraints    Pre-Treatment Position in bed  -  in bed  -    Post Treatment Position bed  -  bed  -    In Bed fowlers;call light within reach;encouraged to call for assist;with family/caregiver;side rails up x2;RLE elevated  -  fowlers;call light within reach;encouraged to call for assist;side rails up x2;with brace  -    Recorded by [CH] Shagufta Mehta OTR/L  [CH] Shagufta Mehta OTR/L      06/26/17 1040 06/26/17 0900       Rehab Assessment/Intervention    Discipline speech language pathologist  -CS physical therapy assistant  -     Document Type therapy note (daily note)  -CS therapy note (daily note)  -     Subjective Information agree to therapy  -CS agree to therapy  -AH     Patient Effort, Rehab Treatment adequate  -CS      Precautions/Limitations  fall precautions;hip precautions- right   R knee immobilizer  -     Recorded by [] Yordy Whiting MS, CFY-SLP [] Yesenia Merritt, MIYA     Pain Assessment    Pain Assessment Unable to assess   Pt indictaes pain however she is unable to rate pain.  -CS [place] FACES  -AH     Antony-Briggs FACES Pain Rating  4  -AH     Pain Type  Surgical pain  -     Pain Location  Hip  -     Pain Orientation  Right  -AH     Pain Frequency  Intermittent  -     Pain Intervention(s)   Repositioned  -     Response to Interventions  tolerated  -AH     Recorded by [CS] Yordy Whiting MS, CFY-SLP [] Yesenia Merritt PTA     Dysphagia/Swallow Intervention    Dysphagia/Swallow Therapeutic Feed Pt completed trials of pureed, honey thick, nectar thick, and thin consistencies. 1x immediate cough with thin liquids as well as 1x significant audible swallow as well as delayed throat clear with nectar thick liquids. No other overt s/s of aspiration noted. However, ST cannot fully r/o aspiration with PO trials completed.   -CS      Recorded by [CS] Yordy Whiting MS, CFY-SLP      Bed Mobility, Assessment/Treatment    Bed Mob, Supine to Sit, [place]  maximum assist (25% patient effort);moderate assist (50% patient effort);2 person assist required;verbal cues required  -     Bed Mob, Sit to Supine, [place]  maximum assist (25% patient effort);2 person assist required;verbal cues required  -     Recorded by  [] Yesenia Merritt PTA     Transfer Assessment/Treatment    Transfers, Sit-Stand [place]  moderate assist (50% patient effort);2 person assist required;verbal cues required  -     Transfers, Stand-Sit [place]  moderate assist (50% patient effort);minimum assist (75% patient effort);2 person assist required;verbal cues required  -     Transfers, Sit-Stand-Sit, Assist Device  rolling walker  -     Recorded by  [] Yesenia Merritt PTA     Therapy Exercises    Right Lower Extremity  AAROM:;10 reps;sitting  -AH     Left Lower Extremity  AROM:;AAROM:;10 reps  -     Recorded by  [] Yesenia Merritt PTA     Orthotics Prosthetics    Additional Documentation  Orthosis Location (Group)  -AH     Recorded by  [] Yesenia Merritt PTA     Orthosis Location    Orthosis Location/Type  lower extremity  -     Orthosis, Lower Extremity  Right:;knee immobilizer  -     Recorded by  [] Yesenia Merritt PTA     Orthosis Management/Training    Orthosis Wear Schedule  wear at night or when in  bed  -AH     Recorded by  [] Yesenia Merritt PTA     Positioning and Restraints    Pre-Treatment Position  in bed  -AH     Post Treatment Position  bed  -AH     In Bed  fowlers;call light within reach;encouraged to call for assist;with family/caregiver  -AH     Recorded by  [] Yesenia Merritt PTA       User Key  (r) = Recorded By, (t) = Taken By, (c) = Cosigned By    Initials Name Effective Dates    AE Rosalba Thurston, PTA 06/22/15 -      Yesenia Merritt, PTA 08/02/16 -     CH Shagufta Mehta, OTR/L 08/02/16 -     KJ Amanda Carlin, PTA 08/02/16 -     TM Marva Jamil, CCC-SLP 08/02/16 -     CW Hoa Valentin, PTA 06/22/15 -     CS Yordy Whiting, MS, [facility]-SLP 08/02/16 - 06/27/17     Yordy Whiting, MS CCC-SLP 06/28/17 -     CM Tiff Ortiz, Speech Therapy Student 03/20/17 -                 IP PT Goals       06/21/17 0810          Bed Mobility PT LTG    Bed Mobility PT LTG, Date Established 06/21/17  -EDMUND      Bed Mobility PT LTG, Time to Achieve by discharge  -EDMUND      Bed Mobility PT LTG, Activity Type supine to sit/sit to supine  -EDMUND      Bed Mobility PT LTG, [place] Level minimum assist (75% patient effort)  -EDMUND      Bed Mobility PT Goal  LTG, Assist Device bed rails  -EDMUND      Transfer Training PT LTG    Transfer Training PT LTG, Date Established 06/21/17  -EDMUND      Transfer Training PT LTG, Time to Achieve by discharge  -EDMUND      Transfer Training PT LTG, Activity Type bed to chair /chair to bed;sit to stand/stand to sit  -EDMUND      Transfer Training PT LTG, [place] Level minimum assist (75% patient effort)  -EDMUND      Transfer Training PT LTG, Assist Device walker, rolling  -EDMUND      Gait Training PT LTG    Gait Training Goal PT LTG, Date Established 06/21/17  -EDMUND      Gait Training Goal PT LTG, Time to Achieve by discharge  -EDMUND      Gait Training Goal PT LTG, [place] Level minimum assist (75% patient effort)  -EDMUND      Gait Training Goal PT LTG, Assist Device walker, rolling  -EDMUND      Gait  Training Goal PT LTG, Distance to Achieve 75  -EDMUND      Strength Goal PT LTG    Strength Goal PT LTG, Date Established 06/21/17  -EDMUND      Strength Goal PT LTG, Time to Achieve by discharge  -EDMUND      Strength Goal PT LTG, Measure to Achieve AROM/AAROM, 20 reps, B UE/LE, min assist  -EDMUND        User Key  (r) = Recorded By, (t) = Taken By, (c) = Cosigned By    Initials Name Provider Type    EDMUND Cesar, PT DPT Physical Therapist          Physical Therapy Education     Title: PT OT SLP Therapies (Active)     Topic: Physical Therapy (Active)     Point: Mobility training (Active)    Learning Progress Summary    Learner Readiness Method Response Comment Documented by Status   Patient Acceptance E NR benefits of activity, bed mobility, and exxercise KJ 06/27/17 1059 Active    Acceptance E NR Standing tall, taking steps AL 06/25/17 0838 Active    Acceptance E NR Needs encouragement for standing AL 06/24/17 0847 Active    Acceptance E NL bed mobility AH 06/22/17 1129 Active    Acceptance E NR Educated pt./spouse on progression of PT POC, benefits of activity, R hip precautions. EDMUND 06/21/17 0810 Active   Significant Other Acceptance E NR Educated pt./spouse on progression of PT POC, benefits of activity, R hip precautions. EDMUND 06/21/17 0810 Active               Point: Home exercise program (Active)    Learning Progress Summary    Learner Readiness Method Response Comment Documented by Status   Patient Acceptance D,E NR total hip exercises, hip precautions CW 06/28/17 0737 Active    Acceptance E NR Needs encouragement for standing AL 06/24/17 0847 Active               Point: Body mechanics (Active)    Learning Progress Summary    Learner Readiness Method Response Comment Documented by Status   Patient Acceptance E NR Needs encouragement for standing AL 06/24/17 0847 Active               Point: Precautions (Active)    Learning Progress Summary    Learner Readiness Method Response Comment Documented by Status   Patient  Acceptance E,H NR hip precautions  06/26/17 0947 Active    Acceptance E NR Needs encouragement for standing AL 06/24/17 0847 Active    Acceptance E NR Educated pt./spouse on progression of PT POC, benefits of activity, R hip precautions.  06/21/17 0810 Active   Family Acceptance E,H NR hip precautions  06/26/17 0947 Active   Significant Other Acceptance E,D,H VU,NR THR HEP and precautions  06/23/17 1035 Done    Acceptance E NR Educated pt./spouse on progression of PT POC, benefits of activity, R hip precautions.  06/21/17 0810 Active                      User Key     Initials Effective Dates Name Provider Type Discipline     08/02/16 -  Yesenia Merritt, PTA Physical Therapy Assistant PT    KJ 08/02/16 -  Amanda Carlin, PTA Physical Therapy Assistant PT    AL 08/02/16 -  Yesenia Llanes, PTA Physical Therapy Assistant PT    EDMUND 08/02/16 -  Deonte Cesar, PT DPT Physical Therapist PT    CW 06/22/15 -  Hoa Valentin, PTA Physical Therapy Assistant PT                    PT Recommendation and Plan  Anticipated Equipment Needs At Discharge: two wheeled walker  Anticipated Discharge Disposition: transitional care, skilled nursing facility, inpatient rehabilitation facility  Planned Therapy Interventions: bed mobility training, transfer training, gait training, balance training, home exercise program, orthotic fitting/training, patient/family education, postural re-education, strengthening  PT Frequency: 2 times/day, per priority policy             Outcome Measures       06/28/17 0700 06/27/17 0950 06/26/17 0900    How much help from another person do you currently need...    Turning from your back to your side while in flat bed without using bedrails? 2  -CW  2  -AH    Moving from lying on back to sitting on the side of a flat bed without bedrails? 2  -CW  1  -AH    Moving to and from a bed to a chair (including a wheelchair)? 2  -CW  1  -AH    Standing up from a chair using your arms (e.g., wheelchair,  bedside chair)? 2  -CW  1  -AH    Climbing 3-5 steps with a railing? 1  -CW  1  -AH    To walk in hospital room? 1  -CW  1  -AH    AM-PAC 6 Clicks Score 10  -CW  7  -AH    How much help from another is currently needed...    Putting on and taking off regular lower body clothing?  1  -CH     Bathing (including washing, rinsing, and drying)  2  -CH     Toileting (which includes using toilet bed pan or urinal)  2  -CH     Putting on and taking off regular upper body clothing  3  -CH     Taking care of personal grooming (such as brushing teeth)  4  -CH     Eating meals  4  -CH     Score  16  -CH     Functional Assessment    Outcome Measure Options AM-PAC 6 Clicks Basic Mobility (PT)  -CW AM-PAC 6 Clicks Daily Activity (OT)  -CH AM-PAC 6 Clicks Basic Mobility (PT)  -      User Key  (r) = Recorded By, (t) = Taken By, (c) = Cosigned By    Initials Name Provider Type     Yesenia Merritt, MIYA Physical Therapy Assistant     Shagufta Mehta, OTR/L Occupational Therapist     Hoa Valentin PTA Physical Therapy Assistant           Time Calculation:         PT Charges       06/28/17 1452 06/28/17 1451 06/28/17 0755    Time Calculation    Start Time --  -AE 1429  -AE 0723  -CW    Stop Time --  -AE 1452  -AE 0750  -CW    Time Calculation (min) --  -AE 23 min  -AE 27 min  -CW    PT Received On  06/28/17  -AE 06/28/17  -CW    PT Goal Re-Cert Due Date  07/01/17  -AE 07/01/17  -CW    Time Calculation- PT    Total Timed Code Minutes- PT  23 minute(s)  -AE 27 minute(s)  -CW      User Key  (r) = Recorded By, (t) = Taken By, (c) = Cosigned By    Initials Name Provider Type    AE Rosalba Thurston PTA Physical Therapy Assistant    ANA Valentin, [facility] Physical Therapy Assistant          Therapy Charges for Today     Code Description Service Date Service Provider Modifiers Qty    74534641043 HC PT THER PROC EA 15 MIN 6/28/2017 Rosalba Thurston PTA GP, KX 2          PT G-Codes  Outcome Measure Options: AM-PAC 6 Clicks Basic  Mobility (PT)  Score: 9  Functional Limitation: Mobility: Walking and moving around  Mobility: Walking and Moving Around Current Status (): At least 60 percent but less than 80 percent impaired, limited or restricted  Mobility: Walking and Moving Around Goal Status (): At least 20 percent but less than 40 percent impaired, limited or restricted    Roaslba Thurston, PTA  6/28/2017

## 2017-06-28 NOTE — THERAPY TREATMENT NOTE
Acute Care - Speech Language Pathology   Swallow Treatment Note [facility]     Patient Name: Renay Lazo  : 1929  MRN: [id number]  Today's Date: 2017  Onset of Illness/Injury or Date of Surgery Date: 17            Admit Date: 2017  Pt completed trials of honey thick, nectar thick, and regular solid consistencies. Adequate bolus clearance with regular solids, however she experienced a 1x delayed cough and 1x immediate throat clear. 2x delayed throat clear following nectar thick liquids. ST cannot fully r/o aspiration. Pt to continue current diet of pureed consistencies with honey thick liquids.  Yordy Whiting MS, CFY-SLP 2017 8:53 AM    Visit Dx:      ICD-10-CM ICD-9-CM   1. Closed displaced intertrochanteric fracture of right femur, initial encounter S72.141A 820.21   2. Closed subcapital fracture of neck of right femur, initial encounter S72.011A 820.09   3. Decreased activities of daily living (ADL) Z78.9 V49.89   4. Impaired mobility Z74.09 799.89   5. Oropharyngeal dysphagia R13.12 787.22     Patient Active Problem List   Diagnosis   • Closed C2 fracture   • C2 cervical fracture   • Transient alteration of awareness   • Paroxysmal atrial fibrillation   • History of meningioma of the brain   • UTI (urinary tract infection)   • Over weight   • Closed displaced fracture of second cervical vertebra with routine healing   • Closed subcapital fracture of neck of right femur   • Obstructive sleep apnea   • Dementia   • Hypertension   • Pulmonary edema   • Diastolic dysfunction   • Pulmonary HTN             Adult Rehabilitation Note       17 0828 17 0723 17 1424    Rehab Assessment/Intervention    Discipline speech language pathologist  -CS physical therapy assistant  -CW physical therapy assistant  -CW    Document Type therapy note (daily note)  -CS therapy note (daily note)  -CW therapy note (daily note)  -CW    Subjective Information no complaints;agree to therapy  -CS  agree to therapy;complains of;fatigue  -CW agree to therapy  -CW    Patient Effort, Rehab Treatment adequate  -CS adequate  -CW good  -CW    Precautions/Limitations  fall precautions;hip precautions- right  -CW fall precautions;hip precautions- right  -CW    Recorded by [CS] Yordy Whiting, MS, CFY-SLP [CW] Hoa Valentin PTA [CW] Hoa Valentin PTA    Vital Signs    Pre SpO2 (%)  93  -CW 96  -CW    O2 Delivery Pre Treatment  supplemental O2   1 L  -CW supplemental O2   1 L  -CW    O2 Delivery Intra Treatment   room air  -CW    Post SpO2 (%)   95  -CW    O2 Delivery Post Treatment   room air   placed back on 1 L O2  -CW    Pre Patient Position  Supine  -CW Supine  -CW    Intra Patient Position   Sitting  -CW    Post Patient Position   Supine  -CW    Recorded by  [CW] Hoa Valentin PTA [CW] Hoa Valentin PTA    Pain Assessment    Pain Assessment No/denies pain  -CS Antony-Baker FACES  -CW Antony-Baker FACES  -CW    Antony-Baker FACES Pain Rating  0  -CW 4  -CW    Post Pain Score  0  -CW     Pain Location  Hip  -CW Hip  -CW    Pain Orientation  Right  -CW Right  -CW    Pain Frequency   Intermittent  -CW    Pain Intervention(s)  Repositioned  -CW Repositioned  -CW    Recorded by [CS] Yordy Whiting, MS, CFY-SLP [CW] Hoa Valentin PTA [CW] Hoa Valentin PTA    Cognitive Assessment/Intervention    Current Cognitive/Communication Assessment  impaired  -CW impaired  -CW    Orientation Status   oriented to;person;situation  -CW    Follows Commands/Answers Questions  100% of the time;able to follow single-step instructions  -% of the time;able to follow single-step instructions  -CW    Personal Safety   mild impairment;decreased awareness, need for assist;decreased awareness, need for safety  -CW    Personal Safety Interventions  elopement precautions initiated;fall prevention program maintained;gait belt;nonskid shoes/slippers when out of bed  -CW fall prevention program maintained;gait  belt;nonskid shoes/slippers when out of bed  -CW    Recorded by  [CW] Hoa Valentin PTA [CW] Hoa Valentin PTA    Dysphagia/Swallow Intervention    Dysphagia/Swallow Therapeutic Feed Pt completed trials of honey thick, nectar thick, and regular solid consistencies. Adequate bolus clearance with regular solids, however she experienced a 1x delayed cough and 1x immediate throat clear. 2x delayed throat clear following nectar thick liquids. ST cannot fully r/o aspiration. Pt to continue current diet of pureed consistencies with honey thick liquids.  -CS      Recorded by [CS] Yordy Whiting MS, CFY-SLP      Mobility Assessment/Training    Extremity Weight-Bearing Status  right lower extremity  -CW right lower extremity  -CW    Right Lower Extremity Weight-Bearing  weight-bearing as tolerated  -CW weight-bearing as tolerated  -CW    Recorded by  [CW] Hoa Valentin PTA [CW] Hoa Valentin PTA    Bed Mobility, Assessment/Treatment    Bed Mob, Supine to Sit, [place]   minimum assist (75% patient effort);2 person assist required;verbal cues required  -CW    Bed Mob, Sit to Supine, [place]   maximum assist (25% patient effort);2 person assist required;verbal cues required  -CW    Bed Mobility, Safety Issues   decreased use of legs for bridging/pushing;cognitive deficits limit understanding  -CW    Bed Mobility, Impairments   strength decreased;pain  -CW    Recorded by   [CW] Hoa Valentin PTA    Transfer Assessment/Treatment    Transfers, Sit-Stand [place]   moderate assist (50% patient effort);2 person assist required;verbal cues required  -CW    Transfers, Stand-Sit [place]   minimum assist (75% patient effort);2 person assist required;verbal cues required;nonverbal cues required (demo/gesture)   stood x 4  -CW    Transfers, Sit-Stand-Sit, Assist Device   rolling walker  -CW    Toilet Transfer, [place]   moderate assist (50% patient effort);2 person assist required;verbal  cues required  -CW    Toilet Transfer, Assistive Device   bedside commode without drop arms;rolling walker  -CW    Transfer, Safety Issues   step length decreased;weight-shifting ability decreased;knees buckling   R knee aimee  -CW    Transfer, Impairments   strength decreased;pain  -CW    Transfer, Comment   R knee aimee  -CW    Recorded by   [CW] Hoa Valentin PTA    Gait Assessment/Treatment    Gait, [place] Level   minimum assist (75% patient effort);moderate assist (50% patient effort);2 person assist required;verbal cues required  -CW    Gait, Assistive Device   rolling walker  -CW    Gait, Distance (Feet)   --   Small steps bed to BSC and back to bed  -CW    Gait, Safety Issues   weight-shifting ability decreased;step length decreased  -CW    Gait, Impairments   strength decreased;pain  -CW    Gait, Comment   R knee aimee  -CW    Recorded by   [ANA] Hoa Valentin PTA    Balance Skills Training    Sitting-Level of Assistance   Close supervision  -CW    Sitting-Balance Support   Feet supported  -CW    Standing-Level of Assistance   Minimum assistance;x2  -CW    Static Standing Balance Support   assistive device  -CW    Gait Balance-Level of Assistance   Minimum assistance;Moderate assistance;x2  -CW    Gait Balance Support   assistive device  -CW    Recorded by   [ANA] Hoa Valentin PTA    Therapy Exercises    Exercise Protocols  total hip  -CW     Total Hip Exercises  right:;15 reps;with assist  -CW     Recorded by  [ANA] Hoa Valentin PTA     Orthosis Location    Orthosis Location/Type  lower extremity  -CW lower extremity  -CW    Orthosis, Lower Extremity  Right:;knee immobilizer  -CW Right:;knee immobilizer  -CW    Recorded by  [ANA] Hoa Valentin PTA [CW] Hoa Valentin PTA    Orthosis Management/Training    Orthosis Wear Schedule  wear at night or when in bed  -CW wear at night or when in bed  -CW    Recorded by  [ANA] Hoa Valentin PTA [] Hoa DE OLIVEIRA  MIYA Valentin    Positioning and Restraints    Pre-Treatment Position  in bed  -CW in bed  -CW    Post Treatment Position  bed  -CW bed  -CW    In Bed  fowlers;call light within reach;exit alarm on  -CW fowlers;call light within reach;with family/caregiver;exit alarm on  -CW    Recorded by  [CW] Hoa Valentin PTA [CW] Hoa Valentin PTA      06/27/17 1413 06/27/17 1042 06/27/17 0950    Rehab Assessment/Intervention    Discipline speech language pathologist  -TM,CM,TM2 physical therapy assistant  -KJ occupational therapist  -CH    Document Type therapy note (daily note)  -TM,CM,TM2 therapy note (daily note)  -KJ therapy note (daily note)  -CH    Subjective Information agree to therapy  -TM,CM,TM2 complains of;pain  -KJ agree to therapy;complains of;pain  -CH    Patient Effort, Rehab Treatment  adequate  -KJ good  -CH    Precautions/Limitations  fall precautions;hip precautions- right  -KJ     Recorded by [TM,CM,TM2] Marva Jamil CCC-SLP (r) Tiff Ortiz, Speech Therapy Student (t) Marva Jamil CCC-SLP (c) [KJ] Amanda Carlin PTA [CH] Shagufta Mehta, OTR/L    Pain Assessment    Pain Assessment 0-10  -TM,CM,TM2 Antony-Baker FACES  -KJ     Antony-Briggs FACES Pain Rating 10  -TM,CM,TM2 8  -KJ     Pain Type  Surgical pain  -KJ     Pain Location Hip  -TM,CM,TM2 Hip  -KJ     Pain Orientation Right  -TM,CM,TM2 Right  -KJ     Pain Descriptors  Aching;Sharp  -KJ     Pain Frequency  Intermittent   wiith movement  -KJ     Pain Intervention(s)  Repositioned  -KJ     Recorded by [TM,CM,TM2] Marva Jamil CCC-SLP (r) Tiff Ortiz, Speech Therapy Student (t) Marva Jamil CCC-SLP (c) [KJ] Amanda Carlin PTA     Dysphagia/Swallow Intervention    Dysphagia/Swallow Therapeutic Feed Pt trialed on current diet of pureed consistency and honey thick liquids. ST to note pt had a delayed cough following honey thick trial. No other overt s/s of aspiration noted.   -TM,CM,TM2      Recorded by [TM,CM,TM2] Marva RIVERA  Omero, CCC-SLP (r) Tiff Ortiz, Speech Therapy Student (t) Marva Jamil, CCC-SLP (c)      Mobility Assessment/Training    Extremity Weight-Bearing Status  right lower extremity  -KJ     Right Lower Extremity Weight-Bearing  weight-bearing as tolerated  -KJ     Recorded by  [KJ] Amanda Carlin PTA     Bed Mobility, Assessment/Treatment    Bed Mob, Supine to Sit, [place]   minimum assist (75% patient effort);2 person assist required;verbal cues required  -    Bed Mob, Sit to Supine, [place]   maximum assist (25% patient effort);2 person assist required;verbal cues required  -    Bed Mobility, Safety Issues   cognitive deficits limit understanding;decreased use of arms for pushing/pulling;decreased use of legs for bridging/pushing  -    Bed Mobility, Impairments   pain;strength decreased  -    Bed Mobility, Comment  just laid down from working with OT  -KJ     Recorded by  [KJ] Amanda Carlin PTA [CH] Shagufta Mehta OTR/L    Upper Body Bathing Assessment/Training    UB Bathing Assess/Train, Position   sitting;edge of bed  -    UB Bathing Assess/Train, [place] Level   minimum assist (75% patient effort);verbal cues required  -    UB Bathing Assess/Train, Impairments   pain;strength decreased;impaired balance  -    Recorded by   [CH] Shagufta Mehta, OTR/L    Lower Body Bathing Assessment/Training    LB Bathing Assess/Train, Position   sitting;edge of bed  -    LB Bathing Assess/Train, [place] Level   maximum assist (25% patient effort);verbal cues required  -    LB Bathing Assess/Train, Impairments   pain;impaired balance;strength decreased  -    Recorded by   [CH] Shagufta Mehta, OTR/L    Upper Body Dressing Assessment/Training    UB Dressing Assess/Train, Clothing Type   donning:;doffing:;hospital gown  -    UB Dressing Assess/Train, Position   supine  -    UB Dressing Assess/Train, [place]   minimum assist (75% patient effort);verbal cues required  -    UB  Dressing Assess/Train, Impairments   strength decreased;pain  -CH    Recorded by   [CH] Shagufta Mehta, OTR/L    Balance Skills Training    Sitting-Level of Assistance   Contact guard  -    Sitting-Balance Support   Right upper extremity supported;Left upper extremity supported;Feet supported   leans Left  -CH    Recorded by   [CH] Shagufta Mehta, OTR/L    Therapy Exercises    Right Lower Extremity  AAROM:;PROM:;15 reps;supine;ankle pumps/circles;glut sets;hip abduction/adduction  -     Left Lower Extremity  AROM:;20 reps  -KJ     Recorded by  [KJ] Amanda Carlin PTA     Orthosis Location    Orthosis Location/Type  lower extremity  -KJ lower extremity  -    Orthosis, Lower Extremity  knee immobilizer  - knee immobilizer  -    Recorded by  [KJ] Amanda Carlin PTA [] Shagufta Mehta, OTR/L    Positioning and Restraints    Pre-Treatment Position  in bed  -KJ in bed  -    Post Treatment Position  bed  -KJ bed  -    In Bed   fowlers;call light within reach;encouraged to call for assist;with family/caregiver;side rails up x2;RLE elevated  -CH    Recorded by  [KJ] Amanda Carlin PTA [CH] Shagufta Mehta OTR/L      06/27/17 0854 06/26/17 1453 06/26/17 1040    Rehab Assessment/Intervention    Discipline --  -KJ occupational therapist  -CH speech language pathologist  -CS    Document Type  therapy note (daily note)  - therapy note (daily note)  -CS    Subjective Information  agree to therapy  - agree to therapy  -CS    Patient Effort, Rehab Treatment  adequate  -CH adequate  -CS    Precautions/Limitations  fall precautions;hip precautions- right  -CH     Recorded by [KJ] Amanda Carlin PTA [CH] Shagufta Mehta, OTR/L [CS] Yordy Whiting MS, CFY-SLP    Pain Assessment    Pain Assessment  No/denies pain  - Unable to assess   Pt indictaes pain however she is unable to rate pain.  -CS    Recorded by  [] Shagufta Mehta, OTR/L [CS] Yordy Whiting MS, CFY-SLP    Dysphagia/Swallow Intervention    Dysphagia/Swallow  Therapeutic Feed   Pt completed trials of pureed, honey thick, nectar thick, and thin consistencies. 1x immediate cough with thin liquids as well as 1x significant audible swallow as well as delayed throat clear with nectar thick liquids. No other overt s/s of aspiration noted. However, ST cannot fully r/o aspiration with PO trials completed.   -CS    Recorded by   [CS] Yordy Whiting MS, CFY-SLP    Therapy Exercises    Bilateral Upper Extremity  AROM:;10 reps;30 reps;elbow flexion/extension;hand pumps;pronation/supination;shoulder extension/flexion  -CH     Recorded by  [CH] Shagufta Mehta, OTR/L     Positioning and Restraints    Pre-Treatment Position  in bed  -CH     Post Treatment Position  bed  -     In Bed  fowlers;call light within reach;encouraged to call for assist;side rails up x2;with brace  -CH     Recorded by  [CH] Shagufta Mehta OTR/L       06/26/17 0900 06/25/17 1320 06/25/17 1248    Rehab Assessment/Intervention    Discipline physical therapy assistant  - physical therapy assistant  -AL speech language pathologist  -MB    Document Type therapy note (daily note)  - therapy note (daily note)  -AL therapy note (daily note)  -MB    Subjective Information agree to therapy  - agree to therapy  -AL     Precautions/Limitations fall precautions;hip precautions- right   R knee immobilizer  - fall precautions;hip precautions- right   knee immobilizer R leg to be worn in bed, confused  -AL     Recorded by [] Yesenia Merritt PTA [AL] Yesenia Llanes PTA [MB] Liss Breaux, CCC-SLP    Pain Assessment    Pain Assessment Antony-Briggs FACES  - Antony-Briggs FACES  -AL     Antony-Briggs FACES Pain Rating 4  - 2  -AL     Pain Type Surgical pain  - Surgical pain  -AL     Pain Location Hip  - Hip  -AL     Pain Orientation Right  -AH Right  -AL     Pain Frequency Intermittent  - Intermittent  -AL     Pain Intervention(s) Repositioned  - Medication (See MAR)  -AL     Response to Interventions tolerated   - tolerated  -AL     Recorded by [] Yesenia Merritt PTA [AL] Yesenia Llanes PTA     Dysphagia/Swallow Intervention    Dysphagia/Swallow Therapeutic Feed   Observed pt's  feeding pt lunch. The pt a throat clear type behavior 1 to 2 times during the meal, however it is unclear if this is a typical habit for her. The pt's  was noted to provide large bites at times, however did self-correct independently and state he would give her smaller bites.  -MB    Recorded by   [MB] Liss Breaux CCC-SLP    Bed Mobility, Assessment/Treatment    Bed Mob, Supine to Sit, [place] maximum assist (25% patient effort);moderate assist (50% patient effort);2 person assist required;verbal cues required  -      Bed Mob, Sit to Supine, [place] maximum assist (25% patient effort);2 person assist required;verbal cues required  -      Recorded by [] Yesenia Merritt PTA      Transfer Assessment/Treatment    Transfers, Sit-Stand [place] moderate assist (50% patient effort);2 person assist required;verbal cues required  -      Transfers, Stand-Sit [place] moderate assist (50% patient effort);minimum assist (75% patient effort);2 person assist required;verbal cues required  -      Transfers, Sit-Stand-Sit, Assist Device rolling walker  -      Recorded by [] Yesenia Merritt PTA      Therapy Exercises    Right Lower Extremity AAROM:;10 reps;sitting  - AAROM:;20 reps;supine;ankle pumps/circles;hip abduction/adduction;SAQ;heel slides;PROM:   patient needs verbal and tactile cues to stay awake  -AL     Left Lower Extremity AROM:;AAROM:;10 reps  -      Recorded by [] Yesenia Merritt PTA [AL] Yesenia Llanes PTA     Orthotics Prosthetics    Additional Documentation Orthosis Location (Group)  - Orthosis Location (Group)  -AL     Recorded by [] Yesenia Merritt PTA [AL] Yesenia Llanes PTA     Orthosis Location    Orthosis Location/Type lower extremity  - lower extremity  -AL     Orthosis,  Lower Extremity Right:;knee immobilizer  - Right:;knee immobilizer   removed for exercisees, put brace back on after exercisees.  -AL     Recorded by [] Yesenia Merritt PTA [AL] Yesenia Llanes PTA     Orthosis Management/Training    Orthosis Wear Schedule wear at night or when in bed  -AH wear at night or when in bed  -AL     Recorded by [] Yesenia Merritt PTA [AL] Yesenia Llanes PTA     Positioning and Restraints    Pre-Treatment Position in bed  - in bed  -AL     Post Treatment Position bed  - bed  -AL     In Bed fowlers;call light within reach;encouraged to call for assist;with family/caregiver  - fowlers;call light within reach;with family/caregiver  -AL     Recorded by [] Yesenia Merritt PTA [AL] Yesenia Llanes PTA       User Key  (r) = Recorded By, (t) = Taken By, (c) = Cosigned By    Initials Name Effective Dates    MB Liss Breaux, [facility]-SLP 08/02/16 -      Yesenia Merritt, [facility] [place] 08/02/16 -     CH Shagufta Mehta, OTR/L 08/02/16 -     KJ Amanda Carlin, PTA 08/02/16 -     AL Yesenia Llanes, PTA 08/02/16 -     TM Marva Jamil, CCC-SLP 08/02/16 -      Hoa Valentin, [facility] [place] 06/22/15 -     CS Yordy Whiting, MS, CFY-SLP 08/02/16 -     CM Tiff Ortiz, Speech Therapy Student 03/20/17 -                    SLP Goals       06/28/17 0851 06/27/17 1438 06/26/17 1404    Safely Consume Diet    Safely Consume Diet- SLP, Date Goal Reviewed 06/28/17  -CS 06/27/17  -TM (r) CM (t) TM (c) 06/26/17  -CS      06/25/17 1304 06/23/17 1037 06/22/17 1533    Safely Consume Diet    Safely Consume Diet- SLP, Date Established   06/22/17  -MB    Safely Consume Diet- SLP, Time to Achieve   by discharge  -MB    Safely Consume Diet- SLP, Additional Goal   Pt will tolerate trials of current diet and upgraded consistencies with no overt s/s of aspiration.  -MB    Safely Consume Diet- SLP, Date Goal Reviewed 06/25/17  -MB 06/23/17  -TM 06/22/17  -MB    Safely Consume Diet- SLP, Outcome   goal ongoing  -MB      User Key   (r) = Recorded By, (t) = Taken By, (c) = Cosigned By    Initials Name Provider Type    RED Breaux, CCC-SLP Speech and Language Pathologist     Marva Jamil, CCC-SLP Speech and Language Pathologist     Yordy Whiting MS, CFY-SLP Speech and Language Pathologist    FLAKITA Ortiz, Speech Therapy Student Speech Therapy Student          EDUCATION  The patient has been educated in the following areas:   Dysphagia (Swallowing Impairment).    SLP Recommendation and Plan                                           Plan of Care Review  Plan Of Care Reviewed With: patient  Progress: progress towards functional goals is fair  Outcome Summary/Follow up Plan: Pt completed trials of honey thick, nectar thick, and regular solid consistencies. Adequate bolus clearance with regular solids, however she experienced a 1x delayed cough and 1x immediate throat clear. 2x delayed throat clear following nectar thick liquids. ST cannot fully r/o aspiration. Pt to continue current diet of pureed consistencies with honey thick liquids.           Time Calculation:         Time Calculation- SLP       06/28/17 0853          Time Calculation- SLP    SLP Start Time 0828  -      SLP Stop Time 0853  -      SLP Time Calculation (min) 25 min  -      SLP Received On 06/28/17  -        User Key  (r) = Recorded By, (t) = Taken By, (c) = Cosigned By    Initials Name Provider Type     Yordy Whiting MS, CFY-SLP Speech and Language Pathologist          Therapy Charges for Today     Code Description Service Date Service Provider Modifiers Qty    [id number]  ST TREATMENT SWALLOW 2 6/28/2017 Yordy Whiting MS, CFSERJIO-SLP GN, KX 1          SLP G-Codes  SLP NOMS Used?: Yes  Functional Limitations: Swallowing  Swallow Current Status (): At least 80 percent but less than 100 percent impaired, limited or restricted  Swallow Goal Status (): At least 40 percent but less than 60 percent impaired, limited or restricted      Yordy VILLANUEVA  MS [place], CFY-SLP  6/28/2017

## 2017-06-28 NOTE — PLAN OF CARE
Problem: Patient Care Overview (Adult)  Goal: Plan of Care Review  Outcome: Ongoing (interventions implemented as appropriate)    06/28/17 0851   Coping/Psychosocial Response Interventions   Plan Of Care Reviewed With patient   Patient Care Overview   Progress progress towards functional goals is fair   Outcome Evaluation   Outcome Summary/Follow up Plan Pt completed trials of honey thick, nectar thick, and regular solid consistencies. Adequate bolus clearance with regular solids, however she experienced a 1x delayed cough and 1x immediate throat clear. 2x delayed throat clear following nectar thick liquids. ST cannot fully r/o aspiration. Pt to continue current diet of pureed consistencies with honey thick liquids.         Problem: Inpatient SLP  Goal: Dysphagia- Patient will safely consume diet as per recommendation with no signs/symptoms of aspiration  Outcome: Ongoing (interventions implemented as appropriate)    06/22/17 1533 06/28/17 0851   Safely Consume Diet   Safely Consume Diet- SLP, Date Established 06/22/17 --    Safely Consume Diet- SLP, Time to Achieve by discharge --    Safely Consume Diet- SLP, Additional Goal Pt will tolerate trials of current diet and upgraded consistencies with no overt s/s of aspiration. --    Safely Consume Diet- SLP, Date Goal Reviewed --  06/28/17   Safely Consume Diet- SLP, Outcome goal ongoing --

## 2017-06-28 NOTE — THERAPY TREATMENT NOTE
Acute Care - Physical Therapy Treatment Note  [facility]     Patient Name: Renay Lazo  : 1929  MRN: [id number]  Today's Date: 2017  Onset of Illness/Injury or Date of Surgery Date: 17  Date of Referral to PT: 17  Referring Physician: (S) Dr. Zaidi (Post hip precautions, WBAT)    Admit Date: 2017    Visit Dx:    ICD-10-CM ICD-9-CM   1. Closed displaced intertrochanteric fracture of right femur, initial encounter S72.141A 820.21   2. Closed subcapital fracture of neck of right femur, initial encounter S72.011A 820.09   3. Decreased activities of daily living (ADL) Z78.9 V49.89   4. Impaired mobility Z74.09 799.89   5. Oropharyngeal dysphagia R13.12 787.22     Patient Active Problem List   Diagnosis   • Closed C2 fracture   • C2 cervical fracture   • Transient alteration of awareness   • Paroxysmal atrial fibrillation   • History of meningioma of the brain   • UTI (urinary tract infection)   • Over weight   • Closed displaced fracture of second cervical vertebra with routine healing   • Closed subcapital fracture of neck of right femur   • Obstructive sleep apnea   • Dementia   • Hypertension   • Pulmonary edema   • Diastolic dysfunction   • Pulmonary HTN               Adult Rehabilitation Note       17 0828 17 0723 17 1424    Rehab Assessment/Intervention    Discipline speech language pathologist  -CS physical therapy assistant  -CW physical therapy assistant  -CW    Document Type therapy note (daily note)  -CS therapy note (daily note)  -CW therapy note (daily note)  -CW    Subjective Information no complaints;agree to therapy  -CS agree to therapy;complains of;fatigue  -CW agree to therapy  -CW    Patient Effort, Rehab Treatment adequate  -CS adequate  -CW good  -CW    Precautions/Limitations  fall precautions;hip precautions- right  -CW fall precautions;hip precautions- right  -CW    Recorded by [CS] Yordy Whiting MS, CFY-SLP [CW] Hoa Valentin PTA [CW]  Hoa Valentin PTA    Vital Signs    Pre SpO2 (%)  93  -CW 96  -CW    O2 Delivery Pre Treatment  supplemental O2   1 L  -CW supplemental O2   1 L  -CW    O2 Delivery Intra Treatment   room air  -CW    Post SpO2 (%)   95  -CW    O2 Delivery Post Treatment   room air   placed back on 1 L O2  -CW    Pre Patient Position  Supine  -CW Supine  -CW    Intra Patient Position   Sitting  -CW    Post Patient Position   Supine  -CW    Recorded by  [CW] Hoa Valentin PTA [CW] Hoa Valentin PTA    Pain Assessment    Pain Assessment No/denies pain  -CS Antony-Baker FACES  -CW Antony-Baker FACES  -CW    Antony-Baker FACES Pain Rating  0  -CW 4  -CW    Post Pain Score  0  -CW     Pain Location  Hip  -CW Hip  -CW    Pain Orientation  Right  -CW Right  -CW    Pain Frequency   Intermittent  -CW    Pain Intervention(s)  Repositioned  -CW Repositioned  -CW    Recorded by [CS] Yordy Whiting MS, CFY-SLP [CW] Hoa Valentin PTA [CW] Hoa Valentin PTA    Cognitive Assessment/Intervention    Current Cognitive/Communication Assessment  impaired  -CW impaired  -CW    Orientation Status   oriented to;person;situation  -CW    Follows Commands/Answers Questions  100% of the time;able to follow single-step instructions  -% of the time;able to follow single-step instructions  -CW    Personal Safety   mild impairment;decreased awareness, need for assist;decreased awareness, need for safety  -CW    Personal Safety Interventions  elopement precautions initiated;fall prevention program maintained;gait belt;nonskid shoes/slippers when out of bed  -CW fall prevention program maintained;gait belt;nonskid shoes/slippers when out of bed  -CW    Recorded by  [CW] Hoa Valentin PTA [CW] Hoa Valentin PTA    Dysphagia/Swallow Intervention    Dysphagia/Swallow Therapeutic Feed Pt completed trials of honey thick, nectar thick, and regular solid consistencies. Adequate bolus clearance with regular solids, however she  experienced a 1x delayed cough and 1x immediate throat clear. 2x delayed throat clear following nectar thick liquids. ST cannot fully r/o aspiration. Pt to continue current diet of pureed consistencies with honey thick liquids.  -CS      Recorded by [CS] Yordy Whiting MS, CFY-SLP      Mobility Assessment/Training    Extremity Weight-Bearing Status  right lower extremity  -CW right lower extremity  -CW    Right Lower Extremity Weight-Bearing  weight-bearing as tolerated  -CW weight-bearing as tolerated  -CW    Recorded by  [CW] Hoa Valentin PTA [CW] Hoa Valentin PTA    Bed Mobility, Assessment/Treatment    Bed Mob, Supine to Sit, [place]   minimum assist (75% patient effort);2 person assist required;verbal cues required  -CW    Bed Mob, Sit to Supine, [place]   maximum assist (25% patient effort);2 person assist required;verbal cues required  -CW    Bed Mobility, Safety Issues   decreased use of legs for bridging/pushing;cognitive deficits limit understanding  -CW    Bed Mobility, Impairments   strength decreased;pain  -CW    Recorded by   [CW] Hoa Valentin PTA    Transfer Assessment/Treatment    Transfers, Sit-Stand [place]   moderate assist (50% patient effort);2 person assist required;verbal cues required  -CW    Transfers, Stand-Sit [place]   minimum assist (75% patient effort);2 person assist required;verbal cues required;nonverbal cues required (demo/gesture)   stood x 4  -CW    Transfers, Sit-Stand-Sit, Assist Device   rolling walker  -CW    Toilet Transfer, [place]   moderate assist (50% patient effort);2 person assist required;verbal cues required  -CW    Toilet Transfer, Assistive Device   bedside commode without drop arms;rolling walker  -CW    Transfer, Safety Issues   step length decreased;weight-shifting ability decreased;knees buckling   R knee aimee  -CW    Transfer, Impairments   strength decreased;pain  -CW    Transfer, Comment   R knee aimee  -CW     Recorded by   [CW] Hoa Valentin PTA    Gait Assessment/Treatment    Gait, [place] Level   minimum assist (75% patient effort);moderate assist (50% patient effort);2 person assist required;verbal cues required  -CW    Gait, Assistive Device   rolling walker  -CW    Gait, Distance (Feet)   --   Small steps bed to BSC and back to bed  -CW    Gait, Safety Issues   weight-shifting ability decreased;step length decreased  -CW    Gait, Impairments   strength decreased;pain  -CW    Gait, Comment   R knee aimee  -CW    Recorded by   [CW] Hoa Valentin PTA    Balance Skills Training    Sitting-Level of Assistance   Close supervision  -CW    Sitting-Balance Support   Feet supported  -CW    Standing-Level of Assistance   Minimum assistance;x2  -CW    Static Standing Balance Support   assistive device  -CW    Gait Balance-Level of Assistance   Minimum assistance;Moderate assistance;x2  -CW    Gait Balance Support   assistive device  -CW    Recorded by   [CW] Hoa Valentin PTA    Therapy Exercises    Exercise Protocols  total hip  -CW     Total Hip Exercises  right:;15 reps;with assist  -CW     Recorded by  [CW] Hoa Valentin PTA     Orthosis Location    Orthosis Location/Type  lower extremity  -CW lower extremity  -CW    Orthosis, Lower Extremity  Right:;knee immobilizer  -CW Right:;knee immobilizer  -CW    Recorded by  [CW] Hoa Valentin PTA [CW] Hoa Valentin PTA    Orthosis Management/Training    Orthosis Wear Schedule  wear at night or when in bed  -CW wear at night or when in bed  -CW    Recorded by  [CW] Hoa Valentin PTA [CW] Hoa Valentin PTA    Positioning and Restraints    Pre-Treatment Position  in bed  -CW in bed  -CW    Post Treatment Position  bed  -CW bed  -CW    In Bed  fowlers;call light within reach;exit alarm on  -CW fowlers;call light within reach;with family/caregiver;exit alarm on  -CW    Recorded by  [CW] Hoa Valentin PTA [CW] Hoa DE OLIVEIRA  MIYA Valentin      06/27/17 1413 06/27/17 1042 06/27/17 0950    Rehab Assessment/Intervention    Discipline speech language pathologist  -TM,CM,TM2 physical therapy assistant  -KJ occupational therapist  -CH    Document Type therapy note (daily note)  -TM,CM,TM2 therapy note (daily note)  -KJ therapy note (daily note)  -CH    Subjective Information agree to therapy  -TM,CM,TM2 complains of;pain  -KJ agree to therapy;complains of;pain  -CH    Patient Effort, Rehab Treatment  adequate  -KJ good  -CH    Precautions/Limitations  fall precautions;hip precautions- right  -KJ     Recorded by [TM,CM,TM2] Marva Jamil CCC-SLP (r) Tiff Ortiz, Speech Therapy Student (t) Marva Jamil CCC-SLP (c) [KJ] Amanda Carlin PTA [CH] Shagufta Mehta, OTR/L    Pain Assessment    Pain Assessment 0-10  -TM,CM,TM2 Antony-Baker FACES  -KJ     Antony-Briggs FACES Pain Rating 10  -TM,CM,TM2 8  -KJ     Pain Type  Surgical pain  -KJ     Pain Location Hip  -TM,CM,TM2 Hip  -KJ     Pain Orientation Right  -TM,CM,TM2 Right  -KJ     Pain Descriptors  Aching;Sharp  -KJ     Pain Frequency  Intermittent   wiith movement  -KJ     Pain Intervention(s)  Repositioned  -KJ     Recorded by [TM,CM,TM2] Marva Jamil CCC-SLP (r) Tiff Ortiz, Speech Therapy Student (t) Marva Jamil CCC-SLP (c) [KJ] Amanda Carlin PTA     Dysphagia/Swallow Intervention    Dysphagia/Swallow Therapeutic Feed Pt trialed on current diet of pureed consistency and honey thick liquids. ST to note pt had a delayed cough following honey thick trial. No other overt s/s of aspiration noted.   -TM,CM,TM2      Recorded by [TM,CM,TM2] Marva Jamil CCC-SLP (r) Tiff Ortiz, Speech Therapy Student (t) Marva Jamil CCC-SLP (c)      Mobility Assessment/Training    Extremity Weight-Bearing Status  right lower extremity  -KJ     Right Lower Extremity Weight-Bearing  weight-bearing as tolerated  -KJ     Recorded by  [KJ] Amanda Carlin PTA     Bed Mobility,  Assessment/Treatment    Bed Mob, Supine to Sit, [place]   minimum assist (75% patient effort);2 person assist required;verbal cues required  -    Bed Mob, Sit to Supine, [place]   maximum assist (25% patient effort);2 person assist required;verbal cues required  -    Bed Mobility, Safety Issues   cognitive deficits limit understanding;decreased use of arms for pushing/pulling;decreased use of legs for bridging/pushing  -    Bed Mobility, Impairments   pain;strength decreased  -    Bed Mobility, Comment  just laid down from working with OT  -KJ     Recorded by  [KJ] Amanda Carlin, PTA [CH] Shagufta Mehta, OTR/L    Upper Body Bathing Assessment/Training    UB Bathing Assess/Train, Position   sitting;edge of bed  -    UB Bathing Assess/Train, [place] Level   minimum assist (75% patient effort);verbal cues required  -    UB Bathing Assess/Train, Impairments   pain;strength decreased;impaired balance  -CH    Recorded by   [CH] Shagufta Mehta OTR/L    Lower Body Bathing Assessment/Training    LB Bathing Assess/Train, Position   sitting;edge of bed  -    LB Bathing Assess/Train, [place] Level   maximum assist (25% patient effort);verbal cues required  -    LB Bathing Assess/Train, Impairments   pain;impaired balance;strength decreased  -CH    Recorded by   [CH] Shagufta Mehta, OTR/L    Upper Body Dressing Assessment/Training    UB Dressing Assess/Train, Clothing Type   donning:;doffing:;hospital gown  -    UB Dressing Assess/Train, Position   supine  -    UB Dressing Assess/Train, [place]   minimum assist (75% patient effort);verbal cues required  -    UB Dressing Assess/Train, Impairments   strength decreased;pain  -CH    Recorded by   [CH] Shagufta Mehta OTR/L    Balance Skills Training    Sitting-Level of Assistance   Contact guard  -    Sitting-Balance Support   Right upper extremity supported;Left upper extremity supported;Feet supported   leans Left  -CH    Recorded by    [CH] Shagufta Mehta, OTR/L    Therapy Exercises    Right Lower Extremity  AAROM:;PROM:;15 reps;supine;ankle pumps/circles;glut sets;hip abduction/adduction  -     Left Lower Extremity  AROM:;20 reps  -KJ     Recorded by  [KJ] Amanda Carlin PTA     Orthosis Location    Orthosis Location/Type  lower extremity  - lower extremity  -    Orthosis, Lower Extremity  knee immobilizer  -KJ knee immobilizer  -    Recorded by  [KJ] Amanda Carlin PTA [CH] Shagufta Mehta, OTR/L    Positioning and Restraints    Pre-Treatment Position  in bed  -KJ in bed  -    Post Treatment Position  bed  -KJ bed  -    In Bed   fowlers;call light within reach;encouraged to call for assist;with family/caregiver;side rails up x2;RLE elevated  -    Recorded by  [KJ] Amanda Carlin PTA [CH] Shagufta Mehta OTR/L      06/27/17 0854 06/26/17 1453 06/26/17 1040    Rehab Assessment/Intervention    Discipline --  -KJ occupational therapist  - speech language pathologist  -CS    Document Type  therapy note (daily note)  - therapy note (daily note)  -CS    Subjective Information  agree to therapy  - agree to therapy  -CS    Patient Effort, Rehab Treatment  adequate  - adequate  -CS    Precautions/Limitations  fall precautions;hip precautions- right  -     Recorded by [KJ] Amanda Carlin PTA [CH] Shagufta Mehta, OTR/L [CS] Yordy Whiting, MS, CFY-SLP    Pain Assessment    Pain Assessment  No/denies pain  - Unable to assess   Pt indictaes pain however she is unable to rate pain.  -CS    Recorded by  [] Shagufta Mehta, OTR/L [CS] Yordy Whiting, MS, CFY-SLP    Dysphagia/Swallow Intervention    Dysphagia/Swallow Therapeutic Feed   Pt completed trials of pureed, honey thick, nectar thick, and thin consistencies. 1x immediate cough with thin liquids as well as 1x significant audible swallow as well as delayed throat clear with nectar thick liquids. No other overt s/s of aspiration noted. However, ST cannot fully r/o aspiration with PO  trials completed.   -CS    Recorded by   [CS] Yordy Whiting MS, CFY-SLP    Therapy Exercises    Bilateral Upper Extremity  AROM:;10 reps;30 reps;elbow flexion/extension;hand pumps;pronation/supination;shoulder extension/flexion  -CH     Recorded by  [CH] Shagufta Mehta, OTR/L     Positioning and Restraints    Pre-Treatment Position  in bed  -CH     Post Treatment Position  bed  -CH     In Bed  fowlers;call light within reach;encouraged to call for assist;side rails up x2;with brace  -CH     Recorded by  [CH] Shagufta Mehta, OTR/L       06/26/17 0900 06/25/17 1320 06/25/17 1248    Rehab Assessment/Intervention    Discipline physical therapy assistant  - physical therapy assistant  -AL speech language pathologist  -MB    Document Type therapy note (daily note)  - therapy note (daily note)  -AL therapy note (daily note)  -MB    Subjective Information agree to therapy  - agree to therapy  -AL     Precautions/Limitations fall precautions;hip precautions- right   R knee immobilizer  - fall precautions;hip precautions- right   knee immobilizer R leg to be worn in bed, confused  -AL     Recorded by [] Yesenia Merritt PTA [AL] Yesenia Llanes PTA [MB] Liss Breaux, CCC-SLP    Pain Assessment    Pain Assessment Antony-Briggs FACES  - Antony-Briggs FACES  -AL     Antony-Briggs FACES Pain Rating 4  - 2  -AL     Pain Type Surgical pain  - Surgical pain  -AL     Pain Location Hip  - Hip  -AL     Pain Orientation Right  -AH Right  -AL     Pain Frequency Intermittent  - Intermittent  -AL     Pain Intervention(s) Repositioned  - Medication (See MAR)  -AL     Response to Interventions tolerated  - tolerated  -AL     Recorded by [] Yesenia Merritt PTA [AL] Yesenia Llanes PTA     Dysphagia/Swallow Intervention    Dysphagia/Swallow Therapeutic Feed   Observed pt's  feeding pt lunch. The pt a throat clear type behavior 1 to 2 times during the meal, however it is unclear if this is a typical habit for her. The  pt's  was noted to provide large bites at times, however did self-correct independently and state he would give her smaller bites.  -MB    Recorded by   [MB] Liss Breaux CCC-SLP    Bed Mobility, Assessment/Treatment    Bed Mob, Supine to Sit, [place] maximum assist (25% patient effort);moderate assist (50% patient effort);2 person assist required;verbal cues required  -      Bed Mob, Sit to Supine, [place] maximum assist (25% patient effort);2 person assist required;verbal cues required  -      Recorded by [] Yesenia Merritt PTA      Transfer Assessment/Treatment    Transfers, Sit-Stand [place] moderate assist (50% patient effort);2 person assist required;verbal cues required  -      Transfers, Stand-Sit [place] moderate assist (50% patient effort);minimum assist (75% patient effort);2 person assist required;verbal cues required  -      Transfers, Sit-Stand-Sit, Assist Device rolling walker  -      Recorded by [] Yesenia Merritt PTA      Therapy Exercises    Right Lower Extremity AAROM:;10 reps;sitting  - AAROM:;20 reps;supine;ankle pumps/circles;hip abduction/adduction;SAQ;heel slides;PROM:   patient needs verbal and tactile cues to stay awake  -AL     Left Lower Extremity AROM:;AAROM:;10 reps  -      Recorded by [] Yesenia Merritt PTA [AL] Yesenia Llanes PTA     Orthotics Prosthetics    Additional Documentation Orthosis Location (Group)  - Orthosis Location (Group)  -AL     Recorded by [] Yesenia Merritt PTA [AL] Yesenia Llanes PTA     Orthosis Location    Orthosis Location/Type lower extremity  - lower extremity  -AL     Orthosis, Lower Extremity Right:;knee immobilizer  - Right:;knee immobilizer   removed for exercisees, put brace back on after exercisees.  -AL     Recorded by [] Yesenia Merritt PTA [AL] Yesenia Llanes PTA     Orthosis Management/Training    Orthosis Wear Schedule wear at night or when in bed  - wear at night or when in bed  -AL      Recorded by [AH] Yesenia Merritt, MIYA [AL] Yesenia Llanes PTA     Positioning and Restraints    Pre-Treatment Position in bed  -AH in bed  -AL     Post Treatment Position bed  - bed  -AL     In Bed fowlers;call light within reach;encouraged to call for assist;with family/caregiver  -AH fowlers;call light within reach;with family/caregiver  -AL     Recorded by [AH] Yesenia Merritt PTA [AL] Yesenia Llanes PTA       User Key  (r) = Recorded By, (t) = Taken By, (c) = Cosigned By    Initials Name Effective Dates    MB Liss Breaux, [facility]-SLP 08/02/16 -      Yesenia Merritt, PTA 08/02/16 -     CH Shagufta Mehta, OTR/L 08/02/16 -     KJ Amanda Carlin, PTA 08/02/16 -     AL Yesenia Llanes, PTA 08/02/16 -     TM Marva Jamil, CCC-SLP 08/02/16 -     CW Hoa Valentin, PTA 06/22/15 -     CS Yordy Whiting MS, [facility]-SLP 08/02/16 -     CM Tiff Ortiz, Speech Therapy Student 03/20/17 -                 IP PT Goals       06/21/17 0810          Bed Mobility PT LTG    Bed Mobility PT LTG, Date Established 06/21/17  -EDMUND      Bed Mobility PT LTG, Time to Achieve by discharge  -EDMUND      Bed Mobility PT LTG, Activity Type supine to sit/sit to supine  -EDMUND      Bed Mobility PT LTG, [place] Level minimum assist (75% patient effort)  -EDMUND      Bed Mobility PT Goal  LTG, Assist Device bed rails  -EDMUND      Transfer Training PT LTG    Transfer Training PT LTG, Date Established 06/21/17  -EDMUND      Transfer Training PT LTG, Time to Achieve by discharge  -EDMUND      Transfer Training PT LTG, Activity Type bed to chair /chair to bed;sit to stand/stand to sit  -EDMUND      Transfer Training PT LTG, [place] Level minimum assist (75% patient effort)  -EDMUND      Transfer Training PT LTG, Assist Device walker, rolling  -EDMUND      Gait Training PT LTG    Gait Training Goal PT LTG, Date Established 06/21/17  -EDMUND      Gait Training Goal PT LTG, Time to Achieve by discharge  -EMDUND      Gait Training Goal PT LTG, [place] Level minimum assist  (75% patient effort)  -EDMUND      Gait Training Goal PT LTG, Assist Device walker, rolling  -EDMUND      Gait Training Goal PT LTG, Distance to Achieve 75  -EDMUND      Strength Goal PT LTG    Strength Goal PT LTG, Date Established 06/21/17  -EDMUND      Strength Goal PT LTG, Time to Achieve by discharge  -EDMUND      Strength Goal PT LTG, Measure to Achieve AROM/AAROM, 20 reps, B UE/LE, min assist  -EDMUND        User Key  (r) = Recorded By, (t) = Taken By, (c) = Cosigned By    Initials Name Provider Type    EDMUND Cesar, PT DPT Physical Therapist          Physical Therapy Education     Title: PT OT SLP Therapies (Active)     Topic: Physical Therapy (Active)     Point: Mobility training (Active)    Learning Progress Summary    Learner Readiness Method Response Comment Documented by Status   Patient Acceptance E NR benefits of activity, bed mobility, and exxercise KJ 06/27/17 1059 Active    Acceptance E NR Standing tall, taking steps AL 06/25/17 0838 Active    Acceptance E NR Needs encouragement for standing AL 06/24/17 0847 Active    Acceptance E NL bed mobility AH 06/22/17 1129 Active    Acceptance E NR Educated pt./spouse on progression of PT POC, benefits of activity, R hip precautions. EDMUND 06/21/17 0810 Active   Significant Other Acceptance E NR Educated pt./spouse on progression of PT POC, benefits of activity, R hip precautions.  06/21/17 0810 Active               Point: Home exercise program (Active)    Learning Progress Summary    Learner Readiness Method Response Comment Documented by Status   Patient Acceptance D,E NR total hip exercises, hip precautions CW 06/28/17 0737 Active    Acceptance E NR Needs encouragement for standing AL 06/24/17 0847 Active               Point: Body mechanics (Active)    Learning Progress Summary    Learner Readiness Method Response Comment Documented by Status   Patient Acceptance E NR Needs encouragement for standing AL 06/24/17 0847 Active               Point: Precautions (Active)     Learning Progress Summary    Learner Readiness Method Response Comment Documented by Status   Patient Acceptance E,H NR hip precautions  06/26/17 0947 Active    Acceptance E NR Needs encouragement for standing AL 06/24/17 0847 Active    Acceptance E NR Educated pt./spouse on progression of PT POC, benefits of activity, R hip precautions.  06/21/17 0810 Active   Family Acceptance E,H NR hip precautions  06/26/17 0947 Active   Significant Other Acceptance E,D,H VU,NR THR HEP and precautions  06/23/17 1035 Done    Acceptance E NR Educated pt./spouse on progression of PT POC, benefits of activity, R hip precautions.  06/21/17 0810 Active                      User Key     Initials Effective Dates Name Provider Type Discipline     08/02/16 -  Yesenia Merritt, PTA Physical Therapy Assistant PT     08/02/16 -  Amanda Carlin, PTA Physical Therapy Assistant PT    AL 08/02/16 -  Yesenia Llanes, PTA Physical Therapy Assistant PT    EDMUND 08/02/16 -  Deonte Cesar, PT DPT Physical Therapist PT     06/22/15 -  Hoa Valentin, PTA Physical Therapy Assistant PT                    PT Recommendation and Plan  Anticipated Equipment Needs At Discharge: two wheeled walker  Anticipated Discharge Disposition: transitional care, skilled nursing facility, inpatient rehabilitation facility  Planned Therapy Interventions: bed mobility training, transfer training, gait training, balance training, home exercise program, orthotic fitting/training, patient/family education, postural re-education, strengthening  PT Frequency: 2 times/day, per priority policy  Plan of Care Review  Plan Of Care Reviewed With: patient  Progress: progress towards functional goals is fair  Outcome Summary/Follow up Plan: Pt performed total hip exercises RLE with assist.  Will continue to benefit from therapy to increase strength and mobility          Outcome Measures       06/28/17 0700 06/27/17 0950 06/26/17 0900    How much help from another person  do you currently need...    Turning from your back to your side while in flat bed without using bedrails? 2  -CW  2  -AH    Moving from lying on back to sitting on the side of a flat bed without bedrails? 2  -CW  1  -AH    Moving to and from a bed to a chair (including a wheelchair)? 2  -CW  1  -AH    Standing up from a chair using your arms (e.g., wheelchair, bedside chair)? 2  -CW  1  -AH    Climbing 3-5 steps with a railing? 1  -CW  1  -AH    To walk in hospital room? 1  -CW  1  -AH    AM-PAC 6 Clicks Score 10  -CW  7  -AH    How much help from another is currently needed...    Putting on and taking off regular lower body clothing?  1  -CH     Bathing (including washing, rinsing, and drying)  2  -CH     Toileting (which includes using toilet bed pan or urinal)  2  -CH     Putting on and taking off regular upper body clothing  3  -CH     Taking care of personal grooming (such as brushing teeth)  4  -CH     Eating meals  4  -CH     Score  16  -CH     Functional Assessment    Outcome Measure Options AM-PAC 6 Clicks Basic Mobility (PT)  -CW AM-PAC 6 Clicks Daily Activity (OT)  -CH AM-PAC 6 Clicks Basic Mobility (PT)  -AH      User Key  (r) = Recorded By, (t) = Taken By, (c) = Cosigned By    Initials Name Provider Type     Yesenia Merritt PTA Physical Therapy Assistant     Shagufta Mehta, OTR/L Occupational Therapist    ANA Valentin PTA Physical Therapy Assistant           Time Calculation:         PT Charges       06/28/17 0755          Time Calculation    Start Time 0723  -CW      Stop Time 0750  -CW      Time Calculation (min) 27 min  -CW      PT Received On 06/28/17  -CW      PT Goal Re-Cert Due Date 07/01/17  -CW      Time Calculation- PT    Total Timed Code Minutes- PT 27 minute(s)  -CW        User Key  (r) = Recorded By, (t) = Taken By, (c) = Cosigned By    Initials Name Provider Type    ANA Valentin PTA Physical Therapy Assistant          Therapy Charges for Today     Code Description  Service Date Service Provider Modifiers Qty    72000540968 HC PT THERAPEUTIC ACT EA 15 MIN 6/27/2017 Hoa Valentin PTA GP, KX 3    93544315177 HC PT THER PROC EA 15 MIN 6/28/2017 Hoa Valentin PTA GP, KX 2          PT G-Codes  Outcome Measure Options: AM-PAC 6 Clicks Basic Mobility (PT)  Score: 9  Functional Limitation: Mobility: Walking and moving around  Mobility: Walking and Moving Around Current Status (): At least 60 percent but less than 80 percent impaired, limited or restricted  Mobility: Walking and Moving Around Goal Status (): At least 20 percent but less than 40 percent impaired, limited or restricted    Hoa Valentin PTA  6/28/2017

## 2017-06-28 NOTE — PLAN OF CARE
Problem: Patient Care Overview (Adult)  Goal: Plan of Care Review  Outcome: Ongoing (interventions implemented as appropriate)    06/28/17 0739   Coping/Psychosocial Response Interventions   Plan Of Care Reviewed With patient   Patient Care Overview   Progress progress towards functional goals is fair   Outcome Evaluation   Outcome Summary/Follow up Plan Pt performed total hip exercises RLE with assist. Will continue to benefit from therapy to increase strength and mobility

## 2017-06-28 NOTE — DISCHARGE SUMMARY
[facility] Medicine Services  DISCHARGE SUMMARY       Date of Admission: 6/19/2017  Date of Discharge:  6/28/2017  Primary Care Physician: Jung Collins MD    Presenting Problem/History of Present Illness:  Closed displaced intertrochanteric fracture of right femur, initial encounter [S72.141H]     Final Discharge Diagnoses:  1. Closed subcapital fracture of neck of right femur status post right hip hemiarthroplasty. Post op day 8.   2. Concern for pneumonia superimposed on COPD-healthcare associated  3. Urinary tract infection-E. Coli   4. Obstructive sleep apnea-CPAP  5. Dementia at baseline  6. Hypertension-poorly controlled  7. Pulmonary edema-improved on chest x-ray  8. Diastolic dysfunction Ejection fraction 65%  9. Pulmonary HTN  10. Supraventricular tachycardia  11. Vitamin B-12 deficiency  12. Hypokalemia  13. Diarrhea    Consults:   1. Jeremiah Zaidi-orthopedics  2. Dr. Marlon Fernandez-cardiology     Procedures Performed:   1. Right hip hemiarthroplasty 6/20/2017    Pertinent Test Results:   Imaging Results (all)     Procedure Component Value Units Date/Time    XR Hip With or Without Pelvis 2 - 3 View Right [[id number]] Collected:  06/19/17 0719     Updated:  06/19/17 0814    Narrative:       HISTORY: Fall     Three views.     AP and lateral projection radiographs of the right hip were obtained as  well as pelvis radiograph.     Comparison was made to the 09/20/2016 pelvis exam.     There is deformity and foreshortening of the right femoral neck  compatible with femoral neck fracture, basocervical type.     Changes from left femoral neck pinning observed.     No pelvic bone fracture notified.     There is diffuse osteoporosis.     There are vascular calcifications.       Impression:       1. Right femoral neck fracture.  This report was finalized on 06/19/2017 08:11 by Dr. Mick Kimbrough MD.    XR Chest 1 View [[id number]] Collected:  06/19/17 0720     Updated:  06/19/17 0814     Narrative:       EXAMINATION: XR CHEST 1 VW-. 6/19/2017 7:04 AM CDT     CHEST, ONE VIEW:     HISTORY: Fall. Hip fracture.     COMPARISON: 10/04/2016     A single frontal chest radiograph was obtained.     FINDINGS:  There are diffuse bilateral perihilar interstitial infiltrates with  pulmonary vascular congestion with Kerley B lines. There is  cardiomegaly. These findings are compatible with interstitial pulmonary  edema and congestive heart failure.     Permanent cardiac pacemaker noted.     Calcified granuloma identified in the left lung base.                                  Impression:       1. 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     This report was finalized on 06/19/2017 08:11 by Dr. Mick Kimbrough MD.    XR Chest 1 View [[id number]] Collected:  06/20/17 1003     Updated:  06/20/17 1007    Narrative:       EXAMINATION:  XR CHEST 1 VW-  6/20/2017 8:41 CST     HISTORY: Reevaluate pulmonary edema.     COMPARISON: 06/19/2017.     FINDINGS:  Bilateral interstitial edema is decreasing when compared to  the prior study. There is mild cardiomegaly. There is minimal blunting  of the left costophrenic angle. There is a pacemaker on the left. There  is atheromatous disease of the thoracic aorta.       Impression:       1. Decreasing pulmonary edema.  2. Minimal blunting of the left costophrenic angle which may be due to  minimal left pleural effusion.  3. Mild cardiomegaly.        This report was finalized on 06/20/2017 10:04 by Dr. Jamar Choe MD.    CT Head Without Contrast [[id number]] Collected:  06/22/17 1626     Updated:  06/22/17 1724    Narrative:       EXAMINATION: CT HEAD WO CONTRAST-  6/22/2017 4:14 PM CDT     CT SCAN OF THE HEAD, WITHOUT CONTRAST:      HISTORY: Altered mental status     COMPARISONS: 10/04/2016      TECHNIQUE:     Radiation dose equals DLP 1395 mGy-cm.  Automated exposure control dose  reduction technique was  implemented.     CT evaluation of the head without intravenous contrast. 5 mm transaxial  images were obtained.   2-D sagittal and coronal reconstruction images  were generated.     FINDINGS:      A craniotomy defect is again identified in the left temporofrontal lobe  region, with a large amount of cystic encephalomalacia involving the  temporal lobe and frontal lobe, most likely stable.     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     There is decreased attenuation in the periventricular white matter;  suspect chronic ischemic changes.     There is mild diffuse central and cortical atrophy.     There is no intra or extra-axial hemorrhage.     There is a partially calcified mass identified along the left posterior  parietal region peripherally, and a meningioma is suspected. The mass  measures nearly 2 cm and was noted previously, likely unchanged.             Impression:       1. No CT evidence of an acute intracranial process.    2. Postsurgical and chronic changes.   3. Partially calcified left convexity meningioma suspected.     These findings were described on a digital voice clip recorded on the  PACS system at the time of dictation.                            This report was finalized on 06/22/2017 17:21 by Dr. Mick Kimbrough MD.    XR Chest 1 View [[id number]] Collected:  06/23/17 1317     Updated:  06/23/17 1427    Narrative:       EXAMINATION: XR CHEST 1 VW-. 6/23/2017 12:54 PM CDT     CHEST, ONE VIEW:     HISTORY: Pulmonary edema.     COMPARISON: 06/20/2017, 06/19/2017 and 10/04/2016.     A single frontal chest radiograph was obtained.     FINDINGS:  The heart is generous with permanent cardiac pacemaker.     There is mild perihilar bronchovascular interstitial prominence  compatible with minimal residual pulmonary edema changes significantly  improved compared to the 6/19/2017 examination improving compared to the  06/20/2017  study.     The bony structures are intact.       Impression:       1. Significant improvement in pulmonary edema with minimal residual  perihilar bronchovascular interstitial prominence.     This report was finalized on 06/23/2017 14:23 by Dr. Mick Kimbrough MD.    CT Chest Without Contrast [[id number]] Collected:  06/23/17 2027     Updated:  06/23/17 2038    Narrative:       CT CHEST WO CONTRAST- 6/23/2017 7:26 PM CDT     HISTORY: Fever, right congestion, difficult to arouse;  S72.141A-Displaced intertrochanteric fracture of right femur, initial  encounter for closed fracture; S72.011A-Unspecified intracapsular  fracture of right femur, initial encounter for closed fracture;  Z78.9-Other specified health status; Z74.09-Other reduced mobility;  R13.12-Dysphagia, oropharyngeal phase.     COMPARISON: 12/21/2015     DOSE LENGTH PRODUCT: 480 mGy cm. Automated exposure control was also  utilized to decrease patient radiation dose.     TECHNIQUE: Serial helical tomographic images of the chest were acquired.  Multiplanar reformatted images were provided for review.     FINDINGS:  Patchy basilar infiltrates are present, greater on the left than the  right. Small posterior pleural effusions are present, also slightly  greater on the left than the right.      The trachea and bronchial tree are patent.     Vascular calcification is present in the aortic arch. The cardiac  silhouette is moderately enlarged. Coronary calcification is present.  There is no pericardial effusion. No enlarged axillary or mediastinal  lymph nodes are present.      No acute findings are seen in the bones or surrounding soft tissues.     A moderate-size hiatal hernia is present.       Impression:       1. Bibasilar patchy infiltrates and small posterior pleural effusions.  Most likely this is an inflammatory process superimposed on COPD.  2. Extensive vascular calcification is present.        This report was finalized on 06/23/2017 20:34 by Dr. Everett  MD Aicha.    XR Chest 1 View [[id number]] Collected:  06/27/17 2005     Updated:  06/27/17 2042    Narrative:       EXAMINATION:   XR CHEST 1 VW-  6/27/2017 7:56 PM CDT     HISTORY: Cough.     Single view the chest is obtained. There is mild redistribution of  pulmonary vascular flow. Cardiac silhouette is moderately enlarged. This  is a subtle change compared to June 23. This may represent mild  recurrent pulmonary vascular congestion.     Vascular calcifications present in the aortic arch. Pacing device  present soft tissues of the left chest.       Impression:       Cardiomegaly. Mild interstitial pulmonary vascular  congestion. This is a slight change from June 23.  This report was finalized on 06/27/2017 20:39 by Dr. Karlos Holcomb MD.        Lab Results (last 7 days)     Procedure Component Value Units Date/Time    CBC (No Diff) [[id number]]  (Abnormal) Collected:  06/22/17 0420    Specimen:  Blood Updated:  06/22/17 0603     WBC 7.83 10*3/mm3      RBC 2.53 (L) 10*6/mm3      Hemoglobin 8.1 (L) g/dL      Hematocrit 23.9 (L) %      MCV 94.5 fL      MCH 32.0 pg      MCHC 33.9 g/dL      RDW 13.6 %      RDW-SD 47.6 fl      MPV 10.0 fL      Platelets 166 10*3/mm3     CBC (No Diff) [[id number]]  (Abnormal) Collected:  06/23/17 0507    Specimen:  Blood Updated:  06/23/17 0555     WBC 8.46 10*3/mm3      RBC 2.57 (L) 10*6/mm3      Hemoglobin 8.1 (L) g/dL      Hematocrit 24.3 (L) %      MCV 94.6 fL      MCH 31.5 pg      MCHC 33.3 g/dL      RDW 13.3 %      RDW-SD 46.3 fl      MPV 10.1 fL      Platelets 173 10*3/mm3     Comprehensive Metabolic Panel [[id number]]  (Abnormal) Collected:  06/23/17 0507    Specimen:  Blood Updated:  06/23/17 0609     Glucose 120 (H) mg/dL      BUN 34 (H) mg/dL      Creatinine 1.18 mg/dL      Sodium 138 mmol/L      Potassium 4.0 mmol/L      Chloride 102 mmol/L      CO2 23.0 (L) mmol/L      Calcium 8.5 mg/dL      Total Protein 6.6 g/dL      Albumin 3.30 (L) g/dL      ALT (SGPT) 45 U/L      AST  (SGOT) 63 (H) U/L      Alkaline Phosphatase 83 U/L      Total Bilirubin 0.7 mg/dL      eGFR Non African Amer 43 (L) mL/min/1.73      Globulin 3.3 gm/dL      A/G Ratio 1.0 (L) g/dL      BUN/Creatinine Ratio 28.8 (H)     Anion Gap 13.0 mmol/L     Narrative:       The MDRD GFR formula is only valid for adults with stable renal function between ages 18 and 70.    Iron Profile [[id number]]  (Abnormal) Collected:  06/23/17 0507    Specimen:  Blood Updated:  06/23/17 0619     Iron 23 (L) mcg/dL      TIBC 200 (L) mcg/dL      Iron Saturation 12 (L) %     Ferritin [[id number]]  (Normal) Collected:  06/23/17 0507    Specimen:  Blood Updated:  06/23/17 0644     Ferritin 202.00 ng/mL     Folate [[id number]] Collected:  06/23/17 0507    Specimen:  Blood Updated:  06/23/17 0715     Folate >20.00 ng/mL     Vitamin B12 [[id number]]  (Normal) Collected:  06/23/17 0507    Specimen:  Blood Updated:  06/23/17 0715     Vitamin B-12 245 pg/mL     Blood Gas, Arterial [[id number]]  (Abnormal) Collected:  06/23/17 1558    Specimen:  Arterial Blood Updated:  06/23/17 1600     Site Arterial: right radial     Cruz's Test --      Documented in Rapid Comm        pH, Arterial 7.501 (H) pH units      pCO2, Arterial 34.7 (L) mm Hg      pO2, Arterial 91.1 mm Hg      HCO3, Arterial 26.5 (H) mmol/L      Base Excess, Arterial 3.7 (H) mmol/L      O2 Saturation, Arterial 97.6 %      O2 Saturation Calculated 97.6 %      Barometric Pressure for Blood Gas -- mmHg       Component not reported at this site.        Modality Cannula     Flow Rate 2.00 lpm     Narrative:       Serial Number: [id number]    : [id number]    CBC (No Diff) [[id number]]  (Abnormal) Collected:  06/24/17 0628    Specimen:  Blood Updated:  06/24/17 0711     WBC 8.66 10*3/mm3      RBC 2.57 (L) 10*6/mm3      Hemoglobin 8.1 (L) g/dL      Hematocrit 24.2 (L) %      MCV 94.2 fL      MCH 31.5 pg      MCHC 33.5 g/dL      RDW 13.3 %      RDW-SD 46.3 fl      MPV 10.1 fL      Platelets 227 10*3/mm3      Basic Metabolic Panel [[id number]]  (Abnormal) Collected:  06/24/17 0628    Specimen:  Blood Updated:  06/24/17 0714     Glucose 190 (H) mg/dL      BUN 23 (H) mg/dL      Creatinine 0.85 mg/dL      Sodium 139 mmol/L      Potassium 3.7 mmol/L      Chloride 104 mmol/L      CO2 25.0 mmol/L      Calcium 8.4 mg/dL      eGFR Non African Amer 63 mL/min/1.73      BUN/Creatinine Ratio 27.1 (H)     Anion Gap 10.0 mmol/L     Narrative:       The MDRD GFR formula is only valid for adults with stable renal function between ages 18 and 70.    BNP [[id number]]  (Abnormal) Collected:  06/24/17 0628    Specimen:  Blood Updated:  06/24/17 1411     proBNP 7140.0 (H) pg/mL     CBC (No Diff) [[id number]]  (Abnormal) Collected:  06/25/17 0532    Specimen:  Blood Updated:  06/25/17 0605     WBC 7.92 10*3/mm3      RBC 2.48 (L) 10*6/mm3      Hemoglobin 7.8 (L) g/dL      Hematocrit 23.2 (L) %      MCV 93.5 fL      MCH 31.5 pg      MCHC 33.6 g/dL      RDW 13.3 %      RDW-SD 45.5 fl      MPV 9.6 fL      Platelets 244 10*3/mm3     Basic Metabolic Panel [[id number]]  (Abnormal) Collected:  06/25/17 0532    Specimen:  Blood Updated:  06/25/17 0617     Glucose 114 (H) mg/dL      BUN 16 mg/dL      Creatinine 0.74 mg/dL      Sodium 140 mmol/L      Potassium 3.4 (L) mmol/L      Chloride 105 mmol/L      CO2 26.0 mmol/L      Calcium 8.3 (L) mg/dL      eGFR Non African Amer 74 mL/min/1.73      BUN/Creatinine Ratio 21.6     Anion Gap 9.0 mmol/L     Narrative:       The MDRD GFR formula is only valid for adults with stable renal function between ages 18 and 70.    Magnesium [[id number]]  (Normal) Collected:  06/25/17 0532    Specimen:  Blood Updated:  06/25/17 1156     Magnesium 2.1 mg/dL     CBC (No Diff) [[id number]]  (Abnormal) Collected:  06/26/17 0437    Specimen:  Blood Updated:  06/26/17 0526     WBC 7.38 10*3/mm3      RBC 2.77 (L) 10*6/mm3      Hemoglobin 8.5 (L) g/dL      Hematocrit 25.8 (L) %      MCV 93.1 fL      MCH 30.7 pg      MCHC 32.9 (L) g/dL       RDW 15.2 (H) %      RDW-SD 52.2 fl      MPV 9.7 fL      Platelets 244 10*3/mm3     Basic Metabolic Panel [[id number]]  (Abnormal) Collected:  06/26/17 0437    Specimen:  Blood Updated:  06/26/17 0537     Glucose 111 (H) mg/dL      BUN 20 mg/dL      Creatinine 0.91 mg/dL      Sodium 142 mmol/L      Potassium 3.7 mmol/L      Chloride 108 mmol/L      CO2 27.0 mmol/L      Calcium 8.3 (L) mg/dL      eGFR Non African Amer 58 (L) mL/min/1.73      BUN/Creatinine Ratio 22.0     Anion Gap 7.0 mmol/L     Narrative:       The MDRD GFR formula is only valid for adults with stable renal function between ages 18 and 70.    Vancomycin, Trough [[id number]]  (Abnormal) Collected:  06/26/17 1409    Specimen:  Blood Updated:  06/26/17 1430     Vancomycin Trough 8.93 (L) mcg/mL     CBC (No Diff) [[id number]]  (Abnormal) Collected:  06/27/17 0427    Specimen:  Blood Updated:  06/27/17 0446     WBC 8.32 10*3/mm3      RBC 2.82 (L) 10*6/mm3      Hemoglobin 8.7 (L) g/dL      Hematocrit 26.7 (L) %      MCV 94.7 fL      MCH 30.9 pg      MCHC 32.6 (L) g/dL      RDW 15.2 (H) %      RDW-SD 51.2 fl      MPV 9.4 fL      Platelets 266 10*3/mm3     Respiratory Culture [[id number]] Collected:  06/27/17 2348    Specimen:  Sputum from Cough Updated:  06/27/17 2357    Blood Culture With TEDDY [[id number]]  (Normal) Collected:  06/22/17 2347    Specimen:  Blood from Arm, Left Updated:  06/28/17 0103     Blood Culture No growth at 5 days    Blood Culture With TEDDY [[id number]]  (Normal) Collected:  06/22/17 2347    Specimen:  Blood from Hand, Left Updated:  06/28/17 0207     Blood Culture No growth at 5 days    CBC (No Diff) [[id number]]  (Abnormal) Collected:  06/28/17 0432    Specimen:  Blood Updated:  06/28/17 0552     WBC 11.08 (H) 10*3/mm3      RBC 2.94 (L) 10*6/mm3      Hemoglobin 9.1 (L) g/dL      Hematocrit 27.9 (L) %      MCV 94.9 fL      MCH 31.0 pg      MCHC 32.6 (L) g/dL      RDW 14.7 %      RDW-SD 50.0 fl      MPV 9.5 fL      Platelets 342 10*3/mm3   "       Chief Complaint on Day of Discharge: right hip pain    History of Present Illness on Day of Discharge: Pt is sitting in bed. States, \"Good morning and how are you today.\" \"Are you going to give me a shot?\" Less diarrhea. Still with white sputum production. No new complaints per the .     Hospital Course:  The patient is a 88 y.o. female who follows with Dr. Collins for his primary care. She has a past medical history significant for dementia, hypertension, and pulmonary edema.  Patient presented to the emergency department 6/19/17 after suffering a fall.  Apparently patient laid there for a couple of hours before she was tended to.  His found have a right hip fracture.  Unfortunate she had accelerated hypertension with systolic blood pressures greater than 200.  She was admitted to the hospitalist service was placed in intensive care unit on a Cardene drip.  She is also noted to have acute pulmonary edema and required IV diuretics.  Patiently ultimately went to the operating room 6/20/17 for a right hip hemiarthroplasty.  Postoperatively, she has had uncontrolled hypertension, postop blood loss anemia requiring 1 unit of packed red blood cells, and concern for healthcare associated pneumonia.  Patient does carry history of chronic obstructive pulmonary disease and obstructive sleep apnea for which she uses a CPAP at home.  Her blood pressure medicines have been adjusted, and her blood pressure still remains slightly high.  Today, I increased her hydralazine from 25 mg 3 times a day to 50 mg 3 times a day.  This will need to be further evaluated and adjusted by the nursing home physician.  In addition, she was found have an Escherichia coli and intact infection has completed antibiotics for this.  She has multiple medication allergies and is been on IV Dilaudid throughout her hospitalization. Transition over to oral today at time of discharge.     She also has dementia at baseline has significant problems " with postoperative altered mental status.  CT head was unremarkable.  Addition, she had significant runs of supraventricular tachycardia.  This prompted a cardiology consult.  They did interrogate her pacemaker.  She is not a candidate for long-term anticoagulation; however, she will go to the nursing cal[facility]on on Lovenox for 3 weeks which I discussed with orthopedics.  She has been working with physical therapy and is only been able to stand.  It was recommended that she receive ongoing rehabilitation and skilled nursing facility.  Her  agrees she was also to [facility].  [facility] is offered a bed and patient be transferred there today.  Please note the patient is a COPD and has had problems remaining therapeutic with on room air as regard to her oxygen saturation.  She is currently on oxygen 1-2 L will continue this in the nursing home.  I was suggested as her fluid volume status improves that she can be weaned off the oxygen.  She uses a home CPAP at home and  states he will bring this to the nursing home.     Over the weekend, patient developed worsening congestion and had a chest x-ray that showed atelectasis but a CT of the chest did reveal possible inflammatory process superimposed on COPD.  She was then placed on meropenem and vancomycin.  This was descalated to cefepime and vancomycin.  We discontinued the vancomycin yesterday.  Repeat chest x-ray does reveal some volume overload but no significant pneumonia.  We feel comfortable and increasing her Bumex back to her home dose of 1 mg also gave her 1 mg IV Bumex today prior to discharge.  She will need a repeat BMP on Friday.  She will complete a total of 7 days of antibiotic therapy.  I placed her on oral Omnicef for a total of 5 more doses.    Condition on Discharge:  Stable with a guarded prognosis secondary to multiple medical problems and advanced age and dementia.    Physical Exam on Discharge:  /56 (BP Location: Right arm, Patient  "Position: Lying) Comment: RN notified  Pulse 74  Temp 98.7 °F (37.1 °C) (Oral)   Resp 18  Ht 64.02\" (162.6 cm)  Wt 178 lb 4 oz (80.9 kg)  SpO2 94%  BMI 30.58 kg/m2  Physical Exam   Constitutional: She appears well-developed and well-nourished.   HENT:   Head: Normocephalic and atraumatic.   Eyes: Conjunctivae and EOM are normal. Pupils are equal, round, and reactive to light.   Neck: Neck supple. No JVD present. No thyromegaly present.   Cardiovascular: Normal rate, regular rhythm, normal heart sounds and intact distal pulses.  Exam reveals no gallop and no friction rub.    No murmur heard.  A pacing 66.    Pulmonary/Chest: Effort normal. No respiratory distress. She has no wheezes (less bilaterally. ). She exhibits no tenderness.   Abdominal: Soft. Bowel sounds are normal. She exhibits no distension. There is no tenderness. There is no rebound and no guarding.   Musculoskeletal: Normal range of motion. She exhibits edema (RLE post op). She exhibits no tenderness or deformity.   Lymphadenopathy:     She has no cervical adenopathy.   Neurological: She is alert. She displays normal reflexes. No cranial nerve deficit. She exhibits normal muscle tone.   Skin: Skin is warm and dry. No rash noted.   Psychiatric: She has a normal mood and affect. Her behavior is normal. Judgment and thought content normal.     Discharge Disposition:  Skilled Nursing Facility (DC - External)    Discharge Medications:   Renay Lazo   Home Medication Instructions ROMULO:[id number]    Printed on:06/28/17 5536   Medication Information                      acetaminophen (TYLENOL) 325 MG tablet  Take 2 tablets by mouth Every 6 (Six) Hours As Needed for Mild Pain (1-3).             amLODIPine (NORVASC) 10 MG tablet  Take 1 tablet by mouth Daily.             aspirin 81 MG EC tablet  Take 81 mg by mouth Every Night.             bumetanide (BUMEX) 1 MG tablet  Take 1 mg by mouth daily.             carvedilol (COREG) 25 MG tablet  Take 1 " tablet by mouth 2 (Two) Times a Day With Meals.             cefdinir (OMNICEF) 300 MG capsule  Take 1 capsule by mouth 2 (Two) Times a Day for 5 doses.             CloNIDine (CATAPRES-TTS) 0.2 MG/24HR patch  Place 1 patch on the skin 1 (One) Time Per Week.             cyanocobalamin 1000 MCG/ML injection  1000 mg X1 on 6/29 then 1000mg every week X4, then monthly.             docusate sodium 100 MG capsule  Take 100 mg by mouth 2 (Two) Times a Day As Needed (constipation).             enoxaparin (LOVENOX) 40 MG/0.4ML solution syringe  Inject 0.4 mL under the skin Daily.             Ergocalciferol (VITAMIN D2 PO)  Take 2,000 Units by mouth 2 (Two) Times a Week.             ferrous sulfate 325 (65 FE) MG tablet  Take 325 mg by mouth daily.             folic acid (FOLVITE) 1 MG tablet  Take 1 mg by mouth daily.             hydrALAZINE (APRESOLINE) 50 MG tablet  Take 1 tablet by mouth Every 8 (Eight) Hours.             HYDROmorphone (DILAUDID) 2 MG tablet  Take 0.5 tablets by mouth Every 6 (Six) Hours As Needed for Moderate Pain (4-6).             lisinopril (PRINIVIL,ZESTRIL) 20 MG tablet  Take 1 tablet by mouth Daily.             phenytoin (DILANTIN) 100 MG ER capsule  Take 100 mg by mouth Every Morning.             phenytoin (DILANTIN) 100 MG ER capsule  Take 300 mg by mouth every night at bedtime.             potassium chloride (K-DUR,KLOR-CON) 20 MEQ CR tablet  Take 20 mEq by mouth 3 (Three) Times a Week.             pravastatin (PRAVACHOL) 40 MG tablet  Take 40 mg by mouth Every Night.                 Discharge Diet:  low-salt diet.  With honey thickened liquids.  Mechanical soft.    Activity at Discharge:   Activity Instructions     Other Instructions (Specify)       Posterior hip precautions. WBAT                 Discharge Care Plan/Instructions: She will be discharged to [facility] nursing her debilitation today via ambulance.    Follow-up Appointments:   Future Appointments  Date Time Provider Department Center    1/15/2018 10:15 AM Mik Puri MD [facility] None       Test Results Pending at Discharge: sputum culture.    Griselda Matias Vipul, APRN  06/28/17  12:35 PM    Time: 50 min.     Discussed above plan with NP.  Discussed plans for d/c medications. Reviewed recent CXR, labs, weight, vitals and clinical picture.  Agree with above plan and plan for d/c to NH.  DVT prophy per ortho.  Beata Chavis MD

## 2017-06-28 NOTE — PROGRESS NOTES
Orthopedic Surgery Progress Note    Renay Lazo  6/28/2017      Subjective:     Systemic or Specific Complaints: no complaints.   Objective:     Patient Vitals for the past 24 hrs:   BP Temp Temp src Pulse Resp SpO2 Weight   06/28/17 0701 - - - 91 18 93 % -   06/28/17 0301 177/56 99.3 °F (37.4 °C) Oral 77 18 96 % -   06/27/17 2337 166/50 99.1 °F (37.3 °C) Oral 64 18 95 % -   06/27/17 2101 - - - - - - 178 lb 4 oz (80.9 kg)   06/27/17 2100 154/44 97.9 °F (36.6 °C) Oral 78 18 96 % -   06/27/17 1620 167/60 98.9 °F (37.2 °C) Oral 61 18 94 % -   06/27/17 1512 - - - 66 - 96 % -   06/27/17 1135 - - - 62 - 98 % -   06/27/17 0827 154/50 98.2 °F (36.8 °C) Oral 68 18 94 % -       right lower  General: alert, appears stated age and cooperative   Wound: clean, dry, intact             Dressing: clean, dry, intact   Extremity: Distal NVI           DVT Exam: No evidence of DVT seen on physical exam.                   Data Review:  Lab Results (last 24 hours)     Procedure Component Value Units Date/Time    Respiratory Culture [[id number]] Collected:  06/27/17 2348    Specimen:  Sputum from Cough Updated:  06/27/17 2357    Blood Culture With TEDDY [[id number]]  (Normal) Collected:  06/22/17 2347    Specimen:  Blood from Arm, Left Updated:  06/28/17 0103     Blood Culture No growth at 5 days    Blood Culture With TEDDY [[id number]]  (Normal) Collected:  06/22/17 2347    Specimen:  Blood from Hand, Left Updated:  06/28/17 0207     Blood Culture No growth at 5 days    CBC (No Diff) [[id number]]  (Abnormal) Collected:  06/28/17 0432    Specimen:  Blood Updated:  06/28/17 0552     WBC 11.08 (H) 10*3/mm3      RBC 2.94 (L) 10*6/mm3      Hemoglobin 9.1 (L) g/dL      Hematocrit 27.9 (L) %      MCV 94.9 fL      MCH 31.0 pg      MCHC 32.6 (L) g/dL      RDW 14.7 %      RDW-SD 50.0 fl      MPV 9.5 fL      Platelets 342 10*3/mm3         Imaging Results (last 24 hours)     Procedure Component Value Units Date/Time    XR Chest 1 View [[id number]]  Collected:  06/27/17 2005     Updated:  06/27/17 2042    Narrative:       EXAMINATION:   XR CHEST 1 VW-  6/27/2017 7:56 PM CDT     HISTORY: Cough.     Single view the chest is obtained. There is mild redistribution of  pulmonary vascular flow. Cardiac silhouette is moderately enlarged. This  is a subtle change compared to June 23. This may represent mild  recurrent pulmonary vascular congestion.     Vascular calcifications present in the aortic arch. Pacing device  present soft tissues of the left chest.       Impression:       Cardiomegaly. Mild interstitial pulmonary vascular  congestion. This is a slight change from June 23.  This report was finalized on 06/27/2017 20:39 by Dr. Karlos Holcomb MD.          Assessment:     POD# 8 R hip hemiarthroplasty, Acute Post op Blood Loss Anemia (expected-improving)     Plan:      1:  DVT prophylaxis 3 weeks, ICE, elevate  2:  Pain control  3:  Physical therapy/Occupational therapy  4:  IV antibiotics for 24 hours  5:  Anticipate discharge today/tomorrow if pain well controlled-per Medicine, [facility] has offered bed.   6:  WBAT RLE with posterior hip precautions       Supa Cancino PA-C

## 2017-06-28 NOTE — PROGRESS NOTES
Continued Stay Note   [place]     Patient Name: Renay Lazo  MRN: [id number]  Today's Date: 6/28/2017    Admit Date: 6/19/2017          Discharge Plan       06/28/17 1015    Case Management/Social Work Plan    Plan [facility]    Final Note    Final Note Plan discharge today to [facility].  notified Pat in admissions at [facility]. Discharge summary, discharge med list, and a copy of any scripts to be faxed to facility at [phone]. Patient's nurse will call report to [phone]. Patient will transport by EMS.               Discharge Codes       06/28/17 1020    Discharge Codes    Discharge Codes 03  Discharged/transferred to skilled nursing facility (SNF) with Medicare certification in anticipation of skilled care      06/28/17 1015    Discharge Codes    Discharge Codes 03  Discharged/transferred to skilled nursing facility (SNF) with Medicare certification in anticipation of skilled care        Expected Discharge Date and Time     Expected Discharge Date Expected Discharge Time    Jun 28, 2017             ADAN Harris

## 2017-06-28 NOTE — PLAN OF CARE
Problem: Skin Integrity Impairment, Risk/Actual (Adult)  Goal: Skin Integrity/Wound Healing  Outcome: Ongoing (interventions implemented as appropriate)    Problem: Fall Risk (Adult)  Goal: Absence of Falls  Outcome: Ongoing (interventions implemented as appropriate)    Problem: Patient Care Overview (Adult)  Goal: Plan of Care Review  Outcome: Ongoing (interventions implemented as appropriate)    06/28/17 0527   Coping/Psychosocial Response Interventions   Plan Of Care Reviewed With patient;spouse   Patient Care Overview   Progress no change   Outcome Evaluation   Outcome Summary/Follow up Plan Medicated x1 with dilaudid for relief of right hip pain, effective towards relief. Neuro wnl. PPP. Still has productive cough., Respiratory specimen collected and sent to lab. Reposition every 2 hours. Spouse at bedside. Plan for dc to [facility] today. Safety maintained.        Goal: Adult Individualization and Mutuality  Outcome: Ongoing (interventions implemented as appropriate)  Goal: Discharge Needs Assessment  Outcome: Ongoing (interventions implemented as appropriate)    Problem: Fractured Hip (Adult)  Goal: Signs and Symptoms of Listed Potential Problems Will be Absent or Manageable (Fractured Hip)  Outcome: Ongoing (interventions implemented as appropriate)    Problem: Pressure Ulcer Risk (Wing Scale) (Adult,Obstetrics,Pediatric)  Goal: Skin Integrity  Outcome: Ongoing (interventions implemented as appropriate)

## 2017-06-29 ENCOUNTER — LAB REQUISITION (OUTPATIENT)
Dept: LAB | Facility: HOSPITAL | Age: 82
End: 2017-06-29

## 2017-06-29 DIAGNOSIS — Z00.00 ENCOUNTER FOR GENERAL ADULT MEDICAL EXAMINATION WITHOUT ABNORMAL FINDINGS: ICD-10-CM

## 2017-06-29 LAB
ALBUMIN SERPL-MCNC: 3 G/DL (ref 3.5–5)
ALP SERPL-CCNC: 86 U/L (ref 24–120)
ALT SERPL W P-5'-P-CCNC: 40 U/L (ref 0–54)
ANION GAP SERPL CALCULATED.3IONS-SCNC: 11 MMOL/L (ref 4–13)
ARTICHOKE IGE QN: 60 MG/DL (ref 0–99)
AST SERPL-CCNC: 37 U/L (ref 7–45)
BASOPHILS # BLD AUTO: 0.04 10*3/MM3 (ref 0–0.2)
BASOPHILS NFR BLD AUTO: 0.4 % (ref 0–2)
BILIRUB CONJ SERPL-MCNC: 0 MG/DL (ref 0–0.3)
BILIRUB INDIRECT SERPL-MCNC: 0.2 MG/DL (ref 0–1.1)
BILIRUB SERPL-MCNC: 0.6 MG/DL (ref 0.1–1)
BUN BLD-MCNC: 17 MG/DL (ref 5–21)
BUN/CREAT SERPL: 21.3 (ref 7–25)
CALCIUM SPEC-SCNC: 8.3 MG/DL (ref 8.4–10.4)
CHLORIDE SERPL-SCNC: 102 MMOL/L (ref 98–110)
CHOLEST SERPL-MCNC: 117 MG/DL (ref 130–200)
CO2 SERPL-SCNC: 27 MMOL/L (ref 24–31)
CREAT BLD-MCNC: 0.8 MG/DL (ref 0.5–1.4)
DEPRECATED RDW RBC AUTO: 49 FL (ref 40–54)
EOSINOPHIL # BLD AUTO: 1.4 10*3/MM3 (ref 0–0.7)
EOSINOPHIL NFR BLD AUTO: 14.1 % (ref 0–4)
ERYTHROCYTE [DISTWIDTH] IN BLOOD BY AUTOMATED COUNT: 14.7 % (ref 12–15)
GFR SERPL CREATININE-BSD FRML MDRD: 68 ML/MIN/1.73
GLUCOSE BLD-MCNC: 109 MG/DL (ref 70–100)
HBA1C MFR BLD: 4.6 %
HCT VFR BLD AUTO: 28.5 % (ref 37–47)
HDLC SERPL-MCNC: 25 MG/DL
HGB BLD-MCNC: 9.3 G/DL (ref 12–16)
IMM GRANULOCYTES # BLD: 0.15 10*3/MM3 (ref 0–0.03)
IMM GRANULOCYTES NFR BLD: 1.5 % (ref 0–5)
LDLC/HDLC SERPL: 3 {RATIO}
LYMPHOCYTES # BLD AUTO: 0.9 10*3/MM3 (ref 0.72–4.86)
LYMPHOCYTES NFR BLD AUTO: 9.1 % (ref 15–45)
MCH RBC QN AUTO: 31 PG (ref 28–32)
MCHC RBC AUTO-ENTMCNC: 32.6 G/DL (ref 33–36)
MCV RBC AUTO: 95 FL (ref 82–98)
MONOCYTES # BLD AUTO: 0.73 10*3/MM3 (ref 0.19–1.3)
MONOCYTES NFR BLD AUTO: 7.4 % (ref 4–12)
NEUTROPHILS # BLD AUTO: 6.68 10*3/MM3 (ref 1.87–8.4)
NEUTROPHILS NFR BLD AUTO: 67.5 % (ref 39–78)
PLATELET # BLD AUTO: 343 10*3/MM3 (ref 130–400)
PMV BLD AUTO: 9.5 FL (ref 6–12)
POTASSIUM BLD-SCNC: 3.4 MMOL/L (ref 3.5–5.3)
PREALB SERPL-MCNC: 11.5 MG/DL (ref 18–36)
PROT SERPL-MCNC: 6.1 G/DL (ref 6.3–8.7)
RBC # BLD AUTO: 3 10*6/MM3 (ref 4.2–5.4)
SODIUM BLD-SCNC: 140 MMOL/L (ref 135–145)
TRIGL SERPL-MCNC: 85 MG/DL (ref 0–149)
TSH SERPL DL<=0.05 MIU/L-ACNC: 2.31 MIU/ML (ref 0.47–4.68)
VIT B12 BLD-MCNC: >1000 PG/ML (ref 239–931)
WBC NRBC COR # BLD: 9.9 10*3/MM3 (ref 4.8–10.8)

## 2017-06-29 NOTE — PLAN OF CARE
Problem: Inpatient Physical Therapy  Goal: Bed Mobility Goal LTG- PT  Outcome: Unable to achieve outcome(s) by discharge Date Met:  06/29/17 06/21/17 0810 06/29/17 0805   Bed Mobility PT LTG   Bed Mobility PT LTG, Date Established 06/21/17 --    Bed Mobility PT LTG, Time to Achieve by discharge --    Bed Mobility PT LTG, Activity Type supine to sit/sit to supine --    Bed Mobility PT LTG, [place] Level minimum assist (75% patient effort) --    Bed Mobility PT Goal LTG, Assist Device bed rails --    Bed Mobility PT LTG, Date Goal Reviewed --  06/29/17   Bed Mobility PT LTG, Outcome --  goal not met   Bed Mobility PT LTG, Reason Goal Not Met --  discharged from facility       Goal: Transfer Training Goal 1 LTG- PT  Outcome: Unable to achieve outcome(s) by discharge Date Met:  06/29/17 06/21/17 0810 06/29/17 0805   Transfer Training PT LTG   Transfer Training PT LTG, Date Established 06/21/17 --    Transfer Training PT LTG, Time to Achieve by discharge --    Transfer Training PT LTG, Activity Type bed to chair /chair to bed;sit to stand/stand to sit --    Transfer Training PT LTG, [place] Level minimum assist (75% patient effort) --    Transfer Training PT LTG, Assist Device walker, rolling --    Transfer Training PT LTG, Date Goal Reviewed --  06/29/17   Transfer Training PT LTG, Outcome --  goal not met   Transfer Training PT LTG, Reason Goal Not Met --  discharged from facility       Goal: Gait Training Goal LTG- PT  Outcome: Unable to achieve outcome(s) by discharge Date Met:  06/29/17 06/21/17 0810 06/29/17 0805   Gait Training PT LTG   Gait Training Goal PT LTG, Date Established 06/21/17 --    Gait Training Goal PT LTG, Time to Achieve by discharge --    Gait Training Goal PT LTG, [place] Level minimum assist (75% patient effort) --    Gait Training Goal PT LTG, Assist Device walker, rolling --    Gait Training Goal PT LTG, Distance to Achieve 75 --    Gait Training Goal PT LTG, Date Goal  Reviewed --  06/29/17   Gait Training Goal PT LTG, Outcome --  goal not met   Gait Training Goal PT LTG, Reason Goal Not Met --  discharged from facility       Goal: Strength Goal LTG- PT  Outcome: Unable to achieve outcome(s) by discharge Date Met:  06/29/17 06/21/17 0810 06/29/17 0805   Strength Goal PT LTG   Strength Goal PT LTG, Date Established 06/21/17 --    Strength Goal PT LTG, Time to Achieve by discharge --    Strength Goal PT LTG, Measure to Achieve AROM/AAROM, 20 reps, B UE/LE, min assist --    Strength Goal PT LTG, Date Goal Reviewed --  06/29/17   Strength Goal PT LTG, Outcome --  goal not met   Strength Goal PT LTG, Reason Goal Not Met --  discharged from facility

## 2017-06-29 NOTE — PLAN OF CARE
Problem: Skin Integrity Impairment, Risk/Actual (Adult)  Goal: Skin Integrity/Wound Healing  Outcome: Ongoing (interventions implemented as appropriate)    Problem: Fall Risk (Adult)  Goal: Absence of Falls  Outcome: Ongoing (interventions implemented as appropriate)    Problem: Patient Care Overview (Adult)  Goal: Plan of Care Review  Outcome: Ongoing (interventions implemented as appropriate)  Goal: Adult Individualization and Mutuality  Outcome: Ongoing (interventions implemented as appropriate)  Goal: Discharge Needs Assessment  Outcome: Ongoing (interventions implemented as appropriate)    Problem: Fractured Hip (Adult)  Goal: Signs and Symptoms of Listed Potential Problems Will be Absent or Manageable (Fractured Hip)  Outcome: Ongoing (interventions implemented as appropriate)    Problem: Pressure Ulcer Risk (Wing Scale) (Adult,Obstetrics,Pediatric)  Goal: Skin Integrity  Outcome: Ongoing (interventions implemented as appropriate)

## 2017-06-29 NOTE — THERAPY DISCHARGE NOTE
Acute Care - Physical Therapy Discharge Summary  [facility]       Patient Name: Renay Lazo  : 1929  MRN: [id number]    Today's Date: 2017  Onset of Illness/Injury or Date of Surgery Date: 17    Date of Referral to PT: 17  Referring Physician: (S) Dr. Zaidi (Post hip precautions, WBAT)      Admit Date: 2017      PT Recommendation and Plan    Visit Dx:    ICD-10-CM ICD-9-CM   1. Closed displaced intertrochanteric fracture of right femur, initial encounter S72.141A 820.21   2. Closed subcapital fracture of neck of right femur, initial encounter S72.011A 820.09   3. Decreased activities of daily living (ADL) Z78.9 V49.89   4. Impaired mobility Z74.09 799.89   5. Oropharyngeal dysphagia R13.12 787.22             Outcome Measures       17 0700 17 0950 17 0900    How much help from another person do you currently need...    Turning from your back to your side while in flat bed without using bedrails? 2  -CW  2  -AH    Moving from lying on back to sitting on the side of a flat bed without bedrails? 2  -CW  1  -AH    Moving to and from a bed to a chair (including a wheelchair)? 2  -CW  1  -AH    Standing up from a chair using your arms (e.g., wheelchair, bedside chair)? 2  -CW  1  -AH    Climbing 3-5 steps with a railing? 1  -CW  1  -AH    To walk in hospital room? 1  -CW  1  -AH    AM-PAC 6 Clicks Score 10  -CW  7  -AH    How much help from another is currently needed...    Putting on and taking off regular lower body clothing?  1  -CH     Bathing (including washing, rinsing, and drying)  2  -CH     Toileting (which includes using toilet bed pan or urinal)  2  -CH     Putting on and taking off regular upper body clothing  3  -CH     Taking care of personal grooming (such as brushing teeth)  4  -CH     Eating meals  4  -CH     Score  16  -CH     Functional Assessment    Outcome Measure Options AM-PAC 6 Clicks Basic Mobility (PT)  -CW AM-PAC 6 Clicks Daily Activity (OT)  -CH  AM-PAC 6 Clicks Basic Mobility (PT)  -      User Key  (r) = Recorded By, (t) = Taken By, (c) = Cosigned By    Initials Name Provider Type     Yesenia Merritt, PTA Physical Therapy Assistant    CH Shagufta Mehta, OTR/L Occupational Therapist    CW Hoa Valentin, PTA Physical Therapy Assistant                      IP PT Goals       06/29/17 0805 06/21/17 0810       Bed Mobility PT LTG    Bed Mobility PT LTG, Date Established  06/21/17  -     Bed Mobility PT LTG, Time to Achieve  by discharge  -     Bed Mobility PT LTG, Activity Type  supine to sit/sit to supine  -     Bed Mobility PT LTG, [place] Level  minimum assist (75% patient effort)  -     Bed Mobility PT Goal  LTG, Assist Device  bed rails  -     Bed Mobility PT LTG, Date Goal Reviewed 06/29/17  -      Bed Mobility PT LTG, Outcome goal not met  -      Bed Mobility PT LTG, Reason Goal Not Met discharged from [facility]  -      Transfer Training PT LTG    Transfer Training PT LTG, Date Established  06/21/17  -     Transfer Training PT LTG, Time to Achieve  by discharge  -     Transfer Training PT LTG, Activity Type  bed to chair /chair to bed;sit to stand/stand to sit  -     Transfer Training PT LTG, [place] Level  minimum assist (75% patient effort)  -     Transfer Training PT LTG, Assist Device  walker, rolling  -EDMUND     Transfer Training PT  LTG, Date Goal Reviewed 06/29/17  -      Transfer Training PT LTG, Outcome goal not met  -      Transfer Training PT LTG, Reason Goal Not Met discharged from facility  -      Gait Training PT LTG    Gait Training Goal PT LTG, Date Established  06/21/17  -     Gait Training Goal PT LTG, Time to Achieve  by discharge  -     Gait Training Goal PT LTG, [place] Level  minimum assist (75% patient effort)  -     Gait Training Goal PT LTG, Assist Device  walker, rolling  -EDMUND     Gait Training Goal PT LTG, Distance to Achieve  75  -EDMUND     Gait Training Goal PT LTG, Date Goal  Reviewed 06/29/17  -      Gait Training Goal PT LTG, Outcome goal not met  -      Gait Training Goal PT LTG, Reason Goal Not Met discharged from facility  -      Strength Goal PT LTG    Strength Goal PT LTG, Date Established  06/21/17  -     Strength Goal PT LTG, Time to Achieve  by discharge  -EDMUND     Strength Goal PT LTG, Measure to Achieve  AROM/AAROM, 20 reps, B UE/LE, min assist  -EDMUND     Strength Goal PT LTG, Date Goal Reviewed 06/29/17  -      Strength Goal PT LTG, Outcome goal not met  -      Strength Goal PT LTG, Reason Goal Not Met discharged from facility  -        User Key  (r) = Recorded By, (t) = Taken By, (c) = Cosigned By    Initials Name Provider Type     Yesenia Merritt, MIYA Physical Therapy Assistant    EDMUND Cesar, PT DPT Physical Therapist              PT Discharge Summary  Reason for Discharge: Discharge from facility  Outcomes Achieved: Refer to plan of care for updates on goals achieved  Discharge Destination: [facility]      Yesenia Merritt PTA   6/29/2017

## 2017-06-30 LAB
BACTERIA SPEC RESP CULT: ABNORMAL
BACTERIA SPEC RESP CULT: ABNORMAL
GRAM STN SPEC: ABNORMAL

## 2017-06-30 NOTE — PLAN OF CARE
Problem: Inpatient SLP  Goal: Dysphagia- Patient will safely consume diet as per recommendation with no signs/symptoms of aspiration  Outcome: Unable to achieve outcome(s) by discharge Date Met:  06/30/17 06/22/17 1533 06/28/17 0851 06/30/17 1617   Safely Consume Diet   Safely Consume Diet- SLP, Date Established 06/22/17 --  --    Safely Consume Diet- SLP, Time to Achieve by discharge --  --    Safely Consume Diet- SLP, Additional Goal Pt will tolerate trials of current diet and upgraded consistencies with no overt s/s of aspiration. --  --    Safely Consume Diet- SLP, Date Goal Reviewed --  06/28/17 --    Safely Consume Diet- SLP, Outcome --  --  goal partially met   Safely Consume Diet- SLP, Reason Goal Not Met --  --  discharged from facility

## 2017-06-30 NOTE — PLAN OF CARE
Problem: Inpatient Occupational Therapy  Goal: Bed Mobility Goal LTG- OT  Outcome: Unable to achieve outcome(s) by discharge Date Met:  06/30/17 06/21/17 0852 06/30/17 0711   Bed Mobility OT LTG   Bed Mobility OT LTG, Date Established 06/21/17 --    Bed Mobility OT LTG, Time to Achieve by discharge --    Bed Mobility OT LTG, Activity Type all bed mobility --    Bed Mobility OT LTG, [place] Level minimum assist (75% patient effort) --    Bed Mobility OT LTG, Date Goal Reviewed --  06/30/17   Bed Mobility OT LTG, Outcome --  goal not met   Bed Mobility OT LTG, Reason Goal Not Met --  discharged from facility       Goal: Transfer Training Goal 1 LTG- OT  Outcome: Unable to achieve outcome(s) by discharge Date Met:  06/30/17 06/21/17 0852 06/30/17 0711   Transfer Training OT LTG   Transfer Training OT LTG, Date Established 06/21/17 --    Transfer Training OT LTG, Time to Achieve by discharge --    Transfer Training OT LTG, Activity Type bed to chair /chair to bed;sit to stand/stand to sit;toilet --    Transfer Training OT LTG, [place] Level moderate assist (50% patient effort) --    Transfer Training OT LTG, Assist Device commode, bedside;walker, rolling --    Transfer Training OT LTG, Date Goal Reviewed --  06/30/17   Transfer Training OT LTG, Outcome --  goal not met   Transfer Training OT LTG, Reason Goal Not Met --  discharged from facility       Goal: Strength Goal LTG- OT  Outcome: Unable to achieve outcome(s) by discharge Date Met:  06/30/17 06/21/17 0852 06/30/17 0711   Strength OT LTG   Strength Goal OT LTG, Date Established 06/21/17 --    Strength Goal OT LTG, Time to Achieve by discharge --    Strength Goal OT LTG, Measure to Achieve Pt. will complete BUE strengthening exercises to increase BUE strength to 4/5 for ADLs.  --    Strength Goal OT LTG, Date Goal Reviewed --  06/30/17   Strength Goal OT LTG, Outcome --  goal not met   Strength Goal OT LTG, Reason Goal Not Met --  discharged from  facility       Goal: ADL Goal LTG- OT  Outcome: Unable to achieve outcome(s) by discharge Date Met:  06/30/17 06/21/17 0852 06/30/17 0711   ADL OT LTG   ADL OT LTG, Date Established 06/21/17 --    ADL OT LTG, Time to Achieve by discharge --    ADL OT LTG, Activity Type ADL skills --    ADL OT LTG, [place] Level mod assist --    ADL OT LTG, Additional Goal AE PRN --    ADL OT LTG, Date Goal Reviewed --  06/30/17   ADL OT LTG, Outcome --  goal not met   ADL OT LTG, Reason Goal Not Met --  discharged from facility

## 2017-06-30 NOTE — THERAPY DISCHARGE NOTE
Acute Care - Speech Language Pathology Discharge Summary  [facility]       Patient Name: Renay Lazo  : 1929  MRN: [id number]    Today's Date: 2017  Onset of Illness/Injury or Date of Surgery Date: 17                Admit Date: 2017      SLP Recommendation and Plan  Pureed diet and honey thick liquids    Visit Dx:    ICD-10-CM ICD-9-CM   1. Closed displaced intertrochanteric fracture of right femur, initial encounter S72.141A 820.21   2. Closed subcapital fracture of neck of right femur, initial encounter S72.011A 820.09   3. Decreased activities of daily living (ADL) Z78.9 V49.89   4. Impaired mobility Z74.09 799.89   5. Oropharyngeal dysphagia R13.12 787.22                      SLP Goals       17 1617 17 0851 17 1438    Safely Consume Diet    Safely Consume Diet- SLP, Date Goal Reviewed  17  -CS 17  -TM (r) CM (t) TM (c)    Safely Consume Diet- SLP, Outcome goal partially met  -MB      Safely Consume Diet- SLP, Reason Goal Not Met discharged from facility  -MB        17 1404 17 1304 17 1037    Safely Consume Diet    Safely Consume Diet- SLP, Date Goal Reviewed 17  -CS 17  -MB 17  -TM      17 1533          Safely Consume Diet    Safely Consume Diet- SLP, Date Established 17  -MB      Safely Consume Diet- SLP, Time to Achieve by discharge  -MB      Safely Consume Diet- SLP, Additional Goal Pt will tolerate trials of current diet and upgraded consistencies with no overt s/s of aspiration.  -MB      Safely Consume Diet- SLP, Date Goal Reviewed 17  -MB      Safely Consume Diet- SLP, Outcome goal ongoing  -MB        User Key  (r) = Recorded By, (t) = Taken By, (c) = Cosigned By    Initials Name Provider Type    RED Breaux, CCC-SLP Speech and Language Pathologist    TM Marva Jamil, CCC-SLP Speech and Language Pathologist     Yordy Whiting, MS, CFY-SLP Speech and Language Pathologist     Yordy DALLAS  MS [place] CCC-SLP Speech and Language Pathologist    FLAKITA Ortiz, Speech Therapy Student Speech Therapy Student                  SLP Discharge Summary  Anticipated Discharge Disposition: skilled nursing facility  Reason for Discharge: Discharge from facility  Outcomes Achieved: Patient able to partially acheive established goals  Discharge Destination: SNF      Liss Breaux CCC-SLP  6/30/2017

## 2017-06-30 NOTE — THERAPY DISCHARGE NOTE
Acute Care - Occupational Therapy Discharge Summary  [facility]     Patient Name: Renay Lazo  : 1929  MRN: [id number]    Today's Date: 2017  Onset of Illness/Injury or Date of Surgery Date: 17    Date of Referral to OT: 17  Referring Physician: (S) Dr. Zaidi (Post hip precautions, WBAT)      Admit Date: 2017        OT Recommendation and Plan    Visit Dx:    ICD-10-CM ICD-9-CM   1. Closed displaced intertrochanteric fracture of right femur, initial encounter S72.141A 820.21   2. Closed subcapital fracture of neck of right femur, initial encounter S72.011A 820.09   3. Decreased activities of daily living (ADL) Z78.9 V49.89   4. Impaired mobility Z74.09 799.89   5. Oropharyngeal dysphagia R13.12 787.22                     OT Goals       17 0711 17 0852       Bed Mobility OT LTG    Bed Mobility OT LTG, Date Established  17  -ND     Bed Mobility OT LTG, Time to Achieve  by discharge  -ND     Bed Mobility OT LTG, Activity Type  all bed mobility  -ND     Bed Mobility OT LTG, Temperance Level  minimum assist (75% patient effort)  -ND     Bed Mobility OT LTG, Date Goal Reviewed 17  -TS      Bed Mobility OT LTG, Outcome goal not met  -TS      Bed Mobility OT LTG, Reason Goal Not Met discharged from facility  -TS      Transfer Training OT LTG    Transfer Training OT LTG, Date Established  17  -ND     Transfer Training OT LTG, Time to Achieve  by discharge  -ND     Transfer Training OT LTG, Activity Type  bed to chair /chair to bed;sit to stand/stand to sit;toilet  -ND     Transfer Training OT LTG, Temperance Level  moderate assist (50% patient effort)  -ND     Transfer Training OT LTG, Assist Device  commode, bedside;walker, rolling  -ND     Transfer Training OT LTG, Date Goal Reviewed 17  -TS      Transfer Training OT LTG, Outcome goal not met  -TS      Transfer Training OT LTG, Reason Goal Not Met discharged from facility  -TS      Strength OT LTG     Strength Goal OT LTG, Date Established  06/21/17  -ND     Strength Goal OT LTG, Time to Achieve  by discharge  -ND     Strength Goal OT LTG, Measure to Achieve  Pt. will complete BUE strengthening exercises to increase BUE strength to 4/5 for ADLs.   -ND     Strength Goal OT LTG, Date Goal Reviewed 06/30/17  -TS      Strength Goal OT LTG, Outcome goal not met  -TS      Strength Goal OT LTG, Reason Goal Not Met discharged from facility  -TS      ADL OT LTG    ADL OT LTG, Date Established  06/21/17  -ND     ADL OT LTG, Time to Achieve  by discharge  -ND     ADL OT LTG, Activity Type  ADL skills  -ND     ADL OT LTG, [place] Level  mod assist  -ND     ADL OT LTG, Additional Goal  AE PRN  -ND     ADL OT LTG, Date Goal Reviewed 06/30/17  -TS      ADL OT LTG, Outcome goal not met  -TS      ADL OT LTG, Reason Goal Not Met discharged from facility  -TS        User Key  (r) = Recorded By, (t) = Taken By, (c) = Cosigned By    Initials Name Provider Type     Julisa Balbuena, ALVAREZ/L Occupational Therapy Assistant    ND Jess Zamarripa, OTR/L Occupational Therapist                Outcome Measures       06/28/17 0700 06/27/17 0950       How much help from another person do you currently need...    Turning from your back to your side while in flat bed without using bedrails? 2  -CW      Moving from lying on back to sitting on the side of a flat bed without bedrails? 2  -CW      Moving to and from a bed to a chair (including a wheelchair)? 2  -CW      Standing up from a chair using your arms (e.g., wheelchair, bedside chair)? 2  -CW      Climbing 3-5 steps with a railing? 1  -CW      To walk in hospital room? 1  -CW      AM-PAC 6 Clicks Score 10  -CW      How much help from another is currently needed...    Putting on and taking off regular lower body clothing?  1  -CH     Bathing (including washing, rinsing, and drying)  2  -CH     Toileting (which includes using toilet bed pan or urinal)  2  -CH     Putting on and  taking off regular upper body clothing  3  -CH     Taking care of personal grooming (such as brushing teeth)  4  -CH     Eating meals  4  -CH     Score  16  -CH     Functional Assessment    Outcome Measure Options AM-PAC 6 Clicks Basic Mobility (PT)  -CW AM-PAC 6 Clicks Daily Activity (OT)  -CH       User Key  (r) = Recorded By, (t) = Taken By, (c) = Cosigned By    Initials Name Provider Type    CH Shagufta Mehta, OTR/L Occupational Therapist    CW Hoa Valentin, PTA Physical Therapy Assistant              OT Discharge Summary  Reason for Discharge: Discharge from facility  Outcomes Achieved: Refer to plan of care for updates on goals achieved  Discharge Destination: [facility]      LUTHER Daly  6/30/2017

## 2017-07-05 ENCOUNTER — PATIENT OUTREACH (OUTPATIENT)
Dept: CASE MANAGEMENT | Facility: OTHER | Age: 82
End: 2017-07-05

## 2017-07-12 ENCOUNTER — PATIENT OUTREACH (OUTPATIENT)
Dept: CASE MANAGEMENT | Facility: OTHER | Age: 82
End: 2017-07-12

## 2017-07-12 NOTE — OUTREACH NOTE
Per Carlotta at facility patient is receiving Physical therapy, Speech, continues on a honey thick liquid diet. Plan is for home at discharge.

## 2017-07-19 ENCOUNTER — PATIENT OUTREACH (OUTPATIENT)
Dept: CASE MANAGEMENT | Facility: OTHER | Age: 82
End: 2017-07-19

## 2017-07-26 ENCOUNTER — PATIENT OUTREACH (OUTPATIENT)
Dept: CASE MANAGEMENT | Facility: OTHER | Age: 82
End: 2017-07-26

## 2017-07-26 NOTE — OUTREACH NOTE
Per visit with nurse on wing at facility, patient remains skilled receiving physical and occupational therapy. Plan remains home at discharge. Next outreach planned in one week.

## 2017-08-02 ENCOUNTER — PATIENT OUTREACH (OUTPATIENT)
Dept: CASE MANAGEMENT | Facility: OTHER | Age: 82
End: 2017-08-02

## 2017-08-02 NOTE — OUTREACH NOTE
Per Reggie, nurse on wing at facility, patient is participating in physical, occupational and speech therapy. Plan is for home soon. CA met patient on visit, discussed follow up after discharge from skilled care.

## 2017-08-10 ENCOUNTER — PATIENT OUTREACH (OUTPATIENT)
Dept: CASE MANAGEMENT | Facility: OTHER | Age: 82
End: 2017-08-10

## 2017-08-17 ENCOUNTER — PATIENT OUTREACH (OUTPATIENT)
Dept: CASE MANAGEMENT | Facility: OTHER | Age: 82
End: 2017-08-17

## 2017-08-25 ENCOUNTER — PATIENT OUTREACH (OUTPATIENT)
Dept: CASE MANAGEMENT | Facility: OTHER | Age: 82
End: 2017-08-25

## 2017-08-25 NOTE — OUTREACH NOTE
Spoke with spouse Rajan. Stated home care has been visiting with physical, occupational therapy as well as nursing. Stated they have all medications and are following regimen as prescribed. Has a bedside commode, elevation for toilet, walker and a transport chair. Feels they are currently well managed, no questions or concerns voiced.

## 2017-08-28 NOTE — OUTREACH NOTE
Spoke with spouse, Rajan. Discharged from skilled care with home health. Stated patient is following prescribed medication regimen. Has needed DME, and feels transition to home is going well. No questions voiced at this time.

## 2017-08-29 ENCOUNTER — LAB REQUISITION (OUTPATIENT)
Dept: LAB | Facility: HOSPITAL | Age: 82
End: 2017-08-29

## 2017-08-29 DIAGNOSIS — Z00.00 ENCOUNTER FOR GENERAL ADULT MEDICAL EXAMINATION WITHOUT ABNORMAL FINDINGS: ICD-10-CM

## 2017-08-29 LAB — PHENYTOIN SERPL-MCNC: 9.9 MCG/ML (ref 10–20)

## 2017-08-29 PROCEDURE — 80185 ASSAY OF PHENYTOIN TOTAL: CPT | Performed by: INTERNAL MEDICINE

## 2017-09-28 ENCOUNTER — LAB REQUISITION (OUTPATIENT)
Dept: LAB | Facility: HOSPITAL | Age: 82
End: 2017-09-28

## 2017-09-28 DIAGNOSIS — Z00.00 ENCOUNTER FOR GENERAL ADULT MEDICAL EXAMINATION WITHOUT ABNORMAL FINDINGS: ICD-10-CM

## 2017-09-28 LAB — PHENYTOIN SERPL-MCNC: 12.3 MCG/ML (ref 10–20)

## 2017-09-28 PROCEDURE — 80185 ASSAY OF PHENYTOIN TOTAL: CPT | Performed by: INTERNAL MEDICINE

## 2017-10-10 ENCOUNTER — HOSPITAL ENCOUNTER (EMERGENCY)
Facility: HOSPITAL | Age: 82
Discharge: HOME | End: 2017-10-10
Attending: EMERGENCY MEDICINE | Admitting: EMERGENCY MEDICINE

## 2017-10-10 ENCOUNTER — APPOINTMENT (OUTPATIENT)
Dept: GENERAL RADIOLOGY | Facility: HOSPITAL | Age: 82
End: 2017-10-10

## 2017-10-10 VITALS
WEIGHT: 170 LBS | BODY MASS INDEX: 29.02 KG/M2 | RESPIRATION RATE: 18 BRPM | HEIGHT: 64 IN | SYSTOLIC BLOOD PRESSURE: 253 MMHG | OXYGEN SATURATION: 97 % | DIASTOLIC BLOOD PRESSURE: 72 MMHG | TEMPERATURE: 98.5 F | HEART RATE: 61 BPM

## 2017-10-10 DIAGNOSIS — S52.531A CLOSED COLLES' FRACTURE OF RIGHT RADIUS, INITIAL ENCOUNTER: Primary | ICD-10-CM

## 2017-10-10 PROCEDURE — 96372 THER/PROPH/DIAG INJ SC/IM: CPT

## 2017-10-10 PROCEDURE — 73502 X-RAY EXAM HIP UNI 2-3 VIEWS: CPT

## 2017-10-10 PROCEDURE — 73110 X-RAY EXAM OF WRIST: CPT

## 2017-10-10 PROCEDURE — 25010000002 HYDROMORPHONE PER 4 MG: Performed by: EMERGENCY MEDICINE

## 2017-10-10 PROCEDURE — 99283 EMERGENCY DEPT VISIT LOW MDM: CPT

## 2017-10-10 RX ORDER — HYDROMORPHONE HCL 110MG/55ML
2 PATIENT CONTROLLED ANALGESIA SYRINGE INTRAVENOUS ONCE
Status: COMPLETED | OUTPATIENT
Start: 2017-10-10 | End: 2017-10-10

## 2017-10-10 RX ORDER — TRAMADOL HYDROCHLORIDE 50 MG/1
50 TABLET ORAL EVERY 4 HOURS PRN
Qty: 12 TABLET | Refills: 0 | Status: SHIPPED | OUTPATIENT
Start: 2017-10-10 | End: 2019-02-25

## 2017-10-10 RX ADMIN — HYDROMORPHONE HYDROCHLORIDE 2 MG: 2 INJECTION, SOLUTION INTRAMUSCULAR; INTRAVENOUS; SUBCUTANEOUS at 20:56

## 2017-10-11 NOTE — ED PROVIDER NOTES
Subjective   HPI Comments: Patient tripped and then fell over herself landing on an outstretched right wrist.  Patient has an obvious deformity to the wrist.  She also reports right hip pain following the fall where she landed.  He was not able to stand and bear weight following the fall.      History provided by:  Patient      Review of Systems   Constitutional: Negative for activity change, fatigue and fever.   HENT: Negative for congestion, ear pain, facial swelling, postnasal drip, rhinorrhea, sinus pressure, sore throat and trouble swallowing.    Eyes: Negative for photophobia and redness.   Respiratory: Negative for chest tightness, shortness of breath and wheezing.    Cardiovascular: Negative for chest pain and palpitations.   Gastrointestinal: Negative for abdominal distention, abdominal pain, diarrhea, nausea and vomiting.   Genitourinary: Negative for difficulty urinating, dysuria and flank pain.   Musculoskeletal: Negative for back pain and neck pain.        Right wrist pain and right hip pain   Skin: Negative for color change and wound.   Neurological: Negative for dizziness, speech difficulty, weakness, light-headedness and headaches.   Psychiatric/Behavioral: Negative for agitation, confusion, self-injury and suicidal ideas.       Past Medical History:   Diagnosis Date   • Adrenal adenoma    • Anemia    • Atrial fibrillation    • Benign neoplasm of cerebral meninges    • Brain tumor    • CHF (congestive heart failure)    • Dementia    • Disease of thyroid gland    • Hip fracture requiring operative repair     7/2016   • Hyperlipidemia    • Hypertension    • Obstructive sleep apnea    • Osteoarthritis    • Peripheral vascular disease    • Pneumonia    • Seizures    • Sick sinus syndrome     s/p pacemaker per Dr. Puri   • TIA (transient ischemic attack)        Allergies   Allergen Reactions   • Chocolate Hives   • Ampicillin    • Benadryl [Diphenhydramine]    • Biaxin [Clarithromycin]    • Capoten  [Captopril]      Patient takes Lisinopril at home   • Ciprofloxacin    • Ciprofloxacin Hcl    • Codeine    • Contrast Dye    • Darvon [Propoxyphene]    • Diphenhydramine Hcl    • Dyazide [Triamterene-Hctz]    • Enablex [Darifenacin Hydrobromide Er]    • Gadolinium Derivatives    • Homatropine    • Hydrochlorothiazide W-Triamterene    • Hydrocodone    • Inderal La [Propranolol Hcl]      Patient takes Atenolol at Home   • Iodine    • Levaquin [Levofloxacin]    • Lortab [Hydrocodone-Acetaminophen]    • Lotrel [Amlodipine Besy-Benazepril Hcl]      Patient takes Amlodipine at home.   • Macrodantin [Nitrofurantoin Macrocrystal]    • Motrin [Ibuprofen]    • Macrodantin  [Nitrofurantoin]    • Other      Can now have only unenhanced CAT scan   • Percocet [Oxycodone-Acetaminophen]    • Phosphate    • Propranolol    • Qvar [Beclomethasone]    • Sulfa Antibiotics    • Terramycin [Oxytetracycline]    • Vesicare [Solifenacin]        Past Surgical History:   Procedure Laterality Date   • APPENDECTOMY     • BRAIN SURGERY     • CAROTID ENDARTERECTOMY  02/2011   • CATARACT EXTRACTION     • CHOLECYSTECTOMY     • HEMORRHOIDECTOMY     • HIP HEMIARTHROPLASTY Right 6/20/2017    Procedure: HIP HEMIARTHROPLASTY;  Surgeon: Jeremiah Zaidi MD;  Location: [facility];  Service:    • INSERT / REPLACE / REMOVE PACEMAKER     • JOINT REPLACEMENT     • PACEMAKER IMPLANTATION     • TONSILLECTOMY     • TOTAL HIP ARTHROPLASTY         Family History   Problem Relation Age of Onset   • Arthritis Mother    • Cancer Mother    • Heart disease Mother    • Arthritis Father    • Arthritis Brother    • Cancer Brother    • Heart disease Brother    • Arthritis Sister    • Heart disease Sister        Social History     Social History   • Marital status:      Spouse name: N/A   • Number of children: N/A   • Years of education: N/A     Occupational History   • Retired      Social History Main Topics   • Smoking status: Never Smoker   • Smokeless tobacco:  None   • Alcohol use No   • Drug use: No   • Sexual activity: Yes     Other Topics Concern   • None     Social History Narrative   • None           Objective   Physical Exam   Constitutional: She is oriented to person, place, and time. She appears well-developed and well-nourished.   HENT:   Head: Normocephalic and atraumatic.   Nose: Nose normal.   Mouth/Throat: Oropharynx is clear and moist.   Eyes: Conjunctivae and EOM are normal. Pupils are equal, round, and reactive to light.   Neck: Normal range of motion. Neck supple.   Cardiovascular: Normal rate, regular rhythm, normal heart sounds and intact distal pulses.    Pulmonary/Chest: Effort normal and breath sounds normal.   Abdominal: Soft. Bowel sounds are normal.   Musculoskeletal:        Right wrist: She exhibits decreased range of motion, tenderness, bony tenderness and deformity.        Right hip: She exhibits decreased range of motion, decreased strength, tenderness and bony tenderness. She exhibits no deformity.        Arms:       Legs:  Neurological: She is alert and oriented to person, place, and time.   Skin: Skin is warm and dry.   Psychiatric: She has a normal mood and affect. Her behavior is normal.   Nursing note and vitals reviewed.      Splint Application  Date/Time: 10/10/2017 10:26 PM  Performed by: SANDY ROLLE  Authorized by: SANDY ROLLE   Consent: Verbal consent obtained.  Risks and benefits: risks, benefits and alternatives were discussed  Consent given by: patient  Patient understanding: patient states understanding of the procedure being performed  Patient consent: the patient's understanding of the procedure matches consent given  Procedure consent: procedure consent matches procedure scheduled  Relevant documents: relevant documents present and verified  Patient identity confirmed: verbally with patient and hospital-assigned identification number  Location details: right wrist  Splint type: volar short arm  Supplies used:  Ortho-Glass  Post-procedure: The splinted body part was neurovascularly unchanged following the procedure.  Patient tolerance: Patient tolerated the procedure well with no immediate complications               ED Course  ED Course                  MDM  Number of Diagnoses or Management Options  Closed Colles' fracture of right radius, initial encounter: new and requires workup  Diagnosis management comments: Spoke with Dr. Ocampo he would like to see patient in his office at 1230 2 days from now.       Amount and/or Complexity of Data Reviewed  Tests in the radiology section of CPT®: ordered and reviewed  Discuss the patient with other providers: yes  Independent visualization of images, tracings, or specimens: yes    Risk of Complications, Morbidity, and/or Mortality  Presenting problems: moderate  Diagnostic procedures: moderate  Management options: moderate    Patient Progress  Patient progress: stable      Final diagnoses:   Closed Colles' fracture of right radius, initial encounter            Jeremiah Villa MD  10/10/17 7406

## 2017-12-22 NOTE — ANESTHESIA POSTPROCEDURE EVALUATION
Patient: Renay Lazo    Procedure Summary     Date Anesthesia Start Anesthesia Stop Room / Location    06/20/17 1753 [facility] / [facility]       Procedure Diagnosis Surgeon Provider    HIP HEMIARTHROPLASTY (Right Hip) Closed subcapital fracture of neck of right femur, initial encounter  (Closed subcapital fracture of neck of right femur, initial encounter [S72.011A]) MD Juan Gomez CRNA          Anesthesia Type: general  Last vitals  /67 (06/20/17 1945)    Temp 97.6 °F (36.4 °C) (06/20/17 1925)    Pulse 88 (06/20/17 1945)   Resp 16 (06/20/17 1945)    SpO2 95 % (06/20/17 1945)      Post Anesthesia Care and Evaluation    Patient location during evaluation: PACU  Patient participation: complete - patient cannot participate  Level of consciousness: awake  Pain scale: SANJUANA.  Airway patency: patent  Anesthetic complications: No anesthetic complications  PONV Status: none  Cardiovascular status: blood pressure returned to baseline (on nicardipine gtt (5mg/hr), restarted postop at preop dose)  Respiratory status: acceptable  Hydration status: acceptable    Comments: Short period of tachycardia (150s-160s) difficult to determine SVT vs Sinus Tach due to ecg waveform. BP remained stable, converted to 100% A paced 60-70 beats per minute following 10mg esmolol IVP. HR returned to 150s range for approximately 10-20 seconds before spontaneously returning to Apaced  beats per minute. Continued to monitor in PACU without subsequent episodes.       
01:18

## 2018-01-26 ENCOUNTER — TRANSCRIBE ORDERS (OUTPATIENT)
Dept: ADMINISTRATIVE | Facility: HOSPITAL | Age: 83
End: 2018-01-26

## 2018-01-26 DIAGNOSIS — R51.9 NONINTRACTABLE HEADACHE, UNSPECIFIED CHRONICITY PATTERN, UNSPECIFIED HEADACHE TYPE: Primary | ICD-10-CM

## 2018-01-31 ENCOUNTER — HOSPITAL ENCOUNTER (OUTPATIENT)
Dept: MRI IMAGING | Facility: HOSPITAL | Age: 83
Discharge: HOME | End: 2018-01-31
Attending: EMERGENCY MEDICINE | Admitting: EMERGENCY MEDICINE

## 2018-01-31 DIAGNOSIS — R51.9 NONINTRACTABLE HEADACHE, UNSPECIFIED CHRONICITY PATTERN, UNSPECIFIED HEADACHE TYPE: ICD-10-CM

## 2018-01-31 LAB — CREAT BLDA-MCNC: 0.7 MG/DL (ref 0.6–1.3)

## 2018-01-31 PROCEDURE — 82565 ASSAY OF CREATININE: CPT

## 2018-01-31 PROCEDURE — 70553 MRI BRAIN STEM W/O & W/DYE: CPT

## 2018-01-31 PROCEDURE — 0 GADOBENATE DIMEGLUMINE 529 MG/ML SOLUTION: Performed by: EMERGENCY MEDICINE

## 2018-01-31 PROCEDURE — A9577 INJ MULTIHANCE: HCPCS | Performed by: EMERGENCY MEDICINE

## 2018-01-31 RX ADMIN — GADOBENATE DIMEGLUMINE 10 ML: 529 INJECTION, SOLUTION INTRAVENOUS at 15:45

## 2018-02-05 ENCOUNTER — OFFICE VISIT (OUTPATIENT)
Dept: NEUROSURGERY | Facility: CLINIC | Age: 83
End: 2018-02-05

## 2018-02-05 VITALS
HEIGHT: 64 IN | DIASTOLIC BLOOD PRESSURE: 80 MMHG | BODY MASS INDEX: 29.02 KG/M2 | SYSTOLIC BLOOD PRESSURE: 146 MMHG | WEIGHT: 170 LBS

## 2018-02-05 DIAGNOSIS — Z78.9 NON-SMOKER: ICD-10-CM

## 2018-02-05 DIAGNOSIS — D32.9 MENINGIOMA (HCC): Primary | ICD-10-CM

## 2018-02-05 PROCEDURE — 99214 OFFICE O/P EST MOD 30 MIN: CPT | Performed by: NEUROLOGICAL SURGERY

## 2018-02-05 NOTE — PATIENT INSTRUCTIONS

## 2018-02-05 NOTE — PROGRESS NOTES
SUBJECTIVE:  Patient ID: Renay Lazo is a 89 y.o. female is here today for follow-up.    Chief Complaint: Headache  Chief Complaint   Patient presents with   • meningioma     patient had MRI @ [facility] and is here to discuss results; patient complaining of increasing headaches       HPI  89 old female known to the service with an following for several years for meningiomas.  She had a prior history of left pterional craniotomy for meningioma in 1971.  That left her with an exceptional amount of encephalomalacia in the cranium ectomy on the left.  She has a left frontoparietal meningioma and a left CP angle meningioma that we have been following.  They have been stable for many years.  She complains of left-sided headache gets worse with chewing.  No nausea no vomiting she has had a gradual decline in her functioning over the years that she got older.  She walks with a walker and struggles with her activities of daily living.    The following portions of the patient's history were reviewed and updated as appropriate: allergies, current medications, past family history, past medical history, past social history, past surgical history and problem list.    OBJECTIVE:    Review of Systems   Neurological: Positive for headaches.   All other systems reviewed and are negative.         Physical Exam   Constitutional: She is oriented to person, place, and time.   Neurological: She is oriented to person, place, and time. She has a normal Finger-Nose-Finger Test.   Psychiatric: Her speech is normal.       Neurologic Exam     Mental Status   Oriented to person, place, and time.   Attention: normal.   Speech: speech is normal   Level of consciousness: alert    Cranial Nerves   Cranial nerves II through XII intact.     Motor Exam   Muscle bulk: normal (Temporal wasting left)  Overall muscle tone: normal       Moves all extremities symmetrically with some left foot weakness     Sensory Exam   Light touch normal.   Pinprick normal.      Gait, Coordination, and Reflexes     Coordination   Finger to nose coordination: normal    Tremor   Resting tremor: absent  Intention tremor: absent  Action tremor: absent       Posture left foot drop, shuffling unsteady gait       Independent Review of Radiographic Studies:   MRI the brain with and without contrast shows an area of left encephalomalacia area of craniectomy and a homogeneously enhancing mass in the left frontal parietal area and a left CP angle mass.  Compared to his CT scans over the years going all way back to 2014 these masses are essentially unchanged.    ASSESSMENT/PLAN:  The patient has stable meningiomas and unchanged area of encephalomalacia in the left.  She does have severe temporal wasting in the pain in the headache that she is having on the left jaw and left head with talking may absolutely be related to her left temporal wasting it may be a version of TMJ that she is experiencing.  I cleaned this to her and her .  I would recommend that she see her dentist as a next step if this is truly bothering her.  There is no surgical issue that requires our attention.  I would recommend reimaging her in 1 year with an MRI.      1. Meningioma    2. Non-smoker    3. BMI 29.0-29.9,adult            Return in about 1 year (around 2/5/2019) for follow up w/scan - DR NG.      Sharath Ng MD

## 2018-02-14 ENCOUNTER — OFFICE VISIT (OUTPATIENT)
Dept: CARDIOLOGY | Facility: CLINIC | Age: 83
End: 2018-02-14

## 2018-02-14 ENCOUNTER — CLINICAL SUPPORT NO REQUIREMENTS (OUTPATIENT)
Dept: CARDIOLOGY | Facility: CLINIC | Age: 83
End: 2018-02-14

## 2018-02-14 VITALS
WEIGHT: 170 LBS | BODY MASS INDEX: 29.02 KG/M2 | SYSTOLIC BLOOD PRESSURE: 148 MMHG | DIASTOLIC BLOOD PRESSURE: 82 MMHG | HEART RATE: 64 BPM | RESPIRATION RATE: 18 BRPM | HEIGHT: 64 IN

## 2018-02-14 DIAGNOSIS — I51.89 DIASTOLIC DYSFUNCTION: ICD-10-CM

## 2018-02-14 DIAGNOSIS — I48.0 PAROXYSMAL ATRIAL FIBRILLATION (HCC): Primary | ICD-10-CM

## 2018-02-14 DIAGNOSIS — Z95.0 PACEMAKER: Primary | ICD-10-CM

## 2018-02-14 DIAGNOSIS — Z95.0 PACEMAKER: ICD-10-CM

## 2018-02-14 DIAGNOSIS — I49.5 SSS (SICK SINUS SYNDROME) (HCC): ICD-10-CM

## 2018-02-14 DIAGNOSIS — I48.0 PAROXYSMAL A-FIB (HCC): ICD-10-CM

## 2018-02-14 DIAGNOSIS — I10 ESSENTIAL HYPERTENSION: ICD-10-CM

## 2018-02-14 PROCEDURE — 93288 INTERROG EVL PM/LDLS PM IP: CPT | Performed by: INTERNAL MEDICINE

## 2018-02-14 PROCEDURE — 99214 OFFICE O/P EST MOD 30 MIN: CPT | Performed by: PHYSICIAN ASSISTANT

## 2018-02-14 PROCEDURE — 93000 ELECTROCARDIOGRAM COMPLETE: CPT | Performed by: PHYSICIAN ASSISTANT

## 2018-02-14 NOTE — PROGRESS NOTES
Subjective:     Encounter Date:02/14/2018      Patient ID: Renay Lazo is a 89 y.o. female w hx of advanced dementia, PAF, SSS s/p pacemaker, HTN who [facility]. She relates that she has had recurrent headaches lately. Her  tells me that she has seen a neurologist for this issue and was told it is TMJ. She denies any chest pain or excessive dyspnea. He tells me her legs swell occasionally, but improve with raising them at night.    Chief Complaint:  Atrial Fibrillation   Presents for follow-up visit. Symptoms are negative for chest pain, palpitations, shortness of breath and syncope. The symptoms have been stable. Past medical history includes atrial fibrillation. There are no medication compliance problems.   Hypertension   This is a chronic problem. The current episode started more than 1 year ago. The problem has been gradually improving since onset. Pertinent negatives include no chest pain, malaise/fatigue, orthopnea, palpitations, PND or shortness of breath. There are no associated agents to hypertension. Risk factors for coronary artery disease include dyslipidemia. Past treatments include beta blockers, calcium channel blockers and diuretics. Compliance problems include exercise and diet.  There is no history of CAD/MI. There is no history of chronic renal disease.       The following portions of the patient's history were reviewed and updated as appropriate: allergies, current medications, past family history, past medical history, past social history, past surgical history and problem list.    Review of Systems   Constitution: Negative for malaise/fatigue and weight gain.   Cardiovascular: Negative for chest pain, claudication, dyspnea on exertion, irregular heartbeat, leg swelling, near-syncope, orthopnea, palpitations, paroxysmal nocturnal dyspnea and syncope.   Respiratory: Negative for hemoptysis and shortness of breath.    Hematologic/Lymphatic:  "Negative for bleeding problem.   Skin: Negative for poor wound healing.   Musculoskeletal: Positive for falls. Negative for myalgias.   Gastrointestinal: Negative for melena, nausea and vomiting.   Genitourinary: Negative for hematuria.   Neurological: Negative for focal weakness and light-headedness.   Psychiatric/Behavioral: Negative for memory loss.   All other systems reviewed and are negative.        ECG 12 Lead  Date/Time: 2/14/2018 3:13 PM  Performed by: SPENCER LUJAN  Authorized by: SPENCER LUJAN   Comparison: compared with previous ECG from 6/19/2017  Similar to previous ECG  Rhythm: paced  Rate: normal  QRS axis: normal  Clinical impression: abnormal ECG               Objective:     Physical Exam   Constitutional: She is oriented to person, place, and time. She appears well-developed and well-nourished.   HENT:   Head: Normocephalic and atraumatic.   Eyes: Conjunctivae and EOM are normal. Pupils are equal, round, and reactive to light.   Neck: Normal range of motion. Neck supple. No JVD present.   Cardiovascular: Normal rate, regular rhythm, S1 normal, S2 normal, intact distal pulses and normal pulses.    Murmur heard.   Systolic murmur is present with a grade of 1/6  at the upper right sternal border  Pulmonary/Chest: Effort normal and breath sounds normal. No respiratory distress.   Abdominal: Soft. Bowel sounds are normal. She exhibits no distension.   Musculoskeletal: She exhibits no edema.   Neurological: She is alert and oriented to person, place, and time.   Skin: Skin is warm and dry.   Psychiatric: She has a normal mood and affect. Judgment normal.   Vitals reviewed.    /82 (BP Location: Right arm, Patient Position: Sitting, Cuff Size: Adult)  Pulse 64  Resp 18  Ht 162.6 cm (64\")  Wt 77.1 kg (170 lb)  Breastfeeding? No  BMI 29.18 kg/m2    Current Outpatient Prescriptions:   •  acetaminophen (TYLENOL) 325 MG tablet, Take 2 tablets by mouth Every 6 (Six) Hours As Needed for Mild Pain " (1-3)., Disp: , Rfl: 0  •  amLODIPine (NORVASC) 10 MG tablet, Take 1 tablet by mouth Daily., Disp: , Rfl:   •  aspirin 81 MG EC tablet, Take 81 mg by mouth Every Night., Disp: , Rfl:   •  bumetanide (BUMEX) 1 MG tablet, Take 1 mg by mouth daily., Disp: , Rfl:   •  carvedilol (COREG) 25 MG tablet, Take 1 tablet by mouth 2 (Two) Times a Day With Meals., Disp: , Rfl:   •  CloNIDine (CATAPRES-TTS) 0.2 MG/24HR patch, Place 1 patch on the skin 1 (One) Time Per Week., Disp: , Rfl:   •  cyanocobalamin 1000 MCG/ML injection, 1000 mg X1 on 6/29 then 1000mg every week X4, then monthly., Disp: , Rfl:   •  docusate sodium 100 MG capsule, Take 100 mg by mouth 2 (Two) Times a Day As Needed (constipation)., Disp: , Rfl: 0  •  enoxaparin (LOVENOX) 40 MG/0.4ML solution syringe, Inject 0.4 mL under the skin Daily., Disp: 11.2 mL, Rfl:   •  Ergocalciferol (VITAMIN D2 PO), Take 2,000 Units by mouth 2 (Two) Times a Week., Disp: , Rfl:   •  ferrous sulfate 325 (65 FE) MG tablet, Take 325 mg by mouth daily., Disp: , Rfl:   •  folic acid (FOLVITE) 1 MG tablet, Take 1 mg by mouth daily., Disp: , Rfl:   •  hydrALAZINE (APRESOLINE) 50 MG tablet, Take 1 tablet by mouth Every 8 (Eight) Hours., Disp: , Rfl:   •  HYDROmorphone (DILAUDID) 2 MG tablet, Take 0.5 tablets by mouth Every 6 (Six) Hours As Needed for Moderate Pain (4-6)., Disp: , Rfl: 0  •  lisinopril (PRINIVIL,ZESTRIL) 20 MG tablet, Take 1 tablet by mouth Daily., Disp: , Rfl:   •  Multiple Vitamin (MULTI VITAMIN DAILY PO), Take  by mouth., Disp: , Rfl:   •  phenytoin (DILANTIN) 100 MG ER capsule, Take 100 mg by mouth Every Morning., Disp: , Rfl:   •  phenytoin (DILANTIN) 100 MG ER capsule, Take 300 mg by mouth every night at bedtime., Disp: , Rfl:   •  potassium chloride (K-DUR,KLOR-CON) 20 MEQ CR tablet, Take 20 mEq by mouth 3 (Three) Times a Week., Disp: , Rfl:   •  pravastatin (PRAVACHOL) 40 MG tablet, Take 40 mg by mouth Every Night., Disp: , Rfl:   •  traMADol (ULTRAM) 50 MG tablet,  Take 1 tablet by mouth Every 4 (Four) Hours As Needed for Moderate Pain ., Disp: 12 tablet, Rfl: 0  Past Medical History:   Diagnosis Date   • Adrenal adenoma    • Anemia    • Atrial fibrillation    • Benign neoplasm of cerebral meninges    • Brain tumor    • CHF (congestive heart failure)    • Dementia    • Disease of thyroid gland    • Hip fracture requiring operative repair     7/2016   • Hyperlipidemia    • Hypertension    • Meningioma 2/5/2018   • Obstructive sleep apnea    • Osteoarthritis    • Peripheral vascular disease    • Pneumonia    • Seizures    • Sick sinus syndrome     s/p pacemaker per Dr. Puri   • TIA (transient ischemic attack)      Past Surgical History:   Procedure Laterality Date   • APPENDECTOMY     • BRAIN SURGERY     • CAROTID ENDARTERECTOMY  02/2011   • CATARACT EXTRACTION     • CHOLECYSTECTOMY     • HEMORRHOIDECTOMY     • HIP HEMIARTHROPLASTY Right 6/20/2017    Procedure: HIP HEMIARTHROPLASTY;  Surgeon: Jeremiah Zaidi MD;  Location: [facility];  Service:    • INSERT / REPLACE / REMOVE PACEMAKER     • JOINT REPLACEMENT     • PACEMAKER IMPLANTATION     • TONSILLECTOMY     • TOTAL HIP ARTHROPLASTY       Allergies   Allergen Reactions   • Chocolate Hives   • Ampicillin    • Benadryl [Diphenhydramine]    • Biaxin [Clarithromycin]    • Capoten [Captopril]      Patient takes Lisinopril at home   • Ciprofloxacin    • Ciprofloxacin Hcl    • Ciprofloxacin-Ciproflox Hcl Er    • Codeine    • Contrast Dye    • Darvon [Propoxyphene]    • Diphenhydramine Hcl    • Dyazide [Triamterene-Hctz]    • Enablex [Darifenacin Hydrobromide Er]    • Gadolinium Derivatives    • Homatropine    • Hydrochlorothiazide W-Triamterene    • Hydrocodone    • Inderal La [Propranolol Hcl]      Patient takes Atenolol at Home   • Iodine    • Levaquin [Levofloxacin]    • Lortab [Hydrocodone-Acetaminophen]    • Lotrel [Amlodipine Besy-Benazepril Hcl]      Patient takes Amlodipine at home.   • Macrodantin [Nitrofurantoin  Macrocrystal]    • Motrin [Ibuprofen]    • Macrodantin  [Nitrofurantoin]    • Other      Can now have only unenhanced CAT scan   • Percocet [Oxycodone-Acetaminophen]    • Phosphate    • Propranolol    • Qvar [Beclomethasone]    • Solifenacin Succinate    • Sulfa Antibiotics    • Terramycin [Oxytetracycline]    • Vesicare [Solifenacin]      Social History     Social History   • Marital status:      Spouse name: N/A   • Number of children: N/A   • Years of education: N/A     Occupational History   • Retired      Social History Main Topics   • Smoking status: Never Smoker   • Smokeless tobacco: Never Used   • Alcohol use No   • Drug use: No   • Sexual activity: Defer     Other Topics Concern   • Not on file     Social History Narrative     Family History   Problem Relation Age of Onset   • Arthritis Mother    • Cancer Mother    • Heart disease Mother    • Arthritis Father    • Arthritis Brother    • Cancer Brother    • Heart disease Brother    • Arthritis Sister    • Heart disease Sister            Assessment:          Diagnosis Plan   1. Paroxysmal atrial fibrillation     2. Essential hypertension     3. Diastolic dysfunction      No evidence of CHF   4. Pacemaker            Plan:       1. Unfortunately, patient's risk of anticoagulation outweigh her benefit at this point, given her advanced age, dementia and multiple falls resulting in multiple fractures within the last 9 months.   2. Continue present therapy otherwise  3. Pacemaker interrogation today  4. Follow-up in 6 months, unless needed sooner  5. Verbalized understanding of above plan

## 2018-02-14 NOTE — PROGRESS NOTES
Dual Chamber Pacemaker Evaluation Report  IN OFFICE    February 14, 2018    Primary Cardiologist: Jaxson  : Medtronic Model: Revo MRI  Implant date: 01/26/2012    Reason for evaluation: provider requested, requested by XAVI Benitez  Indication for pacemaker: sick sinus syndrome and paroxysmal a-fib    Measurements  Atrial sensing - P wave: 1.7 mV  Atrial threshold: N/R   Atrial lead impedance: 384 ohms  Ventricular sensing - R wave: 19.9 mV  Ventricular threshold: N/R  Ventricular lead impedance:   480 ohms     Diagnostic Data  Atrial paced: 93.7 %  Ventricular paced: 1.8 %  Other: 3 AT/AF episodes recorded:  Most recent on 2/12/2018; highest ventricular rate 146 bpm; longest duration 14.7 hours. 2 episodes of AT/AF treated unsuccessfully with ATP.  A-fib burden 5.4%.  Not a candidate for anticoagulation s/t history of multiple falls.  8 episodes recorded as NS-VT.  Highest ventricular rate 188, longest duration approx 3 seconds.  15 episodes recorded as Fast A&V:  highest ventricular rate 167 bpm; longest duration approx 44 seconds; appears to be SVT.    Battery status: satisfactory   2.95V (RRT=2.81V)      Final Parameters  Mode:  AAIR+  Lower rate: 60 bpm   Upper rate: 130 bpm  AV Delay: paced- 180 ms  Sensed-150 ms  Atrial - Amplitude: 2.5 V   Pulse width: 0.4 ms   Sensitivity: 0.3 mV     Ventricular - Amplitude: 3 V  Pulse width: 0.4 ms  Sensitivity: 0.9 mV    Changes made: N/A Quick Check  Conclusions: normal pacemaker function, stable pacing and sensing thresholds and adequate battery reserve    Follow up: 6 months remote; Patient's  educated by Joya Fregoso MA, on how to properly use home monitor.

## 2018-02-20 ENCOUNTER — EPISODE CHANGES (OUTPATIENT)
Dept: CASE MANAGEMENT | Facility: OTHER | Age: 83
End: 2018-02-20

## 2018-07-10 NOTE — PROGRESS NOTES
Ms. Lazo is 89 y.o. female    CHIEF COMPLAINT: I am here for urinary incontinence.    HPI  Incontinence  Location: Bladder/urethra  Quality:  Mostly with urge  Severity: Changes close up to 3 times daily  Duration: 10 years  Timing: Worse over the last 2-3 years  Modifying factors: Worse with hydration, running water, and caffeine  Associated signs or symptoms: Urinary urgency and frequency with occasional feeling of incomplete emptying.    The following portions of the patient's history were reviewed and updated as appropriate: allergies, current medications, past family history, past medical history, past social history, past surgical history and problem list.      Review of Systems   Constitutional: Positive for fatigue. Negative for appetite change, diaphoresis and fever.   HENT: Negative for facial swelling and sore throat.    Eyes: Negative for discharge and visual disturbance.   Respiratory: Negative for cough and shortness of breath.    Cardiovascular: Negative for chest pain and leg swelling.   Gastrointestinal: Negative for anal bleeding and vomiting.   Endocrine: Negative for cold intolerance and heat intolerance.   Genitourinary: Positive for frequency and urgency. Negative for flank pain, hematuria and pelvic pain.   Musculoskeletal: Negative for back pain and gait problem.   Skin: Negative for pallor and rash.   Allergic/Immunologic: Negative for food allergies and immunocompromised state.   Neurological: Negative for seizures and headaches.   Hematological: Negative for adenopathy. Does not bruise/bleed easily.   Psychiatric/Behavioral: Negative for dysphoric mood, self-injury and suicidal ideas.           Current Outpatient Prescriptions:   •  amLODIPine (NORVASC) 10 MG tablet, Take 1 tablet by mouth Daily., Disp: , Rfl:   •  aspirin 81 MG EC tablet, Take 81 mg by mouth Every Night., Disp: , Rfl:   •  bumetanide (BUMEX) 1 MG tablet, Take 1 mg by mouth daily., Disp: , Rfl:   •  CloNIDine  (CATAPRES-TTS) 0.2 MG/24HR patch, Place 1 patch on the skin 1 (One) Time Per Week., Disp: , Rfl:   •  cyanocobalamin 1000 MCG/ML injection, 1000 mg X1 on 6/29 then 1000mg every week X4, then monthly., Disp: , Rfl:   •  ferrous sulfate 325 (65 FE) MG tablet, Take 325 mg by mouth daily., Disp: , Rfl:   •  folic acid (FOLVITE) 1 MG tablet, Take 1 mg by mouth daily., Disp: , Rfl:   •  hydrALAZINE (APRESOLINE) 50 MG tablet, Take 1 tablet by mouth Every 8 (Eight) Hours., Disp: , Rfl:   •  lisinopril (PRINIVIL,ZESTRIL) 20 MG tablet, Take 1 tablet by mouth Daily., Disp: , Rfl:   •  phenytoin (DILANTIN) 100 MG ER capsule, Take 100 mg by mouth Every Morning., Disp: , Rfl:   •  pravastatin (PRAVACHOL) 40 MG tablet, Take 40 mg by mouth Every Night., Disp: , Rfl:   •  Vitamin D, Cholecalciferol, (CHOLECALCIFEROL) 400 units tablet, Take 400 Units by mouth Daily., Disp: , Rfl:   •  acetaminophen (TYLENOL) 325 MG tablet, Take 2 tablets by mouth Every 6 (Six) Hours As Needed for Mild Pain (1-3)., Disp: , Rfl: 0  •  carvedilol (COREG) 25 MG tablet, Take 1 tablet by mouth 2 (Two) Times a Day With Meals., Disp: , Rfl:   •  docusate sodium 100 MG capsule, Take 100 mg by mouth 2 (Two) Times a Day As Needed (constipation)., Disp: , Rfl: 0  •  enoxaparin (LOVENOX) 40 MG/0.4ML solution syringe, Inject 0.4 mL under the skin Daily., Disp: 11.2 mL, Rfl:   •  Ergocalciferol (VITAMIN D2 PO), Take 2,000 Units by mouth 2 (Two) Times a Week., Disp: , Rfl:   •  HYDROmorphone (DILAUDID) 2 MG tablet, Take 0.5 tablets by mouth Every 6 (Six) Hours As Needed for Moderate Pain (4-6)., Disp: , Rfl: 0  •  Multiple Vitamin (MULTI VITAMIN DAILY PO), Take  by mouth., Disp: , Rfl:   •  potassium chloride (K-DUR,KLOR-CON) 20 MEQ CR tablet, Take 20 mEq by mouth 3 (Three) Times a Week., Disp: , Rfl:   •  traMADol (ULTRAM) 50 MG tablet, Take 1 tablet by mouth Every 4 (Four) Hours As Needed for Moderate Pain ., Disp: 12 tablet, Rfl: 0  •  trospium 60 MG capsule  sustained-release 24 hr capsule, Take 1 capsule by mouth Every Morning for 360 days., Disp: 90 capsule, Rfl: 3    Past Medical History:   Diagnosis Date   • Adrenal adenoma    • Anemia    • Atrial fibrillation (CMS/HCC)    • Benign neoplasm of cerebral meninges (CMS/HCC)    • Brain tumor (CMS/HCC)    • CHF (congestive heart failure) (CMS/HCC)    • Dementia    • Disease of thyroid gland    • Hip fracture requiring operative repair (CMS/HCC)     7/2016   • Hyperlipidemia    • Hypertension    • Meningioma (CMS/HCC) 2/5/2018   • Obstructive sleep apnea    • Osteoarthritis    • Peripheral vascular disease (CMS/HCC)    • Pneumonia    • Seizures (CMS/HCC)    • Sick sinus syndrome (CMS/HCC)     s/p pacemaker per Dr. Puri   • TIA (transient ischemic attack)        Past Surgical History:   Procedure Laterality Date   • APPENDECTOMY     • BRAIN SURGERY     • CAROTID ENDARTERECTOMY  02/2011   • CATARACT EXTRACTION     • CHOLECYSTECTOMY     • HEMORRHOIDECTOMY     • HIP HEMIARTHROPLASTY Right 6/20/2017    Procedure: HIP HEMIARTHROPLASTY;  Surgeon: Jeremiah Zaidi MD;  Location: [facility];  Service:    • INSERT / REPLACE / REMOVE PACEMAKER     • JOINT REPLACEMENT     • PACEMAKER IMPLANTATION     • TONSILLECTOMY     • TOTAL HIP ARTHROPLASTY         Social History     Social History   • Marital status:      Occupational History   • Retired      Social History Main Topics   • Smoking status: Never Smoker   • Smokeless tobacco: Never Used   • Alcohol use No   • Drug use: No   • Sexual activity: Defer     Other Topics Concern   • Not on file       Family History   Problem Relation Age of Onset   • Arthritis Mother    • Cancer Mother    • Heart disease Mother    • Arthritis Father    • Arthritis Brother    • Cancer Brother    • Heart disease Brother    • Arthritis Sister    • Heart disease Sister          Wt 74.8 kg (165 lb)   BMI 28.32 kg/m²       Physical Exam  Constitutional: The patient  is oriented to person, place,  and time. They  appear well-developed and well-nourished. No distress.   Pulmonary/Chest: Effort normal.   Abdominal: Soft. The patient exhibits no distension and no mass. There is no tenderness. There is no rebound and no guarding. No hernia.   Neurological: Patient is alert and oriented to person, place, and time.   Skin: Skin is warm and dry. Not diaphoretic.   Psychiatric:  normal mood and affect.   Vitals reviewed.      Data  Results for orders placed or performed in visit on 07/12/18   POC Urinalysis Dipstick, Multipro   Result Value Ref Range    Color Yellow Yellow, Straw, Dark Yellow, Sharonda    Clarity, UA Clear Clear    Glucose, UA Negative Negative, 1000 mg/dL (3+) mg/dL    Bilirubin Negative Negative    Ketones, UA Negative Negative    Specific Gravity  1.020 1.005 - 1.030    Blood, UA Negative Negative    pH, Urine 5.5 5.0 - 8.0    Protein, POC 30 mg/dL (A) Negative mg/dL    Urobilinogen, UA Normal Normal    Nitrite, UA Positive (A) Negative    Leukocytes Small (1+) (A) Negative     Microscopic Urinalysis  I inspected the urine myself based on the clinical situation including the dipstick urine. The urine is spun in a centrifuge for three minutes. The spun urine shows none rbc/hpf, 0-2 wbc/hpf, 3-6 epi/hpf, trace bacteria, negative crystals, and negative casts.   Bladder Scan interpretation  Estimation of residual urine via abdominal ultrasound  Residual Urine: 0 ml  Indication: Urinary Incontinence   Position: Supine  Examination: Incremental scanning of the suprapubic area using 3 MHz transducer using copious amounts of acoustic gel.   Findings: An anechoic area was demonstrated which represented the bladder, with measurement of residual urine as noted. I inspected this myself. In that the residual urine was stable or insignificant, no treatment will be necessary at this time.         Assessment and Plan  Diagnoses and all orders for this visit:    Urge incontinence of urine  -     POC Urinalysis  Dipstick, Multipro  -     trospium 60 MG capsule sustained-release 24 hr capsule; Take 1 capsule by mouth Every Morning for 360 days.    The patient's symptoms are most consistent with overactive bladder.  It is recommended that the patient watch caffeine and alcohol intake, overall fluid intake, and avoid spicy foods.  Pelvic floor exercises are explained to the patient including relaxation techniques for controlling leakage as well as interrupting involuntary contractions of the bladder. Further evaluation will include bladder scan and listed above.  The patient is urged to maximize bowel function with strategies to avoid constipation being discussed.  Possible pharmaceutical options including anticholinergic, beta-3 agonist, and use of imipramine are discussed.  I have recommended a trial of trospium with a discussion regarding mechanism of actions and side effects.       Otto Altamirano MD  07/12/18  11:31 AM      Cc:Dr Dumont

## 2018-07-12 ENCOUNTER — OFFICE VISIT (OUTPATIENT)
Dept: UROLOGY | Facility: CLINIC | Age: 83
End: 2018-07-12

## 2018-07-12 VITALS — BODY MASS INDEX: 28.32 KG/M2 | WEIGHT: 165 LBS

## 2018-07-12 DIAGNOSIS — N39.41 URGE INCONTINENCE OF URINE: Primary | ICD-10-CM

## 2018-07-12 LAB
BILIRUB BLD-MCNC: NEGATIVE MG/DL
CLARITY, POC: CLEAR
COLOR UR: YELLOW
GLUCOSE UR STRIP-MCNC: NEGATIVE MG/DL
KETONES UR QL: NEGATIVE
LEUKOCYTE EST, POC: ABNORMAL
NITRITE UR-MCNC: POSITIVE MG/ML
PH UR: 5.5 [PH] (ref 5–8)
PROT UR STRIP-MCNC: ABNORMAL MG/DL
RBC # UR STRIP: NEGATIVE /UL
SP GR UR: 1.02 (ref 1–1.03)
UROBILINOGEN UR QL: NORMAL

## 2018-07-12 PROCEDURE — 81002 URINALYSIS NONAUTO W/O SCOPE: CPT | Performed by: UROLOGY

## 2018-07-12 PROCEDURE — 99204 OFFICE O/P NEW MOD 45 MIN: CPT | Performed by: UROLOGY

## 2018-07-12 PROCEDURE — 51798 US URINE CAPACITY MEASURE: CPT | Performed by: UROLOGY

## 2018-07-12 RX ORDER — TROSPIUM CHLORIDE ER 60 MG/1
60 CAPSULE ORAL EVERY MORNING
Qty: 30 CAPSULE | Refills: 11 | Status: SHIPPED | OUTPATIENT
Start: 2018-07-12 | End: 2018-07-12 | Stop reason: SDUPTHER

## 2018-07-12 RX ORDER — ERGOCALCIFEROL (VITAMIN D2) 10 MCG
400 TABLET ORAL DAILY
COMMUNITY
End: 2019-09-13

## 2018-07-12 RX ORDER — TROSPIUM CHLORIDE ER 60 MG/1
60 CAPSULE ORAL EVERY MORNING
Qty: 90 CAPSULE | Refills: 3 | Status: SHIPPED | OUTPATIENT
Start: 2018-07-12 | End: 2019-06-23 | Stop reason: SDUPTHER

## 2018-07-12 NOTE — PATIENT INSTRUCTIONS

## 2018-08-20 ENCOUNTER — CLINICAL SUPPORT NO REQUIREMENTS (OUTPATIENT)
Dept: CARDIOLOGY | Facility: CLINIC | Age: 83
End: 2018-08-20

## 2018-08-20 ENCOUNTER — OFFICE VISIT (OUTPATIENT)
Dept: CARDIOLOGY | Facility: CLINIC | Age: 83
End: 2018-08-20

## 2018-08-20 VITALS
SYSTOLIC BLOOD PRESSURE: 204 MMHG | BODY MASS INDEX: 28.17 KG/M2 | OXYGEN SATURATION: 98 % | HEIGHT: 64 IN | HEART RATE: 64 BPM | WEIGHT: 165 LBS | DIASTOLIC BLOOD PRESSURE: 78 MMHG

## 2018-08-20 DIAGNOSIS — Z95.0 PACEMAKER: Primary | ICD-10-CM

## 2018-08-20 DIAGNOSIS — I49.5 SSS (SICK SINUS SYNDROME) (HCC): ICD-10-CM

## 2018-08-20 DIAGNOSIS — I10 ESSENTIAL HYPERTENSION: ICD-10-CM

## 2018-08-20 DIAGNOSIS — Z95.0 PACEMAKER: ICD-10-CM

## 2018-08-20 DIAGNOSIS — I48.0 PAROXYSMAL ATRIAL FIBRILLATION (HCC): Primary | ICD-10-CM

## 2018-08-20 PROCEDURE — 93000 ELECTROCARDIOGRAM COMPLETE: CPT | Performed by: INTERNAL MEDICINE

## 2018-08-20 PROCEDURE — 99214 OFFICE O/P EST MOD 30 MIN: CPT | Performed by: INTERNAL MEDICINE

## 2018-08-20 PROCEDURE — 93280 PM DEVICE PROGR EVAL DUAL: CPT | Performed by: INTERNAL MEDICINE

## 2018-08-20 NOTE — PROGRESS NOTES
Referring Provider: Rajan Dumont MD    Reason for Follow-up Visit: PAF    Subjective .   Chief Complaint:   Chief Complaint   Patient presents with   • Follow-up     6 month fu   • Atrial Fibrillation     pt states no palpitations, some sob and fatigue at times.   • Hypertension     does not check at home.  its high today.       History of present illness:  Renay Lazo is a 89 y.o. yo female with history of PAF and HTN who is in for routine follow up. Denies CP. Has chronic ROGERS        History  Past Medical History:   Diagnosis Date   • Adrenal adenoma    • Anemia    • Atrial fibrillation (CMS/HCC)    • Benign neoplasm of cerebral meninges (CMS/HCC)    • Brain tumor (CMS/HCC)    • CHF (congestive heart failure) (CMS/HCC)    • Dementia    • Disease of thyroid gland    • Hip fracture requiring operative repair (CMS/HCC)     7/2016   • Hyperlipidemia    • Hypertension    • Meningioma (CMS/HCC) 2/5/2018   • Obstructive sleep apnea    • Osteoarthritis    • Peripheral vascular disease (CMS/HCC)    • Pneumonia    • Seizures (CMS/HCC)    • Sick sinus syndrome (CMS/HCC)     s/p pacemaker per Dr. Puri   • TIA (transient ischemic attack)    ,   Past Surgical History:   Procedure Laterality Date   • APPENDECTOMY     • BRAIN SURGERY     • CAROTID ENDARTERECTOMY  02/2011   • CATARACT EXTRACTION     • CHOLECYSTECTOMY     • COLONOSCOPY     • HEMORRHOIDECTOMY     • HIP HEMIARTHROPLASTY Right 6/20/2017    Procedure: HIP HEMIARTHROPLASTY;  Surgeon: Jeremiah Zaidi MD;  Location: [facility];  Service:    • INSERT / REPLACE / REMOVE PACEMAKER     • JOINT REPLACEMENT     • PACEMAKER IMPLANTATION     • TONSILLECTOMY     • TOTAL HIP ARTHROPLASTY     ,   Family History   Problem Relation Age of Onset   • Arthritis Mother    • Cancer Mother    • Heart disease Mother    • Arthritis Father    • Arthritis Brother    • Cancer Brother    • Heart disease Brother    • Arthritis Sister    • Heart disease Sister    ,   Social  History   Substance Use Topics   • Smoking status: Never Smoker   • Smokeless tobacco: Never Used   • Alcohol use No   ,     Medications  Current Outpatient Prescriptions   Medication Sig Dispense Refill   • acetaminophen (TYLENOL) 325 MG tablet Take 2 tablets by mouth Every 6 (Six) Hours As Needed for Mild Pain (1-3).  0   • amLODIPine (NORVASC) 10 MG tablet Take 1 tablet by mouth Daily.     • aspirin 81 MG EC tablet Take 81 mg by mouth Every Night.     • bumetanide (BUMEX) 1 MG tablet Take 1 mg by mouth daily.     • carvedilol (COREG) 25 MG tablet Take 1 tablet by mouth 2 (Two) Times a Day With Meals.     • CloNIDine (CATAPRES-TTS) 0.2 MG/24HR patch Place 1 patch on the skin 1 (One) Time Per Week.     • docusate sodium 100 MG capsule Take 100 mg by mouth 2 (Two) Times a Day As Needed (constipation).  0   • ferrous sulfate 325 (65 FE) MG tablet Take 325 mg by mouth daily.     • folic acid (FOLVITE) 1 MG tablet Take 1 mg by mouth daily.     • hydrALAZINE (APRESOLINE) 50 MG tablet Take 1 tablet by mouth Every 8 (Eight) Hours.     • lisinopril (PRINIVIL,ZESTRIL) 20 MG tablet Take 1 tablet by mouth Daily.     • Multiple Vitamin (MULTI VITAMIN DAILY PO) Take  by mouth.     • phenytoin (DILANTIN) 100 MG ER capsule Take 100 mg by mouth Every Morning.     • potassium chloride (K-DUR,KLOR-CON) 20 MEQ CR tablet Take 20 mEq by mouth 3 (Three) Times a Week.     • pravastatin (PRAVACHOL) 40 MG tablet Take 40 mg by mouth Every Night.     • trospium 60 MG capsule sustained-release 24 hr capsule Take 1 capsule by mouth Every Morning for 360 days. 90 capsule 3   • Vitamin D, Cholecalciferol, (CHOLECALCIFEROL) 400 units tablet Take 400 Units by mouth Daily.     • cyanocobalamin 1000 MCG/ML injection 1000 mg X1 on 6/29 then 1000mg every week X4, then monthly.     • enoxaparin (LOVENOX) 40 MG/0.4ML solution syringe Inject 0.4 mL under the skin Daily. 11.2 mL    • Ergocalciferol (VITAMIN D2 PO) Take 2,000 Units by mouth 2 (Two) Times a  "Week.     • HYDROmorphone (DILAUDID) 2 MG tablet Take 0.5 tablets by mouth Every 6 (Six) Hours As Needed for Moderate Pain (4-6).  0   • traMADol (ULTRAM) 50 MG tablet Take 1 tablet by mouth Every 4 (Four) Hours As Needed for Moderate Pain . 12 tablet 0     No current facility-administered medications for this visit.        Allergies:  Chocolate; Codeine; Ciprofloxacin; Biaxin [clarithromycin]; Capoten [captopril]; Ciprofloxacin hcl; Ciprofloxacin-ciproflox hcl er; Darvon [propoxyphene]; Diphenhydramine hcl; Dyazide [triamterene-hctz]; Enablex [darifenacin hydrobromide er]; Gadolinium derivatives; Homatropine; Hydrochlorothiazide w-triamterene; Hydrocodone; Inderal la [propranolol hcl]; Iodine; Levaquin [levofloxacin]; Lortab [hydrocodone-acetaminophen]; Lotrel [amlodipine besy-benazepril hcl]; Macrodantin  [nitrofurantoin]; Macrodantin [nitrofurantoin macrocrystal]; Motrin [ibuprofen]; Other; Percocet [oxycodone-acetaminophen]; Phosphate; Propranolol; Qvar [beclomethasone]; Solifenacin succinate; Sulfa antibiotics; Terramycin [oxytetracycline]; Vesicare [solifenacin]; Ampicillin; Benadryl [diphenhydramine]; and Contrast dye    Review of Systems  Review of Systems   HENT: Negative for nosebleeds.    Cardiovascular: Positive for dyspnea on exertion. Negative for chest pain, claudication, irregular heartbeat, leg swelling, near-syncope, orthopnea, palpitations, paroxysmal nocturnal dyspnea and syncope.   Respiratory: Positive for shortness of breath. Negative for cough and hemoptysis.    Gastrointestinal: Negative for dysphagia, hematemesis and melena.   Genitourinary: Negative for hematuria.   All other systems reviewed and are negative.      Objective     Physical Exam:  BP (!) 204/78 (BP Location: Left arm, Patient Position: Sitting, Cuff Size: Adult)   Pulse 64   Ht 162.6 cm (64\")   Wt 74.8 kg (165 lb)   SpO2 98%   BMI 28.32 kg/m²   Physical Exam   Constitutional: She is oriented to person, place, and time. " She appears well-developed and well-nourished. No distress.   HENT:   Head: Normocephalic and atraumatic.   Eyes: No scleral icterus.   Neck: Normal range of motion.   Cardiovascular: Normal rate, regular rhythm and normal heart sounds.  Exam reveals no gallop and no friction rub.    No murmur heard.  Pulmonary/Chest: Effort normal and breath sounds normal. No respiratory distress. She has no wheezes. She has no rales.   Abdominal: Soft. Bowel sounds are normal. She exhibits no distension. There is no tenderness.   Musculoskeletal: She exhibits no edema.   Neurological: She is alert and oriented to person, place, and time. No cranial nerve deficit.   Skin: Skin is warm and dry. She is not diaphoretic. No erythema.   Psychiatric: She has a normal mood and affect. Her behavior is normal.       Results Review:    ECG 12 Lead  Date/Time: 8/20/2018 12:36 PM  Performed by: ANNEMARIE RUTLEDGE  Authorized by: ANNEMARIE RUTLEDGE   Comparison: compared with previous ECG   Similar to previous ECG  Rhythm: sinus rhythm and paced  Rate: normal  Clinical impression: non-specific ECG            Office Visit on 07/12/2018   Component Date Value Ref Range Status   • Color 07/12/2018 Yellow  Yellow, Straw, Dark Yellow, Sharonda Final   • Clarity, UA 07/12/2018 Clear  Clear Final   • Glucose, UA 07/12/2018 Negative  Negative, 1000 mg/dL (3+) mg/dL Final   • Bilirubin 07/12/2018 Negative  Negative Final   • Ketones, UA 07/12/2018 Negative  Negative Final   • Specific Gravity  07/12/2018 1.020  1.005 - 1.030 Final   • Blood, UA 07/12/2018 Negative  Negative Final   • pH, Urine 07/12/2018 5.5  5.0 - 8.0 Final   • Protein, POC 07/12/2018 30 mg/dL* Negative mg/dL Final   • Urobilinogen, UA 07/12/2018 Normal  Normal Final   • Nitrite, UA 07/12/2018 Positive* Negative Final   • Leukocytes 07/12/2018 Small (1+)* Negative Final       Assessment/Plan   Renay was seen today for follow-up, atrial fibrillation and hypertension.    Diagnoses and all orders for  this visit:    Paroxysmal atrial fibrillation (CMS/HCC), she is not aware of any recurrence. Will check pacemaker telemetry    Essential hypertension, running high today. She will take another clonidine when she gets home    Pacemaker, appropriate function    Other orders  -     ECG 12 Lead        Patient's Body mass index is 28.32 kg/m². BMI is above normal parameters. Recommendations include: exercise counseling.

## 2018-08-24 NOTE — PROGRESS NOTES
Dual Chamber Pacemaker Evaluation Report  IN OFFICE INTERROGATION    August 24, 2018     Interrogation Date:  8/20/2018    Primary Cardiologist: Jaxson  : Medtronic Model: Revo MRI [id number]  Implant date: 1/26/2012    Reason for evaluation: routine  Indication for pacemaker: sick sinus syndrome    Measurements  Atrial sensing - P wave: N/R  Atrial threshold: 0.5V@ 0.4ms  Atrial lead impedance: 440 ohms  Ventricular sensing - R wave: N/R  Ventricular threshold: 1 V @ 0.4 ms  Ventricular lead impedance:   488 ohms     Diagnostic Data  Atrial paced: 98.6 %  Ventricular paced: 0.3 %    Episodes recorded since 2/14/2018  28 fast A&V episodes, longest duration 3 minutes, max rate 171 bpm.  Paroxysmal AF noted:  Longest duration 47 hours.  AT/AF burden 1%.  Not anticoagulated s/t high fall risk.  Several NS-VT episodes, longest duration 3 seconds, max ventricular rate 165 bpm    Battery status: satisfactory, 2.95V.  RN spoke with [facility], who estimated 2.05-2.5 years remaining.      Final Parameters  Mode:  AAIR+  Lower rate: 60 bpm   Upper rate: 130 bpm  AV Delay: paced- 180 ms  Sensed-150 ms  Atrial - Amplitude: 2.5 V   Pulse width: 0.4 ms   Sensitivity: 0.3 mV     Ventricular - Amplitude: 2 V  Pulse width: 0.4 ms  Sensitivity: 0.9 mV    Changes made: Normal emir RV output changed to 2V based on threshold testing.  Conclusions: normal pacemaker function, stable pacing and sensing thresholds and adequate battery reserve    Follow up: 6 months

## 2018-11-14 ENCOUNTER — TELEPHONE (OUTPATIENT)
Dept: UROLOGY | Facility: CLINIC | Age: 83
End: 2018-11-14

## 2018-11-14 NOTE — TELEPHONE ENCOUNTER
Called patient to reschedule her missed appointment. Left message with patient to call back and schedule when she is feeling better.

## 2019-01-08 ENCOUNTER — TRANSCRIBE ORDERS (OUTPATIENT)
Dept: GENERAL RADIOLOGY | Facility: HOSPITAL | Age: 84
End: 2019-01-08

## 2019-01-08 ENCOUNTER — HOSPITAL ENCOUNTER (OUTPATIENT)
Dept: ULTRASOUND IMAGING | Facility: HOSPITAL | Age: 84
Discharge: HOME | End: 2019-01-08
Attending: EMERGENCY MEDICINE

## 2019-01-08 ENCOUNTER — HOSPITAL ENCOUNTER (OUTPATIENT)
Dept: GENERAL RADIOLOGY | Facility: HOSPITAL | Age: 84
Discharge: HOME | End: 2019-01-08
Attending: EMERGENCY MEDICINE | Admitting: EMERGENCY MEDICINE

## 2019-01-08 ENCOUNTER — TRANSCRIBE ORDERS (OUTPATIENT)
Dept: ADMINISTRATIVE | Facility: HOSPITAL | Age: 84
End: 2019-01-08

## 2019-01-08 DIAGNOSIS — I65.23 BILATERAL CAROTID ARTERY OCCLUSION: ICD-10-CM

## 2019-01-08 DIAGNOSIS — I77.9 CAROTID ARTERY DISEASE, UNSPECIFIED LATERALITY (HCC): Primary | ICD-10-CM

## 2019-01-08 DIAGNOSIS — I77.9 CAROTID ARTERY DISEASE, UNSPECIFIED LATERALITY (HCC): ICD-10-CM

## 2019-01-08 DIAGNOSIS — M79.601 RIGHT ARM PAIN: Primary | ICD-10-CM

## 2019-01-08 DIAGNOSIS — M79.601 RIGHT ARM PAIN: ICD-10-CM

## 2019-01-08 PROCEDURE — 73060 X-RAY EXAM OF HUMERUS: CPT

## 2019-01-08 PROCEDURE — 93880 EXTRACRANIAL BILAT STUDY: CPT

## 2019-01-30 ENCOUNTER — TELEPHONE (OUTPATIENT)
Dept: NEUROSURGERY | Facility: CLINIC | Age: 84
End: 2019-01-30

## 2019-01-30 DIAGNOSIS — D32.9 MENINGIOMA (HCC): Primary | ICD-10-CM

## 2019-01-30 NOTE — TELEPHONE ENCOUNTER
"Patient's  called & cancelled her appt with Dr Schmid.  I called him back to reschedule b/c this is for an MRI follow up (meningioma) and the cancellation note says \"she no longer has the symptoms\".  I explained to him it wasn't about symptoms it was to follow up on the meningioma.  He states he didn't know that and when he cancelled the appts no one explained that to him.  He does want to reschedule for says it has to be in warm weather b/c they do not get out when it is cold and they have no one that can bring them until later.  He requested appt be moved to May.  I have rescheduled her appt to 5/21/19 and will place new order for MRI to be scheduled the same day.      christin fang CMA      Of note: patient has allergy to contrast dye and will be pre-treated.      christin fang CMA  "

## 2019-02-05 ENCOUNTER — APPOINTMENT (OUTPATIENT)
Dept: MRI IMAGING | Facility: HOSPITAL | Age: 84
End: 2019-02-05
Attending: NEUROLOGICAL SURGERY

## 2019-02-25 ENCOUNTER — OFFICE VISIT (OUTPATIENT)
Dept: CARDIOLOGY | Facility: CLINIC | Age: 84
End: 2019-02-25

## 2019-02-25 ENCOUNTER — CLINICAL SUPPORT NO REQUIREMENTS (OUTPATIENT)
Dept: CARDIOLOGY | Facility: CLINIC | Age: 84
End: 2019-02-25

## 2019-02-25 VITALS
HEART RATE: 93 BPM | BODY MASS INDEX: 28.68 KG/M2 | SYSTOLIC BLOOD PRESSURE: 140 MMHG | DIASTOLIC BLOOD PRESSURE: 80 MMHG | OXYGEN SATURATION: 94 % | WEIGHT: 168 LBS | HEIGHT: 64 IN | RESPIRATION RATE: 18 BRPM

## 2019-02-25 DIAGNOSIS — I49.5 SSS (SICK SINUS SYNDROME) (HCC): ICD-10-CM

## 2019-02-25 DIAGNOSIS — Z95.0 PACEMAKER: ICD-10-CM

## 2019-02-25 DIAGNOSIS — I51.89 DIASTOLIC DYSFUNCTION: ICD-10-CM

## 2019-02-25 DIAGNOSIS — I48.0 PAROXYSMAL ATRIAL FIBRILLATION (HCC): Primary | ICD-10-CM

## 2019-02-25 DIAGNOSIS — G47.33 OBSTRUCTIVE SLEEP APNEA: ICD-10-CM

## 2019-02-25 DIAGNOSIS — Z95.0 PACEMAKER: Primary | ICD-10-CM

## 2019-02-25 DIAGNOSIS — I10 ESSENTIAL HYPERTENSION: ICD-10-CM

## 2019-02-25 PROCEDURE — 93000 ELECTROCARDIOGRAM COMPLETE: CPT | Performed by: NURSE PRACTITIONER

## 2019-02-25 PROCEDURE — 99214 OFFICE O/P EST MOD 30 MIN: CPT | Performed by: NURSE PRACTITIONER

## 2019-02-25 PROCEDURE — 93288 INTERROG EVL PM/LDLS PM IP: CPT | Performed by: INTERNAL MEDICINE

## 2019-02-25 NOTE — PATIENT INSTRUCTIONS

## 2019-02-25 NOTE — PROGRESS NOTES
Subjective:     Encounter Date:02/25/2019      Patient ID: Renay Lazo is a 89 y.o. female with a hx of PAF, HTN, chronic diastolic heart failure, SSS s/p pacemaker, advanced age.  She presents today for follow up and questions regarding her pacemaker transmission.    Chief Complaint: Follow up  Hypertension   This is a chronic problem. The current episode started more than 1 year ago. The problem has been waxing and waning since onset. The problem is controlled. Associated symptoms include malaise/fatigue. Pertinent negatives include no chest pain, headaches, orthopnea, palpitations, PND or shortness of breath. Risk factors for coronary artery disease include obesity and sedentary lifestyle. Current antihypertension treatment includes beta blockers, ACE inhibitors, calcium channel blockers, central alpha agonists, direct vasodilators and diuretics. There is no history of angina.   Atrial Fibrillation   Presents for follow-up visit. Symptoms include weakness. Symptoms are negative for bradycardia, chest pain, dizziness, hypotension, pacemaker problem, palpitations, shortness of breath, syncope and tachycardia. The symptoms have been stable. Past medical history includes atrial fibrillation and CHF. There are no medication compliance problems.   Congestive Heart Failure   Presents for follow-up visit. Associated symptoms include fatigue and orthopnea. Pertinent negatives include no abdominal pain, chest pain, chest pressure, edema, near-syncope, palpitations, paroxysmal nocturnal dyspnea or shortness of breath. The symptoms have been stable. Compliance with total regimen is 51-75%. Compliance problems include adherence to exercise.  Compliance with diet is %. Compliance with exercise is 26-50%. Compliance with medications is %.     Ms. Lazo presents today for follow up. She states that she is feeling well. She is tired most of the time, but denies chest discomfort shortness of breath.  She denies  palpitations.  She has loss of balance and falls easily denies any recent falls at home.  She has wheelchair for assistance. She denies any swelling or s/s of CHF her left ankle is slightly swollen right side is normal.       The following portions of the patient's history were reviewed and updated as appropriate: allergies, current medications, past family history, past medical history, past social history and past surgical history.     Allergies   Allergen Reactions   • Chocolate Hives   • Codeine Nausea And Vomiting   • Ciprofloxacin Confusion   • Biaxin [Clarithromycin] Unknown (See Comments)     DOES NOT KNOW   • Capoten [Captopril] Cough     Patient takes Lisinopril at home   • Ciprofloxacin Hcl Confusion   • Ciprofloxacin-Ciproflox Hcl Er Confusion   • Darvon [Propoxyphene]    • Diphenhydramine Hcl    • Dyazide [Triamterene-Hctz]    • Enablex [Darifenacin Hydrobromide Er]    • Gadolinium Derivatives    • Homatropine    • Hydrochlorothiazide W-Triamterene    • Hydrocodone    • Inderal La [Propranolol Hcl]      Patient takes Atenolol at Home   • Iodine    • Levaquin [Levofloxacin] Confusion   • Lortab [Hydrocodone-Acetaminophen]    • Lotrel [Amlodipine Besy-Benazepril Hcl]      Patient takes Amlodipine at home.   • Macrodantin  [Nitrofurantoin]    • Macrodantin [Nitrofurantoin Macrocrystal]    • Motrin [Ibuprofen]    • Other      Can now have only unenhanced CAT scan   • Percocet [Oxycodone-Acetaminophen] Nausea And Vomiting   • Phosphate    • Propranolol    • Qvar [Beclomethasone]    • Solifenacin Succinate    • Sulfa Antibiotics Nausea And Vomiting   • Terramycin [Oxytetracycline]    • Vesicare [Solifenacin]    • Ampicillin Itching   • Benadryl [Diphenhydramine] Rash   • Contrast Dye Rash         Current Outpatient Medications:   •  acetaminophen (TYLENOL) 325 MG tablet, Take 2 tablets by mouth Every 6 (Six) Hours As Needed for Mild Pain (1-3)., Disp: , Rfl: 0  •  amLODIPine (NORVASC) 10 MG tablet, Take 1  tablet by mouth Daily. (Patient taking differently: Take 5 mg by mouth Daily.), Disp: , Rfl:   •  aspirin 81 MG EC tablet, Take 81 mg by mouth Every Night., Disp: , Rfl:   •  bumetanide (BUMEX) 1 MG tablet, Take 1 mg by mouth daily., Disp: , Rfl:   •  carvedilol (COREG) 6.25 MG tablet, Take 6.25 mg by mouth 2 (Two) Times a Day With Meals., Disp: , Rfl:   •  CloNIDine (CATAPRES-TTS) 0.2 MG/24HR patch, Place 1 patch on the skin 1 (One) Time Per Week., Disp: , Rfl:   •  cyanocobalamin 1000 MCG/ML injection, 1000 mg X1 on 6/29 then 1000mg every week X4, then monthly., Disp: , Rfl:   •  docusate sodium 100 MG capsule, Take 100 mg by mouth 2 (Two) Times a Day As Needed (constipation)., Disp: , Rfl: 0  •  enoxaparin (LOVENOX) 40 MG/0.4ML solution syringe, Inject 0.4 mL under the skin Daily., Disp: 11.2 mL, Rfl:   •  Ergocalciferol (VITAMIN D2 PO), Take 2,000 Units by mouth 2 (Two) Times a Week., Disp: , Rfl:   •  ferrous sulfate 325 (65 FE) MG tablet, Take 325 mg by mouth daily., Disp: , Rfl:   •  folic acid (FOLVITE) 1 MG tablet, Take 1 mg by mouth daily., Disp: , Rfl:   •  hydrALAZINE (APRESOLINE) 50 MG tablet, Take 1 tablet by mouth Every 8 (Eight) Hours., Disp: , Rfl:   •  HYDROmorphone (DILAUDID) 2 MG tablet, Take 0.5 tablets by mouth Every 6 (Six) Hours As Needed for Moderate Pain (4-6)., Disp: , Rfl: 0  •  lisinopril (PRINIVIL,ZESTRIL) 5 MG tablet, Take 5 mg by mouth Daily., Disp: , Rfl:   •  Multiple Vitamin (MULTI VITAMIN DAILY PO), Take  by mouth., Disp: , Rfl:   •  phenytoin (DILANTIN) 100 MG ER capsule, Take 100 mg by mouth Every Morning., Disp: , Rfl:   •  potassium chloride (K-DUR,KLOR-CON) 20 MEQ CR tablet, Take 20 mEq by mouth 3 (Three) Times a Week., Disp: , Rfl:   •  pravastatin (PRAVACHOL) 40 MG tablet, Take 40 mg by mouth Every Night., Disp: , Rfl:   •  traMADol (ULTRAM) 50 MG tablet, Take 1 tablet by mouth Every 4 (Four) Hours As Needed for Moderate Pain ., Disp: 12 tablet, Rfl: 0  •  trospium 60 MG  capsule sustained-release 24 hr capsule, Take 1 capsule by mouth Every Morning for 360 days., Disp: 90 capsule, Rfl: 3  •  Vitamin D, Cholecalciferol, (CHOLECALCIFEROL) 400 units tablet, Take 400 Units by mouth Daily., Disp: , Rfl:     Past Medical History:   Diagnosis Date   • Adrenal adenoma    • Anemia    • Atrial fibrillation (CMS/HCC)    • Benign neoplasm of cerebral meninges (CMS/HCC)    • Brain tumor (CMS/HCC)    • CHF (congestive heart failure) (CMS/HCC)    • Dementia    • Disease of thyroid gland    • Hip fracture requiring operative repair (CMS/HCC)     7/2016   • Hyperlipidemia    • Hypertension    • Meningioma (CMS/HCC) 2/5/2018   • Obstructive sleep apnea    • Osteoarthritis    • Peripheral vascular disease (CMS/HCC)    • Pneumonia    • Seizures (CMS/HCC)    • Sick sinus syndrome (CMS/HCC)     s/p pacemaker per Dr. Puri   • TIA (transient ischemic attack)      Family History   Problem Relation Age of Onset   • Arthritis Mother    • Cancer Mother    • Heart disease Mother    • Arthritis Father    • Arthritis Brother    • Cancer Brother    • Heart disease Brother    • Arthritis Sister    • Heart disease Sister      Social History     Socioeconomic History   • Marital status:      Spouse name: Not on file   • Number of children: Not on file   • Years of education: Not on file   • Highest education level: Not on file   Social Needs   • Financial resource strain: Not on file   • Food insecurity - worry: Not on file   • Food insecurity - inability: Not on file   • Transportation needs - medical: Not on file   • Transportation needs - non-medical: Not on file   Occupational History   • Occupation: Retired   Tobacco Use   • Smoking status: Never Smoker   • Smokeless tobacco: Never Used   Substance and Sexual Activity   • Alcohol use: No   • Drug use: No   • Sexual activity: Defer   Other Topics Concern   • Not on file   Social History Narrative   • Not on file     Past Surgical History:   Procedure  "Laterality Date   • APPENDECTOMY     • BRAIN SURGERY     • CAROTID ENDARTERECTOMY  02/2011   • CATARACT EXTRACTION     • CHOLECYSTECTOMY     • COLONOSCOPY     • HEMORRHOIDECTOMY     • HIP HEMIARTHROPLASTY Right 6/20/2017    Procedure: HIP HEMIARTHROPLASTY;  Surgeon: Jeremiah Zaidi MD;  Location: [facility] OR;  Service:    • INSERT / REPLACE / REMOVE PACEMAKER     • JOINT REPLACEMENT     • PACEMAKER IMPLANTATION     • TONSILLECTOMY     • TOTAL HIP ARTHROPLASTY       Review of Systems   Constitution: Positive for fatigue, weakness and malaise/fatigue. Negative for diaphoresis and fever.   HENT: Positive for hearing loss.    Eyes: Negative for visual disturbance.   Cardiovascular: Positive for leg swelling (L>R). Negative for chest pain, dyspnea on exertion, near-syncope, orthopnea, palpitations, paroxysmal nocturnal dyspnea and syncope.   Respiratory: Positive for cough. Negative for shortness of breath, sputum production and wheezing.    Hematologic/Lymphatic: Negative for bleeding problem. Bruises/bleeds easily.   Musculoskeletal: Positive for falls.   Gastrointestinal: Negative for bloating, abdominal pain, nausea and vomiting.   Neurological: Positive for loss of balance. Negative for dizziness, headaches and light-headedness.   Psychiatric/Behavioral: Negative for altered mental status. The patient is not nervous/anxious.          ECG 12 Lead  Date/Time: 2/25/2019 1:52 PM  Performed by: Kelsey Feldman APRN  Authorized by: Kelsey Feldman APRN   Comparison: compared with previous ECG from 8/20/2018  Comparison to previous ECG: Atrial fibrillation has replaced a pacing.   Rhythm: atrial fibrillation  Rate: normal  BPM: 93  ST Segments: ST segments normal  QRS axis: normal    Clinical impression: abnormal EKG          /80 (BP Location: Right arm, Patient Position: Sitting, Cuff Size: Adult)   Pulse 93   Resp 18   Ht 162.6 cm (64\")   Wt 76.2 kg (168 lb)   SpO2 94%   Breastfeeding? No   BMI 28.84 " kg/m²        Objective:     Physical Exam   Constitutional: She is oriented to person, place, and time. She appears well-developed and well-nourished. No distress.   HENT:   Head: Normocephalic and atraumatic.   Nose: Nose normal.   Mouth/Throat: No oropharyngeal exudate.   Eyes: Conjunctivae are normal.   Neck: Normal range of motion. Neck supple.   Cardiovascular: Normal rate. An irregularly irregular rhythm present. Exam reveals no gallop and no friction rub.   No murmur heard.  Pulmonary/Chest: Effort normal. No respiratory distress. She has no wheezes. She has no rales.   Abdominal: Soft. Normal appearance. She exhibits no distension. There is no tenderness.   Musculoskeletal: She exhibits edema (Left ankle non pitting edema).   Neurological: She is alert and oriented to person, place, and time.   Skin: Skin is warm, dry and intact. No rash noted. She is not diaphoretic. No erythema. No pallor.   Psychiatric: She has a normal mood and affect. Her behavior is normal.   Vitals reviewed.      Lab Review:       Assessment:          Diagnosis Plan   1. Paroxysmal atrial fibrillation (CMS/HCC)     2. Essential hypertension     3. Diastolic dysfunction     4. Pacemaker     5. Obstructive sleep apnea     6. BMI 28.0-28.9,adult            Plan:       - PAF: presents in afib today. High risk for anticoagulation due to advanced age and falls. She has had check today which reveals afib since November. She has remained without complaints of chf or palpitations. Heart rates are controlled.  Continue coreg. Continue aspirin    - HTN: has had elevated blood pressure for some time, she follows with her PCP. She does not monitor this at home. Relatively controlled in office today. Continue antihypertensives    - Diastolic dysfunction: no evidence of heart failure    - Pacemaker: hx of sss, pacer in situ. Monitors in our office - check today before check out    - ANAYA: compliant with CPAP at night    - BMI: stable.    No changes  at this time. Monitor for signs and symptoms of heart failure. Cannot anticoagulate due to risks. Follow up in 6 months.

## 2019-02-27 NOTE — PROGRESS NOTES
Dual Chamber Pacemaker Evaluation Report  IN OFFICE INTERROGATION    February 27, 2019    Primary Cardiologist: Jaxson  : Medtronic Model: Revo MRI RVDR01  Implant date: 1/26/2012    Reason for evaluation: routine  Indication for pacemaker: sick sinus syndrome    Measurements  Atrial sensing - P wave: N/R  Atrial threshold: N/P--In atrial fibrillation  Atrial lead impedance: 432 ohms  Ventricular sensing - R wave: 19.9 mV  Ventricular threshold: N/R  Ventricular lead impedance:   504 ohms     Diagnostic Data  Atrial paced: 26.2 %  Ventricular paced: 11.3 %    Episodes recorded since 8/20/2018:  AF burden 79.9%.  Ventricular rates controlled.  ATP is 57.1% successful at terminating AF episodes.  Patient is not anticoagulated s/t being high risk s/t advanced age and falls    Battery status: satisfactory, 2.92V      Final Parameters  Mode:  AAIR+  Lower rate: 60 bpm   Upper rate: 130 bpm  AV Delay: paced- 180 ms  Sensed-150 ms  Atrial - Amplitude: 2.5 V   Pulse width: 0.4 ms   Sensitivity: 0.3 mV     Ventricular - Amplitude: 2 V  Pulse width: 0.4 ms  Sensitivity: 0.9 mV    Changes made: No changes made.  Conclusions: normal pacemaker function and adequate battery reserve    Follow up: 6 months via [employer], new monitor ordered for patient

## 2019-05-16 DIAGNOSIS — Z91.041 ALLERGY TO INTRAVENOUS CONTRAST: Primary | ICD-10-CM

## 2019-05-16 RX ORDER — PREDNISONE 50 MG/1
TABLET ORAL
Qty: 3 TABLET | Refills: 0 | Status: SHIPPED | OUTPATIENT
Start: 2019-05-16 | End: 2019-09-13

## 2019-05-16 NOTE — TELEPHONE ENCOUNTER
Patient's  was made aware by phone that the medication for pre-MRI has been sent to [facility].      christin fang CMA

## 2019-05-21 ENCOUNTER — DOCUMENTATION (OUTPATIENT)
Dept: CARDIOLOGY | Facility: CLINIC | Age: 84
End: 2019-05-21

## 2019-05-21 ENCOUNTER — OFFICE VISIT (OUTPATIENT)
Dept: NEUROSURGERY | Facility: CLINIC | Age: 84
End: 2019-05-21

## 2019-05-21 ENCOUNTER — HOSPITAL ENCOUNTER (OUTPATIENT)
Dept: MRI IMAGING | Facility: HOSPITAL | Age: 84
Discharge: HOME | End: 2019-05-21
Admitting: NEUROLOGICAL SURGERY

## 2019-05-21 VITALS
WEIGHT: 168 LBS | HEIGHT: 64 IN | BODY MASS INDEX: 28.68 KG/M2 | SYSTOLIC BLOOD PRESSURE: 140 MMHG | DIASTOLIC BLOOD PRESSURE: 80 MMHG

## 2019-05-21 DIAGNOSIS — Z78.9 NON-SMOKER: ICD-10-CM

## 2019-05-21 DIAGNOSIS — D32.9 MENINGIOMA (HCC): ICD-10-CM

## 2019-05-21 DIAGNOSIS — D32.9 MENINGIOMA (HCC): Primary | ICD-10-CM

## 2019-05-21 DIAGNOSIS — Z95.0 PACEMAKER: Primary | ICD-10-CM

## 2019-05-21 LAB — CREAT BLDA-MCNC: 0.8 MG/DL (ref 0.6–1.3)

## 2019-05-21 PROCEDURE — 70553 MRI BRAIN STEM W/O & W/DYE: CPT

## 2019-05-21 PROCEDURE — A9577 INJ MULTIHANCE: HCPCS | Performed by: NEUROLOGICAL SURGERY

## 2019-05-21 PROCEDURE — 82565 ASSAY OF CREATININE: CPT

## 2019-05-21 PROCEDURE — 0 GADOBENATE DIMEGLUMINE 529 MG/ML SOLUTION: Performed by: NEUROLOGICAL SURGERY

## 2019-05-21 PROCEDURE — 99213 OFFICE O/P EST LOW 20 MIN: CPT | Performed by: NEUROLOGICAL SURGERY

## 2019-05-21 RX ORDER — CLOPIDOGREL BISULFATE 75 MG/1
TABLET ORAL DAILY
Refills: 4 | COMMUNITY
Start: 2019-04-09

## 2019-05-21 RX ADMIN — GADOBENATE DIMEGLUMINE 20 ML: 529 INJECTION, SOLUTION INTRAVENOUS at 14:57

## 2019-05-21 NOTE — PROGRESS NOTES
SUBJECTIVE:  Patient ID: Renay Lazo is a 89 y.o. female is here today for follow-up.    Chief Complaint: Meningioma  Chief Complaint   Patient presents with   • Meningioma     patient is here for her yearly follow up with MRI @ [facility] (her  rescheduled from Feb 2019 until it was warmer)       HPI  89 old female known to the service with an following for several years for meningiomas.  She had a prior history of left pterional craniotomy for meningioma in 1971.  That left her with an exceptional amount of encephalomalacia in the cranium ectomy on the left.  She has a left frontoparietal meningioma and a left CP angle meningioma that we have been following.  They have been stable for many years.  She has no new complaints.  No changes to her medical history.  She lives with her  she does require assistance with her activities of daily living.  She spends most of her time in a wheelchair.  She apparently can stand and take a few steps but is essentially nonambulatory for most of her functions.        The following portions of the patient's history were reviewed and updated as appropriate: allergies, current medications, past family history, past medical history, past social history, past surgical history and problem list.    OBJECTIVE:    Review of Systems   All other systems reviewed and are negative.         Physical Exam   Neurological: She has normal strength. She has a normal Finger-Nose-Finger Test.   Psychiatric: Her speech is normal.       Neurologic Exam     Mental Status   Attention: normal.   Speech: speech is normal   Level of consciousness: alert  Some obvious cognitive limitations.     Cranial Nerves   Cranial nerves II through XII intact.     Motor Exam   Muscle bulk: normal  Overall muscle tone: normal    Strength   Strength 5/5 throughout.     Sensory Exam   Light touch normal.   Pinprick normal.     Gait, Coordination, and Reflexes     Gait  Gait: (Able to stand and take a few steps but  is nonambulatory for most of her functions)    Coordination   Finger to nose coordination: normal    Tremor   Resting tremor: absent  Intention tremor: absent  Action tremor: absent    Reflexes   Reflexes 2+ except as noted.       Independent Review of Radiographic Studies:   Right brain shows a stable left CP angle meningioma stable high left convexity meningioma.  There is stable left temporal encephalomalacia.    ASSESSMENT/PLAN:  The patient is at her neurologic baseline.  She has stable imaging compared to a year ago.  I would image her again in 2 years.      1. Meningioma (CMS/HCC)    2. Non-smoker    3. BMI 28.0-28.9,adult            Return for 2 yr f/u - Wan to take care of.      Sharath Schmid MD

## 2019-05-21 NOTE — PATIENT INSTRUCTIONS
PATIENT TO CONTINUE TO FOLLOW UP WITH HER PRIMARY CARE PROVIDER FOR YEARLY PHYSICAL EXAMS TO ENSURE COMPLETE HEALTH MAINTENANCE      BMI for Adults  Body mass index (BMI) is a number that is calculated from a person's weight and height. In most adults, the number is used to find how much of an adult's weight is made up of fat. BMI is not as accurate as a direct measure of body fat.  How is BMI calculated?  BMI is calculated by dividing weight in kilograms by height in meters squared. It can also be calculated by dividing weight in pounds by height in inches squared, then multiplying the resulting number by 703. Charts are available to help you find your BMI quickly and easily without doing this calculation.  How is BMI interpreted?  Health care professionals use BMI charts to identify whether an adult is underweight, at a normal weight, or overweight based on the following guidelines:  · Underweight: BMI less than 18.5.  · Normal weight: BMI between 18.5 and 24.9.  · Overweight: BMI between 25 and 29.9.  · Obese: BMI of 30 and above.    BMI is usually interpreted the same for males and females.  Weight includes both fat and muscle, so someone with a muscular build, such as an athlete, may have a BMI that is higher than 24.9. In cases like these, BMI may not accurately depict body fat. To determine if excess body fat is the cause of a BMI of 25 or higher, further assessments may need to be done by a health care provider.  Why is BMI a useful tool?  BMI is used to identify a possible weight problem that may be related to a medical problem or may increase the risk for medical problems. BMI can also be used to promote changes to reach a healthy weight.  This information is not intended to replace advice given to you by your health care provider. Make sure you discuss any questions you have with your health care provider.  Document Released: 08/29/2005 Document Revised: 04/27/2017 Document Reviewed: 05/15/2015  Ronald  Interactive Patient Education © 2018 [employer]

## 2019-05-22 ENCOUNTER — TELEPHONE (OUTPATIENT)
Dept: CARDIOLOGY | Facility: CLINIC | Age: 84
End: 2019-05-22

## 2019-05-22 DIAGNOSIS — I48.91 ATRIAL FIBRILLATION WITH RVR (HCC): Primary | ICD-10-CM

## 2019-05-22 NOTE — TELEPHONE ENCOUNTER
RN noted that digoxin is contraindicated because patient is allergic to Vesicare (solifenacin succinate).  RN discussed with [facility] Pharmacist Kana who stated it was because both contain corn starch.  Patient's  cannot recall what patient's allergic reaction to Vesicare was and does not know if she is allergic to corn starch.  RN will discuss with Dr. Puri to see if he still wants patient to take digoxin and will notify  of patient's response.

## 2019-05-22 NOTE — TELEPHONE ENCOUNTER
Remote transmission received.  AF burden 100% since October 2018.  Patient is not anticoagulated s/t history of multiple falls.  Histograms do indicate that ventricular rate during the past 24 hours has been between 120-140 bpm about 40% of the time.  Prior to past 24 hours, rates appear to have been more controlled, with majority of ventricular rates from  bpm.  Per patient's , he denies patient complaining of chest pain, dyspnea, palpitations, or increased fatigue.  RN will discuss with Dr. Puri to see if he would like to make any changes to plan of care and will notify  of MD's response.

## 2019-05-22 NOTE — PROGRESS NOTES
Orders obtained and signed by Dr. Benites (Dr. Puri not available at time of request) and faxed to radiology.

## 2019-05-22 NOTE — TELEPHONE ENCOUNTER
Patient had MRI performed on 5/21/2019, during which the [employer] Rep, Evelio Mock, noted that patient was in AF with RVR.  He recommended clinic follow up.  RN spoke with patient's  (patient was asleep) and requested that they send in a remote transmission for RN to evaluate.   states that he will send one in after patient wakes up.  RN will call back with results after transmission has been received.

## 2019-05-22 NOTE — TELEPHONE ENCOUNTER
Medication changes explained to .  He verbalized understanding.  Digoxin will be sent electronically to [facility] on [address]--RN will contact  when it has been sent to pharmacy.  Increased carvedilol dose will be sent to [facility].  Patient instructed to take 2 x 6.25 tablets in the morning and again at night until her new pills arrive.  Understanding verbalized.  RN also encouraged  to keep a log of patient's blood pressure to ensure increased medication does not decrease her BP too much.  Understanding verbalized.  RN's direct line given to  if he has any concerns or questions.

## 2019-05-26 RX ORDER — CARVEDILOL 12.5 MG/1
12.5 TABLET ORAL 2 TIMES DAILY
Qty: 180 TABLET | Refills: 3 | Status: SHIPPED | OUTPATIENT
Start: 2019-05-26 | End: 2020-05-04

## 2019-05-28 NOTE — TELEPHONE ENCOUNTER
RN notified patient's  that Dr. Puri will not be ordering digoxin for patient.   verbalized understanding.   also verbalized that blood pressures have been stable since increasing carvedilol dose.  RN instructed  to call if blood pressures start to run low or if patient has complaints of dizziness.  Understanding verbalized.

## 2019-06-24 DIAGNOSIS — N39.41 URGE INCONTINENCE OF URINE: ICD-10-CM

## 2019-06-24 RX ORDER — TROSPIUM CHLORIDE ER 60 MG/1
CAPSULE ORAL
Qty: 90 CAPSULE | Refills: 3 | Status: SHIPPED | OUTPATIENT
Start: 2019-06-24 | End: 2021-01-01

## 2019-08-26 ENCOUNTER — CLINICAL SUPPORT (OUTPATIENT)
Dept: CARDIOLOGY | Facility: CLINIC | Age: 84
End: 2019-08-26

## 2019-08-26 DIAGNOSIS — Z95.0 PACEMAKER: ICD-10-CM

## 2019-08-26 PROCEDURE — 93296 REM INTERROG EVL PM/IDS: CPT | Performed by: PHYSICIAN ASSISTANT

## 2019-08-26 PROCEDURE — 93294 REM INTERROG EVL PM/LDLS PM: CPT | Performed by: PHYSICIAN ASSISTANT

## 2019-08-27 NOTE — PROGRESS NOTES
Dual Chamber Pacemaker Evaluation Report  [facility]    August 27, 2019    Primary Cardiologist: Jaxson  : [employer] Model: Revo MRI  Implant date: 1/26/12    Reason for evaluation: routine  Indication for pacemaker: sick sinus syndrome    Measurements  Atrial sensing - P wave: n/r  Atrial threshold: n/r  Atrial lead impedance: 424 ohms  Ventricular sensing - R wave: 18.6 mV  Ventricular threshold: n/r  Ventricular lead impedance:   472 ohms     Diagnostic Data  Atrial paced: <0.1 %  Ventricular paced: 21.1 %  Other: 10 sec run of atrial flutter.  Pt is not anticoagulated d/t being high risk in regard to advanced age and falls.  Battery status: satisfactory         Final Parameters  Mode:  AAIR+  Lower rate: 60 bpm   Upper rate: 130 bpm  AV Delay: paced- 180 ms  Sensed-150 ms  Atrial - Amplitude: 2.5 V   Pulse width: 0.4 ms   Sensitivity: 0.3 mV     Ventricular - Amplitude: 2.0 V  Pulse width: 0.4 ms  Sensitivity: 0.9 mV    Changes made: n/a  Conclusions: normal pacemaker function    Follow up: 3 months

## 2019-09-13 ENCOUNTER — OFFICE VISIT (OUTPATIENT)
Dept: CARDIOLOGY | Facility: CLINIC | Age: 84
End: 2019-09-13

## 2019-09-13 VITALS
HEART RATE: 83 BPM | RESPIRATION RATE: 18 BRPM | WEIGHT: 158 LBS | BODY MASS INDEX: 26.98 KG/M2 | DIASTOLIC BLOOD PRESSURE: 75 MMHG | OXYGEN SATURATION: 98 % | HEIGHT: 64 IN | SYSTOLIC BLOOD PRESSURE: 142 MMHG

## 2019-09-13 DIAGNOSIS — G47.33 OBSTRUCTIVE SLEEP APNEA: ICD-10-CM

## 2019-09-13 DIAGNOSIS — I48.21 PERMANENT ATRIAL FIBRILLATION (HCC): Primary | ICD-10-CM

## 2019-09-13 DIAGNOSIS — I51.89 DIASTOLIC DYSFUNCTION: ICD-10-CM

## 2019-09-13 DIAGNOSIS — I10 ESSENTIAL HYPERTENSION: ICD-10-CM

## 2019-09-13 DIAGNOSIS — Z95.0 PACEMAKER: ICD-10-CM

## 2019-09-13 PROCEDURE — 99214 OFFICE O/P EST MOD 30 MIN: CPT | Performed by: NURSE PRACTITIONER

## 2019-09-13 PROCEDURE — 93000 ELECTROCARDIOGRAM COMPLETE: CPT | Performed by: NURSE PRACTITIONER

## 2019-09-13 NOTE — PROGRESS NOTES
Subjective:     Encounter Date:09/13/2019      Patient ID: Renay Lazo is a 89 y.o. female with a hx of PAF, HTN, chronic diastolic heart failure, SSS s/p pacemaker, advanced age.  She presents today for follow up.    Chief Complaint: Follow up  Hypertension   This is a chronic problem. The current episode started more than 1 year ago. The problem has been waxing and waning since onset. The problem is controlled. Associated symptoms include malaise/fatigue. Pertinent negatives include no chest pain, headaches, orthopnea, palpitations, PND or shortness of breath. Risk factors for coronary artery disease include obesity and sedentary lifestyle. Current antihypertension treatment includes beta blockers, ACE inhibitors, calcium channel blockers, central alpha agonists, direct vasodilators and diuretics. There is no history of angina.   Atrial Fibrillation   Presents for follow-up visit. Symptoms include weakness. Symptoms are negative for bradycardia, chest pain, dizziness, hypotension, pacemaker problem, palpitations, shortness of breath, syncope and tachycardia. The symptoms have been stable. Past medical history includes atrial fibrillation and CHF. There are no medication compliance problems.   Congestive Heart Failure   Presents for follow-up visit. Associated symptoms include fatigue and orthopnea. Pertinent negatives include no abdominal pain, chest pain, chest pressure, edema, near-syncope, palpitations, paroxysmal nocturnal dyspnea or shortness of breath. The symptoms have been stable. Compliance with total regimen is 51-75%. Compliance problems include adherence to exercise.  Compliance with diet is %. Compliance with exercise is 26-50%. Compliance with medications is %.     Ms. Lazo presents today for follow up. She states that she is feeling well. She is tired most of the time, but denies chest discomfort shortness of breath.  She denies palpitations. Per report of her  who is her  caretaker, she spends quite a bit of time sleeping and is mobile at home with a wheelchair. She hardly walks at home. She has mild swelling to her lower legs which is not present upon awakening per her .    The following portions of the patient's history were reviewed and updated as appropriate: allergies, current medications, past family history, past medical history, past social history and past surgical history.     Allergies   Allergen Reactions   • Chocolate Hives   • Codeine Nausea And Vomiting   • Ciprofloxacin Confusion   • Biaxin [Clarithromycin] Unknown (See Comments)     DOES NOT KNOW   • Capoten [Captopril] Cough     Patient takes Lisinopril at home   • Ciprofloxacin Hcl Confusion   • Ciprofloxacin-Ciproflox Hcl Er Confusion   • Darvon [Propoxyphene]    • Diphenhydramine Hcl    • Dyazide [Triamterene-Hctz]    • Enablex [Darifenacin Hydrobromide Er]    • Gadolinium Derivatives    • Homatropine    • Hydrochlorothiazide W-Triamterene    • Hydrocodone    • Inderal La [Propranolol Hcl]      Patient takes Atenolol at Home   • Iodine    • Levaquin [Levofloxacin] Confusion   • Lortab [Hydrocodone-Acetaminophen]    • Lotrel [Amlodipine Besy-Benazepril Hcl]      Patient takes Amlodipine at home.   • Macrodantin  [Nitrofurantoin]    • Macrodantin [Nitrofurantoin Macrocrystal]    • Motrin [Ibuprofen]    • Other      Can now have only unenhanced CAT scan   • Percocet [Oxycodone-Acetaminophen] Nausea And Vomiting   • Phosphate    • Propranolol    • Qvar [Beclomethasone]    • Solifenacin Succinate    • Sulfa Antibiotics Nausea And Vomiting   • Terramycin [Oxytetracycline]    • Vesicare [Solifenacin]    • Ampicillin Itching   • Benadryl [Diphenhydramine] Rash   • Contrast Dye Rash       Current Outpatient Medications:   •  acetaminophen (TYLENOL) 325 MG tablet, Take 2 tablets by mouth Every 6 (Six) Hours As Needed for Mild Pain (1-3)., Disp: , Rfl: 0  •  amLODIPine (NORVASC) 5 MG tablet, Take 5 mg by mouth  Daily., Disp: , Rfl:   •  aspirin 81 MG EC tablet, Take 81 mg by mouth Every Night., Disp: , Rfl:   •  carvedilol (COREG) 12.5 MG tablet, Take 1 tablet by mouth 2 (Two) Times a Day. (Patient taking differently: Take 12.5 mg by mouth Daily.), Disp: 180 tablet, Rfl: 3  •  CloNIDine (CATAPRES) 0.1 MG tablet, Take 0.1 mg by mouth 2 (Two) Times a Day., Disp: , Rfl:   •  clopidogrel (PLAVIX) 75 MG tablet, Take  by mouth Daily., Disp: , Rfl: 4  •  ferrous sulfate 325 (65 FE) MG tablet, Take 325 mg by mouth daily., Disp: , Rfl:   •  folic acid (FOLVITE) 1 MG tablet, Take 1 mg by mouth daily., Disp: , Rfl:   •  hydrALAZINE (APRESOLINE) 50 MG tablet, Take 1 tablet by mouth Every 8 (Eight) Hours. (Patient taking differently: Take 50 mg by mouth 2 (Two) Times a Day.), Disp: , Rfl:   •  lisinopril (PRINIVIL,ZESTRIL) 5 MG tablet, Take 5 mg by mouth Daily., Disp: , Rfl:   •  phenytoin (DILANTIN) 100 MG ER capsule, Take 100 mg by mouth Every Morning., Disp: , Rfl:   •  pravastatin (PRAVACHOL) 40 MG tablet, Take 40 mg by mouth Every Night., Disp: , Rfl:   •  trospium 60 MG capsule sustained-release 24 hr capsule, TAKE 1 CAPSULE EVERY MORNING, Disp: 90 capsule, Rfl: 3    Past Medical History:   Diagnosis Date   • Adrenal adenoma    • Anemia    • Atrial fibrillation (CMS/HCC)    • Benign neoplasm of cerebral meninges (CMS/HCC)    • Brain tumor (CMS/HCC)    • CHF (congestive heart failure) (CMS/HCC)    • Dementia    • Disease of thyroid gland    • Hip fracture requiring operative repair (CMS/HCC)     7/2016   • Hyperlipidemia    • Hypertension    • Meningioma (CMS/HCC) 2/5/2018   • Obstructive sleep apnea    • Osteoarthritis    • Peripheral vascular disease (CMS/HCC)    • Pneumonia    • Seizures (CMS/HCC)    • Sick sinus syndrome (CMS/HCC)     s/p pacemaker per Dr. Puri   • TIA (transient ischemic attack)      Family History   Problem Relation Age of Onset   • Arthritis Mother    • Cancer Mother    • Heart disease Mother    • Arthritis  Father    • Arthritis Brother    • Cancer Brother    • Heart disease Brother    • Arthritis Sister    • Heart disease Sister      Social History     Socioeconomic History   • Marital status:      Spouse name: Not on file   • Number of children: Not on file   • Years of education: Not on file   • Highest education level: Not on file   Occupational History   • Occupation: Retired   Tobacco Use   • Smoking status: Never Smoker   • Smokeless tobacco: Never Used   Substance and Sexual Activity   • Alcohol use: No   • Drug use: No   • Sexual activity: Defer     Past Surgical History:   Procedure Laterality Date   • APPENDECTOMY     • BRAIN SURGERY     • CAROTID ENDARTERECTOMY  02/2011   • CATARACT EXTRACTION     • CHOLECYSTECTOMY     • COLONOSCOPY     • HEMORRHOIDECTOMY     • HIP HEMIARTHROPLASTY Right 6/20/2017    Procedure: HIP HEMIARTHROPLASTY;  Surgeon: Jeremiah Zaidi MD;  Location: [facility] OR;  Service:    • INSERT / REPLACE / REMOVE PACEMAKER     • JOINT REPLACEMENT     • PACEMAKER IMPLANTATION     • TONSILLECTOMY     • TOTAL HIP ARTHROPLASTY       Review of Systems   Constitution: Positive for fatigue, weakness and malaise/fatigue. Negative for diaphoresis and fever.   HENT: Positive for hearing loss.    Eyes: Negative for visual disturbance.   Cardiovascular: Positive for leg swelling. Negative for chest pain, dyspnea on exertion, near-syncope, orthopnea, palpitations, paroxysmal nocturnal dyspnea and syncope.   Respiratory: Negative for shortness of breath, sputum production and wheezing.    Hematologic/Lymphatic: Negative for bleeding problem. Bruises/bleeds easily.   Musculoskeletal: Positive for falls.   Gastrointestinal: Negative for bloating, abdominal pain, nausea and vomiting.   Neurological: Positive for loss of balance. Negative for dizziness, headaches and light-headedness.   Psychiatric/Behavioral: Negative for altered mental status. The patient is not nervous/anxious.          ECG 12  "Lead  Date/Time: 9/13/2019 2:32 PM  Performed by: Kelsey Feldman APRN  Authorized by: Kelsey Feldman APRN   Comparison: compared with previous ECG from 2/25/2019  Similar to previous ECG  Rhythm: atrial fibrillation  Rate: normal  BPM: 83  Conduction: conduction normal  ST Segments: ST segments normal  T Waves: T waves normal    Clinical impression: abnormal EKG          /75 (BP Location: Right arm, Patient Position: Sitting, Cuff Size: Adult)   Pulse 83   Resp 18   Ht 162.6 cm (64\")   Wt 71.7 kg (158 lb)   SpO2 98%   Breastfeeding? No   BMI 27.12 kg/m²        Objective:     Physical Exam   Constitutional: She is oriented to person, place, and time. She appears well-developed and well-nourished. No distress.   HENT:   Head: Normocephalic and atraumatic.   Nose: Nose normal.   Mouth/Throat: Oropharynx is clear and moist. No oropharyngeal exudate.   Eyes: Conjunctivae are normal. No scleral icterus.   Neck: Normal range of motion. Neck supple.   Cardiovascular: Normal rate. An irregularly irregular rhythm present.   Murmur heard.  Pulmonary/Chest: Effort normal. No respiratory distress. She has no wheezes. She has no rales.   Abdominal: Soft. Normal appearance. She exhibits no distension. There is no tenderness.   Musculoskeletal: She exhibits edema (BLE).   Neurological: She is alert and oriented to person, place, and time.   Skin: Skin is warm, dry and intact. No rash noted. She is not diaphoretic. No erythema. No pallor.   Psychiatric: She has a normal mood and affect. Her behavior is normal.   Vitals reviewed.      Lab Review:       Assessment:          Diagnosis Plan   1. Permanent atrial fibrillation (CMS/HCC)     2. Essential hypertension     3. Diastolic dysfunction     4. Pacemaker     5. Obstructive sleep apnea            Plan:       - AF: permanent. Controlled rates. She is not on anticoagulation due to High risk for falls and advanced age. She is on aspirin and plavix.     - HTN: has had " elevated blood pressure for some time, she follows with her PCP. Continue same.     - Diastolic dysfunction: mild swelling on lower legs. Dependent edema that resolves with elevation. Advise TEDS and elevation.    - Pacemaker: hx of sss, pacer in situ.     - ANAYA: compliant with CPAP at night      No changes at this time. Monitor for signs and symptoms of heart failure. Cannot anticoagulate due to risks. Follow up in 6 months.

## 2020-01-01 ENCOUNTER — CLINICAL SUPPORT NO REQUIREMENTS (OUTPATIENT)
Dept: CARDIOLOGY | Facility: CLINIC | Age: 85
End: 2020-01-01

## 2020-01-01 DIAGNOSIS — Z95.0 PACEMAKER: ICD-10-CM

## 2020-01-01 PROCEDURE — 93288 INTERROG EVL PM/LDLS PM IP: CPT | Performed by: PHYSICIAN ASSISTANT

## 2020-05-04 DIAGNOSIS — I48.91 ATRIAL FIBRILLATION WITH RVR (HCC): ICD-10-CM

## 2020-05-04 RX ORDER — CARVEDILOL 12.5 MG/1
TABLET ORAL
Qty: 180 TABLET | Refills: 3 | Status: SHIPPED | OUTPATIENT
Start: 2020-05-04 | End: 2021-01-01

## 2020-06-18 DIAGNOSIS — N39.41 URGE INCONTINENCE OF URINE: ICD-10-CM

## 2020-06-18 RX ORDER — TROSPIUM CHLORIDE ER 60 MG/1
CAPSULE ORAL
Qty: 90 CAPSULE | Refills: 3 | OUTPATIENT
Start: 2020-06-18

## 2020-09-14 ENCOUNTER — APPOINTMENT (OUTPATIENT)
Dept: GENERAL RADIOLOGY | Facility: HOSPITAL | Age: 85
End: 2020-09-14

## 2020-09-14 ENCOUNTER — HOSPITAL ENCOUNTER (OUTPATIENT)
Facility: HOSPITAL | Age: 85
Setting detail: OBSERVATION
Discharge: HOME | End: 2020-09-14
Attending: INTERNAL MEDICINE | Admitting: FAMILY MEDICINE

## 2020-09-14 VITALS
OXYGEN SATURATION: 96 % | SYSTOLIC BLOOD PRESSURE: 137 MMHG | TEMPERATURE: 98.6 F | HEIGHT: 64 IN | DIASTOLIC BLOOD PRESSURE: 92 MMHG | HEART RATE: 94 BPM | BODY MASS INDEX: 26.22 KG/M2 | RESPIRATION RATE: 16 BRPM | WEIGHT: 153.6 LBS

## 2020-09-14 DIAGNOSIS — M25.551 ACUTE RIGHT HIP PAIN: Primary | ICD-10-CM

## 2020-09-14 PROBLEM — S72.011A CLOSED SUBCAPITAL FRACTURE OF NECK OF RIGHT FEMUR (HCC): Status: RESOLVED | Noted: 2017-06-19 | Resolved: 2020-09-14

## 2020-09-14 PROBLEM — E86.0 DEHYDRATION: Status: ACTIVE | Noted: 2020-09-14

## 2020-09-14 LAB
ALBUMIN SERPL-MCNC: 4 G/DL (ref 3.5–5.2)
ALBUMIN/GLOB SERPL: 1.4 G/DL
ALP SERPL-CCNC: 96 U/L (ref 39–117)
ALT SERPL W P-5'-P-CCNC: 14 U/L (ref 1–33)
ANION GAP SERPL CALCULATED.3IONS-SCNC: 12 MMOL/L (ref 5–15)
AST SERPL-CCNC: 22 U/L (ref 1–32)
BASOPHILS # BLD AUTO: 0.05 10*3/MM3 (ref 0–0.2)
BASOPHILS NFR BLD AUTO: 0.6 % (ref 0–1.5)
BILIRUB SERPL-MCNC: 0.3 MG/DL (ref 0–1.2)
BUN SERPL-MCNC: 49 MG/DL (ref 8–23)
BUN/CREAT SERPL: 48.5 (ref 7–25)
CALCIUM SPEC-SCNC: 8.7 MG/DL (ref 8.2–9.6)
CHLORIDE SERPL-SCNC: 105 MMOL/L (ref 98–107)
CO2 SERPL-SCNC: 21 MMOL/L (ref 22–29)
CREAT SERPL-MCNC: 1.01 MG/DL (ref 0.57–1)
DEPRECATED RDW RBC AUTO: 47.1 FL (ref 37–54)
EOSINOPHIL # BLD AUTO: 0.32 10*3/MM3 (ref 0–0.4)
EOSINOPHIL NFR BLD AUTO: 3.7 % (ref 0.3–6.2)
ERYTHROCYTE [DISTWIDTH] IN BLOOD BY AUTOMATED COUNT: 13.2 % (ref 12.3–15.4)
GFR SERPL CREATININE-BSD FRML MDRD: 51 ML/MIN/1.73
GLOBULIN UR ELPH-MCNC: 2.8 GM/DL
GLUCOSE SERPL-MCNC: 118 MG/DL (ref 65–99)
HCT VFR BLD AUTO: 34.2 % (ref 34–46.6)
HGB BLD-MCNC: 11.3 G/DL (ref 12–15.9)
IMM GRANULOCYTES # BLD AUTO: 0.02 10*3/MM3 (ref 0–0.05)
IMM GRANULOCYTES NFR BLD AUTO: 0.2 % (ref 0–0.5)
INR PPP: 1.19 (ref 0.91–1.09)
LYMPHOCYTES # BLD AUTO: 1.56 10*3/MM3 (ref 0.7–3.1)
LYMPHOCYTES NFR BLD AUTO: 17.8 % (ref 19.6–45.3)
MCH RBC QN AUTO: 32.1 PG (ref 26.6–33)
MCHC RBC AUTO-ENTMCNC: 33 G/DL (ref 31.5–35.7)
MCV RBC AUTO: 97.2 FL (ref 79–97)
MONOCYTES # BLD AUTO: 0.65 10*3/MM3 (ref 0.1–0.9)
MONOCYTES NFR BLD AUTO: 7.4 % (ref 5–12)
NEUTROPHILS NFR BLD AUTO: 6.16 10*3/MM3 (ref 1.7–7)
NEUTROPHILS NFR BLD AUTO: 70.3 % (ref 42.7–76)
NRBC BLD AUTO-RTO: 0 /100 WBC (ref 0–0.2)
PLATELET # BLD AUTO: 301 10*3/MM3 (ref 140–450)
PMV BLD AUTO: 9.4 FL (ref 6–12)
POTASSIUM SERPL-SCNC: 5 MMOL/L (ref 3.5–5.2)
PROT SERPL-MCNC: 6.8 G/DL (ref 6–8.5)
PROTHROMBIN TIME: 14.7 SECONDS (ref 11.9–14.6)
RBC # BLD AUTO: 3.52 10*6/MM3 (ref 3.77–5.28)
SARS-COV-2 RNA PNL SPEC NAA+PROBE: NOT DETECTED
SODIUM SERPL-SCNC: 138 MMOL/L (ref 136–145)
WBC # BLD AUTO: 8.76 10*3/MM3 (ref 3.4–10.8)

## 2020-09-14 PROCEDURE — 99285 EMERGENCY DEPT VISIT HI MDM: CPT

## 2020-09-14 PROCEDURE — 96374 THER/PROPH/DIAG INJ IV PUSH: CPT

## 2020-09-14 PROCEDURE — 80053 COMPREHEN METABOLIC PANEL: CPT | Performed by: INTERNAL MEDICINE

## 2020-09-14 PROCEDURE — 87635 SARS-COV-2 COVID-19 AMP PRB: CPT | Performed by: INTERNAL MEDICINE

## 2020-09-14 PROCEDURE — C9803 HOPD COVID-19 SPEC COLLECT: HCPCS

## 2020-09-14 PROCEDURE — 25010000002 ONDANSETRON PER 1 MG: Performed by: INTERNAL MEDICINE

## 2020-09-14 PROCEDURE — G0378 HOSPITAL OBSERVATION PER HR: HCPCS

## 2020-09-14 PROCEDURE — 85025 COMPLETE CBC W/AUTO DIFF WBC: CPT | Performed by: INTERNAL MEDICINE

## 2020-09-14 PROCEDURE — 97165 OT EVAL LOW COMPLEX 30 MIN: CPT

## 2020-09-14 PROCEDURE — 85610 PROTHROMBIN TIME: CPT | Performed by: INTERNAL MEDICINE

## 2020-09-14 PROCEDURE — 96375 TX/PRO/DX INJ NEW DRUG ADDON: CPT

## 2020-09-14 PROCEDURE — 73502 X-RAY EXAM HIP UNI 2-3 VIEWS: CPT

## 2020-09-14 PROCEDURE — 73562 X-RAY EXAM OF KNEE 3: CPT

## 2020-09-14 PROCEDURE — 25010000002 MORPHINE PER 10 MG: Performed by: INTERNAL MEDICINE

## 2020-09-14 RX ORDER — SODIUM CHLORIDE 0.9 % (FLUSH) 0.9 %
10 SYRINGE (ML) INJECTION AS NEEDED
Status: DISCONTINUED | OUTPATIENT
Start: 2020-09-14 | End: 2020-09-14 | Stop reason: HOSPADM

## 2020-09-14 RX ORDER — CARVEDILOL 6.25 MG/1
12.5 TABLET ORAL 2 TIMES DAILY WITH MEALS
Status: DISCONTINUED | OUTPATIENT
Start: 2020-09-14 | End: 2020-09-14 | Stop reason: HOSPADM

## 2020-09-14 RX ORDER — SODIUM CHLORIDE 9 MG/ML
30 INJECTION, SOLUTION INTRAVENOUS CONTINUOUS
Status: DISCONTINUED | OUTPATIENT
Start: 2020-09-14 | End: 2020-09-14 | Stop reason: HOSPADM

## 2020-09-14 RX ORDER — AMLODIPINE BESYLATE 5 MG/1
5 TABLET ORAL DAILY
Status: DISCONTINUED | OUTPATIENT
Start: 2020-09-14 | End: 2020-09-14 | Stop reason: HOSPADM

## 2020-09-14 RX ORDER — ACETAMINOPHEN 325 MG/1
650 TABLET ORAL EVERY 4 HOURS PRN
Status: DISCONTINUED | OUTPATIENT
Start: 2020-09-14 | End: 2020-09-14 | Stop reason: HOSPADM

## 2020-09-14 RX ORDER — PRAVASTATIN SODIUM 20 MG
40 TABLET ORAL NIGHTLY
Status: DISCONTINUED | OUTPATIENT
Start: 2020-09-14 | End: 2020-09-14 | Stop reason: HOSPADM

## 2020-09-14 RX ORDER — ONDANSETRON 2 MG/ML
4 INJECTION INTRAMUSCULAR; INTRAVENOUS ONCE
Status: COMPLETED | OUTPATIENT
Start: 2020-09-14 | End: 2020-09-14

## 2020-09-14 RX ORDER — PHENYTOIN SODIUM 100 MG/1
300 CAPSULE, EXTENDED RELEASE ORAL NIGHTLY
COMMUNITY
End: 2021-01-01

## 2020-09-14 RX ORDER — CLONIDINE HYDROCHLORIDE 0.1 MG/1
0.1 TABLET ORAL ONCE
Status: COMPLETED | OUTPATIENT
Start: 2020-09-14 | End: 2020-09-14

## 2020-09-14 RX ORDER — ASPIRIN 81 MG/1
81 TABLET ORAL NIGHTLY
Status: DISCONTINUED | OUTPATIENT
Start: 2020-09-14 | End: 2020-09-14 | Stop reason: HOSPADM

## 2020-09-14 RX ORDER — PHENYTOIN SODIUM 100 MG/1
100 CAPSULE, EXTENDED RELEASE ORAL DAILY
Status: DISCONTINUED | OUTPATIENT
Start: 2020-09-14 | End: 2020-09-14 | Stop reason: HOSPADM

## 2020-09-14 RX ORDER — SODIUM CHLORIDE 0.9 % (FLUSH) 0.9 %
10 SYRINGE (ML) INJECTION EVERY 12 HOURS SCHEDULED
Status: DISCONTINUED | OUTPATIENT
Start: 2020-09-14 | End: 2020-09-14 | Stop reason: HOSPADM

## 2020-09-14 RX ADMIN — SODIUM CHLORIDE, PRESERVATIVE FREE 10 ML: 5 INJECTION INTRAVENOUS at 08:47

## 2020-09-14 RX ADMIN — ONDANSETRON HYDROCHLORIDE 4 MG: 2 SOLUTION INTRAMUSCULAR; INTRAVENOUS at 03:56

## 2020-09-14 RX ADMIN — PHENYTOIN SODIUM 100 MG: 100 CAPSULE, EXTENDED RELEASE ORAL at 08:46

## 2020-09-14 RX ADMIN — CARVEDILOL 12.5 MG: 6.25 TABLET, FILM COATED ORAL at 08:46

## 2020-09-14 RX ADMIN — MORPHINE SULFATE 4 MG: 4 INJECTION, SOLUTION INTRAMUSCULAR; INTRAVENOUS at 03:56

## 2020-09-14 RX ADMIN — CLONIDINE HYDROCHLORIDE 0.1 MG: 0.1 TABLET ORAL at 04:00

## 2020-09-14 RX ADMIN — AMLODIPINE BESYLATE 5 MG: 5 TABLET ORAL at 08:46

## 2020-09-14 RX ADMIN — SODIUM CHLORIDE 30 ML/HR: 9 INJECTION, SOLUTION INTRAVENOUS at 08:47

## 2020-09-14 NOTE — THERAPY DISCHARGE NOTE
Acute Care - Occupational Therapy Discharge  [facility]    Patient Name: Renay Lazo  : 1929    MRN: [id number]                              Today's Date: 2020       Admit Date: 2020    Visit Dx:     ICD-10-CM ICD-9-CM   1. Acute right hip pain  M25.551 719.45     Patient Active Problem List   Diagnosis   • Permanent atrial fibrillation (CMS/HCC)   • Obstructive sleep apnea   • Dementia (CMS/HCC)   • Hypertension   • Grade II diastolic dysfunction   • Pulmonary HTN (CMS/HCC)   • Pacemaker   • Acute right hip pain   • Dehydration     Past Medical History:   Diagnosis Date   • Adrenal adenoma    • Anemia    • Atrial fibrillation (CMS/HCC)    • Benign neoplasm of cerebral meninges (CMS/HCC)    • Brain tumor (CMS/HCC)    • Dementia (CMS/HCC)    • Disease of thyroid gland    • Hip fracture requiring operative repair (CMS/HCC)     2016   • Hyperlipidemia    • Hypertension    • Meningioma (CMS/HCC) 2018   • Obstructive sleep apnea    • Osteoarthritis    • Peripheral vascular disease (CMS/HCC)    • Pneumonia    • Pulmonary HTN (CMS/HCC) 2017   • Seizures (CMS/HCC)    • Sick sinus syndrome (CMS/HCC)     s/p pacemaker per Dr. Puri   • TIA (transient ischemic attack)      Past Surgical History:   Procedure Laterality Date   • APPENDECTOMY     • BRAIN SURGERY     • CAROTID ENDARTERECTOMY  2011   • CATARACT EXTRACTION     • CHOLECYSTECTOMY     • COLONOSCOPY     • HEMORRHOIDECTOMY     • HIP HEMIARTHROPLASTY Right 2017    Procedure: HIP HEMIARTHROPLASTY;  Surgeon: Jeremiah Zaidi MD;  Location: [facility];  Service:    • INSERT / REPLACE / REMOVE PACEMAKER     • JOINT REPLACEMENT     • PACEMAKER IMPLANTATION     • TONSILLECTOMY     • TOTAL HIP ARTHROPLASTY       General Information     Row Name 20 1510          OT Time and Intention    Document Type  evaluation  -     Mode of Treatment  occupational therapy  -     Row Name 20 1510          General Information    Patient  Profile Reviewed  yes  -MW     Prior Level of Function  independent:;ADL's;transfer;w/c or scooter side rails on bed at home, mainly transfers to transport chair  -MW     Existing Precautions/Restrictions  fall limited WB RLE  -MW     Barriers to Rehab  previous functional deficit;physical barrier  -MW     Row Name 09/14/20 1510          Occupational Profile    Reason for Services/Referral (Occupational Profile)  s/p mechanical fall at home; plan for nonop txt with limited WB  -MW     Row Name 09/14/20 1510          Living Environment    Lives With  spouse  -MW     Row Name 09/14/20 1510          Home Main Entrance    Number of Stairs, Main Entrance  three 3 steps in front door, ramp in back  -MW     Row Name 09/14/20 1510          Stairs Within Home, Primary    Stairs, Within Home, Primary  4 steps from den into main part of house  -MW     Row Name 09/14/20 1510          Cognition    Orientation Status (Cognition)  oriented x 4  -MW     Row Name 09/14/20 1510          Safety Issues, Functional Mobility    Safety Issues Affecting Function (Mobility)  ability to follow commands;insight into deficits/self-awareness;safety precaution awareness;safety precautions follow-through/compliance  -MW     Impairments Affecting Function (Mobility)  cognition;endurance/activity tolerance  -MW     Cognitive Impairments, Mobility Safety/Performance  awareness, need for assistance;insight into deficits/self-awareness;safety precaution awareness;safety precaution follow-through  -MW     Row Name 09/14/20 1510          Relationship/Environment    Name(s) of Who Lives With Patient  Spouse - Rajan  -       User Key  (r) = Recorded By, (t) = Taken By, (c) = Cosigned By    Initials Name Provider Type    MW Karuna Mcconnell, OTR/L Occupational Therapist        Mobility/ADL's     Row Name 09/14/20 1635          Bed Mobility    Bed Mobility  bed mobility (all) activities  -     All Activities, Tie Siding (Bed Mobility)  supervision   -     Assistive Device (Bed Mobility)  bed rails  -[facility] Name 09/14/20 1635          Transfers    Transfers  sit-stand transfer  -     Sit-Stand [place] (Transfers)  contact guard  -MW     Row Name 09/14/20 1635          Sit-Stand Transfer    Assistive Device (Sit-Stand Transfers)  walker, front-wheeled  -MW     Row Name 09/14/20 1635          Functional Mobility    Functional Mobility- Ind. Level  verbal cues required;contact guard assist  -     Functional Mobility- Device  rolling walker  -     Functional Mobility-Maintain WBing  unable to maintain weight bearing status  -     Functional Mobility- Comment  amb to door and back  -MW     Row Name 09/14/20 1635          Activities of Daily Living    BADL Assessment/Intervention  lower body dressing  -MW     Row Name 09/14/20 1635          Mobility    Extremity Weight-bearing Status  right lower extremity  -     Right Lower Extremity (Weight-bearing Status)  partial weight-bearing (PWB)  -MW     Row Name 09/14/20 1635          Lower Body Dressing Assessment/Training    [place] Level (Lower Body Dressing)  don;doff;maximum assist (25% patient effort)  -     Position (Lower Body Dressing)  edge of bed sitting  -       User Key  (r) = Recorded By, (t) = Taken By, (c) = Cosigned By    Initials Name Provider Type     Karuna Mcconnell, OTR/L Occupational Therapist        Obj/Interventions     [facility] Name 09/14/20 1636          Sensory Assessment (Somatosensory)    Sensory Assessment (Somatosensory)  sensation intact  -MW     Row Name 09/14/20 1636          Vision Assessment/Intervention    Visual Impairment/Limitations  corrective lenses for reading  -MW     Row Name 09/14/20 1636          Strength Comprehensive (MMT)    General Manual Muscle Testing (MMT) Assessment  no strength deficits identified  -MW     Row Name 09/14/20 1636          Balance    Balance Assessment  sitting static balance;standing static balance  -     Static Sitting  Balance  WFL  -MW     Static Standing Balance  WFL  -MW       User Key  (r) = Recorded By, (t) = Taken By, (c) = Cosigned By    Initials Name Provider Type    Karuna Lackey OTR/L Occupational Therapist        Goals/Plan    No documentation.       Clinical Impression    No documentation.       Outcome Measures     Row Name 09/14/20 1641          How much help from another is currently needed...    Putting on and taking off regular lower body clothing?  2  -MW     Bathing (including washing, rinsing, and drying)  2  -MW     Toileting (which includes using toilet bed pan or urinal)  2  -MW     Putting on and taking off regular upper body clothing  3  -MW     Taking care of personal grooming (such as brushing teeth)  3  -MW     Eating meals  3  -MW     AM-PAC 6 Clicks Score (OT)  15  -MW     Row Name 09/14/20 1641          Functional Assessment    Outcome Measure Options  AM-PAC 6 Clicks Daily Activity (OT)  -MW       User Key  (r) = Recorded By, (t) = Taken By, (c) = Cosigned By    Initials Name Provider Type    Karuna Lackey, OTR/L Occupational Therapist        Occupational Therapy Education                 Title: PT OT SLP Therapies (Resolved)     Topic: Occupational Therapy (Resolved)     Point: ADL training (Resolved)     Description:   Instruct learner(s) on proper safety adaptation and remediation techniques during self care or transfers.   Instruct in proper use of assistive devices.              Learning Progress Summary           Patient Acceptance, E, VU by  at 9/14/2020 1642   Significant Other Acceptance, E, VU by  at 9/14/2020 1642                   Point: Home exercise program (Resolved)     Description:   Instruct learner(s) on appropriate technique for monitoring, assisting and/or progressing therapeutic exercises/activities.              Learning Progress Summary           Patient Acceptance, E, VU by  at 9/14/2020 1642   Significant Other Acceptance, E, VU by  at 9/14/2020 1642                    Point: Precautions (Resolved)     Description:   Instruct learner(s) on prescribed precautions during self-care and functional transfers.              Learning Progress Summary           Patient Acceptance, E, VU by  at 9/14/2020 1642   Significant Other Acceptance, E, VU by MW at 9/14/2020 1642                               User Key     Initials Effective Dates Name Provider Type Discipline     08/28/18 -  Karuna Mcconnell, OTR/L Occupational Therapist OT              OT Recommendation and Plan  Retired Outcome Summary/Treatment Plan (OT)  Anticipated Discharge Disposition (OT): home with 24/7 care, home with home health  Plan of Care Review  Plan of Care Reviewed With: patient, spouse  Outcome Summary: OT eval completed. Pt awake and alert in fowlers, confused but very pleasant, no complaints of pain. Performs all bed mobility with S, including scooting self back up in bed in sitting position. Pt stands CGA with use of RW, amb to door and back to bed. She has difficulty maintaining limited WB on RLE despite max vc, however c/o no increased pain when she is bearing increased weight through RLE. Pt and spouse report she mainly uses transport chair around her home, has environment accessible for safe transfers. She is at her baseline and pt plans to dc back home w spouse this date, education provided for increased safety and independence. Recommend d/c home w HH.  Plan of Care Reviewed With: patient, spouse  Outcome Summary: OT eval completed. Pt awake and alert in fowlers, confused but very pleasant, no complaints of pain. Performs all bed mobility with S, including scooting self back up in bed in sitting position. Pt stands CGA with use of RW, amb to door and back to bed. She has difficulty maintaining limited WB on RLE despite max vc, however c/o no increased pain when she is bearing increased weight through RLE. Pt and spouse report she mainly uses transport chair around her home, has  environment accessible for safe transfers. She is at her baseline and pt plans to dc back home w spouse this date, education provided for increased safety and independence. Recommend d/c home w HH.     Time Calculation:   Time Calculation- OT     Row Name 09/14/20 1642             Time Calculation- OT    OT Start Time  1505 +10 min chart review  -MW      OT Stop Time  1550  -MW      OT Time Calculation (min)  45 min  -MW      OT Received On  09/14/20  -MW        User Key  (r) = Recorded By, (t) = Taken By, (c) = Cosigned By    Initials Name Provider Type    MW Karuna Mcconnell, OTR/L Occupational Therapist        Therapy Charges for Today     Code Description Service Date Service Provider Modifiers Qty    29403166415 HC OT EVAL LOW COMPLEXITY 4 9/14/2020 Karuna Mcconnell OTR/L GO 1               SHAY Crowder/YENNIFER  9/14/2020

## 2020-09-14 NOTE — CONSULTS
Orthopaedic Inpatient Consultation    NAME:  Renay Lazo   : 1929  MRN: [id number]    2020  2:24 AM    Requesting Physician: Tejas Gallo*    CHIEF COMPLAINT:  right hip pain    HISTORY OF PRESENT ILLNESS:   The patient is a 89 y.o. female who experienced a mechanical fall onto her right side resulting in the abrupt onset of right hip pain.  For review, she is undergone a right hip hemiarthroplasty in 2017 by Dr. Zaidi.  Pain is located in the right hip, rated a 2/5, dull and constant, worse with movement, better with rest and medication.  There are no associated symptoms.      Past Medical History:    Past Medical History:   Diagnosis Date   • Adrenal adenoma    • Anemia    • Atrial fibrillation (CMS/HCC)    • Benign neoplasm of cerebral meninges (CMS/HCC)    • Brain tumor (CMS/HCC)    • Dementia (CMS/HCC)    • Disease of thyroid gland    • Hip fracture requiring operative repair (CMS/HCC)     2016   • Hyperlipidemia    • Hypertension    • Meningioma (CMS/HCC) 2018   • Obstructive sleep apnea    • Osteoarthritis    • Peripheral vascular disease (CMS/HCC)    • Pneumonia    • Pulmonary HTN (CMS/HCC) 2017   • Seizures (CMS/HCC)    • Sick sinus syndrome (CMS/HCC)     s/p pacemaker per Dr. Puri   • TIA (transient ischemic attack)        Past Surgical History:    Past Surgical History:   Procedure Laterality Date   • APPENDECTOMY     • BRAIN SURGERY     • CAROTID ENDARTERECTOMY  2011   • CATARACT EXTRACTION     • CHOLECYSTECTOMY     • COLONOSCOPY     • HEMORRHOIDECTOMY     • HIP HEMIARTHROPLASTY Right 2017    Procedure: HIP HEMIARTHROPLASTY;  Surgeon: Jeremiah Zaidi MD;  Location: [facility];  Service:    • INSERT / REPLACE / REMOVE PACEMAKER     • JOINT REPLACEMENT     • PACEMAKER IMPLANTATION     • TONSILLECTOMY     • TOTAL HIP ARTHROPLASTY         Current Medications:   Prior to Admission medications    Medication Sig Start Date End Date Taking? Authorizing  Provider   acetaminophen (TYLENOL) 325 MG tablet Take 2 tablets by mouth Every 6 (Six) Hours As Needed for Mild Pain (1-3). 6/28/17   Griselda Matthew APRN   amLODIPine (NORVASC) 5 MG tablet Take 5 mg by mouth Daily.    Brinda Pickering MD   aspirin 81 MG EC tablet Take 81 mg by mouth Every Night.    Birnda Pickering MD   carvedilol (COREG) 12.5 MG tablet TAKE 1 TABLET TWICE A DAY 5/4/20   Lorraine, BETO Gamboa   CloNIDine (CATAPRES) 0.1 MG tablet Take 0.1 mg by mouth 2 (Two) Times a Day.    Brinda Pickering MD   clopidogrel (PLAVIX) 75 MG tablet Take  by mouth Daily. 4/9/19   Brinda Pickering MD   ferrous sulfate 325 (65 FE) MG tablet Take 325 mg by mouth daily.    Brinda Pickering MD   folic acid (FOLVITE) 1 MG tablet Take 1 mg by mouth daily.    Brinda Pickering MD   hydrALAZINE (APRESOLINE) 50 MG tablet Take 1 tablet by mouth Every 8 (Eight) Hours.  Patient taking differently: Take 50 mg by mouth 2 (Two) Times a Day. 6/28/17   Griselda Matthew APRN   lisinopril (PRINIVIL,ZESTRIL) 5 MG tablet Take 5 mg by mouth Daily.    Brinda Pickering MD   phenytoin (DILANTIN) 100 MG ER capsule Take 100 mg by mouth Every Morning.    Brinda Pickering MD   pravastatin (PRAVACHOL) 40 MG tablet Take 40 mg by mouth Every Night.    Brinda Pickering MD   trospium 60 MG capsule sustained-release 24 hr capsule TAKE 1 CAPSULE EVERY MORNING 6/24/19   Otto Altamirano MD       Allergies:  Chocolate, Codeine, Ciprofloxacin, Biaxin [clarithromycin], Capoten [captopril], Ciprofloxacin hcl, Ciprofloxacin-ciproflox hcl er, Darvon [propoxyphene], Diphenhydramine hcl, Dyazide [triamterene-hctz], Enablex [darifenacin hydrobromide er], Gadolinium derivatives, Homatropine, Hydrochlorothiazide w-triamterene, Hydrocodone, Inderal la [propranolol hcl], Iodine, Levaquin [levofloxacin], Lortab [hydrocodone-acetaminophen], Lotrel [amlodipine besy-benazepril hcl], Macrodantin  [nitrofurantoin],  "Macrodantin [nitrofurantoin macrocrystal], Motrin [ibuprofen], Other, Percocet [oxycodone-acetaminophen], Phosphate, Propranolol, Qvar [beclomethasone], Solifenacin succinate, Sulfa antibiotics, Terramycin [oxytetracycline], Vesicare [solifenacin], Ampicillin, Benadryl [diphenhydramine], and Contrast dye    Social History:   Social History     Socioeconomic History   • Marital status:      Spouse name: Not on file   • Number of children: Not on file   • Years of education: Not on file   • Highest education level: Not on file   Occupational History   • Occupation: Retired   Tobacco Use   • Smoking status: Never Smoker   • Smokeless tobacco: Never Used   Substance and Sexual Activity   • Alcohol use: No   • Drug use: No   • Sexual activity: Defer       Family History:   Family History   Problem Relation Age of Onset   • Arthritis Mother    • Cancer Mother    • Heart disease Mother    • Arthritis Father    • Arthritis Brother    • Cancer Brother    • Heart disease Brother    • Arthritis Sister    • Heart disease Sister        REVIEW OF SYSTEMS:  14 point review of systems has been reviewed from the patient's emergency room visit, reviewed with the patient on today's date with no new changes.    PHYSICAL EXAM:      Physical Examination:  Vitals:   Vitals:    09/14/20 0500 09/14/20 0502 09/14/20 0530 09/14/20 0740   BP: 140/88  142/76 179/79   BP Location:   Left arm Left arm   Patient Position:   Lying Lying   Pulse:   106 110   Resp:  12 18 16   Temp:  98.1 °F (36.7 °C) 98 °F (36.7 °C) 97.5 °F (36.4 °C)   TempSrc:  Oral Oral Oral   SpO2: 95%  97% 95%   Weight:   69.7 kg (153 lb 9.6 oz)    Height:   162.6 cm (64\")      General:  Appears stated age, no distress, but is somewhat hard of hearing.  Orientation:  Alert and oriented to place, circumstance and person.  Mood and Affect:  Cooperative and pleasant.  Gait:  Resting comfortably in bed.  Cardiovascular:  Symmetric 1-2 plus pulses in upper and lower " extremities.  Lymph:  No cervical or inguinal lymphadenopathy noted.  Sensation:  Grossly intact to light touch.  DTR:  Normal, no pathologic reflexes.  Coordination/balance:  Normal    Musculoskeletal:  Right upper extremity exam:  There is no tenderness to palpation about the shoulder, elbow, wrist or hand.  Unrestricted full function motion is present.  Stability is normal with provocative tests, 5/5 strength, and skin is normal.      Left upper extremity exam:  There is no tenderness to palpation about the shoulder, elbow, wrist or hand.  Unrestricted full function motion is present.  Stability is normal with provocative tests, 5/5 strength, and skin is normal.     Right lower extremity exam:  There is no tenderness to palpation about the knee, ankle or foot, but she does have some tenderness to palpation about her right hip.  There is some mild superficial bruising.  There is no obvious crepitus.  There is no instability with supine logroll.  The patient does tolerate passive range of motion to the right hip though she does report some discomfort.  Incision is benign in appearance and well-healed.    Left lower extremity exam:  There is no tenderness to palpation about the hip, knee, ankle or foot.  Her postoperative incisions are benign in appearance and well approximated.  Well-healed.  Unrestricted full function motion is present.  Stability is normal with provocative tests, 4+/5 strength, and skin is normal.      DATA:    CBC with Differential:    WBC   Date Value Ref Range Status   09/14/2020 8.76 3.40 - 10.80 10*3/mm3 Final     RBC   Date Value Ref Range Status   09/14/2020 3.52 (L) 3.77 - 5.28 10*6/mm3 Final     Hemoglobin   Date Value Ref Range Status   09/14/2020 11.3 (L) 12.0 - 15.9 g/dL Final     Hematocrit   Date Value Ref Range Status   09/14/2020 34.2 34.0 - 46.6 % Final     Platelets   Date Value Ref Range Status   09/14/2020 301 140 - 450 10*3/mm3 Final     MCV   Date Value Ref Range Status    09/14/2020 97.2 (H) 79.0 - 97.0 fL Final     MCH   Date Value Ref Range Status   09/14/2020 32.1 26.6 - 33.0 pg Final     MCHC   Date Value Ref Range Status   09/14/2020 33.0 31.5 - 35.7 g/dL Final     RDW   Date Value Ref Range Status   09/14/2020 13.2 12.3 - 15.4 % Final     nRBC   Date Value Ref Range Status   09/14/2020 0.0 0.0 - 0.2 /100 WBC Final     Monocytes, Absolute   Date Value Ref Range Status   09/14/2020 0.65 0.10 - 0.90 10*3/mm3 Final     Lymphocytes, Absolute   Date Value Ref Range Status   09/14/2020 1.56 0.70 - 3.10 10*3/mm3 Final     Eosinophils, Absolute   Date Value Ref Range Status   09/14/2020 0.32 0.00 - 0.40 10*3/mm3 Final     Basophils, Absolute   Date Value Ref Range Status   09/14/2020 0.05 0.00 - 0.20 10*3/mm3 Final     CMP:    Sodium   Date Value Ref Range Status   09/14/2020 138 136 - 145 mmol/L Final     Potassium   Date Value Ref Range Status   09/14/2020 5.0 3.5 - 5.2 mmol/L Final     Chloride   Date Value Ref Range Status   09/14/2020 105 98 - 107 mmol/L Final     CO2   Date Value Ref Range Status   09/14/2020 21.0 (L) 22.0 - 29.0 mmol/L Final     BUN   Date Value Ref Range Status   09/14/2020 49 (H) 8 - 23 mg/dL Final     Creatinine   Date Value Ref Range Status   09/14/2020 1.01 (H) 0.57 - 1.00 mg/dL Final     A/G Ratio   Date Value Ref Range Status   09/14/2020 1.4 g/dL Final     Glucose   Date Value Ref Range Status   09/14/2020 118 (H) 65 - 99 mg/dL Final     Calcium   Date Value Ref Range Status   09/14/2020 8.7 8.2 - 9.6 mg/dL Final     Total Bilirubin   Date Value Ref Range Status   09/14/2020 0.3 0.0 - 1.2 mg/dL Final     Alkaline Phosphatase   Date Value Ref Range Status   09/14/2020 96 39 - 117 U/L Final     AST (SGOT)   Date Value Ref Range Status   09/14/2020 22 1 - 32 U/L Final     ALT (SGPT)   Date Value Ref Range Status   09/14/2020 14 1 - 33 U/L Final     BMP:    Sodium   Date Value Ref Range Status   09/14/2020 138 136 - 145 mmol/L Final     Potassium   Date  Value Ref Range Status   09/14/2020 5.0 3.5 - 5.2 mmol/L Final     Chloride   Date Value Ref Range Status   09/14/2020 105 98 - 107 mmol/L Final     CO2   Date Value Ref Range Status   09/14/2020 21.0 (L) 22.0 - 29.0 mmol/L Final     BUN   Date Value Ref Range Status   09/14/2020 49 (H) 8 - 23 mg/dL Final     Creatinine   Date Value Ref Range Status   09/14/2020 1.01 (H) 0.57 - 1.00 mg/dL Final     Calcium   Date Value Ref Range Status   09/14/2020 8.7 8.2 - 9.6 mg/dL Final     Glucose   Date Value Ref Range Status   09/14/2020 118 (H) 65 - 99 mg/dL Final       ----------------------------------------------------------------------------------------------------------------------  I have reviewed the radiology images above and agree with the findings dictated below    Radiology:   Imaging Results (Last 24 Hours)     Procedure Component Value Units Date/Time    XR Knee 3 View Left [[id number]] Collected: 09/14/20 0709     Updated: 09/14/20 0714    Narrative:      EXAMINATION:  XR KNEE 3 VW LEFT-  9/14/2020 2:51 AM CDT     HISTORY: Left knee pain status post fall      COMPARISON: No comparison study.     TECHNIQUE: 3 views: AP notch and lateral projection imaging     FINDINGS:      Intramedullary naveed noted in the distal femur with 2 interlocking screws.     The patellofemoral and tibial relationships are appropriate. The joint  spaces are maintained without fracture or dislocation.     Osteoarthritic changes observed particularly in the medial and  patellofemoral joint spaces with joint space narrowing and osteophyte  formation. Degenerative changes observed to lesser degree in the lateral  joint space.     There are vascular calcifications.     There are small joint effusion suspected in the suprapatellar recess.       Impression:      1. No acute osseous abnormality.  This report was finalized on 09/14/2020 07:10 by Dr. Mick Kimbrough MD.    XR Hip With or Without Pelvis 2 - 3 View Right [[id number]] Collected: 09/14/20  0708     Updated: 09/14/20 0712    Narrative:      EXAMINATION:  XR HIP W OR WO PELVIS 2-3 VIEW RIGHT-  9/14/2020 2:50 AM  CDT     HISTORY: Right hip pain status post fall      COMPARISON: No comparison study.     TECHNIQUE: 3 views: AP pelvis, AP and lateral right hip     FINDINGS:      Changes from right hip arthroplasty observed.     The acetabular and femoral prosthetic components well-positioned.     Changes from left femoral neck internal fixation with femoral nail and  intramedullary naveed identified. No hardware complication observed.     The bony pelvis is intact without fracture.     There is osteoporosis.     Degenerative changes noted in the lower lumbar spine.     There are vascular calcifications.       Impression:      1. No acute osseous abnormality.  2. No hardware complication.  This report was finalized on 09/14/2020 07:09 by Dr. Mick Kimbrough MD.          ----------------------------------------------------------------------------------------------------------------------  Assessment: Fall onto right hip status post right hip hemiarthroplasty in the absence of any obvious acute finding or fracture -likely to represent soft tissue bruising    Plan:  1) Nonop treatment with serial x-rays until healing, PT/OT, limited weight bearing  2) Admit for pain control, PT/OT  3) we will attempt to advance weightbearing as tolerated, but patient may ultimately have to limit weightbearing on her right side until symptoms subside.  If persistent inability to weight-bear, we may have to obtain a CT scan of the right hip to rule out any nondisplaced periprosthetic fracture    Electronically signed by Isai Charles MD on 9/14/2020 at 10:29 CDT

## 2020-09-14 NOTE — DISCHARGE SUMMARY
[facility] Medicine Services  DISCHARGE SUMMARY       Date of Admission: 9/14/2020  Date of Discharge:  9/14/2020  Primary Care Physician: Edgar Reyna APRN    Discharge Diagnoses:  Active Hospital Problems    Diagnosis   • **Acute right hip pain   • Grade II diastolic dysfunction   • Pulmonary HTN (CMS/HCC)   • Dehydration   • Pacemaker   • Obstructive sleep apnea   • Dementia (CMS/HCC)   • Hypertension   • Permanent atrial fibrillation (CMS/HCC)         Presenting Problem/History of Present Illness:  Acute right hip pain [M25.551]     Chief Complaint on Day of Discharge:   Right hip discomfort    History of Present Illness on Day of Discharge:   Patient has been seen and evaluated by orthopedic surgery.  They have recommended that the patient advance weightbearing as tolerated.  The patient may ultimately have 2 limit weightbearing on the right side until symptoms decrease.  If the patient persistently has inability to bear weight a CT scan may need to be obtained of the right hip to rule out a nondisplaced periprosthetic fracture.  At this point both services agreed that the patient is appropriate to return home.  She is to use a walker when she walks.  She has an electric wheelchair device that she uses in her home.  She is appropriate for discharge home today.    Hospital Course   89 year old female with PMH of Afib, HTN, CHF, Dementia, SSS/PPM that presents to the ER after a fall resulting in right hip pain and inability to walk. Hip Xray did not show injury around hip prosthesis, but she could not bear weight. Also reportedly she was having hallucinations and running away from someone at her house when she fell. The patient is hard of hearing and not able to answer or understand my questions. Still complains of pain when right thigh is mobilized.   Plan:   Admit to medical floor, vitals routine, cardiac diet, up with assistance and fall precautions  Pain control.  IVF  slow hydration with NS 30 cc/hour  Orthopedic surgery consult for right hip pain  Monitor for behavioral changes or delirium and risk of falls or injury  Home medications reconciled based on current list. May need to restart Hydralazine as well if blood pressure requires      Consults:   Orthopedic surgery:  Assessment: Fall onto right hip status post right hip hemiarthroplasty in the absence of any obvious acute finding or fracture -likely to represent soft tissue bruising     Plan:  1) Nonop treatment with serial x-rays until healing, PT/OT, limited weight bearing  2) Admit for pain control, PT/OT  3) we will attempt to advance weightbearing as tolerated, but patient may ultimately have to limit weightbearing on her right side until symptoms subside.  If persistent inability to weight-bear, we may have to obtain a CT scan of the right hip to rule out any nondisplaced periprosthetic fracture     Electronically signed by Isai Charles MD    Pertinent Test Results:   Lab Results (last 7 days)     Procedure Component Value Units Date/Time    Comprehensive Metabolic Panel [[id number]]  (Abnormal) Collected: 09/14/20 0437    Specimen: Blood Updated: 09/14/20 0502     Glucose 118 mg/dL      BUN 49 mg/dL      Creatinine 1.01 mg/dL      Sodium 138 mmol/L      Potassium 5.0 mmol/L      Chloride 105 mmol/L      CO2 21.0 mmol/L      Calcium 8.7 mg/dL      Total Protein 6.8 g/dL      Albumin 4.00 g/dL      ALT (SGPT) 14 U/L      AST (SGOT) 22 U/L      Alkaline Phosphatase 96 U/L      Total Bilirubin 0.3 mg/dL      eGFR Non African Amer 51 mL/min/1.73      Globulin 2.8 gm/dL      A/G Ratio 1.4 g/dL      BUN/Creatinine Ratio 48.5     Anion Gap 12.0 mmol/L     Narrative:      GFR Normal >60  Chronic Kidney Disease <60  Kidney Failure <15      Protime-INR [[id number]]  (Abnormal) Collected: 09/14/20 0355    Specimen: Blood Updated: 09/14/20 0457     Protime 14.7 Seconds      INR 1.19    COVID PRE-OP / PRE-PROCEDURE SCREENING  ORDER (NO ISOLATION) - Swab, Nasal Cavity [[id number]]  (Normal) Collected: 09/14/20 0404    Specimen: Swab from Nasal Cavity Updated: 09/14/20 0455    Narrative:      The following orders were created for panel order COVID PRE-OP / PRE-PROCEDURE SCREENING ORDER (NO ISOLATION) - Swab, Nasal Cavity.  Procedure                               Abnormality         Status                     ---------                               -----------         ------                     COVID-19,[facility]...[[id number]]  Normal              Final result                 Please view results for these tests on the individual orders.    COVID-19,[facility] IN-HOUSE,Nasal Swab No Transport Media 3-4 HR TAT - Swab, Nasal Cavity [[id number]]  (Normal) Collected: 09/14/20 0404    Specimen: Swab from Nasal Cavity Updated: 09/14/20 0455     COVID19 Not Detected    Narrative:      Fact sheet for providers: https://www.fda.gov/media/235937/download     Fact sheet for patients: https://www.fda.gov/media/556704/download    CBC & Differential [[id number]]  (Abnormal) Collected: 09/14/20 0355    Specimen: Blood Updated: 09/14/20 0414    Narrative:      The following orders were created for panel order CBC & Differential.  Procedure                               Abnormality         Status                     ---------                               -----------         ------                     CBC Auto Differential[[id number]]        Abnormal            Final result                 Please view results for these tests on the individual orders.    CBC Auto Differential [[id number]]  (Abnormal) Collected: 09/14/20 0355    Specimen: Blood Updated: 09/14/20 0414     WBC 8.76 10*3/mm3      RBC 3.52 10*6/mm3      Hemoglobin 11.3 g/dL      Hematocrit 34.2 %      MCV 97.2 fL      MCH 32.1 pg      MCHC 33.0 g/dL      RDW 13.2 %      RDW-SD 47.1 fl      MPV 9.4 fL      Platelets 301 10*3/mm3      Neutrophil % 70.3 %      Lymphocyte % 17.8 %      Monocyte % 7.4 %       Eosinophil % 3.7 %      Basophil % 0.6 %      Immature Grans % 0.2 %      Neutrophils, Absolute 6.16 10*3/mm3      Lymphocytes, Absolute 1.56 10*3/mm3      Monocytes, Absolute 0.65 10*3/mm3      Eosinophils, Absolute 0.32 10*3/mm3      Basophils, Absolute 0.05 10*3/mm3      Immature Grans, Absolute 0.02 10*3/mm3      nRBC 0.0 /100 WBC         Imaging Results (Last 7 Days)     Procedure Component Value Units Date/Time    XR Knee 3 View Left [[id number]] Collected: 09/14/20 0709     Updated: 09/14/20 0714    Narrative:      EXAMINATION:  XR KNEE 3 VW LEFT-  9/14/2020 2:51 AM CDT     HISTORY: Left knee pain status post fall      COMPARISON: No comparison study.     TECHNIQUE: 3 views: AP notch and lateral projection imaging     FINDINGS:      Intramedullary naveed noted in the distal femur with 2 interlocking screws.     The patellofemoral and tibial relationships are appropriate. The joint  spaces are maintained without fracture or dislocation.     Osteoarthritic changes observed particularly in the medial and  patellofemoral joint spaces with joint space narrowing and osteophyte  formation. Degenerative changes observed to lesser degree in the lateral  joint space.     There are vascular calcifications.     There are small joint effusion suspected in the suprapatellar recess.       Impression:      1. No acute osseous abnormality.  This report was finalized on 09/14/2020 07:10 by Dr. Mick Kimbrough MD.    XR Hip With or Without Pelvis 2 - 3 View Right [[id number]] Collected: 09/14/20 0708     Updated: 09/14/20 0712    Narrative:      EXAMINATION:  XR HIP W OR WO PELVIS 2-3 VIEW RIGHT-  9/14/2020 2:50 AM  CDT     HISTORY: Right hip pain status post fall      COMPARISON: No comparison study.     TECHNIQUE: 3 views: AP pelvis, AP and lateral right hip     FINDINGS:      Changes from right hip arthroplasty observed.     The acetabular and femoral prosthetic components well-positioned.     Changes from left femoral neck  "internal fixation with femoral nail and  intramedullary naveed identified. No hardware complication observed.     The bony pelvis is intact without fracture.     There is osteoporosis.     Degenerative changes noted in the lower lumbar spine.     There are vascular calcifications.       Impression:      1. No acute osseous abnormality.  2. No hardware complication.  This report was finalized on 09/14/2020 07:09 by Dr. Mick Kimbrough MD.            Condition on Discharge:    Stable    Physical Exam on Discharge:  /92 (BP Location: Right arm, Patient Position: Lying)   Pulse 94   Temp 98.6 °F (37 °C) (Oral)   Resp 16   Ht 162.6 cm (64\")   Wt 69.7 kg (153 lb 9.6 oz)   SpO2 96%   BMI 26.37 kg/m²   Physical Exam     Constitutional:       Comments: Elder collaborative and debilitated female   HENT:      Head: Normocephalic and atraumatic.      Nose: Nose normal.      Mouth/Throat:      Mouth: Mucous membranes are dry.   Eyes:      General: No scleral icterus.     Extraocular Movements: Extraocular movements intact.      Pupils: Pupils are equal, round, and reactive to light.   Neck:      Musculoskeletal: Normal range of motion and neck supple. No neck rigidity.   Cardiovascular:      Rate and Rhythm: Tachycardia present. Rhythm irregular.      Heart sounds: No murmur.   Pulmonary:      Effort: Pulmonary effort is normal. No respiratory distress.      Breath sounds: Normal breath sounds. No stridor. No wheezing or rhonchi.   Abdominal:      General: Abdomen is flat. There is no distension.      Palpations: Abdomen is soft. There is no mass.      Tenderness: There is no abdominal tenderness.      Hernia: No hernia is present.   Musculoskeletal: Normal range of motion.         General: Tenderness present.      Comments: Right buttock area hematoma   Skin:     Capillary Refill: Capillary refill takes less than 2 seconds.      Coloration: Skin is not jaundiced.      Findings: Bruising present. No erythema or rash.   "   Neurological:      General: No focal deficit present.      Mental Status: She is disoriented.       Discharge Disposition:  Home or Self Care    Discharge Medications:     Discharge Medications      Changes to Medications      Instructions Start Date   hydrALAZINE 50 MG tablet  Commonly known as: APRESOLINE  What changed:   · how much to take  · when to take this   50 mg, Oral, Every 8 Hours Scheduled         Continue These Medications      Instructions Start Date   acetaminophen 325 MG tablet  Commonly known as: TYLENOL   650 mg, Oral, Every 6 Hours PRN      amLODIPine 5 MG tablet  Commonly known as: NORVASC   5 mg, Oral, Daily      aspirin 81 MG EC tablet   81 mg, Oral, Nightly      carvedilol 12.5 MG tablet  Commonly known as: COREG   TAKE 1 TABLET TWICE A DAY      cloNIDine 0.1 MG tablet  Commonly known as: CATAPRES   0.1 mg, Oral, 2 Times Daily      clopidogrel 75 MG tablet  Commonly known as: PLAVIX   Oral, Daily      ferrous sulfate 325 (65 FE) MG tablet   325 mg, Oral, Daily      folic acid 1 MG tablet  Commonly known as: FOLVITE   1 mg, Oral, Daily      lisinopril 5 MG tablet  Commonly known as: PRINIVIL,ZESTRIL   5 mg, Oral, Daily      phenytoin 100 MG ER capsule  Commonly known as: DILANTIN   100 mg, Oral, Every Morning      phenytoin 100 MG ER capsule  Commonly known as: DILANTIN   300 mg, Oral, Nightly      pravastatin 40 MG tablet  Commonly known as: PRAVACHOL   40 mg, Oral, Nightly      trospium 60 MG capsule sustained-release 24 hr capsule   TAKE 1 CAPSULE EVERY MORNING             Discharge Diet:   Diet Instructions     Diet: Regular; Thin      Discharge Diet: Regular    Fluid Consistency: Thin          Discharge Care Plan / Instructions:   Discharge home with home health follow-up for physical therapy    Activity at Discharge:   Activity Instructions     Activity as Tolerated            Follow-up Appointments:  Follow-up with primary care provider next week  Follow-up with Dr. Charles in 2 weeks        Electronically signed by Kwaku Frey DO, 09/14/20, 15:57 CDT.    Time: Discharge Less than 30 min    Part of this note may be an electronic transcription/translation of spoken language to printed text using the Dragon Dictation system.

## 2020-09-14 NOTE — PLAN OF CARE
Problem: Adult Inpatient Plan of Care  Goal: Plan of Care Review  Outcome: Ongoing, Progressing  Flowsheets (Taken 9/14/2020 0700)  Progress: no change  Plan of Care Reviewed With: patient  Outcome Summary: Pt admitted to 324 from ED. Alert, disoriented to time. Intermittent confusion. Complains of R hip pain and L knee pain with bruising to R hip. Pulses dopplered. Will continue to monitor.

## 2020-09-14 NOTE — NURSING NOTE
Unsuccessful IV attempt x1 to left forearm by Trudy SAENZ.    1010 Unsuccessful IV attempt x 1 by Maribel SAENZ.

## 2020-09-14 NOTE — PLAN OF CARE
Problem: Adult Inpatient Plan of Care  Goal: Plan of Care Review  Outcome: Ongoing, Progressing  Flowsheets (Taken 9/14/2020 1611)  Plan of Care Reviewed With:   patient   spouse  Outcome Summary: CORINA diaz completed. Pt awake and alert in fowlers, confused but very pleasant, no complaints of pain. Performs all bed mobility with S, including scooting self back up in bed in sitting position. Pt stands CGA with use of RW, amb to door and back to bed. She has difficulty maintaining limited WB on RLE despite max vc, however c/o no increased pain when she is bearing increased weight through RLE. Pt and spouse report she mainly uses transport chair around her home, has environment accessible for safe transfers. She is at her baseline and pt plans to dc back home w spouse this date, education provided for increased safety and independence. Recommend d/c home w HH.

## 2020-09-14 NOTE — PROGRESS NOTES
Discharge Planning Assessment  [facility]     Patient Name: Renay Lazo  MRN: [id number]  Today's Date: 9/14/2020    Admit Date: 9/14/2020    Discharge Needs Assessment     Row Name 09/14/20 1614       Living Environment    Lives With  spouse    Name(s) of Who Lives With Patient  Rajan    Current Living Arrangements  home/apartment/condo    Primary Care Provided by  self    Provides Primary Care For  no one    Family Caregiver if Needed  spouse    Quality of Family Relationships  helpful;involved    Able to Return to Prior Arrangements  yes       Resource/Environmental Concerns    Resource/Environmental Concerns  none    Transportation Concerns  car, none       Transition Planning    Patient/Family Anticipates Transition to  home with family;home with help/services    Patient/Family Anticipated Services at Transition  home health care    Transportation Anticipated  family or friend will provide       Discharge Needs Assessment    Readmission Within the Last 30 Days  no previous admission in last 30 days    Equipment Currently Used at Home  walker, rolling    Concerns to be Addressed  denies needs/concerns at this time    Anticipated Changes Related to Illness  none    Equipment Needed After Discharge  none    Outpatient/Agency/Support Group Needs  homecare agency    Discharge Facility/Level of Care Needs  home with home health    Provided Post Acute Provider List?  Yes    Post Acute Provider List  Home Health    Provided Post Acute Provider Quality & Resource List?  Yes    Post Acute Provider Quality and Resource List  Home Health    Delivered To  Support Person    Method of Delivery  Telephone    Discharge Coordination/Progress  Pt is being dcd home today with spouse. Spouse chose [facility]        Discharge Plan     Row Name 09/14/20 1615       Plan    Final Discharge Disposition Code  06 - home with home health care    Final Note  Pt is being dcd home today with [facility]        Continued Care and Services - Admitted Since  9/14/2020     Home Medical Care Coordination complete    Service Provider Request Status Selected Services Address Phone Fax    [facility]   Selected Home Health Services [address], [facility] [address] [phone]              Expected Discharge Date and Time     Expected Discharge Date Expected Discharge Time    Sep 14, 2020         Demographic Summary    No documentation.       Functional Status    No documentation.       Psychosocial    No documentation.       Abuse/Neglect    No documentation.       Legal    No documentation.       Substance Abuse    No documentation.       Patient Forms    No documentation.           FREDY Schilling

## 2020-09-14 NOTE — PLAN OF CARE
Goal Outcome Evaluation:  Plan of Care Reviewed With: patient, spouse  Progress: no change  Outcome Summary: Pt alert, oriented to self only,  at bedside, ermelinda diet, cont. ivf, incont. of bladder, ortho in to see pt no surgery at this time, working with PT, will cont to monitor

## 2020-09-14 NOTE — H&P
[facility]  HISTORY AND PHYSICAL    Date of Admission: 9/14/2020  Primary Care Physician: Edgar Reyna APRN    Subjective     Chief Complaint: Fall    History of Present Illness  89 year old female with PMH of Afib, HTN, CHF, Dementia, SSS/PPM that presents to the ER after a fall resulting in right hip pain and inability to walk. Hip Xray did not show injury around hip prosthesis, but she could not bear weight. Also reportedly she was having hallucinations and running away from someone at her house when she fell. The patient is hard of hearing and not able to answer or understand my questions. Still complains of pain when right thigh is mobilized.     Review of Systems   Otherwise complete ROS reviewed and negative except as mentioned in the HPI.    Past Medical History:   Past Medical History:   Diagnosis Date   • Adrenal adenoma    • Anemia    • Atrial fibrillation (CMS/HCC)    • Benign neoplasm of cerebral meninges (CMS/HCC)    • Brain tumor (CMS/HCC)    • CHF (congestive heart failure) (CMS/HCC)    • Dementia (CMS/HCC)    • Disease of thyroid gland    • Hip fracture requiring operative repair (CMS/HCC)     7/2016   • Hyperlipidemia    • Hypertension    • Meningioma (CMS/HCC) 2/5/2018   • Obstructive sleep apnea    • Osteoarthritis    • Peripheral vascular disease (CMS/HCC)    • Pneumonia    • Seizures (CMS/HCC)    • Sick sinus syndrome (CMS/HCC)     s/p pacemaker per Dr. Puri   • TIA (transient ischemic attack)      Past Surgical History:  Past Surgical History:   Procedure Laterality Date   • APPENDECTOMY     • BRAIN SURGERY     • CAROTID ENDARTERECTOMY  02/2011   • CATARACT EXTRACTION     • CHOLECYSTECTOMY     • COLONOSCOPY     • HEMORRHOIDECTOMY     • HIP HEMIARTHROPLASTY Right 6/20/2017    Procedure: HIP HEMIARTHROPLASTY;  Surgeon: Jeremiah Zaidi MD;  Location: [facility];  Service:    • INSERT / REPLACE / REMOVE PACEMAKER     • JOINT  REPLACEMENT     • PACEMAKER IMPLANTATION     • TONSILLECTOMY     • TOTAL HIP ARTHROPLASTY       Social History:  reports that she has never smoked. She has never used smokeless tobacco. She reports that she does not drink alcohol or use drugs.    Family History: family history includes Arthritis in her brother, father, mother, and sister; Cancer in her brother and mother; Heart disease in her brother, mother, and sister.       Allergies:  Allergies   Allergen Reactions   • Chocolate Hives   • Codeine Nausea And Vomiting   • Ciprofloxacin Confusion   • Biaxin [Clarithromycin] Unknown (See Comments)     DOES NOT KNOW   • Capoten [Captopril] Cough     Patient takes Lisinopril at home   • Ciprofloxacin Hcl Confusion   • Ciprofloxacin-Ciproflox Hcl Er Confusion   • Darvon [Propoxyphene]    • Diphenhydramine Hcl    • Dyazide [Triamterene-Hctz]    • Enablex [Darifenacin Hydrobromide Er]    • Gadolinium Derivatives    • Homatropine    • Hydrochlorothiazide W-Triamterene    • Hydrocodone    • Inderal La [Propranolol Hcl]      Patient takes Atenolol at Home   • Iodine    • Levaquin [Levofloxacin] Confusion   • Lortab [Hydrocodone-Acetaminophen]    • Lotrel [Amlodipine Besy-Benazepril Hcl]      Patient takes Amlodipine at home.   • Macrodantin  [Nitrofurantoin]    • Macrodantin [Nitrofurantoin Macrocrystal]    • Motrin [Ibuprofen]    • Other      Can now have only unenhanced CAT scan   • Percocet [Oxycodone-Acetaminophen] Nausea And Vomiting   • Phosphate    • Propranolol    • Qvar [Beclomethasone]    • Solifenacin Succinate    • Sulfa Antibiotics Nausea And Vomiting   • Terramycin [Oxytetracycline]    • Vesicare [Solifenacin]    • Ampicillin Itching   • Benadryl [Diphenhydramine] Rash   • Contrast Dye Rash     Medications:  Prior to Admission medications    Medication Sig Start Date End Date Taking? Authorizing Provider   acetaminophen (TYLENOL) 325 MG tablet Take 2 tablets by mouth Every 6 (Six) Hours As Needed for Mild Pain  "(1-3). 6/28/17   Griselda Matthew APRN   amLODIPine (NORVASC) 5 MG tablet Take 5 mg by mouth Daily.    Brinda Pickering MD   aspirin 81 MG EC tablet Take 81 mg by mouth Every Night.    Brinda Pickering MD   carvedilol (COREG) 12.5 MG tablet TAKE 1 TABLET TWICE A DAY 5/4/20   Lorraine, Chantell EBETO   CloNIDine (CATAPRES) 0.1 MG tablet Take 0.1 mg by mouth 2 (Two) Times a Day.    Brinda Pickering MD   clopidogrel (PLAVIX) 75 MG tablet Take  by mouth Daily. 4/9/19   Brinda Pickering MD   ferrous sulfate 325 (65 FE) MG tablet Take 325 mg by mouth daily.    Brinda Pickering MD   folic acid (FOLVITE) 1 MG tablet Take 1 mg by mouth daily.    Brinda Pickering MD   hydrALAZINE (APRESOLINE) 50 MG tablet Take 1 tablet by mouth Every 8 (Eight) Hours.  Patient taking differently: Take 50 mg by mouth 2 (Two) Times a Day. 6/28/17   Griselda Matthew APRN   lisinopril (PRINIVIL,ZESTRIL) 5 MG tablet Take 5 mg by mouth Daily.    Brinda Pickering MD   phenytoin (DILANTIN) 100 MG ER capsule Take 100 mg by mouth Every Morning.    Brinda Pickering MD   pravastatin (PRAVACHOL) 40 MG tablet Take 40 mg by mouth Every Night.    Brinda Pickering MD   trospium 60 MG capsule sustained-release 24 hr capsule TAKE 1 CAPSULE EVERY MORNING 6/24/19   Otto Altamirano MD     Objective     Vital Signs: /76 (BP Location: Left arm, Patient Position: Lying)   Pulse 106   Temp 98 °F (36.7 °C) (Oral)   Resp 18   Ht 162.6 cm (64\")   Wt 69.7 kg (153 lb 9.6 oz)   SpO2 97%   BMI 26.37 kg/m²   Physical Exam  Constitutional:       Comments: Elder collaborative and debilitated female   HENT:      Head: Normocephalic and atraumatic.      Nose: Nose normal.      Mouth/Throat:      Mouth: Mucous membranes are dry.   Eyes:      General: No scleral icterus.     Extraocular Movements: Extraocular movements intact.      Pupils: Pupils are equal, round, and reactive to light.   Neck:      " Musculoskeletal: Normal range of motion and neck supple. No neck rigidity.   Cardiovascular:      Rate and Rhythm: Tachycardia present. Rhythm irregular.      Heart sounds: No murmur.   Pulmonary:      Effort: Pulmonary effort is normal. No respiratory distress.      Breath sounds: Normal breath sounds. No stridor. No wheezing or rhonchi.   Abdominal:      General: Abdomen is flat. There is no distension.      Palpations: Abdomen is soft. There is no mass.      Tenderness: There is no abdominal tenderness.      Hernia: No hernia is present.   Musculoskeletal: Normal range of motion.         General: Tenderness present.      Comments: Right buttock area hematoma   Skin:     Capillary Refill: Capillary refill takes less than 2 seconds.      Coloration: Skin is not jaundiced.      Findings: Bruising present. No erythema or rash.   Neurological:      General: No focal deficit present.      Mental Status: She is disoriented.        Results Reviewed:  Lab Results (last 24 hours)     Procedure Component Value Units Date/Time    Comprehensive Metabolic Panel [[id number]]  (Abnormal) Collected: 09/14/20 0437    Specimen: Blood Updated: 09/14/20 0502     Glucose 118 mg/dL      BUN 49 mg/dL      Creatinine 1.01 mg/dL      Sodium 138 mmol/L      Potassium 5.0 mmol/L      Chloride 105 mmol/L      CO2 21.0 mmol/L      Calcium 8.7 mg/dL      Total Protein 6.8 g/dL      Albumin 4.00 g/dL      ALT (SGPT) 14 U/L      AST (SGOT) 22 U/L      Alkaline Phosphatase 96 U/L      Total Bilirubin 0.3 mg/dL      eGFR Non African Amer 51 mL/min/1.73      Globulin 2.8 gm/dL      A/G Ratio 1.4 g/dL      BUN/Creatinine Ratio 48.5     Anion Gap 12.0 mmol/L     Narrative:      GFR Normal >60  Chronic Kidney Disease <60  Kidney Failure <15      Protime-INR [[id number]]  (Abnormal) Collected: 09/14/20 0355    Specimen: Blood Updated: 09/14/20 0457     Protime 14.7 Seconds      INR 1.19    COVID PRE-OP / PRE-PROCEDURE SCREENING ORDER (NO ISOLATION) -  Swab, Nasal Cavity [[id number]]  (Normal) Collected: 09/14/20 0404    Specimen: Swab from Nasal Cavity Updated: 09/14/20 0455    Narrative:      The following orders were created for panel order COVID PRE-OP / PRE-PROCEDURE SCREENING ORDER (NO ISOLATION) - Swab, Nasal Cavity.  Procedure                               Abnormality         Status                     ---------                               -----------         ------                     COVID-19,[facility]...[[id number]]  Normal              Final result                 Please view results for these tests on the individual orders.    COVID-19,[facility] IN-HOUSE,Nasal Swab No Transport Media 3-4 HR TAT - Swab, Nasal Cavity [[id number]]  (Normal) Collected: 09/14/20 0404    Specimen: Swab from Nasal Cavity Updated: 09/14/20 0455     COVID19 Not Detected    Narrative:      Fact sheet for providers: https://www.fda.gov/media/765661/download     Fact sheet for patients: https://www.fda.gov/media/397981/download    CBC & Differential [[id number]]  (Abnormal) Collected: 09/14/20 0355    Specimen: Blood Updated: 09/14/20 0414    Narrative:      The following orders were created for panel order CBC & Differential.  Procedure                               Abnormality         Status                     ---------                               -----------         ------                     CBC Auto Differential[[id number]]        Abnormal            Final result                 Please view results for these tests on the individual orders.    CBC Auto Differential [[id number]]  (Abnormal) Collected: 09/14/20 0355    Specimen: Blood Updated: 09/14/20 0414     WBC 8.76 10*3/mm3      RBC 3.52 10*6/mm3      Hemoglobin 11.3 g/dL      Hematocrit 34.2 %      MCV 97.2 fL      MCH 32.1 pg      MCHC 33.0 g/dL      RDW 13.2 %      RDW-SD 47.1 fl      MPV 9.4 fL      Platelets 301 10*3/mm3      Neutrophil % 70.3 %      Lymphocyte % 17.8 %      Monocyte % 7.4 %      Eosinophil % 3.7  %      Basophil % 0.6 %      Immature Grans % 0.2 %      Neutrophils, Absolute 6.16 10*3/mm3      Lymphocytes, Absolute 1.56 10*3/mm3      Monocytes, Absolute 0.65 10*3/mm3      Eosinophils, Absolute 0.32 10*3/mm3      Basophils, Absolute 0.05 10*3/mm3      Immature Grans, Absolute 0.02 10*3/mm3      nRBC 0.0 /100 WBC         Imaging Results (Last 24 Hours)     Procedure Component Value Units Date/Time    XR Knee 3 View Left [[id number]] Resulted: 09/14/20 0305     Updated: 09/14/20 0306    XR Hip With or Without Pelvis 2 - 3 View Right [[id number]] Resulted: 09/14/20 0305     Updated: 09/14/20 0305        I have personally reviewed and interpreted the radiology studies and ECG obtained at time of admission.     Assessment / Plan     Assessment:   Active Hospital Problems    Diagnosis   • **Acute right hip pain   • Dehydration   • Pacemaker   • Obstructive sleep apnea   • Dementia (CMS/HCC)   • Hypertension   • Diastolic dysfunction   • Permanent atrial fibrillation (CMS/HCC)       Plan:   Admit to medical floor, vitals routine, cardiac diet, up with assistance and fall precautions  Pain control.  IVF slow hydration with NS 30 cc/hour  Orthopedic surgery consult for right hip pain  Monitor for behavioral changes or delirium and risk of falls or injury  Home medications reconciled based on current list. May need to restart Hydralazine as well if blood pressure requires    DVT prophylaxis > Lovenox scheduled start 9/15/2020      Code Status: Full     I discussed the patient's findings and my recommendations with the patient    Estimated length of stay over 2 midnights    Patient seen and examined by me on 9/14/2020 at 555 AM.    Electronically signed by Tejas Gallo MD, 09/14/20, 06:33 CDT.

## 2020-09-14 NOTE — ED PROVIDER NOTES
Subjective   Patient is a 89-year-old female who resides at home alone independently who states that she felt like somebody was chasing her and that she was going to die because of this when trying to mobilize her how she fell landed on her right hip and left knee and had acute onset of right hip pain with any range of motion or with weightbearing along with left knee pain.  She is previously had surgeries in both these areas and has a prosthesis in her right hip.          Review of Systems   Constitutional: Negative for chills, fatigue and fever.   HENT: Negative for congestion and facial swelling.    Eyes: Negative for photophobia, discharge and visual disturbance.   Respiratory: Negative for cough, shortness of breath and wheezing.    Cardiovascular: Negative for chest pain, palpitations and leg swelling.   Gastrointestinal: Negative for abdominal pain, diarrhea, nausea and vomiting.   Endocrine: Negative for cold intolerance and heat intolerance.   Genitourinary: Negative for difficulty urinating and urgency.   Musculoskeletal: Positive for arthralgias, gait problem, joint swelling and myalgias.   Skin: Negative for color change and pallor.   Neurological: Negative for dizziness and light-headedness.   Hematological: Negative for adenopathy. Does not bruise/bleed easily.   Psychiatric/Behavioral: Negative for agitation, behavioral problems and confusion.       Past Medical History:   Diagnosis Date   • Adrenal adenoma    • Anemia    • Atrial fibrillation (CMS/HCC)    • Benign neoplasm of cerebral meninges (CMS/HCC)    • Brain tumor (CMS/HCC)    • CHF (congestive heart failure) (CMS/HCC)    • Dementia    • Disease of thyroid gland    • Hip fracture requiring operative repair (CMS/HCC)     7/2016   • Hyperlipidemia    • Hypertension    • Meningioma (CMS/HCC) 2/5/2018   • Obstructive sleep apnea    • Osteoarthritis    • Peripheral vascular disease (CMS/HCC)    • Pneumonia    • Seizures (CMS/HCC)    • Sick sinus  syndrome (CMS/HCC)     s/p pacemaker per Dr. Puri   • TIA (transient ischemic attack)        Allergies   Allergen Reactions   • Chocolate Hives   • Codeine Nausea And Vomiting   • Ciprofloxacin Confusion   • Biaxin [Clarithromycin] Unknown (See Comments)     DOES NOT KNOW   • Capoten [Captopril] Cough     Patient takes Lisinopril at home   • Ciprofloxacin Hcl Confusion   • Ciprofloxacin-Ciproflox Hcl Er Confusion   • Darvon [Propoxyphene]    • Diphenhydramine Hcl    • Dyazide [Triamterene-Hctz]    • Enablex [Darifenacin Hydrobromide Er]    • Gadolinium Derivatives    • Homatropine    • Hydrochlorothiazide W-Triamterene    • Hydrocodone    • Inderal La [Propranolol Hcl]      Patient takes Atenolol at Home   • Iodine    • Levaquin [Levofloxacin] Confusion   • Lortab [Hydrocodone-Acetaminophen]    • Lotrel [Amlodipine Besy-Benazepril Hcl]      Patient takes Amlodipine at home.   • Macrodantin  [Nitrofurantoin]    • Macrodantin [Nitrofurantoin Macrocrystal]    • Motrin [Ibuprofen]    • Other      Can now have only unenhanced CAT scan   • Percocet [Oxycodone-Acetaminophen] Nausea And Vomiting   • Phosphate    • Propranolol    • Qvar [Beclomethasone]    • Solifenacin Succinate    • Sulfa Antibiotics Nausea And Vomiting   • Terramycin [Oxytetracycline]    • Vesicare [Solifenacin]    • Ampicillin Itching   • Benadryl [Diphenhydramine] Rash   • Contrast Dye Rash       Past Surgical History:   Procedure Laterality Date   • APPENDECTOMY     • BRAIN SURGERY     • CAROTID ENDARTERECTOMY  02/2011   • CATARACT EXTRACTION     • CHOLECYSTECTOMY     • COLONOSCOPY     • HEMORRHOIDECTOMY     • HIP HEMIARTHROPLASTY Right 6/20/2017    Procedure: HIP HEMIARTHROPLASTY;  Surgeon: Jeremiah Zaidi MD;  Location: [facility] OR;  Service:    • INSERT / REPLACE / REMOVE PACEMAKER     • JOINT REPLACEMENT     • PACEMAKER IMPLANTATION     • TONSILLECTOMY     • TOTAL HIP ARTHROPLASTY         Family History   Problem Relation Age of Onset   •  Arthritis Mother    • Cancer Mother    • Heart disease Mother    • Arthritis Father    • Arthritis Brother    • Cancer Brother    • Heart disease Brother    • Arthritis Sister    • Heart disease Sister        Social History     Socioeconomic History   • Marital status:      Spouse name: Not on file   • Number of children: Not on file   • Years of education: Not on file   • Highest education level: Not on file   Occupational History   • Occupation: Retired   Tobacco Use   • Smoking status: Never Smoker   • Smokeless tobacco: Never Used   Substance and Sexual Activity   • Alcohol use: No   • Drug use: No   • Sexual activity: Defer           Objective   Physical Exam  Vitals signs and nursing note reviewed.   Constitutional:       Appearance: She is well-developed.   HENT:      Head: Normocephalic and atraumatic.   Eyes:      Conjunctiva/sclera: Conjunctivae normal.      Pupils: Pupils are equal, round, and reactive to light.   Neck:      Musculoskeletal: Normal range of motion and neck supple.   Cardiovascular:      Rate and Rhythm: Normal rate and regular rhythm.      Heart sounds: Normal heart sounds.   Pulmonary:      Effort: Pulmonary effort is normal.      Breath sounds: Normal breath sounds.   Abdominal:      General: Bowel sounds are normal. There is no distension.      Palpations: Abdomen is soft.   Musculoskeletal: Normal range of motion.      Comments: Pain with palpation and with range of motion of the right hip or left knee.  Pain is more severe in the right hip especially with any type of weightbearing or significant motion.   Skin:     General: Skin is warm and dry.   Neurological:      Mental Status: She is alert and oriented to person, place, and time.      Cranial Nerves: No cranial nerve deficit.   Psychiatric:         Behavior: Behavior normal.         Thought Content: Thought content normal.         Procedures           ED Course  ED Course as of Sep 14 0345   Mon Sep 14, 2020   [id number]  Although fracture is not appreciated on imaging the patient's right foot is rotated medially and she has a significant amount of pain with weightbearing or range of motion to where she is unable to mobilize at home she lives independently because this cannot be discharged home.    [RW]      ED Course User Index  [RW] Juan Jordan MD                                           [facility]    Final diagnoses:   Acute right hip pain            Juan Jordan MD  09/14/20 0345

## 2020-09-22 ENCOUNTER — TELEPHONE (OUTPATIENT)
Dept: CARDIOLOGY | Facility: CLINIC | Age: 85
End: 2020-09-22

## 2020-09-22 ENCOUNTER — PREP FOR SURGERY (OUTPATIENT)
Dept: OTHER | Facility: HOSPITAL | Age: 85
End: 2020-09-22

## 2020-09-22 DIAGNOSIS — Z95.0 PACEMAKER: Primary | ICD-10-CM

## 2020-09-22 RX ORDER — SODIUM CHLORIDE 0.9 % (FLUSH) 0.9 %
3 SYRINGE (ML) INJECTION EVERY 12 HOURS SCHEDULED
Status: CANCELLED | OUTPATIENT
Start: 2020-09-22

## 2020-09-22 RX ORDER — ONDANSETRON 2 MG/ML
4 INJECTION INTRAMUSCULAR; INTRAVENOUS EVERY 6 HOURS PRN
Status: CANCELLED | OUTPATIENT
Start: 2020-09-22

## 2020-09-22 RX ORDER — VANCOMYCIN HYDROCHLORIDE 1 G/200ML
1000 INJECTION, SOLUTION INTRAVENOUS ONCE
Status: CANCELLED | OUTPATIENT
Start: 2020-09-22 | End: 2020-09-22

## 2020-09-22 RX ORDER — SODIUM CHLORIDE 0.9 % (FLUSH) 0.9 %
10 SYRINGE (ML) INJECTION AS NEEDED
Status: CANCELLED | OUTPATIENT
Start: 2020-09-22

## 2020-09-30 ENCOUNTER — TRANSCRIBE ORDERS (OUTPATIENT)
Dept: ADMINISTRATIVE | Facility: HOSPITAL | Age: 85
End: 2020-09-30

## 2020-09-30 DIAGNOSIS — Z01.818 PREOP TESTING: Primary | ICD-10-CM

## 2020-10-05 ENCOUNTER — LAB (OUTPATIENT)
Dept: LAB | Facility: HOSPITAL | Age: 85
End: 2020-10-05

## 2020-10-05 PROCEDURE — 87635 SARS-COV-2 COVID-19 AMP PRB: CPT | Performed by: INTERNAL MEDICINE

## 2020-10-05 PROCEDURE — C9803 HOPD COVID-19 SPEC COLLECT: HCPCS | Performed by: INTERNAL MEDICINE

## 2020-10-06 LAB — SARS-COV-2 N GENE RESP QL NAA+PROBE: NOT DETECTED

## 2020-10-07 ENCOUNTER — HOSPITAL ENCOUNTER (OUTPATIENT)
Facility: HOSPITAL | Age: 85
Setting detail: HOSPITAL OUTPATIENT SURGERY
Discharge: HOME | End: 2020-10-07
Attending: INTERNAL MEDICINE | Admitting: INTERNAL MEDICINE

## 2020-10-07 VITALS
HEART RATE: 76 BPM | TEMPERATURE: 97.2 F | HEIGHT: 63 IN | DIASTOLIC BLOOD PRESSURE: 88 MMHG | WEIGHT: 154 LBS | BODY MASS INDEX: 27.29 KG/M2 | SYSTOLIC BLOOD PRESSURE: 105 MMHG | RESPIRATION RATE: 14 BRPM | OXYGEN SATURATION: 98 %

## 2020-10-07 DIAGNOSIS — Z95.0 PACEMAKER: ICD-10-CM

## 2020-10-07 LAB
DEPRECATED RDW RBC AUTO: 47.7 FL (ref 37–54)
ERYTHROCYTE [DISTWIDTH] IN BLOOD BY AUTOMATED COUNT: 13.3 % (ref 12.3–15.4)
HCT VFR BLD AUTO: 30.9 % (ref 34–46.6)
HGB BLD-MCNC: 10.1 G/DL (ref 12–15.9)
MCH RBC QN AUTO: 32.5 PG (ref 26.6–33)
MCHC RBC AUTO-ENTMCNC: 32.7 G/DL (ref 31.5–35.7)
MCV RBC AUTO: 99.4 FL (ref 79–97)
PLATELET # BLD AUTO: 294 10*3/MM3 (ref 140–450)
PMV BLD AUTO: 9.2 FL (ref 6–12)
RBC # BLD AUTO: 3.11 10*6/MM3 (ref 3.77–5.28)
WBC # BLD AUTO: 5.56 10*3/MM3 (ref 3.4–10.8)

## 2020-10-07 PROCEDURE — 25010000002 VANCOMYCIN PER 500 MG: Performed by: INTERNAL MEDICINE

## 2020-10-07 PROCEDURE — 25010000002 FENTANYL CITRATE (PF) 100 MCG/2ML SOLUTION: Performed by: INTERNAL MEDICINE

## 2020-10-07 PROCEDURE — 99152 MOD SED SAME PHYS/QHP 5/>YRS: CPT | Performed by: INTERNAL MEDICINE

## 2020-10-07 PROCEDURE — 85027 COMPLETE CBC AUTOMATED: CPT | Performed by: INTERNAL MEDICINE

## 2020-10-07 PROCEDURE — 33228 REMV&REPLC PM GEN DUAL LEAD: CPT | Performed by: INTERNAL MEDICINE

## 2020-10-07 PROCEDURE — 99235 HOSP IP/OBS SAME DATE MOD 70: CPT | Performed by: INTERNAL MEDICINE

## 2020-10-07 PROCEDURE — C1785 PMKR, DUAL, RATE-RESP: HCPCS | Performed by: INTERNAL MEDICINE

## 2020-10-07 PROCEDURE — 25010000002 MIDAZOLAM HCL (PF) 5 MG/5ML SOLUTION: Performed by: INTERNAL MEDICINE

## 2020-10-07 DEVICE — GEN PM ADVISA SURESCAN DR MRI: Type: IMPLANTABLE DEVICE | Status: FUNCTIONAL

## 2020-10-07 RX ORDER — SODIUM CHLORIDE 0.9 % (FLUSH) 0.9 %
3 SYRINGE (ML) INJECTION EVERY 12 HOURS SCHEDULED
Status: DISCONTINUED | OUTPATIENT
Start: 2020-10-07 | End: 2020-10-07 | Stop reason: HOSPADM

## 2020-10-07 RX ORDER — FENTANYL CITRATE 50 UG/ML
INJECTION, SOLUTION INTRAMUSCULAR; INTRAVENOUS AS NEEDED
Status: DISCONTINUED | OUTPATIENT
Start: 2020-10-07 | End: 2020-10-07 | Stop reason: HOSPADM

## 2020-10-07 RX ORDER — NEOMYCIN AND POLYMYXIN B SULFATES 40; 200000 MG/ML; [USP'U]/ML
SOLUTION IRRIGATION AS NEEDED
Status: DISCONTINUED | OUTPATIENT
Start: 2020-10-07 | End: 2020-10-07 | Stop reason: HOSPADM

## 2020-10-07 RX ORDER — MIDAZOLAM HYDROCHLORIDE 1 MG/ML
INJECTION, SOLUTION INTRAMUSCULAR; INTRAVENOUS AS NEEDED
Status: DISCONTINUED | OUTPATIENT
Start: 2020-10-07 | End: 2020-10-07 | Stop reason: HOSPADM

## 2020-10-07 RX ORDER — HYDRALAZINE HYDROCHLORIDE 50 MG/1
100 TABLET, FILM COATED ORAL 2 TIMES DAILY
COMMUNITY

## 2020-10-07 RX ORDER — SODIUM CHLORIDE 0.9 % (FLUSH) 0.9 %
10 SYRINGE (ML) INJECTION AS NEEDED
Status: DISCONTINUED | OUTPATIENT
Start: 2020-10-07 | End: 2020-10-07 | Stop reason: HOSPADM

## 2020-10-07 RX ORDER — ONDANSETRON 2 MG/ML
4 INJECTION INTRAMUSCULAR; INTRAVENOUS EVERY 6 HOURS PRN
Status: DISCONTINUED | OUTPATIENT
Start: 2020-10-07 | End: 2020-10-07 | Stop reason: HOSPADM

## 2020-10-07 RX ORDER — VANCOMYCIN HYDROCHLORIDE 1 G/200ML
1000 INJECTION, SOLUTION INTRAVENOUS ONCE
Status: COMPLETED | OUTPATIENT
Start: 2020-10-07 | End: 2020-10-07

## 2020-10-07 RX ADMIN — VANCOMYCIN HYDROCHLORIDE 1000 MG: 1 INJECTION, SOLUTION INTRAVENOUS at 12:06

## 2020-10-07 NOTE — H&P
No chief complaint on file.      Subjective .     History of present illness:  Renay Lazo is a 89 y.o. yo female with history of SSS, s/p DDD pacemaker in the past who has developed battery end of life indicators who presents today for generator replacement. She has no specific complaints.    History  Past Medical History:   Diagnosis Date   • Adrenal adenoma    • Anemia    • Atrial fibrillation (CMS/HCC)    • Benign neoplasm of cerebral meninges (CMS/HCC)    • Brain tumor (CMS/HCC)    • Dementia (CMS/HCC)    • Disease of thyroid gland    • Hip fracture requiring operative repair (CMS/HCC)     7/2016   • Hyperlipidemia    • Hypertension    • Meningioma (CMS/HCC) 2/5/2018   • Obstructive sleep apnea    • Osteoarthritis    • Peripheral vascular disease (CMS/HCC)    • Pneumonia    • Pulmonary HTN (CMS/HCC) 6/19/2017   • Seizures (CMS/[facility])    • Sick sinus syndrome (CMS/[facility])     s/p pacemaker per Dr. Puri   • TIA (transient ischemic attack)    ,   Past Surgical History:   Procedure Laterality Date   • APPENDECTOMY     • BRAIN SURGERY     • CAROTID ENDARTERECTOMY  02/2011   • CATARACT EXTRACTION     • CHOLECYSTECTOMY     • COLONOSCOPY     • HEMORRHOIDECTOMY     • HIP HEMIARTHROPLASTY Right 6/20/2017    Procedure: HIP HEMIARTHROPLASTY;  Surgeon: Jeremiah Zaidi MD;  Location: [facility];  Service:    • INSERT / REPLACE / REMOVE PACEMAKER     • JOINT REPLACEMENT     • PACEMAKER IMPLANTATION     • TONSILLECTOMY     • TOTAL HIP ARTHROPLASTY     ,   Family History   Problem Relation Age of Onset   • Arthritis Mother    • Cancer Mother    • Heart disease Mother    • Arthritis Father    • Arthritis Brother    • Cancer Brother    • Heart disease Brother    • Arthritis Sister    • Heart disease Sister    ,   Social History     Tobacco Use   • Smoking status: Never Smoker   • Smokeless tobacco: Never Used   Substance Use Topics   • Alcohol use: No   • Drug use: No   ,     Medications  Current Facility-Administered  Medications   Medication Dose Route Frequency Provider Last Rate Last Dose   • fentaNYL citrate (PF) (SUBLIMAZE) injection    PRN Mik Puri MD   25 mcg at 10/07/20 1402   • lidocaine (cardiac) (XYLOCAINE) injection    PRN Mik Puri MD   20 mL at 10/07/20 1401   • Midazolam HCl (PF) (VERSED) injection    PRN Mik Puri MD   1 mg at 10/07/20 1402   • neomycin-polymyxin B (NEOSPORIN-) irrigation solution    PRN Mik Puri MD   1 mL at 10/07/20 1404   • ondansetron (ZOFRAN) injection 4 mg  4 mg Intravenous Q6H PRN Mik Puri MD       • sodium chloride 0.9 % flush 10 mL  10 mL Intravenous PRN Mik Puri MD       • sodium chloride 0.9 % flush 3 mL  3 mL Intravenous Q12H Mik Puri MD           Allergies:  Chocolate, Codeine, Ciprofloxacin, Biaxin [clarithromycin], Capoten [captopril], Ciprofloxacin hcl, Ciprofloxacin-ciproflox hcl er, Darvon [propoxyphene], Diphenhydramine hcl, Dyazide [triamterene-hctz], Enablex [darifenacin hydrobromide er], Gadolinium derivatives, Homatropine, Hydrochlorothiazide w-triamterene, Hydrocodone, Inderal la [propranolol hcl], Iodine, Levaquin [levofloxacin], Lortab [hydrocodone-acetaminophen], Lotrel [amlodipine besy-benazepril hcl], Macrodantin  [nitrofurantoin], Macrodantin [nitrofurantoin macrocrystal], Motrin [ibuprofen], Other, Percocet [oxycodone-acetaminophen], Phosphate, Propranolol, Qvar [beclomethasone], Solifenacin succinate, Sulfa antibiotics, Terramycin [oxytetracycline], Vesicare [solifenacin], Ampicillin, Benadryl [diphenhydramine], and Contrast dye    Review of Systems  Review of Systems   HENT: Negative for nosebleeds.    Cardiovascular: Positive for dyspnea on exertion. Negative for chest pain, claudication, irregular heartbeat, leg swelling, near-syncope, orthopnea, palpitations, paroxysmal nocturnal dyspnea and syncope.   Respiratory: Negative for cough, hemoptysis and shortness of breath.    Gastrointestinal: Negative for dysphagia,  "hematemesis and melena.   Genitourinary: Negative for hematuria.   All other systems reviewed and are negative.      Objective     Physical Exam:  Patient Vitals for the past 24 hrs:   BP Temp Temp src Pulse Resp SpO2 Height Weight   10/07/20 1340 -- -- -- -- -- 100 % -- --   10/07/20 1150 143/88 97.2 °F (36.2 °C) Temporal 89 11 99 % 160 cm (63\") 69.9 kg (154 lb)     Constitutional:       General: Not in acute distress.     Appearance: Well-developed. Not diaphoretic.   Eyes:      General: No scleral icterus.  HENT:      Head: Normocephalic and atraumatic.   Neck:      Musculoskeletal: Normal range of motion.   Pulmonary:      Effort: Pulmonary effort is normal. No respiratory distress.      Breath sounds: Normal breath sounds. No wheezing. No rales.   Cardiovascular:      Normal rate. Regular rhythm.      No gallop.   Edema:     Peripheral edema absent.   Abdominal:      General: Bowel sounds are normal. There is no distension.      Palpations: Abdomen is soft.      Tenderness: There is no abdominal tenderness.   Skin:     General: Skin is warm and dry.      Findings: No erythema.   Neurological:      Mental Status: Alert and oriented to person, place, and time.      Cranial Nerves: No cranial nerve deficit.   Psychiatric:         Behavior: Behavior normal.         Results Review:   I reviewed the patient's new clinical results.  Lab Results (last 24 hours)     Procedure Component Value Units Date/Time    CBC (No Diff) [[id number]]  (Abnormal) Collected: 10/07/20 1141    Specimen: Blood Updated: 10/07/20 1154     WBC 5.56 10*3/mm3      RBC 3.11 10*6/mm3      Hemoglobin 10.1 g/dL      Hematocrit 30.9 %      MCV 99.4 fL      MCH 32.5 pg      MCHC 32.7 g/dL      RDW 13.3 %      RDW-SD 47.7 fl      MPV 9.2 fL      Platelets 294 10*3/mm3         Imaging Results (Last 24 Hours)     ** No results found for the last 24 hours. **        Lab Results   Component Value Date    ECHOEFEST 65 06/20/2017         Assessment/Plan     " Pacemaker      Impression/Plan:  Pacemaker battery with end of life indicators, will proceed with change out. The risk and potential complications as well as anticipated benefits were discussed with the patient and she wishes to proceed with the planned procedure

## 2020-10-26 ENCOUNTER — HOSPITAL ENCOUNTER (EMERGENCY)
Facility: HOSPITAL | Age: 85
Discharge: HOME | End: 2020-10-26
Attending: EMERGENCY MEDICINE | Admitting: EMERGENCY MEDICINE

## 2020-10-26 ENCOUNTER — APPOINTMENT (OUTPATIENT)
Dept: CT IMAGING | Facility: HOSPITAL | Age: 85
End: 2020-10-26

## 2020-10-26 ENCOUNTER — APPOINTMENT (OUTPATIENT)
Dept: GENERAL RADIOLOGY | Facility: HOSPITAL | Age: 85
End: 2020-10-26

## 2020-10-26 VITALS
BODY MASS INDEX: 24.92 KG/M2 | HEART RATE: 86 BPM | DIASTOLIC BLOOD PRESSURE: 60 MMHG | WEIGHT: 146 LBS | OXYGEN SATURATION: 100 % | TEMPERATURE: 98.7 F | HEIGHT: 64 IN | RESPIRATION RATE: 22 BRPM | SYSTOLIC BLOOD PRESSURE: 132 MMHG

## 2020-10-26 DIAGNOSIS — S70.01XA CONTUSION OF RIGHT HIP, INITIAL ENCOUNTER: ICD-10-CM
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
## 2020-10-26 LAB
ALBUMIN SERPL-MCNC: 3.5 G/DL (ref 3.5–5.2)
ALBUMIN/GLOB SERPL: 1.2 G/DL
ALP SERPL-CCNC: 89 U/L (ref 39–117)
ALT SERPL W P-5'-P-CCNC: 10 U/L (ref 1–33)
ANION GAP SERPL CALCULATED.3IONS-SCNC: 10 MMOL/L (ref 5–15)
APTT PPP: 31.3 SECONDS (ref 24.1–35)
AST SERPL-CCNC: 19 U/L (ref 1–32)
BASOPHILS # BLD AUTO: 0.03 10*3/MM3 (ref 0–0.2)
BASOPHILS NFR BLD AUTO: 0.5 % (ref 0–1.5)
BILIRUB SERPL-MCNC: 0.2 MG/DL (ref 0–1.2)
BUN SERPL-MCNC: 38 MG/DL (ref 8–23)
BUN/CREAT SERPL: 35.5 (ref 7–25)
CALCIUM SPEC-SCNC: 8.5 MG/DL (ref 8.2–9.6)
CHLORIDE SERPL-SCNC: 109 MMOL/L (ref 98–107)
CO2 SERPL-SCNC: 20 MMOL/L (ref 22–29)
CREAT SERPL-MCNC: 1.07 MG/DL (ref 0.57–1)
DEPRECATED RDW RBC AUTO: 45 FL (ref 37–54)
EOSINOPHIL # BLD AUTO: 0.21 10*3/MM3 (ref 0–0.4)
EOSINOPHIL NFR BLD AUTO: 3.7 % (ref 0.3–6.2)
ERYTHROCYTE [DISTWIDTH] IN BLOOD BY AUTOMATED COUNT: 12.8 % (ref 12.3–15.4)
GFR SERPL CREATININE-BSD FRML MDRD: 48 ML/MIN/1.73
GLOBULIN UR ELPH-MCNC: 3 GM/DL
GLUCOSE SERPL-MCNC: 128 MG/DL (ref 65–99)
HCT VFR BLD AUTO: 29.8 % (ref 34–46.6)
HGB BLD-MCNC: 10.1 G/DL (ref 12–15.9)
IMM GRANULOCYTES # BLD AUTO: 0.02 10*3/MM3 (ref 0–0.05)
IMM GRANULOCYTES NFR BLD AUTO: 0.4 % (ref 0–0.5)
INR PPP: 1.28 (ref 0.91–1.09)
LYMPHOCYTES # BLD AUTO: 1.05 10*3/MM3 (ref 0.7–3.1)
LYMPHOCYTES NFR BLD AUTO: 18.4 % (ref 19.6–45.3)
MCH RBC QN AUTO: 32.6 PG (ref 26.6–33)
MCHC RBC AUTO-ENTMCNC: 33.9 G/DL (ref 31.5–35.7)
MCV RBC AUTO: 96.1 FL (ref 79–97)
MONOCYTES # BLD AUTO: 0.53 10*3/MM3 (ref 0.1–0.9)
MONOCYTES NFR BLD AUTO: 9.3 % (ref 5–12)
NEUTROPHILS NFR BLD AUTO: 3.86 10*3/MM3 (ref 1.7–7)
NEUTROPHILS NFR BLD AUTO: 67.7 % (ref 42.7–76)
NRBC BLD AUTO-RTO: 0 /100 WBC (ref 0–0.2)
PLATELET # BLD AUTO: 252 10*3/MM3 (ref 140–450)
PMV BLD AUTO: 9.5 FL (ref 6–12)
POTASSIUM SERPL-SCNC: 4.4 MMOL/L (ref 3.5–5.2)
PROT SERPL-MCNC: 6.5 G/DL (ref 6–8.5)
PROTHROMBIN TIME: 15.6 SECONDS (ref 11.9–14.6)
RBC # BLD AUTO: 3.1 10*6/MM3 (ref 3.77–5.28)
SARS-COV-2 RNA PNL SPEC NAA+PROBE: NOT DETECTED
SODIUM SERPL-SCNC: 139 MMOL/L (ref 136–145)
WBC # BLD AUTO: 5.7 10*3/MM3 (ref 3.4–10.8)

## 2020-10-26 PROCEDURE — 85730 THROMBOPLASTIN TIME PARTIAL: CPT | Performed by: EMERGENCY MEDICINE

## 2020-10-26 PROCEDURE — 70450 CT HEAD/BRAIN W/O DYE: CPT

## 2020-10-26 PROCEDURE — 80053 COMPREHEN METABOLIC PANEL: CPT | Performed by: EMERGENCY MEDICINE

## 2020-10-26 PROCEDURE — 73502 X-RAY EXAM HIP UNI 2-3 VIEWS: CPT

## 2020-10-26 PROCEDURE — 87635 SARS-COV-2 COVID-19 AMP PRB: CPT | Performed by: EMERGENCY MEDICINE

## 2020-10-26 PROCEDURE — 85610 PROTHROMBIN TIME: CPT | Performed by: EMERGENCY MEDICINE

## 2020-10-26 PROCEDURE — 99283 EMERGENCY DEPT VISIT LOW MDM: CPT

## 2020-10-26 PROCEDURE — 85025 COMPLETE CBC W/AUTO DIFF WBC: CPT | Performed by: EMERGENCY MEDICINE

## 2020-11-23 NOTE — PROGRESS NOTES
Dual Chamber Pacemaker Evaluation Report  Clinic Interrogation    November 23, 2020    Primary Cardiologist: Jaxson  : Medtronic Model: Revo MRI  Implant date: 1/26/12    Reason for evaluation: s/p gen change.   Indication for pacemaker: sick sinus syndrome    Measurements  Atrial sensing - P wave: 1.6 mV  Atrial threshold: n/r  Atrial lead impedance: 418 ohms  Ventricular sensing - R wave: 15.9 mV  Ventricular threshold: 1.0 V @ 0.4 ms  Ventricular lead impedance:   456 ohms     Diagnostic Data  Atrial paced: 0 %  Ventricular paced: 15.5 %  Other: 100% AF burden.  Pt is on Plavix and ASA.  Pt is not fully anticoagulated d/t being high risk in regard to advanced age and falls.  Incision site is well healed without S&S of infection.  Battery status: satisfactory         Final Parameters  Mode:  VVIR  Lower rate: 60 bpm   Upper rate: 130 bpm  Atrial - Sensitivity: 0.3 mV     Ventricular - Amplitude: 2.0 V  Pulse width: 0.4 ms  Sensitivity: 0.9 mV    Changes made: none  Conclusions: normal pacemaker function    Follow up: 6 months via [facility], annually in office.

## 2021-01-01 ENCOUNTER — TELEPHONE (OUTPATIENT)
Dept: NEUROSURGERY | Facility: CLINIC | Age: 86
End: 2021-01-01

## 2021-01-01 ENCOUNTER — APPOINTMENT (OUTPATIENT)
Dept: GENERAL RADIOLOGY | Facility: HOSPITAL | Age: 86
End: 2021-01-01

## 2021-01-01 ENCOUNTER — HOSPITAL ENCOUNTER (EMERGENCY)
Facility: HOSPITAL | Age: 86
Discharge: HOME | End: 2021-05-30
Attending: EMERGENCY MEDICINE | Admitting: EMERGENCY MEDICINE

## 2021-01-01 ENCOUNTER — APPOINTMENT (OUTPATIENT)
Dept: CT IMAGING | Facility: HOSPITAL | Age: 86
End: 2021-01-01

## 2021-01-01 ENCOUNTER — OFFICE VISIT (OUTPATIENT)
Dept: CARDIOLOGY | Facility: CLINIC | Age: 86
End: 2021-01-01

## 2021-01-01 ENCOUNTER — HOSPITAL ENCOUNTER (EMERGENCY)
Facility: HOSPITAL | Age: 86
Discharge: HOME | End: 2021-05-06
Admitting: EMERGENCY MEDICINE

## 2021-01-01 ENCOUNTER — TELEPHONE (OUTPATIENT)
Dept: CARDIOLOGY | Facility: CLINIC | Age: 86
End: 2021-01-01

## 2021-01-01 VITALS
HEART RATE: 66 BPM | TEMPERATURE: 98.3 F | DIASTOLIC BLOOD PRESSURE: 100 MMHG | BODY MASS INDEX: 23.04 KG/M2 | HEIGHT: 63 IN | RESPIRATION RATE: 16 BRPM | OXYGEN SATURATION: 97 % | WEIGHT: 130 LBS | SYSTOLIC BLOOD PRESSURE: 150 MMHG

## 2021-01-01 VITALS
SYSTOLIC BLOOD PRESSURE: 137 MMHG | HEART RATE: 73 BPM | TEMPERATURE: 96.3 F | DIASTOLIC BLOOD PRESSURE: 72 MMHG | WEIGHT: 130 LBS | RESPIRATION RATE: 18 BRPM | OXYGEN SATURATION: 98 % | BODY MASS INDEX: 23.04 KG/M2 | HEIGHT: 63 IN

## 2021-01-01 VITALS — BODY MASS INDEX: 24.92 KG/M2 | WEIGHT: 146 LBS | OXYGEN SATURATION: 96 % | HEIGHT: 64 IN | HEART RATE: 81 BPM

## 2021-01-01 DIAGNOSIS — S10.93XA CONTUSION OF NECK, INITIAL ENCOUNTER: ICD-10-CM

## 2021-01-01 DIAGNOSIS — I27.20 PULMONARY HTN (HCC): ICD-10-CM

## 2021-01-01 DIAGNOSIS — F03.90 DEMENTIA WITHOUT BEHAVIORAL DISTURBANCE, UNSPECIFIED DEMENTIA TYPE: ICD-10-CM

## 2021-01-01 DIAGNOSIS — I10 ESSENTIAL HYPERTENSION: ICD-10-CM

## 2021-01-01 DIAGNOSIS — I48.21 PERMANENT ATRIAL FIBRILLATION (HCC): Primary | ICD-10-CM
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
## 2021-01-01 DIAGNOSIS — Z95.0 PACEMAKER: ICD-10-CM

## 2021-01-01 DIAGNOSIS — Z00.00 NORMAL EXAM: Primary | ICD-10-CM

## 2021-01-01 DIAGNOSIS — I48.91 ATRIAL FIBRILLATION WITH RVR (HCC): ICD-10-CM

## 2021-01-01 DIAGNOSIS — G47.33 OBSTRUCTIVE SLEEP APNEA: ICD-10-CM

## 2021-01-01 DIAGNOSIS — S60.221A CONTUSION OF RIGHT HAND, INITIAL ENCOUNTER: ICD-10-CM

## 2021-01-01 LAB
HOLD SPECIMEN: NORMAL
WHOLE BLOOD HOLD SPECIMEN: NORMAL
WHOLE BLOOD HOLD SPECIMEN: NORMAL

## 2021-01-01 PROCEDURE — 99283 EMERGENCY DEPT VISIT LOW MDM: CPT

## 2021-01-01 PROCEDURE — 72125 CT NECK SPINE W/O DYE: CPT

## 2021-01-01 PROCEDURE — 70450 CT HEAD/BRAIN W/O DYE: CPT

## 2021-01-01 PROCEDURE — 99442 PR PHYS/QHP TELEPHONE EVALUATION 11-20 MIN: CPT | Performed by: NURSE PRACTITIONER

## 2021-01-01 PROCEDURE — 73130 X-RAY EXAM OF HAND: CPT

## 2021-01-01 PROCEDURE — 99284 EMERGENCY DEPT VISIT MOD MDM: CPT

## 2021-01-01 RX ORDER — CARVEDILOL 12.5 MG/1
TABLET ORAL
Qty: 180 TABLET | Refills: 3 | Status: SHIPPED | OUTPATIENT
Start: 2021-01-01

## 2021-01-22 NOTE — PATIENT INSTRUCTIONS
Advance Care Planning and Advance Directives     You make decisions on a daily basis - decisions about where you want to live, your career, your home, your life. Perhaps one of the most important decisions you face is your choice for future medical care. Take time to talk with your family and your healthcare team and start planning today.  Advance Care Planning is a process that can help you:  · Understand possible future healthcare decisions in light of your own experiences  · Reflect on those decision in light of your goals and values  · Discuss your decisions with those closest to you and the healthcare professionals that care for you  · Make a plan by creating a document that reflects your wishes    Surrogate Decision Maker  In the event of a medical emergency, which has left you unable to communicate or to make your own decisions, you would need someone to make decisions for you.  It is important to discuss your preferences for medical treatment with this person while you are in good health.     Qualities of a surrogate decision maker:  • Willing to take on this role and responsibility  • Knows what you want for future medical care  • Willing to follow your wishes even if they don't agree with them  • Able to make difficult medical decisions under stressful circumstances    Advance Directives  These are legal documents you can create that will guide your healthcare team and decision maker(s) when needed. These documents can be stored in the electronic medical record.    · Living Will - a legal document to guide your care if you have a terminal condition or a serious illness and are unable to communicate. States vary by statute in document names/types, but most forms may include one or more of the following:        -  Directions regarding life-prolonging treatments        -  Directions regarding artificially provided nutrition/hydration        -  Choosing a healthcare decision maker        -  Direction  regarding organ/tissue donation    · Durable Power of  for Healthcare - this document names an -in-fact to make medical decisions for you, but it may also allow this person to make personal and financial decisions for you. Please seek the advice of an  if you need this type of document.    **Advance Directives are not required and no one may discriminate against you if you do not sign one.    Medical Orders  Many states allow specific forms/orders signed by your physician to record your wishes for medical treatment in your current state of health. This form, signed in personal communication with your physician, addresses resuscitation and other medical interventions that you may or may not want.      For more information or to schedule a time with a [facility] Advance Care Planning Facilitator contact: [email or web address] or call [phone] and someone will contact you directly.

## 2021-01-22 NOTE — PROGRESS NOTES
Subjective:     Encounter Date:01/22/2021      Patient ID: Renay Lazo is a 89 y.o. female with a history of permanent atrial fibrillation, HTN, severe PHTN, chronic diastolic heart failure, and SSS s/p pacemaker.    You have chosen to receive care through a telephone visit. Do you consent to use a telephone visit for your medical care today? Yes    This visit has been rescheduled as a phone visit to comply with patient safety concerns in accordance with CDC recommendations. Time spent: 20 minutes    Chief Complaint: follow up  Atrial Fibrillation  Presents for follow-up visit. Symptoms are negative for chest pain, dizziness, palpitations, shortness of breath, syncope and weakness. The symptoms have been stable. Past medical history includes atrial fibrillation and CHF.   Congestive Heart Failure  Presents for follow-up visit. Pertinent negatives include no chest pain, near-syncope, palpitations or shortness of breath. The symptoms have been stable.   Hypertension  This is a chronic problem. The current episode started more than 1 year ago. The problem has been rapidly improving since onset. The problem is controlled. Pertinent negatives include no chest pain, malaise/fatigue, orthopnea, palpitations, PND or shortness of breath.     Patient called today for a routine follow up regarding her heart failure. She also has a history of SSS s/p pacemaker insertion with recent generator change in October. Her last device interrogation revealed 100% AF burden with ventricular rates controlled. She is not anticoagulated due to advanced age and frequent falls. She has dementia and her  his with her to provide further information. She denies chest pain, shortness of breath and swelling. She does not weigh daily or check her BP daily. He reports BETO Covarrubias, manages her BP and notes it to be well controlled.     The following portions of the patient's history were reviewed and updated as appropriate:  allergies, current medications, past family history, past medical history, past social history, past surgical history and problem list.    Allergies   Allergen Reactions   • Chocolate Hives   • Codeine Nausea And Vomiting   • Ciprofloxacin Confusion   • Biaxin [Clarithromycin] Unknown (See Comments)     DOES NOT KNOW   • Capoten [Captopril] Cough     Patient takes Lisinopril at home   • Ciprofloxacin Hcl Confusion   • Ciprofloxacin-Ciproflox Hcl Er Confusion   • Darvon [Propoxyphene]    • Diphenhydramine Hcl    • Dyazide [Triamterene-Hctz]    • Enablex [Darifenacin Hydrobromide Er]    • Gadolinium Derivatives    • Homatropine    • Hydrochlorothiazide W-Triamterene    • Hydrocodone    • Inderal La [Propranolol Hcl]      Patient takes Atenolol at Home   • Iodine    • Levaquin [Levofloxacin] Confusion   • Lortab [Hydrocodone-Acetaminophen]    • Lotrel [Amlodipine Besy-Benazepril Hcl]      Patient takes Amlodipine at home.   • Macrodantin  [Nitrofurantoin]    • Macrodantin [Nitrofurantoin Macrocrystal]    • Motrin [Ibuprofen]    • Other      Can now have only unenhanced CAT scan   • Percocet [Oxycodone-Acetaminophen] Nausea And Vomiting   • Phosphate    • Propranolol    • Qvar [Beclomethasone]    • Solifenacin Succinate    • Sulfa Antibiotics Nausea And Vomiting   • Terramycin [Oxytetracycline]    • Vesicare [Solifenacin]    • Ampicillin Itching   • Benadryl [Diphenhydramine] Rash   • Contrast Dye Rash       Current Outpatient Medications:   •  amLODIPine (NORVASC) 5 MG tablet, Take 5 mg by mouth Daily., Disp: , Rfl:   •  aspirin 81 MG EC tablet, Take 81 mg by mouth Every Night., Disp: , Rfl:   •  carvedilol (COREG) 12.5 MG tablet, TAKE 1 TABLET TWICE A DAY, Disp: 180 tablet, Rfl: 3  •  CloNIDine (CATAPRES) 0.1 MG tablet, Take 0.1 mg by mouth 2 (Two) Times a Day., Disp: , Rfl:   •  clopidogrel (PLAVIX) 75 MG tablet, Take  by mouth Daily., Disp: , Rfl: 4  •  ferrous sulfate 325 (65 FE) MG tablet, Take 325 mg by mouth  daily., Disp: , Rfl:   •  folic acid (FOLVITE) 1 MG tablet, Take 1 mg by mouth daily., Disp: , Rfl:   •  hydrALAZINE (APRESOLINE) 50 MG tablet, Take 100 mg by mouth 2 (two) times a day., Disp: , Rfl:   •  lisinopril (PRINIVIL,ZESTRIL) 5 MG tablet, Take 5 mg by mouth Daily., Disp: , Rfl:   •  phenytoin (DILANTIN) 100 MG ER capsule, Take 100 mg by mouth Every Morning., Disp: , Rfl:   •  pravastatin (PRAVACHOL) 40 MG tablet, Take 40 mg by mouth Every Night., Disp: , Rfl:   Past Medical History:   Diagnosis Date   • Adrenal adenoma    • Anemia    • Atrial fibrillation (CMS/HCC)    • Benign neoplasm of cerebral meninges (CMS/HCC)    • Brain tumor (CMS/HCC)    • Dementia (CMS/HCC)    • Disease of thyroid gland    • Hip fracture requiring operative repair (CMS/HCC)     7/2016   • Hyperlipidemia    • Hypertension    • Meningioma (CMS/HCC) 2/5/2018   • Obstructive sleep apnea    • Osteoarthritis    • Peripheral vascular disease (CMS/HCC)    • Pneumonia    • Pulmonary HTN (CMS/HCC) 6/19/2017   • Seizures (CMS/HCC)    • Sick sinus syndrome (CMS/HCC)     s/p pacemaker per Dr. Puri   • TIA (transient ischemic attack)        Social History     Socioeconomic History   • Marital status:      Spouse name: Not on file   • Number of children: Not on file   • Years of education: Not on file   • Highest education level: Not on file   Occupational History   • Occupation: Retired   Tobacco Use   • Smoking status: Never Smoker   • Smokeless tobacco: Never Used   Substance and Sexual Activity   • Alcohol use: No   • Drug use: No   • Sexual activity: Defer       Review of Systems   Constitution: Negative for malaise/fatigue, weight gain and weight loss.   Cardiovascular: Negative for chest pain, dyspnea on exertion, irregular heartbeat, leg swelling, near-syncope, orthopnea, palpitations, paroxysmal nocturnal dyspnea and syncope.   Respiratory: Positive for cough. Negative for shortness of breath, sleep disturbances due to breathing,  "sputum production and wheezing.    Skin: Negative for dry skin, flushing, itching and rash.   Gastrointestinal: Negative for hematemesis and hematochezia.   Neurological: Negative for dizziness, light-headedness, loss of balance and weakness.   All other systems reviewed and are negative.         Objective:     Physical Exam  Unable to preform due to visit being conducted via telephone    Procedures  Pulse 81   Ht 162.6 cm (64\")   Wt 66.2 kg (146 lb)   SpO2 96%   BMI 25.06 kg/m²     Lab Review:   I have reviewed previous office notes, device interrogations, recent labs and recent cardiac testing.     Lab Results   Component Value Date    CHOL 135 07/04/2017    CHLPL 126 (L) 07/12/2016    TRIG 112 07/04/2017    HDL 28 (L) 07/04/2017    LDL 70 07/04/2017     Device interrogation 11/19/2020:   Diagnostic Data  Atrial paced: 0 %  Ventricular paced: 15.5 %  Other: 100% AF burden.  Pt is on Plavix and ASA.  Pt is not fully anticoagulated d/t being high risk in regard to advanced age and falls.  Incision site is well healed without S&S of infection.  Battery status: satisfactory       Results for orders placed during the hospital encounter of 06/19/17   Adult Transthoracic Echo Complete With Contrast    Narrative · Left ventricular wall thickness is consistent with moderate-to-severe   concentric hypertrophy.  · Left ventricular systolic function is normal. Estimated EF = 65%.  · Left ventricular diastolic dysfunction (grade II) consistent with   pseudonormalization.  · Left atrial cavity size is moderately dilated.  · Mild tricuspid valve regurgitation is present.  · Right ventricular cavity is mildly dilated.              Assessment:        Diagnosis Plan   1. Permanent atrial fibrillation (CMS/HCC)     2. Pacemaker     3. Essential hypertension     4. Pulmonary HTN (CMS/HCC)     5. Obstructive sleep apnea     6. Dementia without behavioral disturbance, unspecified dementia type (CMS/[facility])          Plan:       1. " Permanent AF- 100% burden noted on device interrogation. Not anticoagulated due to age and frequent falls.  Continue ASA and plavix.   2. Pacer-normal device function noted on recent interrogation. 100% AF burden noted  3. HTN- controlled. Followed by PCP   4. PHTN- stable. Noted to be severe on echo in 2017.   5. ANAYA- noncompliant with CPAP due to dementia  6. Dementia- stable. Is being cared for by her .     Follow up in 10 months, at next in-office pacemaker interrogation, or sooner if symptoms worsen.        Advance Care Planning   ACP discussion was held with the patient during this visit. Patient has an advance directive (not in EMR), copy requested.

## 2021-05-18 PROBLEM — D32.9 MENINGIOMA (HCC): Status: ACTIVE | Noted: 2021-01-01

## 2021-05-18 NOTE — TELEPHONE ENCOUNTER
Patient is scheduled to see Dr Schmid on 5/25/21 with MRI and has a gadolinium allergy so I was going to send in the medication to pre-treat for this; however, there is indication that she is allergic to Prednisone & Benadryl.  I called the patient and spoke w/her  who states the patient is not able to get out of the house and is pretty much bed ridden.  He says it is too much trouble to get an ambulance to transport her and they wouldn't treat her even if the MRI showed an increase in this meningioma.  He says right now she seems to be doing okay and they would like to cancel the appt and MRI.  He says if she begins to have an issue or they're concerned about something they will call us back.      christin fang CMA

## 2021-10-20 ENCOUNTER — TELEPHONE (OUTPATIENT)
Dept: PALLATIVE CARE | Age: 86
End: 2021-10-20
